# Patient Record
Sex: MALE | Race: WHITE | NOT HISPANIC OR LATINO
[De-identification: names, ages, dates, MRNs, and addresses within clinical notes are randomized per-mention and may not be internally consistent; named-entity substitution may affect disease eponyms.]

---

## 2022-04-27 PROBLEM — Z00.00 ENCOUNTER FOR PREVENTIVE HEALTH EXAMINATION: Status: ACTIVE | Noted: 2022-04-27

## 2022-04-28 ENCOUNTER — APPOINTMENT (OUTPATIENT)
Dept: ORTHOPEDIC SURGERY | Facility: CLINIC | Age: 87
End: 2022-04-28
Payer: MEDICARE

## 2022-04-28 VITALS — WEIGHT: 183 LBS | HEIGHT: 66 IN | BODY MASS INDEX: 29.41 KG/M2

## 2022-04-28 DIAGNOSIS — M75.42 IMPINGEMENT SYNDROME OF LEFT SHOULDER: ICD-10-CM

## 2022-04-28 DIAGNOSIS — M75.52 BURSITIS OF LEFT SHOULDER: ICD-10-CM

## 2022-04-28 DIAGNOSIS — M75.112 INCOMPLETE ROTATOR CUFF TEAR OR RUPTURE OF LEFT SHOULDER, NOT SPECIFIED AS TRAUMATIC: ICD-10-CM

## 2022-04-28 PROCEDURE — 99213 OFFICE O/P EST LOW 20 MIN: CPT

## 2022-04-28 NOTE — ASSESSMENT
[FreeTextEntry1] : CONTINUED PAIN DESPITE CSI AND CONSERVATIVE MGMT X 3 MONTHS\par UNABLE TO GET MRI DUE TO DEFIBRILLATOR\par BEGIN PT\par F/U WITH DR. WOO IF HE REMAINS SYMPTOMATIC, MAY NEED CT ARTHROGRAM FOR SURGICAL PLANNING\par \par The patient was advised of the diagnosis. The natural history of the pathology was explained in full to the patient in layman's terms. All questions were answered. The risks and benefits of surgical and non-surgical treatment alternatives were explained in full to the patient.\par \par I explained to the patient that OTC pain medication, ice, elevation, compression and rest would benefit them. They may return to activity after follow-up or when they no longer have any pain.\par \par The patient will ice, rest, and elevate the area to help with swelling.\par \par Patient is to begin over the counter oral anti-inflammatory medications on an as needed basis, as long as there are no medical contra-indications. Patient is counseled on possible GI and blood pressure side effects.\par \par

## 2022-04-28 NOTE — HISTORY OF PRESENT ILLNESS
[Left Arm] : left arm [9] : 9 [7] : 7 [de-identified] : pt is here for a follow up of the left shoulder. Pain with activity is a 9, with rest it is slightly lower. pt has lost strength in the arm last week. Saw Dr. Arevalo in [facility] 3/5/22 since pain was so bad. Had CSI with only 1 week relief of pain. [de-identified] : none

## 2022-04-28 NOTE — PHYSICAL EXAM
[Left] : left shoulder [Moderate] : moderate [5___] : abduction 5[unfilled]/5 [] : positive impingement testing [TWNoteComboBox4] : passive forward flexion 170 degrees [de-identified] : external rotation at 90 degrees of abduction 70 degrees [TWNoteComboBox5] : internal rotation at 90 degrees of abduction 45 degrees

## 2022-06-02 ENCOUNTER — APPOINTMENT (OUTPATIENT)
Dept: ORTHOPEDIC SURGERY | Facility: CLINIC | Age: 87
End: 2022-06-02

## 2022-08-18 ENCOUNTER — APPOINTMENT (OUTPATIENT)
Dept: ORTHOPEDIC SURGERY | Facility: CLINIC | Age: 87
End: 2022-08-18

## 2022-08-19 ENCOUNTER — APPOINTMENT (OUTPATIENT)
Dept: ORTHOPEDIC SURGERY | Facility: CLINIC | Age: 87
End: 2022-08-19

## 2022-08-19 VITALS — WEIGHT: 183 LBS | BODY MASS INDEX: 29.41 KG/M2 | HEIGHT: 66 IN

## 2022-08-19 DIAGNOSIS — M17.11 UNILATERAL PRIMARY OSTEOARTHRITIS, RIGHT KNEE: ICD-10-CM

## 2022-08-19 PROCEDURE — 99214 OFFICE O/P EST MOD 30 MIN: CPT | Mod: 25

## 2022-08-19 PROCEDURE — 73562 X-RAY EXAM OF KNEE 3: CPT | Mod: RT

## 2022-08-19 PROCEDURE — 20610 DRAIN/INJ JOINT/BURSA W/O US: CPT | Mod: RT

## 2022-08-19 NOTE — IMAGING
[Right] : right knee [de-identified] : Right knee with no skin changes/bony deformity.\par There is no effusion.\par Mild medial jointline TTP . 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

## 2022-08-19 NOTE — HISTORY OF PRESENT ILLNESS
[7] : 7 [Dull/Aching] : dull/aching [Localized] : localized [Retired] : Work status: retired [de-identified] : 8/19/2022: pt here with complaint of right anterior pain x 2 weeks w/o hx of trauma.\par Pt denies instability or associated weakness.\par Pt denies numbness to the RLE. \par \par PMH: CHF.\par Allergies: NKDA.  [] : Post Surgical Visit: no [FreeTextEntry1] : RT knee [FreeTextEntry5] : patient complains of Rt knee pain since 1-2 weeks ago\par no injury

## 2022-08-19 NOTE — PROCEDURE
[Large Joint Injection] : Large joint injection [Right] : of the right [Knee] : knee [Pain] : pain [Inflammation] : inflammation [X-ray evidence of Osteoarthritis on this or prior visit] : x-ray evidence of Osteoarthritis on this or prior visit [Alcohol] : alcohol [Betadine] : betadine [Ethyl Chloride sprayed topically] : ethyl chloride sprayed topically [Sterile technique used] : sterile technique used [___ cc    1%] : Lidocaine ~Vcc of 1%  [___ cc    0.25%] : Bupivacaine (Marcaine) ~Vcc of 0.25%  [___ cc    40mg] : Triamcinolone (Kenalog) ~Vcc of 40 mg  [] : Patient tolerated procedure well [Call if redness, pain or fever occur] : call if redness, pain or fever occur [Apply ice for 15min out of every hour for the next 12-24 hours as tolerated] : apply ice for 15 minutes out of every hour for the next 12-24 hours as tolerated [Previous OTC use and PT nontherapeutic] : patient has tried OTC's including aspirin, Ibuprofen, Aleve, etc or prescription NSAIDS, and/or exercises at home and/or physical therapy without satisfactory response [Patient had decreased mobility in the joint] : patient had decreased mobility in the joint [Risks, benefits, alternatives discussed / Verbal consent obtained] : the risks benefits, and alternatives have been discussed, and verbal consent was obtained [de-identified] : right knee CSI

## 2022-08-19 NOTE — ASSESSMENT
[FreeTextEntry1] : Pt cannot take nsaids due to cardiac issues.\par He has requested a csi today which was provided to the right knee. \par Pt will rto in 1-2 weeks for f/u care with Dr. Hurtado.

## 2023-07-28 ENCOUNTER — APPOINTMENT (OUTPATIENT)
Dept: ORTHOPEDIC SURGERY | Facility: CLINIC | Age: 88
End: 2023-07-28

## 2025-03-19 ENCOUNTER — INPATIENT (INPATIENT)
Facility: HOSPITAL | Age: 89
LOS: 1 days | Discharge: HOME | DRG: 291 | End: 2025-03-21
Attending: HOSPITALIST | Admitting: STUDENT IN AN ORGANIZED HEALTH CARE EDUCATION/TRAINING PROGRAM
Payer: MEDICARE

## 2025-03-19 PROCEDURE — 99285 EMERGENCY DEPT VISIT HI MDM: CPT

## 2025-03-20 ENCOUNTER — RESULT REVIEW (OUTPATIENT)
Age: 89
End: 2025-03-20

## 2025-03-20 ENCOUNTER — TRANSCRIPTION ENCOUNTER (OUTPATIENT)
Age: 89
End: 2025-03-20

## 2025-03-20 VITALS
WEIGHT: 184.97 LBS | RESPIRATION RATE: 18 BRPM | SYSTOLIC BLOOD PRESSURE: 114 MMHG | OXYGEN SATURATION: 100 % | DIASTOLIC BLOOD PRESSURE: 69 MMHG | TEMPERATURE: 99 F | HEIGHT: 68 IN | HEART RATE: 77 BPM

## 2025-03-20 DIAGNOSIS — C61 MALIGNANT NEOPLASM OF PROSTATE: ICD-10-CM

## 2025-03-20 DIAGNOSIS — Z98.890 OTHER SPECIFIED POSTPROCEDURAL STATES: Chronic | ICD-10-CM

## 2025-03-20 DIAGNOSIS — I10 ESSENTIAL (PRIMARY) HYPERTENSION: ICD-10-CM

## 2025-03-20 DIAGNOSIS — I50.9 HEART FAILURE, UNSPECIFIED: ICD-10-CM

## 2025-03-20 DIAGNOSIS — Z95.810 PRESENCE OF AUTOMATIC (IMPLANTABLE) CARDIAC DEFIBRILLATOR: Chronic | ICD-10-CM

## 2025-03-20 DIAGNOSIS — Z98.49 CATARACT EXTRACTION STATUS, UNSPECIFIED EYE: Chronic | ICD-10-CM

## 2025-03-20 DIAGNOSIS — R79.89 OTHER SPECIFIED ABNORMAL FINDINGS OF BLOOD CHEMISTRY: ICD-10-CM

## 2025-03-20 DIAGNOSIS — E78.5 HYPERLIPIDEMIA, UNSPECIFIED: ICD-10-CM

## 2025-03-20 DIAGNOSIS — E11.9 TYPE 2 DIABETES MELLITUS WITHOUT COMPLICATIONS: ICD-10-CM

## 2025-03-20 DIAGNOSIS — I50.23 ACUTE ON CHRONIC SYSTOLIC (CONGESTIVE) HEART FAILURE: ICD-10-CM

## 2025-03-20 DIAGNOSIS — Z29.9 ENCOUNTER FOR PROPHYLACTIC MEASURES, UNSPECIFIED: ICD-10-CM

## 2025-03-20 LAB
A1C WITH ESTIMATED AVERAGE GLUCOSE RESULT: 6.8 % — HIGH (ref 4–5.6)
ALBUMIN SERPL ELPH-MCNC: 3.7 G/DL — SIGNIFICANT CHANGE UP (ref 3.3–5)
ALP SERPL-CCNC: 66 U/L — SIGNIFICANT CHANGE UP (ref 40–120)
ALT FLD-CCNC: 39 U/L — SIGNIFICANT CHANGE UP (ref 12–78)
ANION GAP SERPL CALC-SCNC: 6 MMOL/L — SIGNIFICANT CHANGE UP (ref 5–17)
APTT BLD: 29.4 SEC — SIGNIFICANT CHANGE UP (ref 24.5–35.6)
AST SERPL-CCNC: 25 U/L — SIGNIFICANT CHANGE UP (ref 15–37)
BASOPHILS # BLD AUTO: 0.05 K/UL — SIGNIFICANT CHANGE UP (ref 0–0.2)
BASOPHILS NFR BLD AUTO: 0.6 % — SIGNIFICANT CHANGE UP (ref 0–2)
BILIRUB SERPL-MCNC: 1.2 MG/DL — SIGNIFICANT CHANGE UP (ref 0.2–1.2)
BUN SERPL-MCNC: 35 MG/DL — HIGH (ref 7–23)
CALCIUM SERPL-MCNC: 9.1 MG/DL — SIGNIFICANT CHANGE UP (ref 8.5–10.1)
CHLORIDE SERPL-SCNC: 111 MMOL/L — HIGH (ref 96–108)
CHOLEST SERPL-MCNC: 133 MG/DL — SIGNIFICANT CHANGE UP
CK MB CFR SERPL CALC: 2 NG/ML — SIGNIFICANT CHANGE UP (ref 0–3.6)
CREAT SERPL-MCNC: 1.7 MG/DL — HIGH (ref 0.5–1.3)
EOSINOPHIL # BLD AUTO: 0.26 K/UL — SIGNIFICANT CHANGE UP (ref 0–0.5)
EOSINOPHIL NFR BLD AUTO: 3.3 % — SIGNIFICANT CHANGE UP (ref 0–6)
ESTIMATED AVERAGE GLUCOSE: 148 MG/DL — HIGH (ref 68–114)
GLUCOSE SERPL-MCNC: 114 MG/DL — HIGH (ref 70–99)
HCT VFR BLD CALC: 42.2 % — SIGNIFICANT CHANGE UP (ref 39–50)
HGB BLD-MCNC: 14 G/DL — SIGNIFICANT CHANGE UP (ref 13–17)
IMM GRANULOCYTES NFR BLD AUTO: 0.5 % — SIGNIFICANT CHANGE UP (ref 0–0.9)
INR BLD: 1.4 RATIO — HIGH (ref 0.85–1.16)
LIPID PNL WITH DIRECT LDL SERPL: 81 MG/DL — SIGNIFICANT CHANGE UP
LYMPHOCYTES # BLD AUTO: 2.33 K/UL — SIGNIFICANT CHANGE UP (ref 1–3.3)
LYMPHOCYTES # BLD AUTO: 29.2 % — SIGNIFICANT CHANGE UP (ref 13–44)
MAGNESIUM SERPL-MCNC: 2.4 MG/DL — SIGNIFICANT CHANGE UP (ref 1.6–2.6)
MCHC RBC-ENTMCNC: 31.5 PG — SIGNIFICANT CHANGE UP (ref 27–34)
MCHC RBC-ENTMCNC: 33.2 G/DL — SIGNIFICANT CHANGE UP (ref 32–36)
MCV RBC AUTO: 94.8 FL — SIGNIFICANT CHANGE UP (ref 80–100)
MONOCYTES # BLD AUTO: 0.7 K/UL — SIGNIFICANT CHANGE UP (ref 0–0.9)
MONOCYTES NFR BLD AUTO: 8.8 % — SIGNIFICANT CHANGE UP (ref 2–14)
NEUTROPHILS # BLD AUTO: 4.6 K/UL — SIGNIFICANT CHANGE UP (ref 1.8–7.4)
NON HDL CHOLESTEROL: 94 MG/DL — SIGNIFICANT CHANGE UP
NRBC BLD AUTO-RTO: 0 /100 WBCS — SIGNIFICANT CHANGE UP (ref 0–0)
NT-PROBNP SERPL-SCNC: 9586 PG/ML — HIGH (ref 0–450)
PLATELET # BLD AUTO: 126 K/UL — LOW (ref 150–400)
POTASSIUM SERPL-MCNC: 3.8 MMOL/L — SIGNIFICANT CHANGE UP (ref 3.5–5.3)
POTASSIUM SERPL-SCNC: 3.8 MMOL/L — SIGNIFICANT CHANGE UP (ref 3.5–5.3)
PROTHROM AB SERPL-ACNC: 16.3 SEC — HIGH (ref 9.9–13.4)
RBC # BLD: 4.45 M/UL — SIGNIFICANT CHANGE UP (ref 4.2–5.8)
RBC # FLD: 14.4 % — SIGNIFICANT CHANGE UP (ref 10.3–14.5)
SODIUM SERPL-SCNC: 140 MMOL/L — SIGNIFICANT CHANGE UP (ref 135–145)
TRIGL SERPL-MCNC: 66 MG/DL — SIGNIFICANT CHANGE UP
TROPONIN I, HIGH SENSITIVITY RESULT: 2565.6 NG/L — HIGH
TROPONIN I, HIGH SENSITIVITY RESULT: 2646.5 NG/L — HIGH
WBC # BLD: 7.98 K/UL — SIGNIFICANT CHANGE UP (ref 3.8–10.5)
WBC # FLD AUTO: 7.98 K/UL — SIGNIFICANT CHANGE UP (ref 3.8–10.5)

## 2025-03-20 PROCEDURE — 71045 X-RAY EXAM CHEST 1 VIEW: CPT | Mod: 26

## 2025-03-20 PROCEDURE — 76775 US EXAM ABDO BACK WALL LIM: CPT | Mod: 26

## 2025-03-20 PROCEDURE — 99223 1ST HOSP IP/OBS HIGH 75: CPT | Mod: GC

## 2025-03-20 PROCEDURE — 93010 ELECTROCARDIOGRAM REPORT: CPT

## 2025-03-20 RX ORDER — FINASTERIDE 1 MG/1
5 TABLET, FILM COATED ORAL DAILY
Refills: 0 | Status: DISCONTINUED | OUTPATIENT
Start: 2025-03-20 | End: 2025-03-21

## 2025-03-20 RX ORDER — SACUBITRIL AND VALSARTAN 49; 51 MG/1; MG/1
1 TABLET, FILM COATED ORAL
Refills: 0 | DISCHARGE

## 2025-03-20 RX ORDER — DAPAGLIFLOZIN 5 MG/1
10 TABLET, FILM COATED ORAL DAILY
Refills: 0 | Status: DISCONTINUED | OUTPATIENT
Start: 2025-03-21 | End: 2025-03-21

## 2025-03-20 RX ORDER — INSULIN LISPRO 100 U/ML
INJECTION, SOLUTION INTRAVENOUS; SUBCUTANEOUS
Refills: 0 | Status: DISCONTINUED | OUTPATIENT
Start: 2025-03-20 | End: 2025-03-21

## 2025-03-20 RX ORDER — ASPIRIN 325 MG
81 TABLET ORAL DAILY
Refills: 0 | Status: DISCONTINUED | OUTPATIENT
Start: 2025-03-20 | End: 2025-03-21

## 2025-03-20 RX ORDER — DEXTROSE 50 % IN WATER 50 %
15 SYRINGE (ML) INTRAVENOUS ONCE
Refills: 0 | Status: DISCONTINUED | OUTPATIENT
Start: 2025-03-20 | End: 2025-03-21

## 2025-03-20 RX ORDER — FUROSEMIDE 10 MG/ML
40 INJECTION INTRAMUSCULAR; INTRAVENOUS ONCE
Refills: 0 | Status: COMPLETED | OUTPATIENT
Start: 2025-03-20 | End: 2025-03-20

## 2025-03-20 RX ORDER — FUROSEMIDE 10 MG/ML
40 INJECTION INTRAMUSCULAR; INTRAVENOUS DAILY
Refills: 0 | Status: DISCONTINUED | OUTPATIENT
Start: 2025-03-21 | End: 2025-03-21

## 2025-03-20 RX ORDER — HEPARIN SODIUM 1000 [USP'U]/ML
5000 INJECTION INTRAVENOUS; SUBCUTANEOUS EVERY 8 HOURS
Refills: 0 | Status: DISCONTINUED | OUTPATIENT
Start: 2025-03-20 | End: 2025-03-21

## 2025-03-20 RX ORDER — TAMSULOSIN HYDROCHLORIDE 0.4 MG/1
0.8 CAPSULE ORAL AT BEDTIME
Refills: 0 | Status: DISCONTINUED | OUTPATIENT
Start: 2025-03-20 | End: 2025-03-21

## 2025-03-20 RX ORDER — ASPIRIN 325 MG
324 TABLET ORAL ONCE
Refills: 0 | Status: COMPLETED | OUTPATIENT
Start: 2025-03-20 | End: 2025-03-20

## 2025-03-20 RX ORDER — ATORVASTATIN CALCIUM 80 MG/1
80 TABLET, FILM COATED ORAL AT BEDTIME
Refills: 0 | Status: DISCONTINUED | OUTPATIENT
Start: 2025-03-20 | End: 2025-03-21

## 2025-03-20 RX ORDER — ATORVASTATIN CALCIUM 80 MG/1
1 TABLET, FILM COATED ORAL
Refills: 0 | DISCHARGE

## 2025-03-20 RX ORDER — DEXTROSE 50 % IN WATER 50 %
25 SYRINGE (ML) INTRAVENOUS ONCE
Refills: 0 | Status: DISCONTINUED | OUTPATIENT
Start: 2025-03-20 | End: 2025-03-21

## 2025-03-20 RX ORDER — DAPAGLIFLOZIN 5 MG/1
1 TABLET, FILM COATED ORAL
Refills: 0 | DISCHARGE

## 2025-03-20 RX ORDER — TAMSULOSIN HYDROCHLORIDE 0.4 MG/1
2 CAPSULE ORAL
Refills: 0 | DISCHARGE

## 2025-03-20 RX ORDER — ASPIRIN 325 MG
0 TABLET ORAL
Refills: 0 | DISCHARGE

## 2025-03-20 RX ORDER — FINASTERIDE 1 MG/1
1 TABLET, FILM COATED ORAL
Refills: 0 | DISCHARGE

## 2025-03-20 RX ORDER — CARVEDILOL 3.12 MG/1
6.25 TABLET, FILM COATED ORAL EVERY 12 HOURS
Refills: 0 | Status: DISCONTINUED | OUTPATIENT
Start: 2025-03-20 | End: 2025-03-21

## 2025-03-20 RX ORDER — GLUCAGON 3 MG/1
1 POWDER NASAL ONCE
Refills: 0 | Status: DISCONTINUED | OUTPATIENT
Start: 2025-03-20 | End: 2025-03-21

## 2025-03-20 RX ORDER — SODIUM CHLORIDE 9 G/1000ML
1000 INJECTION, SOLUTION INTRAVENOUS
Refills: 0 | Status: DISCONTINUED | OUTPATIENT
Start: 2025-03-20 | End: 2025-03-21

## 2025-03-20 RX ORDER — DEXTROSE 50 % IN WATER 50 %
12.5 SYRINGE (ML) INTRAVENOUS ONCE
Refills: 0 | Status: DISCONTINUED | OUTPATIENT
Start: 2025-03-20 | End: 2025-03-21

## 2025-03-20 RX ORDER — SACUBITRIL AND VALSARTAN 49; 51 MG/1; MG/1
1 TABLET, FILM COATED ORAL
Refills: 0 | Status: DISCONTINUED | OUTPATIENT
Start: 2025-03-20 | End: 2025-03-20

## 2025-03-20 RX ADMIN — FUROSEMIDE 40 MILLIGRAM(S): 10 INJECTION INTRAMUSCULAR; INTRAVENOUS at 08:51

## 2025-03-20 RX ADMIN — INSULIN LISPRO 1: 100 INJECTION, SOLUTION INTRAVENOUS; SUBCUTANEOUS at 08:52

## 2025-03-20 RX ADMIN — TAMSULOSIN HYDROCHLORIDE 0.8 MILLIGRAM(S): 0.4 CAPSULE ORAL at 22:15

## 2025-03-20 RX ADMIN — Medication 324 MILLIGRAM(S): at 02:23

## 2025-03-20 RX ADMIN — Medication 4 GRAM(S): at 08:51

## 2025-03-20 RX ADMIN — Medication 81 MILLIGRAM(S): at 12:49

## 2025-03-20 RX ADMIN — HEPARIN SODIUM 5000 UNIT(S): 1000 INJECTION INTRAVENOUS; SUBCUTANEOUS at 04:32

## 2025-03-20 RX ADMIN — FUROSEMIDE 40 MILLIGRAM(S): 10 INJECTION INTRAMUSCULAR; INTRAVENOUS at 01:11

## 2025-03-20 RX ADMIN — HEPARIN SODIUM 5000 UNIT(S): 1000 INJECTION INTRAVENOUS; SUBCUTANEOUS at 16:47

## 2025-03-20 RX ADMIN — ATORVASTATIN CALCIUM 80 MILLIGRAM(S): 80 TABLET, FILM COATED ORAL at 22:15

## 2025-03-20 RX ADMIN — CARVEDILOL 6.25 MILLIGRAM(S): 3.12 TABLET, FILM COATED ORAL at 17:15

## 2025-03-20 RX ADMIN — Medication 4 GRAM(S): at 22:15

## 2025-03-20 RX ADMIN — HEPARIN SODIUM 5000 UNIT(S): 1000 INJECTION INTRAVENOUS; SUBCUTANEOUS at 22:15

## 2025-03-20 RX ADMIN — FINASTERIDE 5 MILLIGRAM(S): 1 TABLET, FILM COATED ORAL at 12:49

## 2025-03-20 NOTE — CARE COORDINATION ASSESSMENT. - NSPASTMEDSURGHISTORY_GEN_ALL_CORE_FT
PAST MEDICAL & SURGICAL HISTORY:  CHF (congestive heart failure)      T2DM (type 2 diabetes mellitus)      Prostate CA      HLD (hyperlipidemia)      HTN (hypertension)      NICM (nonischemic cardiomyopathy)      AICD (automatic cardioverter/defibrillator) present      H/O inguinal hernia repair      H/O cataract extraction      H/O prostate biopsy

## 2025-03-20 NOTE — DIETITIAN INITIAL EVALUATION ADULT - PROBLEM/PLAN-8
----- Message from Cony Lombardi MD sent at 4/16/2021  5:42 PM CDT -----  Negative COVID-19 PCR test.  Inform parent.  
Called and spoke with mother, informed her of lab result message below, mother voice understanding, no further questions asked.    
DISPLAY PLAN FREE TEXT

## 2025-03-20 NOTE — DIETITIAN INITIAL EVALUATION ADULT - NS FNS DIET ORDER
Diet, Regular:   DASH/TLC {Sodium & Cholesterol Restricted}  1200mL Fluid Restriction ([id number]) (03-20-25 @ 03:01)

## 2025-03-20 NOTE — CARE COORDINATION ASSESSMENT. - NSCAREPROVIDERS_GEN_ALL_CORE_FT
CARE PROVIDERS:  Accepting Physician: Maco Kruger  Access Services: Brook Perdomo  Administration: Kylee Dumont  Admitting: Maco Kruger  Attending: Maco Kruger  Cardiology Technician: Tamela Callaway  Case Management: Shannan Desouza  Consultant: Cayetano Boothe  Consultant: Ibrahima Ramires  Covering Team: Trent Velasquez  Covering Team: Laurita Cotter  Covering Team: Yordy Rod  ED Attending: Pablito Munoz  ED Nurse: Breana Clark  Nurse: Johanna Miller  Nurse: Nury Sanford  Nurse: Gianna Christianson  Nurse: Rosa Chaudhary  Ordered: ServiceAccount, SCMMLM  Ordered: Doctor, Unknown  Override: Johanna Miller  Physical Therapy: Myla Roberto  Primary Team: Ariel Chilel  Primary Team: Fito Whitley  Primary Team: Maco Kruger  Registered Dietitian: No Kim  Registered Dietitian: Ariadna Li  Respiratory Therapy: Alyson Hagen  : Savannah Marrufo  Team: [facility], Team

## 2025-03-20 NOTE — DISCHARGE NOTE PROVIDER - NSDCCPCAREPLAN_GEN_ALL_CORE_FT
PRINCIPAL DISCHARGE DIAGNOSIS  Diagnosis: Acute CHF  Assessment and Plan of Treatment: You were given IV Lasix with improvement in respiratory status.  Please continue lasix 40mg  oral daily.  Follow up with your PMD and Cardiologist in 1 week.      SECONDARY DISCHARGE DIAGNOSES  Diagnosis: HTN (hypertension)  Assessment and Plan of Treatment: Your blood pressure has been soft during the hospitalization.  Please check your blood pressure prior to taking your antihypertensive medications.  Hold Coreg and Entresto if SBP<110.    Diagnosis: HLD (hyperlipidemia)  Assessment and Plan of Treatment: Continue your statin medication.

## 2025-03-20 NOTE — DISCHARGE NOTE PROVIDER - CARE PROVIDERS DIRECT ADDRESSES
[email or web address]

## 2025-03-20 NOTE — PHYSICAL THERAPY INITIAL EVALUATION ADULT - AMBULATION SKILLS, REHAB EVAL
Patient is c/o nausea/vomiting/diarrhea with abd cramping. Denies blood in the stool, denies recent travel. Denies cp/sob/syncope.   Vitals reviewed.   Patient is awake, alert, answering questions appropriately, appears comfortable and not in any distress.  Lungs: CTA, no wheezing, no crackles.  Abd: +BS, NT, ND, soft, no rebound, no guarding. No CVA tenderness, no rash.  CNS: awake, alert, o x 3, no focal neurologic deficits.  A/P: Acute gastroenteritis,   labs, IVF,   symptomatic treatment,   reevaluation. independent

## 2025-03-20 NOTE — H&P ADULT - NSICDXPASTSURGICALHX_GEN_ALL_CORE_FT
PAST SURGICAL HISTORY:  AICD (automatic cardioverter/defibrillator) present     H/O cataract extraction     H/O inguinal hernia repair     H/O prostate biopsy

## 2025-03-20 NOTE — H&P ADULT - ATTENDING COMMENTS
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 Admitted for acute on chronic CHF exacerbation.     #Acute on chronic CHF exacerbation  Patient presenting with shortness of breath with exertional episode, associated cough, orthopnea   - CXR showing increased pulmonary vasculature, blunted costophrenic angles b/l lobes, on wet read pending official   - proBNP elevated 9586, Troponin 2646   - EKG with LBBB, no prior to compare   - S/P Lasix 40mg IVP x 1,  x 1   - will order one additional IV dose of 40 lasix in AM, and will allow cardiology to place standing regimen  - No old TTE record, however based on chart review PCP note EF 15%   - Obtain TTE   - Strict I&Os, daily weights, fluid restriction   - Remote tele, continuous pulse ox   - Supplemental O2 wean as tolerated   - Cardiology consulted Dr. Boothe, F/U recs.    #Demand Ischemia  Troponin 2646 on presentation  - SOB with exertion; no chest pain  - EKG with LBBB   - Suspect demand ischemia in setting of CHF exacerbation  - Trend troponin to peak    Agree with Resident's Note. Refer to resident's note for chronic comorbidities  76 minutes spent on total encounter excluding teaching and separately reported services. The necessity of the time spent during the encounter on this date of service was due to:   activities including direct patient care, counseling and/or coordinating care, and reviewing notes/lab data/imaging

## 2025-03-20 NOTE — H&P ADULT - PROBLEM SELECTOR PLAN 1
Patient presenting with shortness of breath with exertional episode, associated cough, orthopnea   - CXR showing increased pulmonary vasculature, blunted costophrenic angles b/l lobes, on wet read pending official   - proBNP elevated 9586, Troponin 2646   - EKG with LBBB, no prior to compare   - S/P Lasix 40mg IVP x 1,  x 1   - Start Lasix 40mg IVP BID  - No old TTE record, however based on chart review PCP note EF 15%   - Obtain TTE   - Strict I&Os, daily weights, fluid restriction   - Cardiology consulted Dr. Boothe, F/U recs Patient presenting with shortness of breath with exertional episode, associated cough, orthopnea   - CXR showing increased pulmonary vasculature, blunted costophrenic angles b/l lobes, on wet read pending official   - proBNP elevated 9586, Troponin 2646   - EKG with LBBB, no prior to compare   - S/P Lasix 40mg IVP x 1,  x 1   - Start Lasix 40mg IVP BID  - No old TTE record, however based on chart review PCP note EF 15%   - Obtain TTE   - Strict I&Os, daily weights, fluid restriction   - Remote tele, continuous pulse ox   - Cardiology consulted Dr. Boothe, F/U recs Patient presenting with shortness of breath with exertional episode, associated cough, orthopnea   - CXR showing increased pulmonary vasculature, blunted costophrenic angles b/l lobes, on wet read pending official   - proBNP elevated 9586, Troponin 2646   - EKG with LBBB, no prior to compare   - S/P Lasix 40mg IVP x 1,  x 1   - Start Lasix 40mg IVP BID  - No old TTE record, however based on chart review PCP note EF 15%   - Obtain TTE   - Strict I&Os, daily weights, fluid restriction   - Remote tele, continuous pulse ox   - Supplemental O2 wean as tolerated   - Cardiology consulted Dr. Boothe, F/U recs Patient presenting with shortness of breath with exertional episode, associated cough, orthopnea   - CXR showing increased pulmonary vasculature, blunted costophrenic angles b/l lobes, on wet read pending official   - proBNP elevated 9586, Troponin 2646   - EKG with LBBB, no prior to compare   - S/P Lasix 40mg IVP x 1,  x 1   - one additional IV lasix 40 mg ordered for AM, cardiology to determine standing lasix regimen given renal functioning  - No old TTE record, however based on chart review PCP note EF 15%   - Obtain TTE   - Strict I&Os, daily weights, fluid restriction   - Remote tele, continuous pulse ox   - Supplemental O2 wean as tolerated   - Cardiology consulted Dr. Boothe, F/U recs  - Nephro consulted; Dr Kaur for managing diuresis and preserving renal function

## 2025-03-20 NOTE — ED PROVIDER NOTE - OBJECTIVE STATEMENT
89 male PMH of CHF, EF 17%, on coreg 12.5 mg, lasix 20mg, farxiga 5mg, asa 81mg, entresto 49/51, afluzozin, presents to ER c/o SOB, worse with exertion today, denies chest pain, no fever, no cough. Patient states he has been cutting back on his lasix.  cardio Mezzafonte  PCP Jabario 89 male PMH of CHF, EF 17%, PPM, on coreg 12.5 mg, lasix 20mg, farxiga 5mg, asa 81mg, entresto 49/51, afluzozin, presents to ER c/o SOB, worse with exertion today, denies chest pain, no fever, no cough. Patient states he has been cutting back on his lasix.  cardio Mezzafonte  PCP Estefani

## 2025-03-20 NOTE — H&P ADULT - PROBLEM SELECTOR PLAN 2
Troponin 2646 on presentation  - SOB with exertion; no chest pain  - EKG with LBBB   - Suspect demand ischemia in setting of CHF exacerbation  - Trend troponin to peak

## 2025-03-20 NOTE — DIETITIAN INITIAL EVALUATION ADULT - PERTINENT LABORATORY DATA
03-20    140  |  111[H]  |  35[H]  ----------------------------<  114[H]  3.8   |  23  |  1.70[H]    Ca    9.1      20 Mar 2025 01:15  Mg     2.4     03-20    TPro  6.4  /  Alb  3.7  /  TBili  1.2  /  DBili  x   /  AST  25  /  ALT  39  /  AlkPhos  66  03-20  POCT Blood Glucose.: 160 mg/dL (03-20-25 @ 08:35)  A1C with Estimated Average Glucose Result: 6.8 % (03-20-25 @ 05:52)

## 2025-03-20 NOTE — CONSULT NOTE ADULT - SUBJECTIVE AND OBJECTIVE BOX
History of Present Illness:    Past Medical/Surgical History:    Medications:    Family History: Non-contributory family history of premature cardiovascular atherosclerotic disease    Social History: No tobacco, alcohol or drug use    Review of Systems:  General: No fevers, chills, weight gain  Skin: No rashes, color changes  Cardiovascular: No chest pain, orthopnea  Respiratory: No shortness of breath, cough  Gastrointestinal: No nausea, abdominal pain  Genitourinary: No incontinence, pain with urination  Musculoskeletal: No pain, swelling, decreased range of motion  Neurological: No headache, weakness  Psychiatric: No depression, anxiety  Endocrine: No weight gain, increased thirst  All other systems are comprehensively negative.    Physical Exam:  Vitals:        Vital Signs Last 24 Hrs  T(C): 36.8 (20 Mar 2025 04:52), Max: 37.1 (20 Mar 2025 00:11)  T(F): 98.3 (20 Mar 2025 04:52), Max: 98.7 (20 Mar 2025 00:11)  HR: 75 (20 Mar 2025 04:52) (75 - 77)  BP: 120/65 (20 Mar 2025 04:52) (114/69 - 120/65)  BP(mean): --  RR: 18 (20 Mar 2025 04:52) (18 - 18)  SpO2: 96% (20 Mar 2025 04:52) (96% - 100%)    Parameters below as of 20 Mar 2025 04:52  Patient On (Oxygen Delivery Method): nasal cannula  O2 Flow (L/min): 4    General: NAD  HEENT: MMM  Neck: No JVD, no carotid bruit  Lungs: CTAB  CV: RRR, nl S1/S2, no M/R/G  Abdomen: S/NT/ND, +BS  Extremities: No LE edema, no cyanosis  Neuro: AAOx3, non-focal  Skin: No rash    Labs:                        14.0   7.98  )-----------( 126      ( 20 Mar 2025 01:15 )             42.2     03-20    140  |  111[H]  |  35[H]  ----------------------------<  114[H]  3.8   |  23  |  1.70[H]    Ca    9.1      20 Mar 2025 01:15  Mg     2.4     03-20    TPro  6.4  /  Alb  3.7  /  TBili  1.2  /  DBili  x   /  AST  25  /  ALT  39  /  AlkPhos  66  03-20    CARDIAC MARKERS ( 20 Mar 2025 01:15 )  x     / x     / x     / x     / 2.0 ng/mL      PT/INR - ( 20 Mar 2025 01:15 )   PT: 16.3 sec;   INR: 1.40 ratio         PTT - ( 20 Mar 2025 01:15 )  PTT:29.4 sec    ECG/Telemetry:      History of Present Illness: The patient is an 89 year old male with a history of HTN, HL, DM, CKD, chronic systolic heart failure s/p ICD prostate cancer who presents with shortness of breath. He noted over the last day or so worsening dyspnea on exertion. His legs have been mildly swollen. No chest pain or dizziness. He states he has not been taking care of himself as well due to visiting his wife in the hospital and rehab after she suffered a stroke.    Past Medical/Surgical History:  HTN, HL, DM, CKD, chronic systolic heart failure s/p ICD prostate cancer    Medications:    Family History: Non-contributory family history of premature cardiovascular atherosclerotic disease    Social History: No tobacco, alcohol or drug use    Review of Systems:  General: No fevers, chills, weight gain  Skin: No rashes, color changes  Cardiovascular: No chest pain, orthopnea  Respiratory: No shortness of breath, cough  Gastrointestinal: No nausea, abdominal pain  Genitourinary: No incontinence, pain with urination  Musculoskeletal: No pain, swelling, decreased range of motion  Neurological: No headache, weakness  Psychiatric: No depression, anxiety  Endocrine: No weight gain, increased thirst  All other systems are comprehensively negative.    Physical Exam:  Vitals:        Vital Signs Last 24 Hrs  T(C): 36.8 (20 Mar 2025 04:52), Max: 37.1 (20 Mar 2025 00:11)  T(F): 98.3 (20 Mar 2025 04:52), Max: 98.7 (20 Mar 2025 00:11)  HR: 75 (20 Mar 2025 04:52) (75 - 77)  BP: 120/65 (20 Mar 2025 04:52) (114/69 - 120/65)  BP(mean): --  RR: 18 (20 Mar 2025 04:52) (18 - 18)  SpO2: 96% (20 Mar 2025 04:52) (96% - 100%)    Parameters below as of 20 Mar 2025 04:52  Patient On (Oxygen Delivery Method): nasal cannula  O2 Flow (L/min): 4    General: NAD  HEENT: MMM  Neck: No JVD, no carotid bruit  Lungs: CTAB  CV: RRR, nl S1/S2, no M/R/G  Abdomen: S/NT/ND, +BS  Extremities: 1+ LE edema, no cyanosis  Neuro: AAOx3, non-focal  Skin: No rash    Labs:                        14.0   7.98  )-----------( 126      ( 20 Mar 2025 01:15 )             42.2     03-20    140  |  111[H]  |  35[H]  ----------------------------<  114[H]  3.8   |  23  |  1.70[H]    Ca    9.1      20 Mar 2025 01:15  Mg     2.4     03-20    TPro  6.4  /  Alb  3.7  /  TBili  1.2  /  DBili  x   /  AST  25  /  ALT  39  /  AlkPhos  66  03-20    CARDIAC MARKERS ( 20 Mar 2025 01:15 )  x     / x     / x     / x     / 2.0 ng/mL      PT/INR - ( 20 Mar 2025 01:15 )   PT: 16.3 sec;   INR: 1.40 ratio         PTT - ( 20 Mar 2025 01:15 )  PTT:29.4 sec    ECG/Telemetry: NSR, LBBB

## 2025-03-20 NOTE — H&P ADULT - NSICDXPASTMEDICALHX_GEN_ALL_CORE_FT
PAST MEDICAL HISTORY:  CHF (congestive heart failure)     HLD (hyperlipidemia)     HTN (hypertension)     NICM (nonischemic cardiomyopathy)     Prostate CA     T2DM (type 2 diabetes mellitus)

## 2025-03-20 NOTE — PATIENT PROFILE ADULT - FALL HARM RISK - HARM RISK INTERVENTIONS

## 2025-03-20 NOTE — DISCHARGE NOTE PROVIDER - PROVIDER TOKENS
PROVIDER:[TOKEN:[[id number]],FOLLOWUP:[1 week]],PROVIDER:[TOKEN:[[id number]],FOLLOWUP:[1 week]]

## 2025-03-20 NOTE — CONSULT NOTE ADULT - SUBJECTIVE AND OBJECTIVE BOX
[facility]                             Nephrology and Hypertension                             Schoeneckhair Ramires                                          [phone]     Patient is a 89y old  Male who presents with a chief complaint of Heart failure     (20 Mar 2025 11:28)       HPI:  89 yo M with PMH HTN, HLD, T2DM, CKD, CHF 2/2 nonischemic cardiomyopathy s/p AICD 2018, Prostate CA S/P radioactive seed implantation in 2015, presents to the ED after episodes of sudden onset shortness of breath that occurred today. Pt states that these happened around ~6PM and ~11PM, while patient was exerting himself. Endorsed some associated dry cough, but denies any chest pain, palpitations, nausea, vomiting, or shoulder pain at the time. Pt does have some baseline SOB with exertion but today's episodes were more severe than prior. Does endorse significant orthopnea.   Pt is under significant amounts of stress due to his wife recently having a stroke and was admitted to a [facility] today.   Pt follows with his cardiologist every 3 months, no acute issues at the most recent visit. Pt feels comfortable at rest at this time, on 3.5L NC sating mid 90s. Baseline at home on RA.     ED Course:   Vitals: BP: 114/69, HR: 77, Temp: 98.7F, RR: 18, SpO2: 100% on RA   Labs:  BUN/Cr 35/1.7, Trop 2646, proBNP 9586    Nephrology is consulted for HANNAH on CKD. Patient states he has kidney disease but does not recall what the baseline creatinine is. He states breathing is better, no chest pain, no dizziness, has no chest pain. Has been getting iv lasix       PAST MEDICAL & SURGICAL HISTORY:  NICM (nonischemic cardiomyopathy)      HTN (hypertension)      HLD (hyperlipidemia)      Prostate CA      T2DM (type 2 diabetes mellitus)      CHF (congestive heart failure)      H/O prostate biopsy      H/O cataract extraction      H/O inguinal hernia repair      AICD (automatic cardioverter/defibrillator) present           FAMILY HISTORY:  Family history of cardiomyopathy (Father)    FH: CHF (congestive heart failure) (Sibling)    NC    Social History:Non smoker    MEDICATIONS  (STANDING):  aspirin enteric coated 81 milliGRAM(s) Oral daily  atorvastatin 80 milliGRAM(s) Oral at bedtime  carvedilol 6.25 milliGRAM(s) Oral every 12 hours  cholestyramine Powder (Sugar-Free) 4 Gram(s) Oral two times a day  dextrose 5%. 1000 milliLiter(s) (100 mL/Hr) IV Continuous <Continuous>  dextrose 5%. 1000 milliLiter(s) (50 mL/Hr) IV Continuous <Continuous>  dextrose 50% Injectable 25 Gram(s) IV Push once  dextrose 50% Injectable 12.5 Gram(s) IV Push once  dextrose 50% Injectable 25 Gram(s) IV Push once  finasteride 5 milliGRAM(s) Oral daily  glucagon  Injectable 1 milliGRAM(s) IntraMuscular once  heparin   Injectable 5000 Unit(s) SubCutaneous every 8 hours  insulin lispro (ADMELOG) corrective regimen sliding scale   SubCutaneous three times a day before meals  sacubitril 24 mG/valsartan 26 mG 1 Tablet(s) Oral two times a day  tamsulosin 0.8 milliGRAM(s) Oral at bedtime    MEDICATIONS  (PRN):  dextrose Oral Gel 15 Gram(s) Oral once PRN Blood Glucose LESS THAN 70 milliGRAM(s)/deciliter   Meds reviewed    Allergies    No Known Allergies    Intolerances         REVIEW OF SYSTEMS: neg other than above HPI    Vital Signs Last 24 Hrs  T(C): 37.1 (20 Mar 2025 08:31), Max: 37.1 (20 Mar 2025 00:11)  T(F): 98.7 (20 Mar 2025 08:31), Max: 98.7 (20 Mar 2025 00:11)  HR: 72 (20 Mar 2025 10:59) (55 - 77)  BP: 107/68 (20 Mar 2025 10:59) (102/68 - 120/65)  BP(mean): --  RR: 18 (20 Mar 2025 08:31) (18 - 18)  SpO2: 91% (20 Mar 2025 10:59) (91% - 100%)    Parameters below as of 20 Mar 2025 10:59  Patient On (Oxygen Delivery Method): room air      Daily Height in cm: 172.72 (20 Mar 2025 00:11)    Daily     PHYSICAL EXAM:    GENERAL: NAD  HEAD:  Atraumatic, Normocephalic  EYES: EOMI, conjunctiva and sclera clear  ENMT: No Drainage from nares, No drainage from ears  NERVOUS SYSTEM:  Awake and Alert  CHEST/LUNG: decreased BS  EXTREMITIES:  No Edema  SKIN: No rashes No obvious ecchymosis      LABS:                        14.0   7.98  )-----------( 126      ( 20 Mar 2025 01:15 )             42.2     03-20    140  |  111[H]  |  35[H]  ----------------------------<  114[H]  3.8   |  23  |  1.70[H]    Ca    9.1      20 Mar 2025 01:15  Mg     2.4     03-20    TPro  6.4  /  Alb  3.7  /  TBili  1.2  /  DBili  x   /  AST  25  /  ALT  39  /  AlkPhos  66  03-20    PT/INR - ( 20 Mar 2025 01:15 )   PT: 16.3 sec;   INR: 1.40 ratio         PTT - ( 20 Mar 2025 01:15 )  PTT:29.4 sec  Urinalysis Basic - ( 20 Mar 2025 01:15 )    Color: x / Appearance: x / SG: x / pH: x  Gluc: 114 mg/dL / Ketone: x  / Bili: x / Urobili: x   Blood: x / Protein: x / Nitrite: x   Leuk Esterase: x / RBC: x / WBC x   Sq Epi: x / Non Sq Epi: x / Bacteria: x      Magnesium: 2.4 mg/dL (03-20 @ 01:15)      RADIOLOGY & ADDITIONAL TESTS:  CXR: increased pulmonary vasculature, blunted costophrenic angles b/l lobes, on wet read pending official   EKG: LBBB, no prior EKG   Received in the ED: Lasix 40mg IVP x 1,  x 1   (20 Mar 2025 03:02)

## 2025-03-20 NOTE — DIETITIAN INITIAL EVALUATION ADULT - PERTINENT MEDS FT
MEDICATIONS  (STANDING):  aspirin enteric coated 81 milliGRAM(s) Oral daily  atorvastatin 80 milliGRAM(s) Oral at bedtime  carvedilol 6.25 milliGRAM(s) Oral every 12 hours  cholestyramine Powder (Sugar-Free) 4 Gram(s) Oral two times a day  dextrose 5%. 1000 milliLiter(s) (100 mL/Hr) IV Continuous <Continuous>  dextrose 5%. 1000 milliLiter(s) (50 mL/Hr) IV Continuous <Continuous>  dextrose 50% Injectable 25 Gram(s) IV Push once  dextrose 50% Injectable 12.5 Gram(s) IV Push once  dextrose 50% Injectable 25 Gram(s) IV Push once  finasteride 5 milliGRAM(s) Oral daily  glucagon  Injectable 1 milliGRAM(s) IntraMuscular once  heparin   Injectable 5000 Unit(s) SubCutaneous every 8 hours  insulin lispro (ADMELOG) corrective regimen sliding scale   SubCutaneous three times a day before meals  sacubitril 24 mG/valsartan 26 mG 1 Tablet(s) Oral two times a day  tamsulosin 0.8 milliGRAM(s) Oral at bedtime    MEDICATIONS  (PRN):  dextrose Oral Gel 15 Gram(s) Oral once PRN Blood Glucose LESS THAN 70 milliGRAM(s)/deciliter

## 2025-03-20 NOTE — GOALS OF CARE CONVERSATION - ADVANCED CARE PLANNING - CONVERSATION DETAILS
I discussed with patient in regards to his code status.  Pt. reports in the setting of cardiac arrest or acute respiratory failure he would want full resuscitation efforts.  Pt. remains full code.

## 2025-03-20 NOTE — H&P ADULT - NSICDXFAMILYHX_GEN_ALL_CORE_FT
FAMILY HISTORY:  Father  Still living? Unknown  Family history of cardiomyopathy, Age at diagnosis: Age Unknown    Sibling  Still living? Unknown  FH: CHF (congestive heart failure), Age at diagnosis: Age Unknown

## 2025-03-20 NOTE — DIETITIAN INITIAL EVALUATION ADULT - OTHER INFO
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 Admitted for acute on chronic CHF exacerbation.     Pt A+Ox4 at visit. Dash/TLC diet with 1.2L po FR rx. Pt tolerating meals well with good appetite/po intake. No report N/V. BM 3/19. Tries to watch sugar and salt in diet pta however admits to noncompliance at times. Consumes egg sandwich on bagel 2-3x/week. Small use table salt. Admits to increase fluid consumption recently. UBW 185lbs. Stable per pt, weighs himself regularly due to HF dx.  Recent cut back on lasix to every other day per pt. Hx type II DM, farxiga pta. A1c 6.8%. Tries to watch sugars in diet though does drink OJ for breakfast. Recommend Dash/TLC, Consistent Carbohyedrate diet with 1.2L po FR. Encourage compliance to rx therapeutic diet plus po FR. Written and verbal diet & HF nutrition therapy provided.

## 2025-03-20 NOTE — H&P ADULT - PATIENT'S PREFERRED PRONOUN
Him/He Carac Counseling:  I discussed with the patient the risks of Carac including but not limited to erythema, scaling, itching, weeping, crusting, and pain.

## 2025-03-20 NOTE — CASE MANAGEMENT PROGRESS NOTE - NSCMPROGRESSNOTE_GEN_ALL_CORE
Patient is identified as a CMS STAR patient. Transition care management program explained yellow contact card has been provided. PT eval- no skilled PT needs or DME recommended. Patient is currently on IV Lasix 40 daily, 4LNC and pending an Echo. CM remains available for transition planning when medically cleared.

## 2025-03-20 NOTE — ED ADULT TRIAGE NOTE - CHIEF COMPLAINT QUOTE
89 yr old male arrives to ED c/o sob, Pt notes worsening during activity, no obstruction to airway, Spo2 87% on RA, Pt able to completed full sentences without becoming sob. Pt notes no chest pain, fevers or chills, Eli TEE

## 2025-03-20 NOTE — H&P ADULT - HISTORY OF PRESENT ILLNESS
88 yo M with PMH HTN, HLD, T2DM ,CKD, CAD, NICM s/p AICD 2018, CHF, Prostate CA S/P radioactive seed implantation in 2015, GERD,  presents to the ED with worsening SOB on exertion.     Denies fever, chills, chest pain, palpitations, SOB, cough, abdominal pain, nausea, vomiting, diarrhea, constipation, urinary frequency, urgency, or dysuria, headaches, changes in vision, dizziness, numbness, tingling.  Denies recent travel, recent antibiotic use, or sick contacts.    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 Pt states that these happened around ~6PM and ~11PM, while patient was exerting himself. Endorsed some associated dry cough, but denies any chest pain, palpitations, nausea, vomiting, or shoulder pain at the time. Pt does have some baseline SOB with exertion but today's episodes were more severe than prior. Does endorse significant orthopnea.   Pt is under significant amounts of stress due to his wife recently having a stroke and was admitted to a [facility] today.   Pt follows with his cardiologist every 3 months, no acute issues at the most recent visit. Pt feels comfortable at rest at this time, on 3.5L NC sating mid 90s. Baseline at home on RA.     ED Course:   Vitals: BP: 114/69, HR: 77, Temp: 98.7F, RR: 18, SpO2: 100% on RA   Labs:  BUN/Cr 35/1.7, Trop 2646, proBNP 9586  CXR: increased pulmonary vasculature, blunted costophrenic angles b/l lobes, on wet read pending official   EKG: LBBB, no prior EKG   Received in the ED: Lasix 40mg IVP x 1,  x 1

## 2025-03-20 NOTE — ED ADULT NURSE NOTE - OBJECTIVE STATEMENT
The patient is a 89 years old male, presented to the ED with complaint of SOB. Patient reported that he experienced SOB while walking to the bathroom. Never had this before. 87% RA in triage. Patient denies pain, fever. Patient is alert oriented X3, no acute distress.  Patient has history of Diabetes.

## 2025-03-20 NOTE — CARE COORDINATION ASSESSMENT. - NSADDITIONAL INFORMATION_FT
Pt chart reviewed. Pt discussed during Interdisciplinary rounds. Pt is being managed on TELE for SOB. Pt discharge plan pending hospital course. This CM met with the pt at the bedside. This CM introduced self and CM role in discharge planning, pt verbalized understanding. Pt is aaox4, pt confirmed his demographics, pt declined to identify a caregiver. Pt reports he is independent w/ his ADL's, denies using any DME and denies having any home care. Care management team will remain available to assist w/ discharge planning needs.

## 2025-03-20 NOTE — PHYSICAL THERAPY INITIAL EVALUATION ADULT - ADDITIONAL COMMENTS
Pt lives in a house w/ stairs.  Pt is a community ambulator and is able to drive a car.  Currently pt's wife is at [facility].

## 2025-03-20 NOTE — PROGRESS NOTE ADULT - ASSESSMENT
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 Admitted for acute on chronic CHF exacerbation.        Problem/Plan - 1:  ·  Problem: Acute on chronic systolic congestive heart failure.   ·  Plan: Patient presenting with shortness of breath with exertional episode, associated cough, orthopnea   - CXR:  Small right pleural effusion. Bilateral basilar opacities. No pneumothorax.  - proBNP elevated 9586, Troponin 2646-->2565  - EKG with LBBB, no prior to compare   - S/P Lasix 40mg IVP x 1,  x 1   - s/p IV lasix 40 mg this morning  - No old TTE record, however based on chart review PCP note EF 15%   - Obtain TTE   - Strict I&Os, daily weights, fluid restriction   - Remote tele, continuous pulse ox   - Supplemental O2 wean as tolerated   - Cardiology consulted Dr. Boothe, F/U recs  - Nephro consulted; Dr Kaur for managing diuresis and preserving renal function.     Problem/Plan - 2:  ·  Problem: Elevated troponin.   ·  Plan: Troponin 2646 on presentation  - SOB with exertion; no chest pain  - EKG with LBBB   - Suspect demand ischemia in setting of CHF exacerbation  - Trend troponin  2646-->2565     Problem/Plan - 3:  ·  Problem: HTN (hypertension).   ·  Plan: Restart home Coreg 6.25mg PO Q12h     Problem/Plan - 4:  ·  Problem: HLD (hyperlipidemia).   ·  Plan: Continue Atorvastatin and Cholestyramine  - AM Lipid panel.     Problem/Plan - 5:  ·  Problem: CKD (chronic kidney disease).   ·  Plan: On chart review and PCP note, Cr baseline is 1.8   - Admit Cr 1.7; CKD3b.     Problem/Plan - 6:  ·  Problem: Prostate CA.   ·  Plan: Hx of biopsy, now s/p prostate  radioactive seed implantation in 2015  - Stable PSA, follows yearly   - Continue home Flomax 0.8mg QHS and Finasteride 5mg QD.     Problem/Plan - 7:  ·  Problem: T2DM (type 2 diabetes mellitus).   ·  Plan: Takes Farxiga 5mg QD - likely more for diabetes than CHF based on dosing  - Will hold at this time  - Start LDISS   - FS QAC QHS, hypoglycemia protocol  - Diet DASH CC.     Problem/Plan - 8:  ·  Problem: Need for prophylactic measure.   ·  Plan: DVT PPx: Heparin subq.

## 2025-03-20 NOTE — H&P ADULT - PROBLEM SELECTOR PLAN 7
Takes Farxiga 5mg QD - likely more for diabetes than CHF based on dosing  - Will hold at this time  - Start LDISS   - FS QAC QHS, hypoglycemia protocol  - Diet DASH CC

## 2025-03-20 NOTE — DIETITIAN INITIAL EVALUATION ADULT - PERSON TAUGHT/METHOD
Written and verbal diet and HF nutrition therapy provided. Good understanding of material discussed. RDs name/phone number left with patient if questions/concerns arise.

## 2025-03-20 NOTE — H&P ADULT - NSHPPHYSICALEXAM_GEN_ALL_CORE
T(C): 37.1 (03-20-25 @ 00:11), Max: 37.1 (03-20-25 @ 00:11)  HR: 77 (03-20-25 @ 00:11) (77 - 77)  BP: 114/69 (03-20-25 @ 00:11) (114/69 - 114/69)  RR: 18 (03-20-25 @ 00:11) (18 - 18)  SpO2: 100% (03-20-25 @ 00:11) (100% - 100%)    CONSTITUTIONAL: Well groomed, no apparent distress  EYES: PERRL and symmetric, EOMI,   ENMT: Oral mucosa with moist membranes.  RESP: No respiratory distress, no use of accessory muscles; crackles b/l lobes diffusely   CV: RRR, +S1S2, + systolic murmur; +1 pitting edema b/l LE   GI: Soft, NT, ND, no rebound, no guarding  MSK: No digital clubbing or cyanosis; examination of the (head/neck/spine/ribs/pelvis, RUE, LUE, RLE, LLE) without misalignment, Normal ROM without pain, no spinal tenderness, normal muscle strength/tone  SKIN: No rashes or ulcers noted  NEURO: CN II-XII intact; sensation intact in upper and lower extremities b/l to light touch   PSYCH: Appropriate insight/judgment; A+O x 3 T(C): 37.1 (03-20-25 @ 00:11), Max: 37.1 (03-20-25 @ 00:11)  HR: 77 (03-20-25 @ 00:11) (77 - 77)  BP: 114/69 (03-20-25 @ 00:11) (114/69 - 114/69)  RR: 18 (03-20-25 @ 00:11) (18 - 18)  SpO2: 100% (03-20-25 @ 00:11) (100% - 100%)    CONSTITUTIONAL: Well groomed, no apparent distress  EYES: PERRL and symmetric, EOMI,   ENMT: Oral mucosa with moist membranes.  RESP: No respiratory distress, no use of accessory muscles; crackles b/l lobes at bases  CV: RRR, +S1S2, + systolic murmur; +1 pitting edema b/l LE   GI: Soft, NT, ND, no rebound, no guarding  MSK: No digital clubbing or cyanosis; examination of the (head/neck/spine/ribs/pelvis, RUE, LUE, RLE, LLE) without misalignment, Normal ROM without pain, no spinal tenderness, normal muscle strength/tone  SKIN: No rashes or ulcers noted  NEURO: CN II-XII intact; sensation intact in upper and lower extremities b/l to light touch   PSYCH: Appropriate insight/judgment; A+O x 3

## 2025-03-20 NOTE — H&P ADULT - NSHPREVIEWOFSYSTEMS_GEN_ALL_CORE
CONSTITUTIONAL: denies fever, chills, fatigue, weakness  HEENT: denies blurred vision, sore throat  SKIN: denies new lesions, rash  CARDIOVASCULAR: denies chest pain, chest pressure, palpitations  RESPIRATORY: denies shortness of breath, cough, sputum production  GASTROINTESTINAL: denies nausea, vomiting, diarrhea, abdominal pain, melena or hematochezia  GENITOURINARY: denies dysuria, discharge  NEUROLOGICAL: denies numbness, headache, focal weakness  MUSCULOSKELETAL: denies new joint pain, muscle aches  HEMATOLOGIC: denies gross bleeding, bruising CONSTITUTIONAL: denies fever, chills, fatigue, weakness  HEENT: denies blurred vision, sore throat  SKIN: denies new lesions, rash  CARDIOVASCULAR: denies chest pain, chest pressure, palpitations  RESPIRATORY: + sudden onset shortness of breath with dry cough  GASTROINTESTINAL: denies nausea, vomiting, diarrhea, abdominal pain  GENITOURINARY: denies dysuria, discharge  NEUROLOGICAL: denies numbness, headache, focal weakness  MUSCULOSKELETAL: denies new joint pain, muscle aches  HEMATOLOGIC: denies gross bleeding, bruising

## 2025-03-20 NOTE — PHYSICAL THERAPY INITIAL EVALUATION ADULT - PERTINENT HX OF CURRENT PROBLEM, REHAB EVAL
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 Pt states that these happened around ~6PM and ~11PM, while patient was exerting himself. Endorsed some associated dry cough, but denies any chest pain, palpitations, nausea, vomiting, or shoulder pain at the time. Pt does have some baseline SOB with exertion but today's episodes were more severe than prior. Does endorse significant orthopnea. Pt is under significant amounts of stress due to his wife recently having a stroke and was admitted to a [facility]. CXR: increased pulmonary vasculature, blunted costophrenic angles b/l lobes

## 2025-03-20 NOTE — CONSULT NOTE ADULT - ASSESSMENT
The patient is an 89 year old male with a history of HTN, HL, DM, CKD, chronic systolic heart failure s/p ICD prostate cancer who presents with shortness of breath in the setting of acute on chronic systolic heart failure.    Plan:  - ECG with sinus rhythm and LBBB  - Outpatient echo 2023 with severely reduced LV systolic function (EF 20-25%), no significant valve issues  - BNP 9586  - CXR with small right pleural effusion  - Troponin elevated at 2646 and trended down; CKMB was normal. Suspect troponin is elevated due to acute heart failure.  - He head a cardiac catheterization in the past for work-up of his heart failure which showed mild non-obstructive CAD  - Repeat echo pending  - Continue Entresto 24-26 mg bid  - Continue dapagliflozin 10 mg daily  - Continue carvedilol 6.25 mg bid  - Continue aspirin 81 mg daily  - Continue atorvastatin 80 mg daily  - The patient's blood pressures as outpatient has been on the lower side with lightheadedness at times and his heart failure medications were reduced over the prior year. Will not start spironolactone due to this.  - Ambulate  - Likely will transition to oral diuretics tomorrow if symptoms improved
HANNAH on CKD  Acute on chronic CHF  HTN with CKD   HLD    -HANNAH on CKD, may be related to CRS with acute on chronic CHF  -SCr is 1.6 currently, no baseline to compare in sunrise but he has known CKD  -Will check renal US for completion, check Urine lytes  -Is volume overloaded, getting iv lasix  -Would rec holding entresto with HANNAH and BP borderline low  -Labs in am  -BP is lower end, monitor, recs above  -Remains on farxiga

## 2025-03-20 NOTE — ED PROVIDER NOTE - PROGRESS NOTE DETAILS
Patient signed out to me by Dr. Munoz follow-up labs and admit for acute CHF exacerbation.  Troponins are elevated to 2600.  Patient reevaluated states he feels better on the oxygen but denies any chest pain at this time or earlier.  Aspirin 324 ordered.  Will discuss with hospitalist.  Case d/w Dr. Kruger

## 2025-03-20 NOTE — DISCHARGE NOTE PROVIDER - NSDCMRMEDTOKEN_GEN_ALL_CORE_FT
aspirin 81 mg oral tablet: orally once a day  atorvastatin 80 mg oral tablet: 1 tab(s) orally once a day  carvedilol 12.5 mg oral tablet: 1 tab(s) orally 2 times a day  cholestyramine 4 g/8.3 g oral powder for reconstitution: 4 gram(s) orally 2 times a day  Entresto 24 mg-26 mg oral tablet: 1 tab(s) orally 2 times a day  Farxiga 5 mg oral tablet: 1 tab(s) orally once a day  finasteride 5 mg oral tablet: 1 tab(s) orally once a day  Flomax 0.4 mg oral capsule: 2 cap(s) orally once a day (at bedtime)  furosemide 20 mg oral tablet: 1 tab(s) orally once a day   aspirin 81 mg oral tablet: orally once a day  atorvastatin 80 mg oral tablet: 1 tab(s) orally once a day  carvedilol 6.25 mg oral tablet: 1 tab(s) orally every 12 hours  cholestyramine 4 g/8.3 g oral powder for reconstitution: 4 gram(s) orally 2 times a day  Entresto 24 mg-26 mg oral tablet: 1 tab(s) orally 2 times a day  Farxiga 5 mg oral tablet: 1 tab(s) orally once a day  finasteride 5 mg oral tablet: 1 tab(s) orally once a day  Flomax 0.4 mg oral capsule: 2 cap(s) orally once a day (at bedtime)  furosemide 40 mg oral tablet: 1 tab(s) orally once a day

## 2025-03-20 NOTE — ED ADULT TRIAGE NOTE - RESPIRATORY RATE (BREATHS/MIN)
OUTPATIENT PROGRESS NOTE    CHIEF COMPLAINT:  Chief Complaint   Patient presents with   •  Symptoms     started Saturday 1/25/2020       HPI:  The patient is a 52 year old adult who comes in with burning, urgency, frequency, hematuria, tactile fevers, low back pain, that started 6 days ago. She has increased water intake. No nausea, vomiting.     Due for colon cancer screening, influenza vaccine, breast cancer screening, and depression screening.    MEDICATIONS  Current Outpatient Medications   Medication Sig   • Acetaminophen (TYLENOL ARTHRITIS PAIN PO)    • Omega-3 Fatty Acids (FISH OIL) 1000 MG capsule Take 2 g by mouth 2 times daily.   • gemfibrozil (LOPID) 600 MG tablet Take 1 tablet by mouth 2 times daily.   • Multiple Vitamins-Minerals (MULTIVITAMIN ADULT PO)      No current facility-administered medications for this visit.        ALLERGIES  ALLERGIES:   Allergen Reactions   • Aleve SHORTNESS OF BREATH       HISTORIES  I have reviewed the past medical history, medications and allergies listed in the medical record as obtained by my nursing staff and support staff and agree with their documentation.    REVIEW OF SYSTEMS    Constitutional:  Denies weight loss, generalized fatigue, chills, night sweats, myalgia, dizziness, and weakness.  Head: Denies changes in thought process or difficulty concentrating. No headaches.  Respiratory:  Denies shortness of breath, cough, sputum production, hemoptysis.  Cardiovascular:  Denies chest pain, palpitations, and dyspnea on exertion. Genitourinary:  Denies urinary incontinence, urinary retention, hesitancy, weak stream.  Psychiatric:  Denies changes in mood, depression, anxiety, insomnia, concerns about excess alcohol consumption or illicit drug use.    PHYSICAL EXAM  Vitals:  Blood pressure 138/78, pulse 114, temperature 99 °F (37.2 °C), temperature source Oral, weight 121 kg, last menstrual period 01/01/2019, SpO2 96 %.  General: Pleasant and cooperative. Good eye  contact. Well-developed, well-nourished appearing, in no acute distress.   Respiratory:  Respirations even and unlabored.  Cardiac: No murmurs. S1 and S2 audible. Heart rate and rhythm regular.  No edema in bilateral upper or lower extremities.   GI/: Bowel sounds active in all 4 quadrants. Nontender to light/deep palpation. No hepatosplenomegaly/masses/bruits. Positive CVA tenderness bilaterally.   Psych: Oriented to person, place, time, situation. Speech/thought pattern clear.      ASSESSMENT/PLAN:  1. Acute cystitis with hematuria  2. Dysuria  Bactrim DS 1 tablet 2x daily x 7 days.  Your symptoms should begin to resolve within one day after starting treatment. It is important to take the full course of antibiotics to completely eliminate the infection. If your symptoms persist for more than two or three days after starting treatment, please notify me.     To help prevent/treat UTI’s:  1.) Increase water intake to help flush bacteria from the bladder.  2.) Urinate soon after intercourse to help prevent infection.  - URINALYSIS & REFLEX MICRO WITH CULTURE IF INDICATED; Future    3. Breast cancer screening  - MAMMO SCREENING W RUPA BILATERAL; Future    JANNA Perkins     20

## 2025-03-20 NOTE — PATIENT PROFILE ADULT - HAVE YOU EXPERIENCED VIOLENCE OR A TRAUMATIC EVENT?
[phone]                   no

## 2025-03-20 NOTE — H&P ADULT - ASSESSMENT
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 Admitted for acute on chronic CHF exacerbation. 
no

## 2025-03-20 NOTE — DISCHARGE NOTE PROVIDER - HOSPITAL COURSE
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 Pt states that these happened around ~6PM and ~11PM, while patient was exerting himself. Endorsed some associated dry cough, but denies any chest pain, palpitations, nausea, vomiting, or shoulder pain at the time. Pt does have some baseline SOB with exertion but today's episodes were more severe than prior. Does endorse significant orthopnea.   Pt is under significant amounts of stress due to his wife recently having a stroke and was admitted to a [facility] today.   Pt follows with his cardiologist every 3 months, no acute issues at the most recent visit. Pt feels comfortable at rest at this time, on 3.5L NC sating mid 90s. Baseline at home on RA.     ED Course:   Vitals: BP: 114/69, HR: 77, Temp: 98.7F, RR: 18, SpO2: 100% on RA   Labs:  BUN/Cr 35/1.7, Trop 2646, proBNP 9586  CXR: Small right pleural effusion. Bilateral basilar opacities. No pneumothorax.  EKG: LBBB, no prior EKG   Received in the ED: Lasix 40mg IVP x 1,  x 1    Pt. was admitted with Cardiology consultation.  He was given additional dose of IV Lasix. 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 Pt states that these happened around ~6PM and ~11PM, while patient was exerting himself. Endorsed some associated dry cough, but denies any chest pain, palpitations, nausea, vomiting, or shoulder pain at the time. Pt does have some baseline SOB with exertion but today's episodes were more severe than prior. Does endorse significant orthopnea.   Pt is under significant amounts of stress due to his wife recently having a stroke and was admitted to a [facility] today.   Pt follows with his cardiologist every 3 months, no acute issues at the most recent visit. Pt feels comfortable at rest at this time, on 3.5L NC sating mid 90s. Baseline at home on RA.     ED Course:   Vitals: BP: 114/69, HR: 77, Temp: 98.7F, RR: 18, SpO2: 100% on RA   Labs:  BUN/Cr 35/1.7, Trop 2646, proBNP 9586  CXR: Small right pleural effusion. Bilateral basilar opacities. No pneumothorax.  EKG: LBBB, no prior EKG   Received in the ED: Lasix 40mg IVP x 1,  x 1    Pt. was admitted with Cardiology consultation.  He was continued on IV Lasix.  Pt. had good clinical response.  GRIER resolved.  He was weaned off supplemental O2.    On day of discharge patient is in no distress.  Afebrile and denies having SOB.

## 2025-03-20 NOTE — ED PROVIDER NOTE - CLINICAL SUMMARY MEDICAL DECISION MAKING FREE TEXT BOX
89 male with dyspnea on exertion, hypoxia, place on cardiac monitor, pulse ox, o2 as needed, lasix, cbc, cmp, probnp, cardiac enzymes, chest xray, admit

## 2025-03-20 NOTE — H&P ADULT - PROBLEM SELECTOR PLAN 6
Hx of biopsy, now s/p prostate  radioactive seed implantation in 2015  - Stable PSA, follows yearly   - Continue home Flomax 0.8mg QHS and Finasteride 5mg QD

## 2025-03-20 NOTE — DIETITIAN INITIAL EVALUATION ADULT - PROBLEM SELECTOR PLAN 1
Patient presenting with shortness of breath with exertional episode, associated cough, orthopnea   - CXR showing increased pulmonary vasculature, blunted costophrenic angles b/l lobes, on wet read pending official   - proBNP elevated 9586, Troponin 2646   - EKG with LBBB, no prior to compare   - S/P Lasix 40mg IVP x 1,  x 1   - one additional IV lasix 40 mg ordered for AM, cardiology to determine standing lasix regimen given renal functioning  - No old TTE record, however based on chart review PCP note EF 15%   - Obtain TTE   - Strict I&Os, daily weights, fluid restriction   - Remote tele, continuous pulse ox   - Supplemental O2 wean as tolerated   - Cardiology consulted Dr. Boothe, F/U recs  - Nephro consulted; Dr Kaur for managing diuresis and preserving renal function

## 2025-03-20 NOTE — H&P ADULT - SOCIAL HISTORY: TOBACCO USE
Distant history of tobacco use. Quit smoking in 1985.  Prior to that he smoked a half a pack a day for 10 years.

## 2025-03-20 NOTE — DISCHARGE NOTE PROVIDER - CARE PROVIDER_API CALL
Lex Jara  Internal Medicine  [address] [place] [address]  Phone: [phone]  Fax: [phone]  Follow Up Time: 1 week    CLARKE PRINGLE  [address] [place] [address]  Phone: [phone]  Fax: [phone]  Follow Up Time: 1 week

## 2025-03-21 ENCOUNTER — TRANSCRIPTION ENCOUNTER (OUTPATIENT)
Age: 89
End: 2025-03-21

## 2025-03-21 VITALS
DIASTOLIC BLOOD PRESSURE: 67 MMHG | TEMPERATURE: 98 F | OXYGEN SATURATION: 93 % | SYSTOLIC BLOOD PRESSURE: 102 MMHG | HEART RATE: 72 BPM | RESPIRATION RATE: 18 BRPM

## 2025-03-21 LAB
ALBUMIN SERPL ELPH-MCNC: 3.5 G/DL — SIGNIFICANT CHANGE UP (ref 3.3–5)
ALP SERPL-CCNC: 57 U/L — SIGNIFICANT CHANGE UP (ref 40–120)
ALT FLD-CCNC: 30 U/L — SIGNIFICANT CHANGE UP (ref 12–78)
ANION GAP SERPL CALC-SCNC: 5 MMOL/L — SIGNIFICANT CHANGE UP (ref 5–17)
AST SERPL-CCNC: 19 U/L — SIGNIFICANT CHANGE UP (ref 15–37)
BASOPHILS # BLD AUTO: 0.06 K/UL — SIGNIFICANT CHANGE UP (ref 0–0.2)
BASOPHILS NFR BLD AUTO: 1 % — SIGNIFICANT CHANGE UP (ref 0–2)
BILIRUB SERPL-MCNC: 1.6 MG/DL — HIGH (ref 0.2–1.2)
CHLORIDE SERPL-SCNC: 110 MMOL/L — HIGH (ref 96–108)
CO2 SERPL-SCNC: 28 MMOL/L — SIGNIFICANT CHANGE UP (ref 22–31)
CREAT SERPL-MCNC: 1.7 MG/DL — HIGH (ref 0.5–1.3)
EGFR: 39 ML/MIN/1.73M2 — LOW
EGFR: 39 ML/MIN/1.73M2 — LOW
EOSINOPHIL # BLD AUTO: 0.28 K/UL — SIGNIFICANT CHANGE UP (ref 0–0.5)
EOSINOPHIL NFR BLD AUTO: 4.5 % — SIGNIFICANT CHANGE UP (ref 0–6)
GLUCOSE SERPL-MCNC: 113 MG/DL — HIGH (ref 70–99)
HCT VFR BLD CALC: 41.4 % — SIGNIFICANT CHANGE UP (ref 39–50)
HGB BLD-MCNC: 13.9 G/DL — SIGNIFICANT CHANGE UP (ref 13–17)
LYMPHOCYTES # BLD AUTO: 2.06 K/UL — SIGNIFICANT CHANGE UP (ref 1–3.3)
LYMPHOCYTES # BLD AUTO: 33.3 % — SIGNIFICANT CHANGE UP (ref 13–44)
MAGNESIUM SERPL-MCNC: 2.4 MG/DL — SIGNIFICANT CHANGE UP (ref 1.6–2.6)
MCHC RBC-ENTMCNC: 31.7 PG — SIGNIFICANT CHANGE UP (ref 27–34)
MCHC RBC-ENTMCNC: 33.6 G/DL — SIGNIFICANT CHANGE UP (ref 32–36)
MONOCYTES # BLD AUTO: 0.73 K/UL — SIGNIFICANT CHANGE UP (ref 0–0.9)
MONOCYTES NFR BLD AUTO: 11.8 % — SIGNIFICANT CHANGE UP (ref 2–14)
NEUTROPHILS # BLD AUTO: 3.03 K/UL — SIGNIFICANT CHANGE UP (ref 1.8–7.4)
NEUTROPHILS NFR BLD AUTO: 48.9 % — SIGNIFICANT CHANGE UP (ref 43–77)
NRBC BLD AUTO-RTO: 0 /100 WBCS — SIGNIFICANT CHANGE UP (ref 0–0)
PHOSPHATE SERPL-MCNC: 3 MG/DL — SIGNIFICANT CHANGE UP (ref 2.5–4.5)
PLATELET # BLD AUTO: 112 K/UL — LOW (ref 150–400)
POTASSIUM SERPL-MCNC: 3.9 MMOL/L — SIGNIFICANT CHANGE UP (ref 3.5–5.3)
POTASSIUM SERPL-SCNC: 3.9 MMOL/L — SIGNIFICANT CHANGE UP (ref 3.5–5.3)
PROT SERPL-MCNC: 6 G/DL — SIGNIFICANT CHANGE UP (ref 6–8.3)
RBC # BLD: 4.38 M/UL — SIGNIFICANT CHANGE UP (ref 4.2–5.8)
RBC # FLD: 14.5 % — SIGNIFICANT CHANGE UP (ref 10.3–14.5)
SODIUM SERPL-SCNC: 143 MMOL/L — SIGNIFICANT CHANGE UP (ref 135–145)
WBC # BLD: 6.19 K/UL — SIGNIFICANT CHANGE UP (ref 3.8–10.5)
WBC # FLD AUTO: 6.19 K/UL — SIGNIFICANT CHANGE UP (ref 3.8–10.5)

## 2025-03-21 PROCEDURE — 84100 ASSAY OF PHOSPHORUS: CPT

## 2025-03-21 PROCEDURE — 80053 COMPREHEN METABOLIC PANEL: CPT

## 2025-03-21 PROCEDURE — 80061 LIPID PANEL: CPT

## 2025-03-21 PROCEDURE — 84484 ASSAY OF TROPONIN QUANT: CPT

## 2025-03-21 PROCEDURE — 93005 ELECTROCARDIOGRAM TRACING: CPT

## 2025-03-21 PROCEDURE — 96374 THER/PROPH/DIAG INJ IV PUSH: CPT

## 2025-03-21 PROCEDURE — 85610 PROTHROMBIN TIME: CPT

## 2025-03-21 PROCEDURE — 99239 HOSP IP/OBS DSCHRG MGMT >30: CPT

## 2025-03-21 PROCEDURE — 71045 X-RAY EXAM CHEST 1 VIEW: CPT

## 2025-03-21 PROCEDURE — 82962 GLUCOSE BLOOD TEST: CPT

## 2025-03-21 PROCEDURE — 83735 ASSAY OF MAGNESIUM: CPT

## 2025-03-21 PROCEDURE — 82553 CREATINE MB FRACTION: CPT

## 2025-03-21 PROCEDURE — 85730 THROMBOPLASTIN TIME PARTIAL: CPT

## 2025-03-21 PROCEDURE — 99285 EMERGENCY DEPT VISIT HI MDM: CPT

## 2025-03-21 PROCEDURE — 83036 HEMOGLOBIN GLYCOSYLATED A1C: CPT

## 2025-03-21 PROCEDURE — 97162 PT EVAL MOD COMPLEX 30 MIN: CPT

## 2025-03-21 PROCEDURE — 83880 ASSAY OF NATRIURETIC PEPTIDE: CPT

## 2025-03-21 PROCEDURE — 76775 US EXAM ABDO BACK WALL LIM: CPT

## 2025-03-21 PROCEDURE — 36415 COLL VENOUS BLD VENIPUNCTURE: CPT

## 2025-03-21 PROCEDURE — 93010 ELECTROCARDIOGRAM REPORT: CPT

## 2025-03-21 PROCEDURE — 85025 COMPLETE CBC W/AUTO DIFF WBC: CPT

## 2025-03-21 PROCEDURE — 93306 TTE W/DOPPLER COMPLETE: CPT

## 2025-03-21 RX ORDER — CARVEDILOL 3.12 MG/1
0.5 TABLET, FILM COATED ORAL
Qty: 60 | Refills: 0 | DISCHARGE
Start: 2025-03-21

## 2025-03-21 RX ORDER — CARVEDILOL 3.12 MG/1
1 TABLET, FILM COATED ORAL
Qty: 60 | Refills: 0
Start: 2025-03-21

## 2025-03-21 RX ORDER — CARVEDILOL 3.12 MG/1
1 TABLET, FILM COATED ORAL
Refills: 0 | DISCHARGE

## 2025-03-21 RX ORDER — FUROSEMIDE 10 MG/ML
1 INJECTION INTRAMUSCULAR; INTRAVENOUS
Qty: 30 | Refills: 0
Start: 2025-03-21

## 2025-03-21 RX ORDER — FUROSEMIDE 10 MG/ML
1 INJECTION INTRAMUSCULAR; INTRAVENOUS
Refills: 0 | DISCHARGE

## 2025-03-21 RX ORDER — FUROSEMIDE 10 MG/ML
40 INJECTION INTRAMUSCULAR; INTRAVENOUS DAILY
Refills: 0 | Status: DISCONTINUED | OUTPATIENT
Start: 2025-03-21 | End: 2025-03-21

## 2025-03-21 RX ORDER — FUROSEMIDE 10 MG/ML
40 INJECTION INTRAMUSCULAR; INTRAVENOUS DAILY
Refills: 0 | Status: DISCONTINUED | OUTPATIENT
Start: 2025-03-22 | End: 2025-03-21

## 2025-03-21 RX ADMIN — Medication 4 GRAM(S): at 08:31

## 2025-03-21 RX ADMIN — HEPARIN SODIUM 5000 UNIT(S): 1000 INJECTION INTRAVENOUS; SUBCUTANEOUS at 13:22

## 2025-03-21 RX ADMIN — HEPARIN SODIUM 5000 UNIT(S): 1000 INJECTION INTRAVENOUS; SUBCUTANEOUS at 05:59

## 2025-03-21 RX ADMIN — Medication 81 MILLIGRAM(S): at 11:34

## 2025-03-21 RX ADMIN — DAPAGLIFLOZIN 10 MILLIGRAM(S): 5 TABLET, FILM COATED ORAL at 11:34

## 2025-03-21 RX ADMIN — FINASTERIDE 5 MILLIGRAM(S): 1 TABLET, FILM COATED ORAL at 11:34

## 2025-03-21 NOTE — CASE MANAGEMENT PROGRESS NOTE - NSCMPROGRESSNOTE_GEN_ALL_CORE
Patient is medically cleared for discharge home today. CMS STAR. CM discussed home care services for a visiting nurse. Home care choice list provided. Patient declined and stated he does not want a visiting nurse. Patient stated he will follow up with his Cardiologist from [place] next week. Declined for CM to make follow up appointment. Transition care management program explained and yellow contact card has been provided. Patient is receptive for a call from [facility]. CM left a message for TCM RN alerting of discharge home today and that patient declined home care services. Patient stated he will drive himself home today. IMM reviewed/patient signed/ copy provided to the patient. Patient verbalized understanding of the transition plan and is in agreement. Bedside RN aware of plan. CM remains available.

## 2025-03-21 NOTE — PROGRESS NOTE ADULT - ASSESSMENT
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 Admitted for acute on chronic CHF exacerbation.        Problem/Plan - 1:  ·  Problem: Acute on chronic systolic congestive heart failure.   ·  Plan: Patient presenting with shortness of breath with exertional episode, associated cough, orthopnea   - CXR:  Small right pleural effusion. Bilateral basilar opacities. No pneumothorax.  - proBNP elevated 9586, Troponin 2646-->2565  - EKG with LBBB, no prior to compare   - S/P Lasix 40mg IVP x 1,  x 1   - continue Lasix 40mg IV daily and Coreg 6.25mg PO Q12h with holding parameters  - No old TTE record, however based on chart review PCP note EF 15%   - Check TTE   - Strict I&Os, daily weights, fluid restriction   - Remote tele, continuous pulse ox   - Supplemental O2 wean as tolerated   - Cardiology consulted Dr. Boothe, F/U recs  - Nephro consulted; Dr Kaur for managing diuresis and preserving renal function.     Problem/Plan - 2:  ·  Problem: Elevated troponin.   ·  Plan: Troponin 2646 on presentation  - SOB with exertion; no chest pain  - EKG with LBBB   - Suspect demand ischemia in setting of CHF exacerbation  - Trend troponin  2646-->2565     Problem/Plan - 3:  ·  Problem: HTN (hypertension).   ·  Plan: Continue home Coreg 6.25mg PO Q12h with holding parameters     Problem/Plan - 4:  ·  Problem: HLD (hyperlipidemia).   ·  Plan: Continue Atorvastatin and Cholestyramine  - Lipid panel LDL 81     Problem/Plan - 5:  ·  Problem: CKD (chronic kidney disease).   ·  Plan: On chart review and PCP note, Cr baseline is 1.8   - Admit Cr 1.7; CKD3b.  - Entresto held due to soft BP/hypotension     Problem/Plan - 6:  ·  Problem: Prostate CA.   ·  Plan: Hx of biopsy, now s/p prostate  radioactive seed implantation in 2015  - Stable PSA, follows yearly   - Continue home Flomax 0.8mg QHS and Finasteride 5mg QD.     Problem/Plan - 7:  ·  Problem: T2DM (type 2 diabetes mellitus).   ·  Plan: Takes Farxiga 5mg QD - likely more for diabetes than CHF based on dosing  - Started on LDISS   - FS QAC QHS, hypoglycemia protocol  - Diet DASH CC.     Problem/Plan - 8:  ·  Problem: Need for prophylactic measure.   ·  Plan: DVT PPx: Heparin subq.       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 Admitted for acute on chronic CHF exacerbation.        Problem/Plan - 1:  ·  Problem: Acute on chronic systolic congestive heart failure.   ·  Plan: Patient presenting with shortness of breath with exertional episode, associated cough, orthopnea   - CXR:  Small right pleural effusion. Bilateral basilar opacities. No pneumothorax.  - proBNP elevated 9586, Troponin 2646-->2565  - EKG with LBBB, no prior to compare   - S/P Lasix 40mg IVP x 1,  x 1   - continue Lasix 40mg IV daily and Coreg 6.25mg PO Q12h with holding parameters  - No old TTE record, however based on chart review PCP note EF 15%   - Check TTE   - Strict I&Os, daily weights, fluid restriction   - Remote tele, continuous pulse ox   - Supplemental O2 wean as tolerated   - Reported no GRIER with PT yesterday  - Cardiology consulted Dr. Boothe, F/U recs  - Nephro consulted; Dr Kaur for managing diuresis and preserving renal function.     Problem/Plan - 2:  ·  Problem: Elevated troponin.   ·  Plan: Troponin 2646 on presentation  - SOB with exertion; no chest pain  - EKG with LBBB   - Suspect demand ischemia in setting of CHF exacerbation  - Trend troponin  2646-->2565     Problem/Plan - 3:  ·  Problem: HTN (hypertension).   ·  Plan: Continue home Coreg 6.25mg PO Q12h with holding parameters     Problem/Plan - 4:  ·  Problem: HLD (hyperlipidemia).   ·  Plan: Continue Atorvastatin and Cholestyramine  - Lipid panel LDL 81     Problem/Plan - 5:  ·  Problem: CKD (chronic kidney disease).   ·  Plan: On chart review and PCP note, Cr baseline is 1.8   - Admit Cr 1.7; CKD3b.  - Kidney US: No hydronephrosis or other acute finding.  Multiple simple bilateral renal cysts  - Entresto held due to soft BP/hypotension     Problem/Plan - 6:  ·  Problem: Prostate CA.   ·  Plan: Hx of biopsy, now s/p prostate  radioactive seed implantation in 2015  - Stable PSA, follows yearly   - Continue home Flomax 0.8mg QHS and Finasteride 5mg QD.     Problem/Plan - 7:  ·  Problem: T2DM (type 2 diabetes mellitus).   ·  Plan: Takes Farxiga 5mg QD - likely more for diabetes than CHF based on dosing  - Started on LDISS   - FS QAC QHS, hypoglycemia protocol  - Diet DASH CC.     Problem/Plan - 8:  ·  Problem: Need for prophylactic measure.   ·  Plan: DVT PPx: Heparin subq.

## 2025-03-21 NOTE — PROGRESS NOTE ADULT - ASSESSMENT
The patient is an 89 year old male with a history of HTN, HL, DM, CKD, chronic systolic heart failure s/p ICD prostate cancer who presents with shortness of breath in the setting of acute on chronic systolic heart failure.    Plan:  - ECG with sinus rhythm and LBBB  - Echo  with severely reduced LV systolic function (EF 20-25%), mod MR  - BNP 9586  - CXR with small right pleural effusion  - Troponin elevated at 2646 and trended down; CKMB was normal. Suspect troponin is elevated due to acute heart failure.  - He head a cardiac catheterization in the past for work-up of his heart failure which showed mild non-obstructive CAD  - Continue Entresto 24-26 mg bid  - Continue dapagliflozin 10 mg daily  - Continue carvedilol 6.25 mg bid  - Continue aspirin 81 mg daily  - Continue atorvastatin 80 mg daily  - The patient's blood pressures as outpatient has been on the lower side with lightheadedness at times and his heart failure medications were reduced over the prior year. Will not start spironolactone due to this.  - Lower to furosemide 40 mg PO daily (he was on 20 mg qam and at times 20 mg qpm, would simplify to 40 mg daily)  - Ambulate  - Discharge planning

## 2025-03-21 NOTE — DISCHARGE NOTE NURSING/CASE MANAGEMENT/SOCIAL WORK - PATIENT PORTAL LINK FT
You can access the FollowMyHealth Patient Portal offered by [facility] by registering at the following website: [email or web address]. By joining [facility]’s FollowMyHealth portal, you will also be able to view your health information using other applications (apps) compatible with our system.

## 2025-03-21 NOTE — PROGRESS NOTE ADULT - SUBJECTIVE AND OBJECTIVE BOX
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 Pt states that these happened around ~6PM and ~11PM, while patient was exerting himself. Endorsed some associated dry cough, but denies any chest pain, palpitations, nausea, vomiting, or shoulder pain at the time. Pt does have some baseline SOB with exertion but today's episodes were more severe than prior. Does endorse significant orthopnea.   Pt is under significant amounts of stress due to his wife recently having a stroke and was admitted to a MYRA today.   Pt follows with his cardiologist every 3 months, no acute issues at the most recent visit. Pt feels comfortable at rest at this time, on 3.5L NC sating mid 90s. Baseline at home on RA.     ED Course:   Vitals: BP: 114/69, HR: 77, Temp: 98.7F, RR: 18, SpO2: 100% on RA   Labs:  BUN/Cr 35/1.7, Trop 2646, proBNP 9586  Nephrology is consulted for HANNAH on CKD. Patient states he has kidney disease but does not recall what the baseline creatinine is. He states breathing is better, no chest pain, no dizziness, has no chest pain. Has been getting iv lasix     No N/V/SOB    PAST MEDICAL & SURGICAL HISTORY:  NICM (nonischemic cardiomyopathy)      HTN (hypertension)      HLD (hyperlipidemia)      Prostate CA      T2DM (type 2 diabetes mellitus)      CHF (congestive heart failure)      H/O prostate biopsy      H/O cataract extraction      H/O inguinal hernia repair      AICD (automatic cardioverter/defibrillator) present           FAMILY HISTORY:  Family history of cardiomyopathy (Father)    FH: CHF (congestive heart failure) (Sibling)    NC    Social History:Non smoker    MEDICATIONS  (STANDING):  aspirin enteric coated 81 milliGRAM(s) Oral daily  atorvastatin 80 milliGRAM(s) Oral at bedtime  carvedilol 6.25 milliGRAM(s) Oral every 12 hours  cholestyramine Powder (Sugar-Free) 4 Gram(s) Oral two times a day  dextrose 5%. 1000 milliLiter(s) (100 mL/Hr) IV Continuous <Continuous>  dextrose 5%. 1000 milliLiter(s) (50 mL/Hr) IV Continuous <Continuous>  dextrose 50% Injectable 25 Gram(s) IV Push once  dextrose 50% Injectable 12.5 Gram(s) IV Push once  dextrose 50% Injectable 25 Gram(s) IV Push once  finasteride 5 milliGRAM(s) Oral daily  glucagon  Injectable 1 milliGRAM(s) IntraMuscular once  heparin   Injectable 5000 Unit(s) SubCutaneous every 8 hours  insulin lispro (ADMELOG) corrective regimen sliding scale   SubCutaneous three times a day before meals  sacubitril 24 mG/valsartan 26 mG 1 Tablet(s) Oral two times a day  tamsulosin 0.8 milliGRAM(s) Oral at bedtime    MEDICATIONS  (PRN):  dextrose Oral Gel 15 Gram(s) Oral once PRN Blood Glucose LESS THAN 70 milliGRAM(s)/deciliter   Meds reviewed    Allergies    No Known Allergies    Intolerances         REVIEW OF SYSTEMS: neg other than above HPI    ICU Vital Signs Last 24 Hrs  T(C): 36.6 (21 Mar 2025 11:54), Max: 36.8 (20 Mar 2025 20:47)  T(F): 97.9 (21 Mar 2025 11:54), Max: 98.3 (20 Mar 2025 20:47)  HR: 72 (21 Mar 2025 11:54) (58 - 72)  BP: 102/67 (21 Mar 2025 11:54) (90/48 - 110/64)  BP(mean): --  ABP: --  ABP(mean): --  RR: 18 (21 Mar 2025 11:54) (17 - 18)  SpO2: 93% (21 Mar 2025 11:54) (92% - 100%)    O2 Parameters below as of 21 Mar 2025 11:54  Patient On (Oxygen Delivery Method): room air            PHYSICAL EXAM:    GENERAL: NAD  HEAD:  Atraumatic, Normocephalic  EYES: EOMI, conjunctiva and sclera clear  ENMT: No Drainage from nares, No drainage from ears  NERVOUS SYSTEM:  Awake and Alert  CHEST/LUNG: decreased BS  EXTREMITIES:  No Edema  SKIN: No rashes No obvious ecchymosis      LABS:                                   13.9   6.19  )-----------( 112      ( 21 Mar 2025 08:00 )             41.4     03-21    143  |  110[H]  |  36[H]  ----------------------------<  113[H]  3.9   |  28  |  1.70[H]    Ca    9.0      21 Mar 2025 08:00  Phos  3.0     03-21  Mg     2.4     03-21    TPro  6.0  /  Alb  3.5  /  TBili  1.6[H]  /  DBili  x   /  AST  19  /  ALT  30  /  AlkPhos  57  03-21    PT/INR - ( 20 Mar 2025 01:15 )   PT: 16.3 sec;   INR: 1.40 ratio         PTT - ( 20 Mar 2025 01:15 )  PTT:29.4 sec  Urinalysis Basic - ( 21 Mar 2025 08:00 )    Color: x / Appearance: x / SG: x / pH: x  Gluc: 113 mg/dL / Ketone: x  / Bili: x / Urobili: x   Blood: x / Protein: x / Nitrite: x   Leuk Esterase: x / RBC: x / WBC x   Sq Epi: x / Non Sq Epi: x / Bacteria: x          
CHIEF COMPLAINT/INTERVAL HISTORY:  Pt. seen and evaluated for acute on chronic systolic chf exacerbation.  Pt. is in no distress.  Denies having SOB while at rest.  Tolerated IV lasix.  Denies having any CP.      REVIEW OF SYSTEMS:  No fever, CP, SOB, or abdominal pain    Vital Signs Last 24 Hrs  T(C): 37.1 (20 Mar 2025 08:31), Max: 37.1 (20 Mar 2025 00:11)  T(F): 98.7 (20 Mar 2025 08:31), Max: 98.7 (20 Mar 2025 00:11)  HR: 68 (20 Mar 2025 08:31) (55 - 77)  BP: 106/69 (20 Mar 2025 08:31) (102/68 - 120/65)  BP(mean): --  RR: 18 (20 Mar 2025 08:31) (18 - 18)  SpO2: 94% (20 Mar 2025 08:31) (94% - 100%)    Parameters below as of 20 Mar 2025 08:31  Patient On (Oxygen Delivery Method): nasal cannula  O2 Flow (L/min): 4      PHYSICAL EXAM:  GENERAL: NAD  HEENT: EOMI, hearing normal, conjunctiva and sclera clear  Chest: mild crackles at bases, no wheezing  CV: S1S2, RRR,   GI: soft, +BS, NT/ND  Musculoskeletal: 1+ LE edema  Psychiatric: affect nL, mood nL  Skin: warm and dry    LABS:                        14.0   7.98  )-----------( 126      ( 20 Mar 2025 01:15 )             42.2     03-20    140  |  111[H]  |  35[H]  ----------------------------<  114[H]  3.8   |  23  |  1.70[H]    Ca    9.1      20 Mar 2025 01:15  Mg     2.4     03-20    TPro  6.4  /  Alb  3.7  /  TBili  1.2  /  DBili  x   /  AST  25  /  ALT  39  /  AlkPhos  66  03-20    PT/INR - ( 20 Mar 2025 01:15 )   PT: 16.3 sec;   INR: 1.40 ratio         PTT - ( 20 Mar 2025 01:15 )  PTT:29.4 sec  Urinalysis Basic - ( 20 Mar 2025 01:15 )    Color: x / Appearance: x / SG: x / pH: x  Gluc: 114 mg/dL / Ketone: x  / Bili: x / Urobili: x   Blood: x / Protein: x / Nitrite: x   Leuk Esterase: x / RBC: x / WBC x   Sq Epi: x / Non Sq Epi: x / Bacteria: x        
CHIEF COMPLAINT/INTERVAL HISTORY:  Pt. seen and evaluated for acute on chronic systolic chf exacerbation.  Pt. is in no distress.  Sitting in bed eating breakfast.  Denies having CP or SOB.  Note soft BP with SBP in 90's  this morning.  Did not receive dose of IV lasix as a result of hypotension.      REVIEW OF SYSTEMS:  No fever, CP, SOB, or abdominal pain     Vital Signs Last 24 Hrs  T(C): 36.4 (21 Mar 2025 04:24), Max: 37.1 (20 Mar 2025 08:31)  T(F): 97.6 (21 Mar 2025 04:24), Max: 98.7 (20 Mar 2025 08:31)  HR: 63 (21 Mar 2025 04:24) (58 - 72)  BP: 94/52 (21 Mar 2025 04:43) (90/48 - 110/64)  BP(mean): --  RR: 18 (21 Mar 2025 04:24) (17 - 18)  SpO2: 92% (21 Mar 2025 04:24) (91% - 100%)    Parameters below as of 21 Mar 2025 04:24  Patient On (Oxygen Delivery Method): room air        PHYSICAL EXAM:  GENERAL: NAD  HEENT: EOMI, hearing normal, conjunctiva and sclera clear  Chest: diminished BS at bases, no wheezing  CV: S1S2, RRR,   GI: soft, +BS, NT/ND  Musculoskeletal: improving LE edema  Psychiatric: affect nL, mood nL  Skin: warm and dry    LABS:                        14.0   7.98  )-----------( 126      ( 20 Mar 2025 01:15 )             42.2     03-20    140  |  111[H]  |  35[H]  ----------------------------<  114[H]  3.8   |  23  |  1.70[H]    Ca    9.1      20 Mar 2025 01:15  Mg     2.4     03-20    TPro  6.4  /  Alb  3.7  /  TBili  1.2  /  DBili  x   /  AST  25  /  ALT  39  /  AlkPhos  66  03-20    PT/INR - ( 20 Mar 2025 01:15 )   PT: 16.3 sec;   INR: 1.40 ratio         PTT - ( 20 Mar 2025 01:15 )  PTT:29.4 sec  Urinalysis Basic - ( 20 Mar 2025 01:15 )    Color: x / Appearance: x / SG: x / pH: x  Gluc: 114 mg/dL / Ketone: x  / Bili: x / Urobili: x   Blood: x / Protein: x / Nitrite: x   Leuk Esterase: x / RBC: x / WBC x   Sq Epi: x / Non Sq Epi: x / Bacteria: x        
Chief Complaint: Shortness of breath    Interval Events: No events overnight.    Review of Systems:  General: No fevers, chills, weight gain  Skin: No rashes, color changes  Cardiovascular: No chest pain, orthopnea  Respiratory: No shortness of breath, cough  Gastrointestinal: No nausea, abdominal pain  Genitourinary: No incontinence, pain with urination  Musculoskeletal: No pain, swelling, decreased range of motion  Neurological: No headache, weakness  Psychiatric: No depression, anxiety  Endocrine: No weight gain, increased thirst  All other systems are comprehensively negative.    Physical Exam:  Vitals:        Vital Signs Last 24 Hrs  T(C): 36.4 (21 Mar 2025 04:24), Max: 36.9 (20 Mar 2025 11:58)  T(F): 97.6 (21 Mar 2025 04:24), Max: 98.5 (20 Mar 2025 11:58)  HR: 63 (21 Mar 2025 04:24) (58 - 72)  BP: 94/52 (21 Mar 2025 04:43) (90/48 - 110/64)  BP(mean): --  RR: 18 (21 Mar 2025 04:24) (17 - 18)  SpO2: 92% (21 Mar 2025 04:24) (91% - 100%)  Parameters below as of 21 Mar 2025 04:24  Patient On (Oxygen Delivery Method): room air  General: NAD  HEENT: MMM  Neck: No JVD, no carotid bruit  Lungs: CTAB  CV: RRR, nl S1/S2, no M/R/G  Abdomen: S/NT/ND, +BS  Extremities: No LE edema, no cyanosis  Neuro: AAOx3, non-focal  Skin: No rash    Labs:                        13.9   6.19  )-----------( 112      ( 21 Mar 2025 08:00 )             41.4     03-21    143  |  110[H]  |  36[H]  ----------------------------<  113[H]  3.9   |  28  |  1.70[H]    Ca    9.0      21 Mar 2025 08:00  Phos  3.0     03-21  Mg     2.4     03-21    TPro  6.0  /  Alb  3.5  /  TBili  1.6[H]  /  DBili  x   /  AST  19  /  ALT  30  /  AlkPhos  57  03-21    CARDIAC MARKERS ( 20 Mar 2025 01:15 )  x     / x     / x     / x     / 2.0 ng/mL      PT/INR - ( 20 Mar 2025 01:15 )   PT: 16.3 sec;   INR: 1.40 ratio         PTT - ( 20 Mar 2025 01:15 )  PTT:29.4 sec    ECG/Telemetry: Sinus rhythm

## 2025-03-21 NOTE — DISCHARGE NOTE NURSING/CASE MANAGEMENT/SOCIAL WORK - FINANCIAL ASSISTANCE
[facility] provides services at a reduced cost to those who are determined to be eligible through [facility]’s financial assistance program. Information regarding [facility]’s financial assistance program can be found by going to [email or web address] or by calling [phone].

## 2025-03-21 NOTE — PATIENT CHOICE NOTE. - NSPTCHOICESTATE_GEN_ALL_CORE
I have met with the patient and/or caregiver to discuss discharge goals and treatment plan. Patient and/or caregiver also provided with instructions on accessing the CMS Compare websites for additional information related to Post Acute Provider quality and resource use measures to assist them in evaluation of the providers and in selecting their post-acute provider of choice. Patient and caregiver were informed of the facilities that are owned and/or operated by [facility]. I have discussed with the patient the availability of in-network facilities and providers. Patient and caregiver provided with a list of post-acute providers whose services are appropriate to the discharge plans and patient needs.     For patient requiring durable medical equipment, patient and/or caregiver were informed that they have the right to request who provides the required equipment.
I have met with the patient and/or caregiver to discuss discharge goals and treatment plan. Patient and/or caregiver also provided with instructions on accessing the CMS Compare websites for additional information related to Post Acute Provider quality and resource use measures to assist them in evaluation of the providers and in selecting their post-acute provider of choice. Patient and caregiver were informed of the facilities that are owned and/or operated by [facility]. I have discussed with the patient the availability of in-network facilities and providers. Patient and caregiver provided with a list of post-acute providers whose services are appropriate to the discharge plans and patient needs.     For patient requiring durable medical equipment, patient and/or caregiver were informed that they have the right to request who provides the required equipment.

## 2025-03-21 NOTE — PROVIDER CONTACT NOTE (OTHER) - SITUATION
pt electronic BP 90/48; manual BP 94/52. pt is asymptomatic.
tele tech called to inform RN pt had 10 beats of vtach

## 2025-03-21 NOTE — PROGRESS NOTE ADULT - REASON FOR ADMISSION
acute on chronic CHF exacerbation

## 2025-03-22 ENCOUNTER — TRANSCRIPTION ENCOUNTER (OUTPATIENT)
Age: 89
End: 2025-03-22

## 2025-03-24 ENCOUNTER — TRANSCRIPTION ENCOUNTER (OUTPATIENT)
Age: 89
End: 2025-03-24

## 2025-03-31 ENCOUNTER — TRANSCRIPTION ENCOUNTER (OUTPATIENT)
Age: 89
End: 2025-03-31

## 2025-04-20 ENCOUNTER — INPATIENT (INPATIENT)
Facility: HOSPITAL | Age: 89
LOS: 10 days | Discharge: NURSING, REHAB OR LONG-TERM CARE | DRG: 291 | End: 2025-05-01
Attending: FAMILY MEDICINE | Admitting: STUDENT IN AN ORGANIZED HEALTH CARE EDUCATION/TRAINING PROGRAM
Payer: MEDICARE

## 2025-04-20 VITALS
HEART RATE: 95 BPM | HEIGHT: 68 IN | OXYGEN SATURATION: 94 % | DIASTOLIC BLOOD PRESSURE: 73 MMHG | TEMPERATURE: 98 F | SYSTOLIC BLOOD PRESSURE: 135 MMHG | WEIGHT: 169.98 LBS | RESPIRATION RATE: 18 BRPM

## 2025-04-20 DIAGNOSIS — Z95.810 PRESENCE OF AUTOMATIC (IMPLANTABLE) CARDIAC DEFIBRILLATOR: Chronic | ICD-10-CM

## 2025-04-20 DIAGNOSIS — Z98.890 OTHER SPECIFIED POSTPROCEDURAL STATES: Chronic | ICD-10-CM

## 2025-04-20 DIAGNOSIS — Z98.49 CATARACT EXTRACTION STATUS, UNSPECIFIED EYE: Chronic | ICD-10-CM

## 2025-04-20 PROCEDURE — 93010 ELECTROCARDIOGRAM REPORT: CPT

## 2025-04-20 PROCEDURE — 99285 EMERGENCY DEPT VISIT HI MDM: CPT

## 2025-04-20 NOTE — ED ADULT NURSE NOTE - OBJECTIVE STATEMENT
pt stated he had sob visiting across the street and ems states he has BBB but were not sure if its new so ekg in triage being done and on monitor and o2 2 lnc.

## 2025-04-20 NOTE — ED ADULT NURSE NOTE - CHIEF COMPLAINT QUOTE
Patient brought by ambulance from home as reported was a visitor at [facility] complaining of shortness of breath as per EMS EKG shows new onset left bundle branch blockshor

## 2025-04-20 NOTE — ED ADULT NURSE NOTE - NSFALLUNIVINTERV_ED_ALL_ED
Bed/Stretcher in lowest position, wheels locked, appropriate side rails in place/Call bell, personal items and telephone in reach/Instruct patient to call for assistance before getting out of bed/chair/stretcher/Non-slip footwear applied when patient is off stretcher/[place] to call system/Physically safe environment - no spills, clutter or unnecessary equipment/Purposeful proactive rounding/Room/bathroom lighting operational, light cord in reach

## 2025-04-20 NOTE — ED ADULT TRIAGE NOTE - PAIN: PRESENCE, MLM
denies pain/discomfort (Rating = 0)
Abdomen soft, nontender, nondistended, bowel sounds present in all 4 quadrants.

## 2025-04-20 NOTE — ED ADULT NURSE REASSESSMENT NOTE - NS ED NURSE REASSESS COMMENT FT1
assumed pt care at this time, pt resting in bed with eyes opened, AAOX4, RR even and unlabored, NAD noted, pending MD's eval

## 2025-04-20 NOTE — ED ADULT NURSE NOTE - ISOLATION TYPE:
Subjective


Progress Note Date: 12/26/22





Patient was seen for a follow-up.  Patient initially seen by Dr. Michele Merritt.  

Please refer to his note for details.





Patient is a 56-year-old female with history of seizure disorder.  She has 

clinical status epilepticus that has resolved.  She is known to our neurology 

service for recurrent seizures, and was last time seen by myself on 12/14/2022. 

A 2.5 hour EEG at that time reported generalized spikes seen along with some 

triphasic waves.  At that time it was recommended to wean off Vimpat, continue 

low-dose Depakote 250 mg twice a day, Keppra 500 mg twice a day and extra 500 mg

tablet postdialysis and zonisamide 100 mg daily was started.  





Apparently she was seen by neurologist and patient currently on Depakote 500 mg 

every 8 hours, zonisamide 100 mg daily, Keppra 500 mg twice a day with 500 mg 

postdialysis Monday Wednesday Friday.  Also appears to be on Depakote 250 mg 

twice a day.





Patient has very slow mentation.  Per speech therapist note, patient has been 

made nothing by mouth, patient not able to swallow medications.  She has very 

delayed responses and delayed speech.  Poor oral phase.





Objective





- Vital Signs


Vital signs: 


                                   Vital Signs











Temp  98.5 F   12/26/22 16:00


 


Pulse  85   12/26/22 17:00


 


Resp  9 L  12/26/22 17:00


 


BP  125/70   12/26/22 17:00


 


Pulse Ox  99   12/26/22 17:00


 


FiO2  35   12/25/22 18:05








                                 Intake & Output











 12/25/22 12/26/22 12/26/22





 18:59 06:59 18:59


 


Intake Total 408.912 355.433 268.401


 


Output Total 0 0 0


 


Balance 408.912 355.433 268.401


 


Weight  78.8 kg 78.8 kg


 


Intake:   


 


   280 250


 


    Dextrose 5%-0.9% NaCl 1,   50





    000 ml @ 50 mls/hr IV .   





    Q20H [facility] Rx#:[id number]   


 


    Lacosamide  mg In 50 50 





    Sodium Chloride 0.9% 50   





    ml @ 100 mls/hr IVPB BID   





    [facility] Rx#:[id number]   


 


    NS @  130 100


 


    levETIRAcetam  mg 100 100 100





    In Sodium Chloride 0.9%   





    100 ml @ 400 mls/hr IVPB   





    Q12H [facility] Rx#:[id number]   


 


  Intake, IV Titration 158.912 75.433 18.401





  Amount   


 


    Clevidipine Butyrate 25 131.766 75.433 18.401





    mg In Empty Bag 1 bag @ 1   





    MG/HR 2 mls/hr IV .Q24H   





    [facility] Rx#:[id number]   


 


    propofoL 1,000 mg In 27.146  





    Empty Bag 1 bag @ 15 MCG/   





    KG/MIN 5.715 mls/hr IV .   





    [id number] [facility] Rx#:[id number]   


 


Output:   


 


  Urine 0 0 0








                       ABP, PAP, CO, CI - Last Documented











Arterial Blood Pressure        137/52

















- Exam





Patient is awake, but slow mentation, prolonged latency time to answer 

questions.  Her nursing report, it is much better than how it was in the 

morning.





- Labs


CBC & Chem 7: 


                                 12/27/22 04:00





                                 12/27/22 04:00


Labs: 


                  Abnormal Lab Results - Last 24 Hours (Table)











  12/25/22 12/26/22 12/26/22 Range/Units





  17:56 04:10 04:10 


 


RBC   3.45 L   (3.80-5.40)  m/uL


 


Hgb   9.8 L   (11.4-16.0)  gm/dL


 


Hct   30.2 L   (34.0-46.0)  %


 


BUN    26 H  (7-17)  mg/dL


 


Creatinine    8.95 H*  (0.52-1.04)  mg/dL


 


POC Glucose (mg/dL)  117 H    ()  mg/dL














  12/26/22 12/26/22 Range/Units





  16:06 16:29 


 


RBC    (3.80-5.40)  m/uL


 


Hgb    (11.4-16.0)  gm/dL


 


Hct    (34.0-46.0)  %


 


BUN    (7-17)  mg/dL


 


Creatinine    (0.52-1.04)  mg/dL


 


POC Glucose (mg/dL)  65 L  137 H  ()  mg/dL








                      Microbiology - Last 24 Hours (Table)











 12/23/22 14:15 Blood Culture - Preliminary





 Blood    No Growth after 72 hours


 


 12/23/22 14:00 Blood Culture - Preliminary





 Blood    No Growth after 72 hours


 


 12/24/22 00:17 Gram Stain - Final





 Sputum Sputum Culture - Final














Assessment and Plan


Assessment: 





This is a 56-year-old woman with medical refractory epilepsy, VNS who presents 

to the hospital numerous times for breakthrough seizures.





Clinical status epilepticus---resolved.  Her epilepsy is not controlled.  Is 

compliant with her medications.


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

preliminary 2.5 hour EEG in earlier 12/2022)


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


Hypodensity of left basal ganglia and brainstem reported on CT (but I feel basal

ganglia is seen on prior CT and CT head is not best study for brainstem).


Dysphagia, failed swallow studies, currently on nothing by mouth.  Rule out CVA.


Probable acute UTI


History of stroke with residual left hemiparesis


Diabetes mellitus


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


Hypertension 





Plan: 





* Dr. Merritt has spoken to the patient's  and he stated her Neurologist 

  (Dr. Nelson's team) recommended her stopping Depakote but she continues to be

  taking it.  Also she was recommended to continue Vimpat 150mg bid, Keppra 

  500mg bid.  She is also on Zonegran.


* Dr. Merritt has increased Zonegran from 100mg daily to 100mg bid.  Patient is 

  nothing by mouth at this time because of failed swallow.  Patient cannot take 

  Zonegran.  We will switch from Depakote by mouth to Depacon  mg every 8 

  hours.  (At home patient was taking Depakote 500mg 1 tab tid with additional 

  250mg bid).  Continue Keppra 500mg bid with additional 500mg post dialysis.  

  Dr. Merritt has decreased her Vimpat from 150mg bid to 100mg bid since on last 

  admission it was felt she had primary generalized epilepsy and Vimpat was not 

  great drug for primary generalized epilepsy. 


* I highly recommend patient to follow-up with epileptilologist since has 

  frequent seizures/medical refractory epilepsy.  Consider Epidiolex for control

  of seizures.  


* Patient has 2.5 hour EEG in our facility on 12/14/2022 (prior admission) and 

  preliminary was reported as runs of sharp and slow waves or spike and slow 

  wave at 2-3 Hz lasting 1 -1.5 seconds suggestive of primary generalized epi

  lepsy.  No official report yet.


* Continue neuro checks.


* Left ICA stenosis 75% on CTA but on carotid duplex no significant stenosis 

  seen.  Vascular surgery input appreciated.  No indication for surgery.  


* Hypodensity of left basal ganglia and brainstem reported on CT (but I feel 

  basal ganglia is seen on prior CT and CT head is not best study for 

  brainstem).   Repeat CT head was ordered.  We will consider MRI of the brain 

  if no quadrant medications.


* Patient is on home dose Plavix 75mg daily and Lipitor 40mg qhs which is 

  sufficient for secondary stroke prophylaxis.


* PT and OT are consulted.


* Will defer the rest of medical management to primary team.


* For DVT prophylaxis: on subq heparin. None

## 2025-04-20 NOTE — ED ADULT TRIAGE NOTE - CHIEF COMPLAINT QUOTE
Patient brought by ambulance from home as reported was a visitor at [facility] complaining of shortness of breath as per EMS EKG shows new onset left bundle branch blockshor

## 2025-04-21 DIAGNOSIS — I10 ESSENTIAL (PRIMARY) HYPERTENSION: ICD-10-CM

## 2025-04-21 DIAGNOSIS — E78.5 HYPERLIPIDEMIA, UNSPECIFIED: ICD-10-CM

## 2025-04-21 DIAGNOSIS — R79.89 OTHER SPECIFIED ABNORMAL FINDINGS OF BLOOD CHEMISTRY: ICD-10-CM

## 2025-04-21 DIAGNOSIS — E11.9 TYPE 2 DIABETES MELLITUS WITHOUT COMPLICATIONS: ICD-10-CM

## 2025-04-21 DIAGNOSIS — I50.9 HEART FAILURE, UNSPECIFIED: ICD-10-CM

## 2025-04-21 DIAGNOSIS — Z29.9 ENCOUNTER FOR PROPHYLACTIC MEASURES, UNSPECIFIED: ICD-10-CM

## 2025-04-21 DIAGNOSIS — C61 MALIGNANT NEOPLASM OF PROSTATE: ICD-10-CM

## 2025-04-21 DIAGNOSIS — N17.9 ACUTE KIDNEY FAILURE, UNSPECIFIED: ICD-10-CM

## 2025-04-21 PROBLEM — I42.8 OTHER CARDIOMYOPATHIES: Chronic | Status: ACTIVE | Noted: 2025-03-20

## 2025-04-21 LAB
ALBUMIN SERPL ELPH-MCNC: 3.8 G/DL — SIGNIFICANT CHANGE UP (ref 3.3–5)
ALBUMIN SERPL ELPH-MCNC: 3.9 G/DL — SIGNIFICANT CHANGE UP (ref 3.3–5)
ALP SERPL-CCNC: 71 U/L — SIGNIFICANT CHANGE UP (ref 40–120)
ALP SERPL-CCNC: 83 U/L — SIGNIFICANT CHANGE UP (ref 40–120)
ALT FLD-CCNC: 30 U/L — SIGNIFICANT CHANGE UP (ref 12–78)
ALT FLD-CCNC: 36 U/L — SIGNIFICANT CHANGE UP (ref 12–78)
ANION GAP SERPL CALC-SCNC: 12 MMOL/L — SIGNIFICANT CHANGE UP (ref 5–17)
ANION GAP SERPL CALC-SCNC: 7 MMOL/L — SIGNIFICANT CHANGE UP (ref 5–17)
AST SERPL-CCNC: 23 U/L — SIGNIFICANT CHANGE UP (ref 15–37)
AST SERPL-CCNC: 32 U/L — SIGNIFICANT CHANGE UP (ref 15–37)
BASOPHILS # BLD AUTO: 0.07 K/UL — SIGNIFICANT CHANGE UP (ref 0–0.2)
BASOPHILS NFR BLD AUTO: 0.7 % — SIGNIFICANT CHANGE UP (ref 0–2)
BILIRUB SERPL-MCNC: 1 MG/DL — SIGNIFICANT CHANGE UP (ref 0.2–1.2)
BILIRUB SERPL-MCNC: 1.3 MG/DL — HIGH (ref 0.2–1.2)
BUN SERPL-MCNC: 56 MG/DL — HIGH (ref 7–23)
BUN SERPL-MCNC: 57 MG/DL — HIGH (ref 7–23)
CALCIUM SERPL-MCNC: 9.1 MG/DL — SIGNIFICANT CHANGE UP (ref 8.5–10.1)
CALCIUM SERPL-MCNC: 9.2 MG/DL — SIGNIFICANT CHANGE UP (ref 8.5–10.1)
CHLORIDE SERPL-SCNC: 106 MMOL/L — SIGNIFICANT CHANGE UP (ref 96–108)
CHLORIDE SERPL-SCNC: 106 MMOL/L — SIGNIFICANT CHANGE UP (ref 96–108)
CK MB BLD-MCNC: 2.9 % — SIGNIFICANT CHANGE UP (ref 0–3.5)
CK MB CFR SERPL CALC: 2.3 NG/ML — SIGNIFICANT CHANGE UP (ref 0–3.6)
CK SERPL-CCNC: 80 U/L — SIGNIFICANT CHANGE UP (ref 26–308)
CO2 SERPL-SCNC: 23 MMOL/L — SIGNIFICANT CHANGE UP (ref 22–31)
CO2 SERPL-SCNC: 28 MMOL/L — SIGNIFICANT CHANGE UP (ref 22–31)
CREAT SERPL-MCNC: 2 MG/DL — HIGH (ref 0.5–1.3)
CREAT SERPL-MCNC: 2.1 MG/DL — HIGH (ref 0.5–1.3)
EGFR: 30 ML/MIN/1.73M2 — LOW
EGFR: 30 ML/MIN/1.73M2 — LOW
EGFR: 32 ML/MIN/1.73M2 — LOW
EGFR: 32 ML/MIN/1.73M2 — LOW
EOSINOPHIL # BLD AUTO: 0.33 K/UL — SIGNIFICANT CHANGE UP (ref 0–0.5)
EOSINOPHIL NFR BLD AUTO: 3.3 % — SIGNIFICANT CHANGE UP (ref 0–6)
GLUCOSE SERPL-MCNC: 125 MG/DL — HIGH (ref 70–99)
GLUCOSE SERPL-MCNC: 241 MG/DL — HIGH (ref 70–99)
HCT VFR BLD CALC: 39.8 % — SIGNIFICANT CHANGE UP (ref 39–50)
HCT VFR BLD CALC: 46.4 % — SIGNIFICANT CHANGE UP (ref 39–50)
HGB BLD-MCNC: 14 G/DL — SIGNIFICANT CHANGE UP (ref 13–17)
HGB BLD-MCNC: 15.9 G/DL — SIGNIFICANT CHANGE UP (ref 13–17)
IMM GRANULOCYTES NFR BLD AUTO: 0.6 % — SIGNIFICANT CHANGE UP (ref 0–0.9)
LACTATE SERPL-SCNC: 1.7 MMOL/L — SIGNIFICANT CHANGE UP (ref 0.7–2)
LYMPHOCYTES # BLD AUTO: 2.94 K/UL — SIGNIFICANT CHANGE UP (ref 1–3.3)
LYMPHOCYTES # BLD AUTO: 29.8 % — SIGNIFICANT CHANGE UP (ref 13–44)
MAGNESIUM SERPL-MCNC: 2.4 MG/DL — SIGNIFICANT CHANGE UP (ref 1.6–2.6)
MCHC RBC-ENTMCNC: 31.9 PG — SIGNIFICANT CHANGE UP (ref 27–34)
MCHC RBC-ENTMCNC: 32 PG — SIGNIFICANT CHANGE UP (ref 27–34)
MCHC RBC-ENTMCNC: 34.3 G/DL — SIGNIFICANT CHANGE UP (ref 32–36)
MCHC RBC-ENTMCNC: 35.2 G/DL — SIGNIFICANT CHANGE UP (ref 32–36)
MCV RBC AUTO: 91.1 FL — SIGNIFICANT CHANGE UP (ref 80–100)
MCV RBC AUTO: 93 FL — SIGNIFICANT CHANGE UP (ref 80–100)
MONOCYTES # BLD AUTO: 0.91 K/UL — HIGH (ref 0–0.9)
MONOCYTES NFR BLD AUTO: 9.2 % — SIGNIFICANT CHANGE UP (ref 2–14)
NEUTROPHILS # BLD AUTO: 5.56 K/UL — SIGNIFICANT CHANGE UP (ref 1.8–7.4)
NEUTROPHILS NFR BLD AUTO: 56.4 % — SIGNIFICANT CHANGE UP (ref 43–77)
NRBC BLD AUTO-RTO: 0 /100 WBCS — SIGNIFICANT CHANGE UP (ref 0–0)
NRBC BLD AUTO-RTO: 0 /100 WBCS — SIGNIFICANT CHANGE UP (ref 0–0)
NT-PROBNP SERPL-SCNC: HIGH PG/ML (ref 0–450)
NT-PROBNP SERPL-SCNC: HIGH PG/ML (ref 0–450)
PHOSPHATE SERPL-MCNC: 5.3 MG/DL — HIGH (ref 2.5–4.5)
PLATELET # BLD AUTO: 156 K/UL — SIGNIFICANT CHANGE UP (ref 150–400)
PLATELET # BLD AUTO: 206 K/UL — SIGNIFICANT CHANGE UP (ref 150–400)
POTASSIUM SERPL-MCNC: 3.7 MMOL/L — SIGNIFICANT CHANGE UP (ref 3.5–5.3)
POTASSIUM SERPL-MCNC: 3.8 MMOL/L — SIGNIFICANT CHANGE UP (ref 3.5–5.3)
POTASSIUM SERPL-SCNC: 3.7 MMOL/L — SIGNIFICANT CHANGE UP (ref 3.5–5.3)
POTASSIUM SERPL-SCNC: 3.8 MMOL/L — SIGNIFICANT CHANGE UP (ref 3.5–5.3)
PROT SERPL-MCNC: 6.9 G/DL — SIGNIFICANT CHANGE UP (ref 6–8.3)
PROT SERPL-MCNC: 7.3 G/DL — SIGNIFICANT CHANGE UP (ref 6–8.3)
RBC # BLD: 4.37 M/UL — SIGNIFICANT CHANGE UP (ref 4.2–5.8)
RBC # BLD: 4.99 M/UL — SIGNIFICANT CHANGE UP (ref 4.2–5.8)
RBC # FLD: 14.3 % — SIGNIFICANT CHANGE UP (ref 10.3–14.5)
RBC # FLD: 14.6 % — HIGH (ref 10.3–14.5)
SODIUM SERPL-SCNC: 141 MMOL/L — SIGNIFICANT CHANGE UP (ref 135–145)
SODIUM SERPL-SCNC: 141 MMOL/L — SIGNIFICANT CHANGE UP (ref 135–145)
TROPONIN I, HIGH SENSITIVITY RESULT: 3051.2 NG/L — HIGH
TROPONIN I, HIGH SENSITIVITY RESULT: 3543.3 NG/L — HIGH
TROPONIN I, HIGH SENSITIVITY RESULT: 3569.6 NG/L — HIGH
TROPONIN I, HIGH SENSITIVITY RESULT: 3785.2 NG/L — HIGH
TROPONIN I, HIGH SENSITIVITY RESULT: 3952.2 NG/L — HIGH
WBC # BLD: 12 K/UL — HIGH (ref 3.8–10.5)
WBC # BLD: 9.87 K/UL — SIGNIFICANT CHANGE UP (ref 3.8–10.5)
WBC # FLD AUTO: 12 K/UL — HIGH (ref 3.8–10.5)
WBC # FLD AUTO: 9.87 K/UL — SIGNIFICANT CHANGE UP (ref 3.8–10.5)

## 2025-04-21 PROCEDURE — 93010 ELECTROCARDIOGRAM REPORT: CPT

## 2025-04-21 PROCEDURE — 71045 X-RAY EXAM CHEST 1 VIEW: CPT | Mod: 26,77

## 2025-04-21 PROCEDURE — 99223 1ST HOSP IP/OBS HIGH 75: CPT | Mod: GC

## 2025-04-21 PROCEDURE — 71045 X-RAY EXAM CHEST 1 VIEW: CPT | Mod: 26

## 2025-04-21 PROCEDURE — G0508: CPT | Mod: 2W

## 2025-04-21 PROCEDURE — 99233 SBSQ HOSP IP/OBS HIGH 50: CPT

## 2025-04-21 RX ORDER — SODIUM CHLORIDE 9 G/1000ML
1000 INJECTION, SOLUTION INTRAVENOUS
Refills: 0 | Status: DISCONTINUED | OUTPATIENT
Start: 2025-04-21 | End: 2025-05-01

## 2025-04-21 RX ORDER — INSULIN LISPRO 100 U/ML
INJECTION, SOLUTION INTRAVENOUS; SUBCUTANEOUS
Refills: 0 | Status: DISCONTINUED | OUTPATIENT
Start: 2025-04-21 | End: 2025-04-22

## 2025-04-21 RX ORDER — DEXTROSE 50 % IN WATER 50 %
25 SYRINGE (ML) INTRAVENOUS ONCE
Refills: 0 | Status: DISCONTINUED | OUTPATIENT
Start: 2025-04-21 | End: 2025-04-24

## 2025-04-21 RX ORDER — METOPROLOL SUCCINATE 50 MG/1
5 TABLET, EXTENDED RELEASE ORAL ONCE
Refills: 0 | Status: COMPLETED | OUTPATIENT
Start: 2025-04-21 | End: 2025-04-21

## 2025-04-21 RX ORDER — GLUCAGON 3 MG/1
1 POWDER NASAL ONCE
Refills: 0 | Status: DISCONTINUED | OUTPATIENT
Start: 2025-04-21 | End: 2025-05-01

## 2025-04-21 RX ORDER — DEXTROSE 50 % IN WATER 50 %
12.5 SYRINGE (ML) INTRAVENOUS ONCE
Refills: 0 | Status: DISCONTINUED | OUTPATIENT
Start: 2025-04-21 | End: 2025-04-24

## 2025-04-21 RX ORDER — HEPARIN SODIUM 1000 [USP'U]/ML
5000 INJECTION INTRAVENOUS; SUBCUTANEOUS EVERY 12 HOURS
Refills: 0 | Status: DISCONTINUED | OUTPATIENT
Start: 2025-04-21 | End: 2025-04-22

## 2025-04-21 RX ORDER — ALBUMIN (HUMAN) 12.5 G/50ML
50 INJECTION, SOLUTION INTRAVENOUS ONCE
Refills: 0 | Status: COMPLETED | OUTPATIENT
Start: 2025-04-21 | End: 2025-04-21

## 2025-04-21 RX ORDER — INSULIN LISPRO 100 U/ML
INJECTION, SOLUTION INTRAVENOUS; SUBCUTANEOUS AT BEDTIME
Refills: 0 | Status: DISCONTINUED | OUTPATIENT
Start: 2025-04-21 | End: 2025-04-22

## 2025-04-21 RX ORDER — ATORVASTATIN CALCIUM 80 MG/1
80 TABLET, FILM COATED ORAL AT BEDTIME
Refills: 0 | Status: DISCONTINUED | OUTPATIENT
Start: 2025-04-21 | End: 2025-04-25

## 2025-04-21 RX ORDER — DEXTROSE 50 % IN WATER 50 %
15 SYRINGE (ML) INTRAVENOUS ONCE
Refills: 0 | Status: DISCONTINUED | OUTPATIENT
Start: 2025-04-21 | End: 2025-04-24

## 2025-04-21 RX ORDER — FINASTERIDE 1 MG/1
5 TABLET, FILM COATED ORAL DAILY
Refills: 0 | Status: DISCONTINUED | OUTPATIENT
Start: 2025-04-21 | End: 2025-04-25

## 2025-04-21 RX ORDER — TAMSULOSIN HYDROCHLORIDE 0.4 MG/1
0.8 CAPSULE ORAL AT BEDTIME
Refills: 0 | Status: DISCONTINUED | OUTPATIENT
Start: 2025-04-21 | End: 2025-05-01

## 2025-04-21 RX ORDER — FUROSEMIDE 10 MG/ML
40 INJECTION INTRAMUSCULAR; INTRAVENOUS ONCE
Refills: 0 | Status: COMPLETED | OUTPATIENT
Start: 2025-04-21 | End: 2025-04-21

## 2025-04-21 RX ORDER — CARVEDILOL 3.12 MG/1
3.12 TABLET, FILM COATED ORAL EVERY 12 HOURS
Refills: 0 | Status: DISCONTINUED | OUTPATIENT
Start: 2025-04-21 | End: 2025-04-21

## 2025-04-21 RX ORDER — FUROSEMIDE 10 MG/ML
40 INJECTION INTRAMUSCULAR; INTRAVENOUS DAILY
Refills: 0 | Status: DISCONTINUED | OUTPATIENT
Start: 2025-04-21 | End: 2025-04-22

## 2025-04-21 RX ORDER — CARVEDILOL 3.12 MG/1
3.12 TABLET, FILM COATED ORAL EVERY 12 HOURS
Refills: 0 | Status: DISCONTINUED | OUTPATIENT
Start: 2025-04-21 | End: 2025-04-22

## 2025-04-21 RX ORDER — DAPAGLIFLOZIN 5 MG/1
1 TABLET, FILM COATED ORAL
Refills: 0 | DISCHARGE

## 2025-04-21 RX ORDER — ASPIRIN 325 MG
81 TABLET ORAL DAILY
Refills: 0 | Status: DISCONTINUED | OUTPATIENT
Start: 2025-04-21 | End: 2025-04-25

## 2025-04-21 RX ORDER — ASPIRIN 325 MG
324 TABLET ORAL ONCE
Refills: 0 | Status: COMPLETED | OUTPATIENT
Start: 2025-04-21 | End: 2025-04-21

## 2025-04-21 RX ADMIN — Medication 324 MILLIGRAM(S): at 03:45

## 2025-04-21 RX ADMIN — Medication 81 MILLIGRAM(S): at 12:37

## 2025-04-21 RX ADMIN — INSULIN LISPRO 1: 100 INJECTION, SOLUTION INTRAVENOUS; SUBCUTANEOUS at 12:37

## 2025-04-21 RX ADMIN — HEPARIN SODIUM 5000 UNIT(S): 1000 INJECTION INTRAVENOUS; SUBCUTANEOUS at 06:57

## 2025-04-21 RX ADMIN — HEPARIN SODIUM 5000 UNIT(S): 1000 INJECTION INTRAVENOUS; SUBCUTANEOUS at 18:52

## 2025-04-21 RX ADMIN — FUROSEMIDE 40 MILLIGRAM(S): 10 INJECTION INTRAMUSCULAR; INTRAVENOUS at 03:45

## 2025-04-21 RX ADMIN — CARVEDILOL 3.12 MILLIGRAM(S): 3.12 TABLET, FILM COATED ORAL at 18:52

## 2025-04-21 RX ADMIN — FINASTERIDE 5 MILLIGRAM(S): 1 TABLET, FILM COATED ORAL at 12:38

## 2025-04-21 RX ADMIN — FUROSEMIDE 40 MILLIGRAM(S): 10 INJECTION INTRAMUSCULAR; INTRAVENOUS at 21:28

## 2025-04-21 RX ADMIN — ALBUMIN (HUMAN) 50 MILLILITER(S): 12.5 INJECTION, SOLUTION INTRAVENOUS at 18:52

## 2025-04-21 RX ADMIN — METOPROLOL SUCCINATE 5 MILLIGRAM(S): 50 TABLET, EXTENDED RELEASE ORAL at 21:31

## 2025-04-21 RX ADMIN — CARVEDILOL 3.12 MILLIGRAM(S): 3.12 TABLET, FILM COATED ORAL at 06:57

## 2025-04-21 NOTE — ED PROVIDER NOTE - PROGRESS NOTE DETAILS
Case discussed with hospitalist for admission.  Patient was informed of all results.  States he does not have any chest pain at this time.

## 2025-04-21 NOTE — CHART NOTE - NSCHARTNOTEFT_GEN_A_CORE
Resident Rapid Response Note    Patient is a 89y old  Male who presents with a chief complaint of SOB (21 Apr 2025 11:26)      Rapid response was called at on this 89y Male patient for SOB and vtach. Pt was complaining of anxiety and SOB. Pt was noted to be in vtach on EKG. Afib w/ RVR on monitor.   Patient was seen and examined at the bedside by the rapid response team and primary team. Dr. Brizuela, ICU PA at bedside.    Rapid Response Vital Signs:  BP: 172/146  HR: 135  RR: 40  SpO2: 94% on NRB   Temp:   FS:     Vital Signs Last 24 Hrs  T(C): 36.7 (21 Apr 2025 20:31), Max: 36.8 (20 Apr 2025 22:28)  T(F): 98 (21 Apr 2025 20:31), Max: 98.2 (20 Apr 2025 22:28)  HR: 100 (21 Apr 2025 20:31) (72 - 100)  BP: 116/81 (21 Apr 2025 20:31) (98/66 - 135/73)  BP(mean): --  RR: 18 (21 Apr 2025 20:31) (16 - 20)  SpO2: 91% (21 Apr 2025 20:31) (91% - 98%)    Parameters below as of 21 Apr 2025 20:31  Patient On (Oxygen Delivery Method): nasal cannula  O2 Flow (L/min): 4      Physical Exam:  General: appears in distress, anxious, diaphrotetic, tachypneic using accesory muscles on NRB  Neurology: A&Ox3  Respiratory: tachypneic using accesory muscles on NRB. coarse BS b/l   CV: irregular   Skin: warm, dry, normal color, no obvious rash or abnormal lesions    LABS:                        14.0   9.87  )-----------( 156      ( 21 Apr 2025 01:15 )             39.8     21 Apr 2025 01:15    141    |  106    |  56     ----------------------------<  125    3.7     |  28     |  2.10     Ca    9.2        21 Apr 2025 01:15    TPro  6.9    /  Alb  3.8    /  TBili  1.0    /  DBili  x      /  AST  23     /  ALT  30     /  AlkPhos  71     21 Apr 2025 01:15      Urinalysis Basic - ( 21 Apr 2025 01:15 )    Color: x / Appearance: x / SG: x / pH: x  Gluc: 125 mg/dL / Ketone: x  / Bili: x / Urobili: x   Blood: x / Protein: x / Nitrite: x   Leuk Esterase: x / RBC: x / WBC x   Sq Epi: x / Non Sq Epi: x / Bacteria: x      CAPILLARY BLOOD GLUCOSE      POCT Blood Glucose.: 153 mg/dL (21 Apr 2025 21:14)  POCT Blood Glucose.: 107 mg/dL (21 Apr 2025 16:56)  POCT Blood Glucose.: 159 mg/dL (21 Apr 2025 11:57)  POCT Blood Glucose.: 129 mg/dL (21 Apr 2025 06:55)        RADIOLOGY & ADDITIONAL TESTS:      Assessment/Plan: 89 y/o male with PMHx of CHF, HLD, HTN, prostate ca (s/p radioactive seed implantation in 2015) T2DM, PPM presents to ER c/o SOB admitted for acute on chronic systolic CHF exacerbation. RRT called for SOB and vtach on EKG. Pt now w/ afib RVR and flash pulmonary edema.   -lasix 40 IV x1, lopressor 5mg IV x1   -EKG showed vtach   -CXR wet read showed pulmonary edema, vascular congestion   -pt transferred to ICU         -Will continue to follow, RN to call if any changes. Resident Rapid Response Note    Patient is a 89y old  Male who presents with a chief complaint of SOB (21 Apr 2025 11:26)      Rapid response was called at on this 89y Male patient for SOB and vtach. Pt was complaining of anxiety and SOB. Pt was noted to be in vtach on EKG. Afib w/ RVR on monitor.   Patient was seen and examined at the bedside by the rapid response team. Dr. Brizuela, ICU PA at bedside.    Rapid Response Vital Signs:  BP: 172/146  HR: 135  RR: 40  SpO2: 94% on NRB   Temp:   FS:     Vital Signs Last 24 Hrs  T(C): 36.7 (21 Apr 2025 20:31), Max: 36.8 (20 Apr 2025 22:28)  T(F): 98 (21 Apr 2025 20:31), Max: 98.2 (20 Apr 2025 22:28)  HR: 100 (21 Apr 2025 20:31) (72 - 100)  BP: 116/81 (21 Apr 2025 20:31) (98/66 - 135/73)  BP(mean): --  RR: 18 (21 Apr 2025 20:31) (16 - 20)  SpO2: 91% (21 Apr 2025 20:31) (91% - 98%)    Parameters below as of 21 Apr 2025 20:31  Patient On (Oxygen Delivery Method): nasal cannula  O2 Flow (L/min): 4      Physical Exam:  General: appears in distress, anxious, diaphrotetic, tachypneic using accesory muscles on NRB  Neurology: A&Ox3  Respiratory: tachypneic using accesory muscles on NRB. coarse BS b/l   CV: irregular   Skin: warm, dry, normal color, no obvious rash or abnormal lesions    LABS:                        14.0   9.87  )-----------( 156      ( 21 Apr 2025 01:15 )             39.8     21 Apr 2025 01:15    141    |  106    |  56     ----------------------------<  125    3.7     |  28     |  2.10     Ca    9.2        21 Apr 2025 01:15    TPro  6.9    /  Alb  3.8    /  TBili  1.0    /  DBili  x      /  AST  23     /  ALT  30     /  AlkPhos  71     21 Apr 2025 01:15      Urinalysis Basic - ( 21 Apr 2025 01:15 )    Color: x / Appearance: x / SG: x / pH: x  Gluc: 125 mg/dL / Ketone: x  / Bili: x / Urobili: x   Blood: x / Protein: x / Nitrite: x   Leuk Esterase: x / RBC: x / WBC x   Sq Epi: x / Non Sq Epi: x / Bacteria: x      CAPILLARY BLOOD GLUCOSE      POCT Blood Glucose.: 153 mg/dL (21 Apr 2025 21:14)  POCT Blood Glucose.: 107 mg/dL (21 Apr 2025 16:56)  POCT Blood Glucose.: 159 mg/dL (21 Apr 2025 11:57)  POCT Blood Glucose.: 129 mg/dL (21 Apr 2025 06:55)        RADIOLOGY & ADDITIONAL TESTS:      Assessment/Plan: 88 y/o male with PMHx of CHF, HLD, HTN, prostate ca (s/p radioactive seed implantation in 2015) T2DM, PPM presents to ER c/o SOB admitted for acute on chronic systolic CHF exacerbation. RRT called for SOB and vtach on EKG. Pt now w/ afib RVR and flash pulmonary edema.   -lasix 40 IV x1, lopressor 5mg IV x1   -EKG showed vtach   -stat labs ordered CMP, cardiac enzymes ordered, mag phos   -pt still w/ SOB and placed on bipap   -CXR wet read showed pulmonary edema, vascular congestion   -pt transferred to ICU Resident Rapid Response Note    Patient is a 89y old  Male who presents with a chief complaint of SOB (21 Apr 2025 11:26)      Rapid response was called at on this 89y Male patient for SOB and vtach. Pt was complaining of anxiety and SOB. Pt was noted to be in vtach on EKG. Afib w/ RVR on monitor.  Patient was seen and examined at the bedside by the rapid response team. Dr. Brizuela, ICU PA at bedside.    Rapid Response Vital Signs:  BP: 172/146  HR: 135  RR: 40  SpO2: 94% on NRB   Temp:   FS:     Vital Signs Last 24 Hrs  T(C): 36.7 (21 Apr 2025 20:31), Max: 36.8 (20 Apr 2025 22:28)  T(F): 98 (21 Apr 2025 20:31), Max: 98.2 (20 Apr 2025 22:28)  HR: 100 (21 Apr 2025 20:31) (72 - 100)  BP: 116/81 (21 Apr 2025 20:31) (98/66 - 135/73)  BP(mean): --  RR: 18 (21 Apr 2025 20:31) (16 - 20)  SpO2: 91% (21 Apr 2025 20:31) (91% - 98%)    Parameters below as of 21 Apr 2025 20:31  Patient On (Oxygen Delivery Method): nasal cannula  O2 Flow (L/min): 4      Physical Exam:  General: appears in distress, anxious, diaphrotetic, tachypneic using accesory muscles on NRB  Neurology: A&Ox3  Respiratory: tachypneic using accesory muscles on NRB. coarse BS b/l   CV: irregular   Skin: warm, dry, normal color, no obvious rash or abnormal lesions    LABS:                        14.0   9.87  )-----------( 156      ( 21 Apr 2025 01:15 )             39.8     21 Apr 2025 01:15    141    |  106    |  56     ----------------------------<  125    3.7     |  28     |  2.10     Ca    9.2        21 Apr 2025 01:15    TPro  6.9    /  Alb  3.8    /  TBili  1.0    /  DBili  x      /  AST  23     /  ALT  30     /  AlkPhos  71     21 Apr 2025 01:15      Urinalysis Basic - ( 21 Apr 2025 01:15 )    Color: x / Appearance: x / SG: x / pH: x  Gluc: 125 mg/dL / Ketone: x  / Bili: x / Urobili: x   Blood: x / Protein: x / Nitrite: x   Leuk Esterase: x / RBC: x / WBC x   Sq Epi: x / Non Sq Epi: x / Bacteria: x      CAPILLARY BLOOD GLUCOSE      POCT Blood Glucose.: 153 mg/dL (21 Apr 2025 21:14)  POCT Blood Glucose.: 107 mg/dL (21 Apr 2025 16:56)  POCT Blood Glucose.: 159 mg/dL (21 Apr 2025 11:57)  POCT Blood Glucose.: 129 mg/dL (21 Apr 2025 06:55)        RADIOLOGY & ADDITIONAL TESTS:      Assessment/Plan: 89 y/o male with PMHx of CHF, HLD, HTN, prostate ca (s/p radioactive seed implantation in 2015) T2DM, PPM presents to ER c/o SOB admitted for acute on chronic systolic CHF exacerbation. RRT called for SOB and vtach on EKG. Pt now w/ afib RVR and flash pulmonary edema.   -lasix 40 IV x1, lopressor 5mg IV x1   -EKG showed vtach   -stat labs ordered CMP, cardiac enzymes ordered, mag phos   -pt still w/ SOB and placed on bipap   -CXR wet read showed pulmonary edema, vascular congestion   -pt transferred to ICU Resident Rapid Response Note    Patient is a 89y old  Male who presents with a chief complaint of SOB (21 Apr 2025 11:26)      Rapid response was called at on this 89y Male patient for SOB and vtach. Pt complaining of anxiety and SOB. denies CP, abd pain. Pt was noted to be in vtach on EKG. Afib w/ RVR on monitor.    Patient was seen and examined at the bedside by the rapid response team. Dr. Brizuela, ICU PA at bedside.    Rapid Response Vital Signs:  BP: 172/146  HR: 135  RR: 40  SpO2: 94% on NRB     Vital Signs Last 24 Hrs  T(C): 36.7 (21 Apr 2025 20:31), Max: 36.8 (20 Apr 2025 22:28)  T(F): 98 (21 Apr 2025 20:31), Max: 98.2 (20 Apr 2025 22:28)  HR: 100 (21 Apr 2025 20:31) (72 - 100)  BP: 116/81 (21 Apr 2025 20:31) (98/66 - 135/73)  BP(mean): --  RR: 18 (21 Apr 2025 20:31) (16 - 20)  SpO2: 91% (21 Apr 2025 20:31) (91% - 98%)    Parameters below as of 21 Apr 2025 20:31  Patient On (Oxygen Delivery Method): nasal cannula  O2 Flow (L/min): 4      Physical Exam:  General: appears in distress, anxious, diaphoretic  Neurology: A&Ox3  Respiratory: tachypneic. using accessory muscles on NRB, coarse BS b/l   CV: irregular   Skin: warm, dry, normal color, no obvious rash or abnormal lesions    LABS:                        14.0   9.87  )-----------( 156      ( 21 Apr 2025 01:15 )             39.8     21 Apr 2025 01:15    141    |  106    |  56     ----------------------------<  125    3.7     |  28     |  2.10     Ca    9.2        21 Apr 2025 01:15    TPro  6.9    /  Alb  3.8    /  TBili  1.0    /  DBili  x      /  AST  23     /  ALT  30     /  AlkPhos  71     21 Apr 2025 01:15      Urinalysis Basic - ( 21 Apr 2025 01:15 )    Color: x / Appearance: x / SG: x / pH: x  Gluc: 125 mg/dL / Ketone: x  / Bili: x / Urobili: x   Blood: x / Protein: x / Nitrite: x   Leuk Esterase: x / RBC: x / WBC x   Sq Epi: x / Non Sq Epi: x / Bacteria: x      CAPILLARY BLOOD GLUCOSE      POCT Blood Glucose.: 153 mg/dL (21 Apr 2025 21:14)  POCT Blood Glucose.: 107 mg/dL (21 Apr 2025 16:56)  POCT Blood Glucose.: 159 mg/dL (21 Apr 2025 11:57)  POCT Blood Glucose.: 129 mg/dL (21 Apr 2025 06:55)        RADIOLOGY & ADDITIONAL TESTS:      Assessment/Plan: 88 y/o male with PMHx of CHF, HLD, HTN, prostate ca (s/p radioactive seed implantation in 2015) T2DM, PPM presents to ER c/o SOB admitted for acute on chronic systolic CHF exacerbation. RRT called for SOB and vtach on EKG. Pt now w/ afib RVR and flash pulmonary edema.     -lasix 40 IV x1, lopressor 5mg IV x1   -EKG showed vtach   -CXR wet read showed likely pulmonary edema, vascular congestion   -stat labs ordered including CMP, cardiac enzymes ordered, mg, phos   -pt still w/ SOB and placed on bipap   -pt transferred to ICU Resident Rapid Response Note    Patient is a 89y old  Male who presents with a chief complaint of SOB (21 Apr 2025 11:26)      Rapid response was called at on this 89y Male patient for SOB and vtach. Pt complaining of anxiety and SOB. denies CP, abd pain. Pt was noted to be in vtach on EKG. Afib w/ RVR on monitor.  Prior to rapid, pt had episode of 14 beats of pAF at 15:08 and 33 beats of vtach at 16:45 earlier today.    Patient was seen and examined at the bedside by the rapid response team. Dr. Brizuela, ICU PA at bedside.    Rapid Response Vital Signs:  BP: 172/146  HR: 135  RR: 40  SpO2: 94% on NRB     Vital Signs Last 24 Hrs  T(C): 36.7 (21 Apr 2025 20:31), Max: 36.8 (20 Apr 2025 22:28)  T(F): 98 (21 Apr 2025 20:31), Max: 98.2 (20 Apr 2025 22:28)  HR: 100 (21 Apr 2025 20:31) (72 - 100)  BP: 116/81 (21 Apr 2025 20:31) (98/66 - 135/73)  BP(mean): --  RR: 18 (21 Apr 2025 20:31) (16 - 20)  SpO2: 91% (21 Apr 2025 20:31) (91% - 98%)    Parameters below as of 21 Apr 2025 20:31  Patient On (Oxygen Delivery Method): nasal cannula  O2 Flow (L/min): 4      Physical Exam:  General: appears in distress, anxious, diaphoretic  Neurology: A&Ox3  Respiratory: tachypneic. using accessory muscles on NRB, coarse BS b/l   CV: irregular   Skin: warm, dry, normal color, no obvious rash or abnormal lesions    LABS:                        14.0   9.87  )-----------( 156      ( 21 Apr 2025 01:15 )             39.8     21 Apr 2025 01:15    141    |  106    |  56     ----------------------------<  125    3.7     |  28     |  2.10     Ca    9.2        21 Apr 2025 01:15    TPro  6.9    /  Alb  3.8    /  TBili  1.0    /  DBili  x      /  AST  23     /  ALT  30     /  AlkPhos  71     21 Apr 2025 01:15      Urinalysis Basic - ( 21 Apr 2025 01:15 )    Color: x / Appearance: x / SG: x / pH: x  Gluc: 125 mg/dL / Ketone: x  / Bili: x / Urobili: x   Blood: x / Protein: x / Nitrite: x   Leuk Esterase: x / RBC: x / WBC x   Sq Epi: x / Non Sq Epi: x / Bacteria: x      CAPILLARY BLOOD GLUCOSE      POCT Blood Glucose.: 153 mg/dL (21 Apr 2025 21:14)  POCT Blood Glucose.: 107 mg/dL (21 Apr 2025 16:56)  POCT Blood Glucose.: 159 mg/dL (21 Apr 2025 11:57)  POCT Blood Glucose.: 129 mg/dL (21 Apr 2025 06:55)          Assessment/Plan: 88 y/o male with PMHx of CHF, HLD, HTN, prostate ca (s/p radioactive seed implantation in 2015) T2DM, PPM presents to ER c/o SOB admitted for acute on chronic systolic CHF exacerbation. RRT called for SOB and vtach on EKG. Pt now w/ afib RVR and flash pulmonary edema.     -lasix 40 IV x1, lopressor 5mg IV x1   -EKG showed vtach   -CXR wet read showed likely pulmonary edema, vascular congestion   -stat labs ordered including CMP, cardiac enzymes ordered, mg, phos   -pt still w/ SOB and placed on bipap   -pt transferred to ICU Resident Rapid Response Note    Patient is a 89y old  Male who presents with a chief complaint of SOB (21 Apr 2025 11:26)      Rapid response was called at on this 89y Male patient for SOB and vtach. Pt complaining of anxiety and SOB. denies CP, abd pain. Pt was noted to be in vtach on EKG. Afib w/ RVR on monitor.  Prior to rapid, pt had episode of 14 beats of pAF at 15:08 and 33 beats of vtach at 16:45 earlier today.    Patient was seen and examined at the bedside by the rapid response team. Dr. Brizuela, ICU PA at bedside.    Rapid Response Vital Signs:  BP: 172/146  HR: 135  RR: 40  SpO2: 94% on NRB     Vital Signs Last 24 Hrs  T(C): 36.7 (21 Apr 2025 20:31), Max: 36.8 (20 Apr 2025 22:28)  T(F): 98 (21 Apr 2025 20:31), Max: 98.2 (20 Apr 2025 22:28)  HR: 100 (21 Apr 2025 20:31) (72 - 100)  BP: 116/81 (21 Apr 2025 20:31) (98/66 - 135/73)  BP(mean): --  RR: 18 (21 Apr 2025 20:31) (16 - 20)  SpO2: 91% (21 Apr 2025 20:31) (91% - 98%)    Parameters below as of 21 Apr 2025 20:31  Patient On (Oxygen Delivery Method): nasal cannula  O2 Flow (L/min): 4      Physical Exam:  General: appears in distress, anxious, diaphoretic  Neurology: A&Ox3  Respiratory: tachypneic. using accessory muscles on NRB, coarse BS b/l   CV: irregular   Skin: warm, dry, normal color, no obvious rash or abnormal lesions    LABS:                        14.0   9.87  )-----------( 156      ( 21 Apr 2025 01:15 )             39.8     21 Apr 2025 01:15    141    |  106    |  56     ----------------------------<  125    3.7     |  28     |  2.10     Ca    9.2        21 Apr 2025 01:15    TPro  6.9    /  Alb  3.8    /  TBili  1.0    /  DBili  x      /  AST  23     /  ALT  30     /  AlkPhos  71     21 Apr 2025 01:15      Urinalysis Basic - ( 21 Apr 2025 01:15 )    Color: x / Appearance: x / SG: x / pH: x  Gluc: 125 mg/dL / Ketone: x  / Bili: x / Urobili: x   Blood: x / Protein: x / Nitrite: x   Leuk Esterase: x / RBC: x / WBC x   Sq Epi: x / Non Sq Epi: x / Bacteria: x      CAPILLARY BLOOD GLUCOSE      POCT Blood Glucose.: 153 mg/dL (21 Apr 2025 21:14)  POCT Blood Glucose.: 107 mg/dL (21 Apr 2025 16:56)  POCT Blood Glucose.: 159 mg/dL (21 Apr 2025 11:57)  POCT Blood Glucose.: 129 mg/dL (21 Apr 2025 06:55)          Assessment/Plan: 89 y/o male with PMHx of CHF, HLD, HTN, prostate ca (s/p radioactive seed implantation in 2015) T2DM, PPM presents to ER c/o SOB admitted for acute on chronic systolic CHF exacerbation. RRT called for SOB and vtach on EKG. Pt now w/ afib RVR and flash pulmonary edema.     -lasix 40 IV x1, lopressor 5mg IV x1   -EKG showed vtach  -on monitor pt found to be in afib w/ RVR   -CXR wet read showed likely pulmonary edema, vascular congestion   -stat labs ordered including CMP, cardiac enzymes ordered, mg, phos   -pt still w/ SOB and placed on bipap   -pt transferred to ICU Resident Rapid Response Note    Patient is a 89y old  Male who presents with a chief complaint of SOB (21 Apr 2025 11:26)      Rapid response was called at on this 89y Male patient for SOB and vtach. Pt complaining of anxiety and SOB. denies CP, abd pain. Pt was noted to be in vtach on EKG. Afib w/ RVR on monitor.  Prior to rapid, pt had episode of 14 beats of pAF at 15:08 and 33 beats of vtach at 16:45 earlier today.    Patient was seen and examined at the bedside by the rapid response team. Dr. Brizuela, ICU PA at bedside.    Rapid Response Vital Signs:  BP: 172/146  HR: 135  RR: 40  SpO2: 94% on NRB     Vital Signs Last 24 Hrs  T(C): 36.7 (21 Apr 2025 20:31), Max: 36.8 (20 Apr 2025 22:28)  T(F): 98 (21 Apr 2025 20:31), Max: 98.2 (20 Apr 2025 22:28)  HR: 100 (21 Apr 2025 20:31) (72 - 100)  BP: 116/81 (21 Apr 2025 20:31) (98/66 - 135/73)  BP(mean): --  RR: 18 (21 Apr 2025 20:31) (16 - 20)  SpO2: 91% (21 Apr 2025 20:31) (91% - 98%)    Parameters below as of 21 Apr 2025 20:31  Patient On (Oxygen Delivery Method): nasal cannula  O2 Flow (L/min): 4      Physical Exam:  General: appears in distress, anxious, diaphoretic  Neurology: A&Ox3  Respiratory: tachypneic. using accessory muscles on NRB, coarse BS b/l   CV: irregular   Skin: warm, dry, normal color, no obvious rash or abnormal lesions    LABS:                        14.0   9.87  )-----------( 156      ( 21 Apr 2025 01:15 )             39.8     21 Apr 2025 01:15    141    |  106    |  56     ----------------------------<  125    3.7     |  28     |  2.10     Ca    9.2        21 Apr 2025 01:15    TPro  6.9    /  Alb  3.8    /  TBili  1.0    /  DBili  x      /  AST  23     /  ALT  30     /  AlkPhos  71     21 Apr 2025 01:15      Urinalysis Basic - ( 21 Apr 2025 01:15 )    Color: x / Appearance: x / SG: x / pH: x  Gluc: 125 mg/dL / Ketone: x  / Bili: x / Urobili: x   Blood: x / Protein: x / Nitrite: x   Leuk Esterase: x / RBC: x / WBC x   Sq Epi: x / Non Sq Epi: x / Bacteria: x      CAPILLARY BLOOD GLUCOSE      POCT Blood Glucose.: 153 mg/dL (21 Apr 2025 21:14)  POCT Blood Glucose.: 107 mg/dL (21 Apr 2025 16:56)  POCT Blood Glucose.: 159 mg/dL (21 Apr 2025 11:57)  POCT Blood Glucose.: 129 mg/dL (21 Apr 2025 06:55)          Assessment/Plan: 89 y/o male with PMHx of CHF, HLD, HTN, prostate ca (s/p radioactive seed implantation in 2015) T2DM, PPM presents to ER c/o SOB admitted for acute on chronic systolic CHF exacerbation. RRT called for SOB and vtach on EKG. Pt now w/ afib RVR on monitor and likely flash pulmonary edema.     -lasix 40 IV x1, lopressor 5mg IV x1   -EKG showed vtach  -on monitor pt found to be in afib w/ RVR   -CXR wet read showed likely pulmonary edema, vascular congestion   -stat labs ordered including CMP, cardiac enzymes ordered, mg, phos   -pt still w/ SOB and placed on bipap   -pt transferred to ICU

## 2025-04-21 NOTE — H&P ADULT - PROBLEM SELECTOR PLAN 6
S/p prostate  radioactive seed implantation in 2015  -On home Flomax 0.8mg QHS and Finasteride 5mg QD  -C/w home meds S/p prostate radioactive seed implantation in 2015  -On home Flomax 0.4mg QHS and Finasteride 5mg QD  -C/w home meds

## 2025-04-21 NOTE — CONSULT NOTE ADULT - SUBJECTIVE AND OBJECTIVE BOX
History of Present Illness:    Past Medical/Surgical History:    Medications:    Family History: Non-contributory family history of premature cardiovascular atherosclerotic disease    Social History: No tobacco, alcohol or drug use    Review of Systems:  General: No fevers, chills, weight gain  Skin: No rashes, color changes  Cardiovascular: No chest pain, orthopnea  Respiratory: No shortness of breath, cough  Gastrointestinal: No nausea, abdominal pain  Genitourinary: No incontinence, pain with urination  Musculoskeletal: No pain, swelling, decreased range of motion  Neurological: No headache, weakness  Psychiatric: No depression, anxiety  Endocrine: No weight gain, increased thirst  All other systems are comprehensively negative.    Physical Exam:  Vitals:        Vital Signs Last 24 Hrs  T(C): 36.7 (21 Apr 2025 02:13), Max: 36.8 (20 Apr 2025 22:28)  T(F): 98.1 (21 Apr 2025 02:13), Max: 98.2 (20 Apr 2025 22:28)  HR: 123 (21 Apr 2025 02:13) (90 - 123)  BP: 101/56 (21 Apr 2025 02:13) (101/56 - 135/73)  BP(mean): --  RR: 20 (20 Apr 2025 23:00) (18 - 20)  SpO2: 97% (20 Apr 2025 23:00) (94% - 97%)    Parameters below as of 20 Apr 2025 23:00  Patient On (Oxygen Delivery Method): nasal cannula  O2 Flow (L/min): 4    General: NAD  HEENT: MMM  Neck: No JVD, no carotid bruit  Lungs: CTAB  CV: RRR, nl S1/S2, no M/R/G  Abdomen: S/NT/ND, +BS  Extremities: No LE edema, no cyanosis  Neuro: AAOx3, non-focal  Skin: No rash    Labs:                        14.0   9.87  )-----------( 156      ( 21 Apr 2025 01:15 )             39.8     04-21    141  |  106  |  56[H]  ----------------------------<  125[H]  3.7   |  28  |  2.10[H]    Ca    9.2      21 Apr 2025 01:15    TPro  6.9  /  Alb  3.8  /  TBili  1.0  /  DBili  x   /  AST  23  /  ALT  30  /  AlkPhos  71  04-21            ECG/Telemetry:      History of Present Illness: The patient is an 89 year old male with a history of HTN, HL, DM, CKD, chronic systolic heart failure s/p ICD prostate cancer who presents with shortness of breath. The patient was recently admitted to [facility] and then [facility] for heart failure exacerbations. He states over the past couple of days but worse yesterday evening he had dyspnea on exertion. He notes having salty food during Easter yesterday. No chest pain.    Past Medical/Surgical History:  HTN, HL, DM, CKD, chronic systolic heart failure s/p ICD prostate cancer    Medications:  Home Medications:  aspirin 81 mg oral tablet: orally once a day (21 Apr 2025 05:16)  atorvastatin 80 mg oral tablet: 1 tab(s) orally once a day (21 Apr 2025 05:16)  carvedilol 6.25 mg oral tablet: 0.5 tab(s) orally every 12 hours (21 Apr 2025 05:12)  Farxiga 10 mg oral tablet: 1 tab(s) orally once a day (21 Apr 2025 05:13)  finasteride 5 mg oral tablet: 1 tab(s) orally once a day (21 Apr 2025 05:16)  Flomax 0.4 mg oral capsule: 2 cap(s) orally once a day (at bedtime) (21 Apr 2025 05:16)  Torsemide:  (21 Apr 2025 05:14)      Family History: Non-contributory family history of premature cardiovascular atherosclerotic disease    Social History: No tobacco, alcohol or drug use    Review of Systems:  General: No fevers, chills, weight gain  Skin: No rashes, color changes  Cardiovascular: No chest pain, orthopnea  Respiratory: No shortness of breath, cough  Gastrointestinal: No nausea, abdominal pain  Genitourinary: No incontinence, pain with urination  Musculoskeletal: No pain, swelling, decreased range of motion  Neurological: No headache, weakness  Psychiatric: No depression, anxiety  Endocrine: No weight gain, increased thirst  All other systems are comprehensively negative.    Physical Exam:  Vitals:        Vital Signs Last 24 Hrs  T(C): 36.7 (21 Apr 2025 02:13), Max: 36.8 (20 Apr 2025 22:28)  T(F): 98.1 (21 Apr 2025 02:13), Max: 98.2 (20 Apr 2025 22:28)  HR: 123 (21 Apr 2025 02:13) (90 - 123)  BP: 101/56 (21 Apr 2025 02:13) (101/56 - 135/73)  BP(mean): --  RR: 20 (20 Apr 2025 23:00) (18 - 20)  SpO2: 97% (20 Apr 2025 23:00) (94% - 97%)    Parameters below as of 20 Apr 2025 23:00  Patient On (Oxygen Delivery Method): nasal cannula  O2 Flow (L/min): 4    General: NAD  HEENT: MMM  Neck: No JVD, no carotid bruit  Lungs: CTAB  CV: RRR, nl S1/S2, no M/R/G  Abdomen: S/NT/ND, +BS  Extremities: No LE edema, no cyanosis  Neuro: AAOx3, non-focal  Skin: No rash    Labs:                        14.0   9.87  )-----------( 156      ( 21 Apr 2025 01:15 )             39.8     04-21    141  |  106  |  56[H]  ----------------------------<  125[H]  3.7   |  28  |  2.10[H]    Ca    9.2      21 Apr 2025 01:15    TPro  6.9  /  Alb  3.8  /  TBili  1.0  /  DBili  x   /  AST  23  /  ALT  30  /  AlkPhos  71  04-21            ECG/Telemetry: NSR, LBBB, PVC

## 2025-04-21 NOTE — H&P ADULT - HISTORY OF PRESENT ILLNESS
89 y/o male PMHx of CHF, EF 17%, PPM presents to ER c/o SOB, worse with exertion today, denies chest pain, no fever, no cough. Patient reports he was visiting his wife at the rehab, when he got up to go home he began to feel SOB. He sat down in the lobby to wait for it to pass, but it didn't. Staff at the rehab called EMS. Pt was admitted 1 month ago for CHF exacerbation and also 1 week ago at [facility].      Denies fever, chills, chest pain, palpitations, SOB, cough, abdominal pain, nausea, vomiting, diarrhea, constipation, urinary frequency, urgency, or dysuria, headaches, changes in vision, dizziness, numbness, tingling.  Denies recent travel, recent antibiotic use, or sick contacts.    ED Course:   Vitals: BP: 135/73, HR: 95 , Temp: 98.2F , RR: 18 , SpO2: 94% on 6LNC  Labs: BUN/Cr 56/2.1, Glucose 125, eGFR 30, Troponin 3051, pro-BNP 26211  CXR: official read pending   EKG: NSR with frequent PVC's, 90 BPM, QTc 518    Received in the ED:  Aspirin 324mg PO x1, Lasix 40mg IVP x1 88 y/o male with PMHx of CHF, HLD, HTN, prostate ca (s/p radioactive seed implantation in 2015) T2DM, PPM presents to ER c/o SOB. Pt states that he has had intermittent SOB for the past few days, Pt notes he was visiting his wife today at rehab and felt SOB as he was leaving. He sat down to catch his breath, a staff member noticed he did not look well and they decided to call EMS. Pt notes he has had SOB both at rest and with exertion. Pt denies chest pain, fever, cough. Of note, pt was admitted 1 month ago for CHF exacerbation and also 1 week ago at [facility]. Pt adds a bunch of his medications were changed including his Entresto was dc'ed, Farxiga dosage was doubled and his Lasix was switched to Torsemide. Pt currently on NC and denies any SOB. Pt denies fever, chills, chest pain, palpitations, abdominal pain, nausea, vomiting, diarrhea.    ED Course:   Vitals: BP: 135/73, HR: 95 , Temp: 98.2F , RR: 18 , SpO2: 94% on 6LNC  Labs: BUN/Cr 56/2.1, Glucose 125, eGFR 30, Troponin 3051, pro-BNP 10908  CXR: official read pending   EKG: NSR with frequent PVC's, 90 BPM, QTc 518    Received in the ED:  Aspirin 324mg PO x1, Lasix 40mg IVP x1 89 y/o male with PMHx of CHF, HLD, HTN, prostate ca (s/p radioactive seed implantation in 2015) T2DM, PPM presents to ER c/o SOB. Pt states that he has had intermittent SOB for the past few days, Pt notes he was visiting his wife today at rehab and felt SOB as he was leaving. He sat down to catch his breath, a staff member noticed he did not look well and they decided to call EMS. Pt notes he has had SOB both at rest and with exertion. Pt denies chest pain, fever, cough. Of note, pt was admitted 1 month ago for CHF exacerbation and also 1 week ago at [facility]. Pt adds a bunch of his medications were changed including his Entresto was dc'ed, Farxiga dosage was doubled and his Lasix was switched to Torsemide. Pt currently on NC and denies any SOB. Pt denies fever, chills, chest pain, palpitations, abdominal pain, nausea, vomiting, diarrhea.    ED Course:   Vitals: BP: 135/73, HR: 95 , Temp: 98.2F , RR: 18 , SpO2: 94% on 6LNC  Labs: BUN/Cr 56/2.1, Glucose 125, eGFR 30, Troponin 3051, pro-BNP 38927  CXR: official read pending   EKG: NSR with frequent PVC's, 90 BPM, QTc 518      Received in the ED:  Aspirin 324mg PO x1, Lasix 40mg IVP x1

## 2025-04-21 NOTE — CONSULT NOTE ADULT - SUBJECTIVE AND OBJECTIVE BOX
Pt presents to the ED c/o diffuse abdominal pain with nausea/vomiting and diarrhea. Pt A&O x 4, does not appear in acute distress, RR even and unlabored. Pt sitting on stretcher with eyes open. Vital signs obtained, medical hx and allergies reviewed with pt. Triage completed.        Adán Keene LPN  16/04/60 2761 Patient is a 89y old  Male who presents with a chief complaint of SOB (21 Apr 2025 22:18)      BRIEF HOSPITAL COURSE: 89 y/o male with PMHx of HFrEF (EF20-25%), HLD, HTN, prostate ca (s/p radioactive seed implantation in 2015) DMII, PPM presents to ER c/o SOB. Pt states that he has had intermittent SOB for the past few days, Pt notes he was visiting his wife today at rehab and felt SOB as he was leaving. He sat down to catch his breath, a staff member noticed he did not look well and they decided to call EMS. Pt notes he has had SOB both at rest and with exertion. Pt denies chest pain, fever, cough. Of note, pt was admitted 1 month ago for CHF exacerbation and also 1 week ago at [facility]. Pt adds a bunch of his medications were changed including his Entresto was dc'ed, Farxiga dosage was doubled and his Lasix was switched to Torsemide. Patient admitted for SOB 2/2 acute on chronic systolic heart failure exacerbation. Labs were significant for BNP 12,121 and troponin peaked at 3952, HANNAH on CKD (baseline Cr 1.8).    Events last 24 hours: Patient noted with new onset A fib with arrhythmia complicated this evening with NSVT noted on EKG when patient developed SOB and anxiety prompting a rapid response. ICU consulted where patient CXR revealed vascular congestion concerning for acute pulmonary edema. Patient ordered for NIV, second IVP lasix 40 mg (on lasix 40 mg daily) and started on heparin gtt for full AC and amiodarone gtt for A fib with RVR. Patient upgraded to ICU.    PAST MEDICAL & SURGICAL HISTORY:  NICM (nonischemic cardiomyopathy)      HTN (hypertension)      HLD (hyperlipidemia)      Prostate CA      T2DM (type 2 diabetes mellitus)      CHF (congestive heart failure)      H/O prostate biopsy      H/O cataract extraction      H/O inguinal hernia repair      AICD (automatic cardioverter/defibrillator) present        Allergies    No Known Allergies    Intolerances      FAMILY HISTORY:  Family history of cardiomyopathy (Father)    FH: CHF (congestive heart failure) (Sibling)      SOCIAL HISTORY:     Review of Systems:  CONSTITUTIONAL: No fever, chills, or fatigue.  EYES: No eye pain, visual disturbances, or discharge.  ENMT:  No difficulty hearing, tinnitus, or vertigo. No sinus or throat pain.  NECK: No pain or stiffness.  RESPIRATORY: Difficulty breathing  CARDIOVASCULAR: No chest pain, palpitations, dizziness, or leg swelling.  GASTROINTESTINAL: No abdominal or epigastric pain. No nausea, vomiting,  diarrhea, or constipation. No hematemesis, melena, or hematochezia.  GENITOURINARY: No dysuria, frequency, hematuria, or incontinence.  NEUROLOGICAL: No headaches, memory loss, loss of strength, numbness, or tremors.  SKIN: No itching, burning, rashes, or lesions.  MUSCULOSKELETAL: No joint pain or swelling; No muscle, back, or extremity pain.  PSYCHIATRIC: Anxious    Medications:    aMIOdarone Infusion 1 mG/Min IV Continuous <Continuous>  aMIOdarone Infusion 0.5 mG/Min IV Continuous <Continuous>  carvedilol 3.125 milliGRAM(s) Oral every 12 hours  furosemide   Injectable 40 milliGRAM(s) IV Push daily      dexMEDEtomidine Infusion 0.2 MICROgram(s)/kG/Hr IV Continuous <Continuous>      aspirin  chewable 81 milliGRAM(s) Oral daily  heparin   Injectable 6500 Unit(s) IV Push every 6 hours PRN  heparin   Injectable 3000 Unit(s) IV Push every 6 hours PRN  heparin  Infusion.  Unit(s)/Hr IV Continuous <Continuous>      tamsulosin 0.8 milliGRAM(s) Oral at bedtime    atorvastatin 80 milliGRAM(s) Oral at bedtime  dextrose 50% Injectable 25 Gram(s) IV Push once  dextrose 50% Injectable 12.5 Gram(s) IV Push once  dextrose 50% Injectable 25 Gram(s) IV Push once  dextrose Oral Gel 15 Gram(s) Oral once PRN  finasteride 5 milliGRAM(s) Oral daily  glucagon  Injectable 1 milliGRAM(s) IntraMuscular once  insulin lispro (ADMELOG) corrective regimen sliding scale   SubCutaneous three times a day before meals  insulin lispro (ADMELOG) corrective regimen sliding scale   SubCutaneous at bedtime    dextrose 5%. 1000 milliLiter(s) IV Continuous <Continuous>  dextrose 5%. 1000 milliLiter(s) IV Continuous <Continuous>  potassium chloride  10 mEq/100 mL IVPB 10 milliEquivalent(s) IV Intermittent once      chlorhexidine 2% Cloths 1 Application(s) Topical <User Schedule>            ICU Vital Signs Last 24 Hrs  T(C): 36.3 (21 Apr 2025 23:33), Max: 36.7 (21 Apr 2025 02:13)  T(F): 97.4 (21 Apr 2025 23:33), Max: 98.1 (21 Apr 2025 02:13)  HR: 97 (22 Apr 2025 00:00) (72 - 100)  BP: 135/97 (22 Apr 2025 00:00) (98/66 - 135/97)  BP(mean): 106 (22 Apr 2025 00:00) (69 - 108)  ABP: --  ABP(mean): --  RR: 26 (22 Apr 2025 00:00) (16 - 33)  SpO2: 96% (22 Apr 2025 00:00) (91% - 98%)    O2 Parameters below as of 21 Apr 2025 20:31  Patient On (Oxygen Delivery Method): nasal cannula  O2 Flow (L/min): 4        Vital Signs Last 24 Hrs  T(C): 36.3 (21 Apr 2025 23:33), Max: 36.7 (21 Apr 2025 02:13)  T(F): 97.4 (21 Apr 2025 23:33), Max: 98.1 (21 Apr 2025 02:13)  HR: 97 (22 Apr 2025 00:00) (72 - 100)  BP: 135/97 (22 Apr 2025 00:00) (98/66 - 135/97)  BP(mean): 106 (22 Apr 2025 00:00) (69 - 108)  RR: 26 (22 Apr 2025 00:00) (16 - 33)  SpO2: 96% (22 Apr 2025 00:00) (91% - 98%)    Parameters below as of 21 Apr 2025 20:31  Patient On (Oxygen Delivery Method): nasal cannula  O2 Flow (L/min): 4      ABG - ( 21 Apr 2025 23:42 )  pH, Arterial: 7.50  pH, Blood: x     /  pCO2: 30    /  pO2: 95    / HCO3: 23    / Base Excess: 0.2   /  SaO2: 98.3                I&O's Detail    21 Apr 2025 07:01  -  22 Apr 2025 01:54  --------------------------------------------------------  IN:    Amiodarone: 66.6 mL    Heparin Infusion: 15 mL    Oral Fluid: 150 mL  Total IN: 231.6 mL    OUT:    Voided (mL): 650 mL  Total OUT: 650 mL    Total NET: -418.4 mL          LABS:                        15.9   12.00 )-----------( 206      ( 21 Apr 2025 22:10 )             46.4     04-21    141  |  106  |  57[H]  ----------------------------<  241[H]  3.8   |  23  |  2.00[H]    Ca    9.1      21 Apr 2025 22:10  Phos  5.3     04-21  Mg     2.4     04-21    TPro  7.3  /  Alb  3.9  /  TBili  1.3[H]  /  DBili  x   /  AST  32  /  ALT  36  /  AlkPhos  83  04-21      CARDIAC MARKERS ( 21 Apr 2025 22:10 )  x     / x     / x     / x     / 2.3 ng/mL      CAPILLARY BLOOD GLUCOSE      POCT Blood Glucose.: 208 mg/dL (21 Apr 2025 22:38)    PT/INR - ( 22 Apr 2025 00:12 )   PT: 15.4 sec;   INR: 1.31 ratio         PTT - ( 22 Apr 2025 00:12 )  PTT:26.2 sec  Urinalysis Basic - ( 21 Apr 2025 22:10 )    Color: x / Appearance: x / SG: x / pH: x  Gluc: 241 mg/dL / Ketone: x  / Bili: x / Urobili: x   Blood: x / Protein: x / Nitrite: x   Leuk Esterase: x / RBC: x / WBC x   Sq Epi: x / Non Sq Epi: x / Bacteria: x        CULTURES:      Physical Examination:    General: Ill appearing, afebrile, in acute distress    HEENT: Pupils equal, reactive to light. Symmetric. No scleral icterus or injection. Head NCAT, hearing and vision intact.    PULM: Rhonchi bilaterally, tachypneic with accessory muscle usage on NRB.    NECK: Supple, no lymphadenopathy, trachea midline.    CVS: Run of VT on EKG. A fib with RVR and PVCs.    ABD: Soft, nondistended, nontender, normoactive bowel sounds.    EXT: No edema, nontender.    SKIN: Warm and well perfused, no rashes noted.    NEURO: Alert, oriented, interactive, nonfocal. Anxious

## 2025-04-21 NOTE — ED PROVIDER NOTE - CLINICAL SUMMARY MEDICAL DECISION MAKING FREE TEXT BOX
89-year-old male with history of severe CHF with an ICD is presenting with shortness of breath similar in presentation to the last time patient was admitted in [place].  Patient was visiting his wife got up to go home began to get dyspneic.  No chest pain.  Will evaluate for CHF exacerbation, ACS.  Will get labs, EKG, chest x-ray.  Currently patient requiring O2 support via nasal cannula.  Will reassess.

## 2025-04-21 NOTE — PROGRESS NOTE ADULT - PROBLEM SELECTOR PLAN 7
Chronic  -On home Farxiga 10mg qd   -Hold home oral meds  -Start LDSS with FS QAC, QHS  -Hypoglycemia protocol  -Consistent carb diet

## 2025-04-21 NOTE — CONSULT NOTE ADULT - ASSESSMENT
The patient is an 89 year old male with a history of HTN, HL, DM, CKD, chronic systolic heart failure s/p ICD prostate cancer who presents with shortness of breath in the setting of acute on chronic systolic heart failure.    Plan:  - Decompensated heart failure may in part be due to increased salt intake yesterday  - ECG with sinus rhythm and LBBB  - Echo 3/20/25 with severely reduced LV systolic function (EF 20-25%), mod MR  - BNP 79794  - CXR with pulm congestion and small left pleural effusion  - Troponin elevated at 3543 in the setting of acute heart failure, HANNAH  - He had a cardiac catheterization in the past for work-up of his heart failure which showed mild non-obstructive CAD  - Entresto was discontinued due to hypotension and HANNAH  - Hold dapagliflozin; resume on discharge if renal function stable  - Continue carvedilol 3.125 mg bid  - Continue aspirin 81 mg daily  - Continue atorvastatin 80 mg daily  - The patient has a history of hypotension and his heart failure medications were recently reduced  - Continue furosemide 40 mg IV daily; can increase to 40 mg IV q12h if remains hypoxic or short of breath. Will transition to home torsemide 20 mg PO bid when able.

## 2025-04-21 NOTE — ED PROVIDER NOTE - OBJECTIVE STATEMENT
89 male PMH of CHF, EF 17%, PPM, on coreg 12.5 mg, lasix 20mg, farxiga 5mg, asa 81mg, entresto 49/51, afluzozin, presents to ER c/o SOB, worse with exertion today, denies chest pain, no fever, no cough. Patient reports he was visiting his wife at the rehab, when he got up to go home he began to feel SOB. He sat down in the lobby to wait for it to pass, but it didn't. Staff at the rehab called EMS. Pt was admitted 1 month ago for CHF exacerbation and also 1 week ago at [facility].    cardio Mehariniafonte  PCP Estefani

## 2025-04-21 NOTE — H&P ADULT - ASSESSMENT
Per chart review pt baseline Cr is around 1.8  -Cr 2.1 on this admission  -Avoid  88 y/o male with PMHx of CHF, HLD, HTN, prostate ca (s/p radioactive seed implantation in 2015) T2DM, PPM presents to ER c/o SOB admitted for acute on chronic CHF exacerbation.

## 2025-04-21 NOTE — H&P ADULT - TIME BILLING
2022 18:13 I personally conducted a physical examination of the patient. I personally gathered the patient's history. I edited the above listed findings which were prepared by the listed resident physician. I personally discussed the plan of care with the patient. The questions and concerns were addressed to the best of my ability. The patient is in agreement with the listed treatment plan.    Time spent excludes teaching and seperately reported services

## 2025-04-21 NOTE — H&P ADULT - PROBLEM SELECTOR PLAN 1
Pt with hx of chronic systolic congestive heart failure last admitted for CHF exacerbation in March   -Pt with inc SOB   -F/u CXR official read  -Troponin 3051, pro-BNP 65698  -EKG: NSR with frequent PVC's, 90 BPM, QTc 518  -Vitals: BP: 135/73, HR: 95 , Temp: 98.2F , RR: 18 , SpO2: 94% on 6LNC  -Currently on 6LNC wean as tolerated   -s/p 40mg Lasix IVP in ED  -Previous TTE from March with severely reduced LV systolic function (EF 20-25%), mod MR  - Strict I&Os, daily weights, fluid restriction   - Remote tele, continuous pulse ox   -C/w Lasix 40mg IV daily and continue home meds? Farxiga and Entresto   -Cardio consult? Pt with hx of chronic systolic congestive heart failure last admitted for CHF exacerbation in March to [facility] last week  -Pt with inc SOB at rest and on exertion   -F/u CXR official read  -Troponin 3051, pro-BNP 64913  -EKG: NSR with frequent PVC's, 90 BPM, QTc 518  -Vitals: BP: 135/73, HR: 95 , Temp: 98.2F , RR: 18 , SpO2: 94% on 6LNC  -Currently on 6LNC wean as tolerated   -s/p 40mg Lasix IVP in ED  -Previous TTE from March with severely reduced LV systolic function (EF 20-25%), mod MR  - Strict I&Os, daily weights, fluid restriction   - Remote tele, continuous pulse ox   -Pt recently switched from Lasix to Torsemide per [facility] discharge but is unsure of dosage   -C/w Lasix 40mg daily   -Cardio consulted, Dr. Boothe, f/u recs Pt with hx of chronic systolic congestive heart failure last admitted for CHF exacerbation in March to [facility] last week  -Pt with inc SOB at rest and on exertion   -F/u CXR official read  -Troponin 3051, pro-BNP 35286  -EKG: NSR with frequent PVC's, 90 BPM, QTc 518  -Vitals: BP: 135/73, HR: 95 , Temp: 98.2F , RR: 18 , SpO2: 94% on 6LNC  -Currently on 6LNC wean as tolerated   -s/p 40mg Lasix IVP in ED  -Previous TTE from March with severely reduced LV systolic function (EF 20-25%), mod MR  - Strict I&Os, daily weights, fluid restriction   - Remote tele, continuous pulse ox   -Pt recently switched from Lasix to Torsemide per [facility] discharge but is unsure of dosage   -C/w Lasix 40mg IVP daily   -Cardio consulted, Dr. Boothe, f/u recs acute on chronic HFrEF exacerbation   Pt with hx of chronic systolic congestive heart failure last admitted for CHF exacerbation in March to [facility] and [facility] last week  -Pt with inc SOB at rest and on exertion   -F/u CXR official read  -Troponin 3051, pro-BNP 75954  -EKG: NSR with frequent PVC's, 90 BPM, QTc 518  -Vitals: BP: 135/73, HR: 95 , Temp: 98.2F , RR: 18 , SpO2: 94% on 6LNC  -Currently on 6LNC wean as tolerated   -s/p 40mg Lasix IVP in ED  -Previous TTE from March with severely reduced LV systolic function (EF 20-25%), mod MR  - Strict I&Os, daily weights, fluid restriction   - Remote tele, continuous pulse ox   -Pt recently switched from Lasix to Torsemide per [facility] discharge but is unsure of dosage   -C/w Lasix 40mg IVP daily   -Cardio consulted, Dr. Boothe, f/u recs

## 2025-04-21 NOTE — CONSULT NOTE ADULT - SUBJECTIVE AND OBJECTIVE BOX
[facility]                             Nephrology and Hypertension                             Kris  Jameson Ramires                                          [phone]     Patient is a 89y old  Male who presents with a chief complaint of SOB (21 Apr 2025 11:26)       HPI:  88 y/o male with PMHx of CHF, HLD, HTN, prostate ca (s/p radioactive seed implantation in 2015) T2DM, PPM presents to ER c/o SOB. Pt states that he has had intermittent SOB for the past few days, Pt notes he was visiting his wife today at rehab and felt SOB as he was leaving. He sat down to catch his breath, a staff member noticed he did not look well and they decided to call EMS. Pt notes he has had SOB both at rest and with exertion. Pt denies chest pain, fever, cough. Of note, pt was admitted 1 month ago for CHF exacerbation and also 1 week ago at [facility]. Pt adds a bunch of his medications were changed including his Entresto was dc'ed, Farxiga dosage was doubled and his Lasix was switched to Torsemide. Pt currently on NC and denies any SOB. Pt denies fever, chills, chest pain, palpitations, abdominal pain, nausea, vomiting, diarrhea.  Has not seen nephrologist.  States he is urinating.  Complains of dyspnea and orthopnea.    Received in the ED:  Aspirin 324mg PO x1, Lasix 40mg IVP x1 (21 Apr 2025 03:55)       PAST MEDICAL & SURGICAL HISTORY:  NICM (nonischemic cardiomyopathy)      HTN (hypertension)      HLD (hyperlipidemia)      Prostate CA      T2DM (type 2 diabetes mellitus)      CHF (congestive heart failure)      H/O prostate biopsy      H/O cataract extraction      H/O inguinal hernia repair      AICD (automatic cardioverter/defibrillator) present           FAMILY HISTORY:  Family history of cardiomyopathy (Father)    FH: CHF (congestive heart failure) (Sibling)    NC    Social History:Non smoker    MEDICATIONS  (STANDING):  aspirin  chewable 81 milliGRAM(s) Oral daily  atorvastatin 80 milliGRAM(s) Oral at bedtime  carvedilol 3.125 milliGRAM(s) Oral every 12 hours  dextrose 5%. 1000 milliLiter(s) (100 mL/Hr) IV Continuous <Continuous>  dextrose 5%. 1000 milliLiter(s) (50 mL/Hr) IV Continuous <Continuous>  dextrose 50% Injectable 25 Gram(s) IV Push once  dextrose 50% Injectable 12.5 Gram(s) IV Push once  dextrose 50% Injectable 25 Gram(s) IV Push once  finasteride 5 milliGRAM(s) Oral daily  furosemide   Injectable 40 milliGRAM(s) IV Push daily  glucagon  Injectable 1 milliGRAM(s) IntraMuscular once  heparin   Injectable 5000 Unit(s) SubCutaneous every 12 hours  insulin lispro (ADMELOG) corrective regimen sliding scale   SubCutaneous three times a day before meals  insulin lispro (ADMELOG) corrective regimen sliding scale   SubCutaneous at bedtime  tamsulosin 0.8 milliGRAM(s) Oral at bedtime    MEDICATIONS  (PRN):  dextrose Oral Gel 15 Gram(s) Oral once PRN Blood Glucose LESS THAN 70 milliGRAM(s)/deciliter   Meds reviewed    Allergies    No Known Allergies    Intolerances         REVIEW OF SYSTEMS:    Review of Systems:   Constitutional: Denies fatigue  HEENT: Denies headaches and dizziness  Respiratory: + dyspnea  Cardiovascular: denies CP, palpitations  Gastrointestinal: Denies nausea, denies vomiting, diarrhea, constipation, abdominal pain, or bloody stools  Genitourinary: denies painful urination, increased frequency, urgency, or bloody urine  Skin: denies rashes or itching  Musculoskeletal: denies muscle aches, joint swelling  Neurologic: Denies generalized weakness, denies loss of sensation, numbness, or tingling      Vital Signs Last 24 Hrs  T(C): 36.3 (21 Apr 2025 09:49), Max: 36.8 (20 Apr 2025 22:28)  T(F): 97.4 (21 Apr 2025 09:49), Max: 98.2 (20 Apr 2025 22:28)  HR: 72 (21 Apr 2025 09:49) (72 - 95)  BP: 98/66 (21 Apr 2025 09:49) (98/66 - 135/73)  BP(mean): --  RR: 16 (21 Apr 2025 09:49) (16 - 20)  SpO2: 96% (21 Apr 2025 09:49) (94% - 97%)    Parameters below as of 21 Apr 2025 09:49  Patient On (Oxygen Delivery Method): nasal cannula  O2 Flow (L/min): 3    Daily Height in cm: 172.72 (20 Apr 2025 22:28)    Daily     PHYSICAL EXAM:    GENERAL: NAD  HEAD:  Atraumatic, Normocephalic  EYES: EOMI, conjunctiva and sclera clear  ENMT: No Drainage from nares, No drainage from ears  NERVOUS SYSTEM:  Awake and Alert  CHEST/LUNG: decreased BS  EXTREMITIES:  No Edema  SKIN: No rashes No obvious ecchymosis      LABS:                        14.0   9.87  )-----------( 156      ( 21 Apr 2025 01:15 )             39.8     04-21    141  |  106  |  56[H]  ----------------------------<  125[H]  3.7   |  28  |  2.10[H]    Ca    9.2      21 Apr 2025 01:15    TPro  6.9  /  Alb  3.8  /  TBili  1.0  /  DBili  x   /  AST  23  /  ALT  30  /  AlkPhos  71  04-21      Urinalysis Basic - ( 21 Apr 2025 01:15 )    Color: x / Appearance: x / SG: x / pH: x  Gluc: 125 mg/dL / Ketone: x  / Bili: x / Urobili: x   Blood: x / Protein: x / Nitrite: x   Leuk Esterase: x / RBC: x / WBC x   Sq Epi: x / Non Sq Epi: x / Bacteria: x              RADIOLOGY & ADDITIONAL TESTS:

## 2025-04-21 NOTE — H&P ADULT - NSICDXPASTSURGICALHX_GEN_ALL_CORE_FT
PAST SURGICAL HISTORY:  AICD (automatic cardioverter/defibrillator) present     H/O cataract extraction     H/O inguinal hernia repair     H/O prostate biopsy      Home

## 2025-04-21 NOTE — CASE MANAGEMENT PROGRESS NOTE - NSCMPROGRESSNOTE_GEN_ALL_CORE
Pt admitted from home with CHF, receiving IV Lasix, oxygen 3L. CM met with the patient at the bedside, educated pt on role of CM and transition planning. Patient verbalized understanding. CM provided direct contact/resource folder and remains available. Patient is identified as a CMS STAR patient. Transition care management program explained and pt verbalized understanding. Pt stated that he is not sure if he wants a visiting nurse but he will review the list and notify CM.

## 2025-04-21 NOTE — CARE COORDINATION ASSESSMENT. - CURRENT STRESSORS OF PATIENT
patient's wife recently had a stroke and is in rehab now at [facility]/hospitalization/medical condition/caregiver responsibilities

## 2025-04-21 NOTE — PROGRESS NOTE ADULT - ATTENDING COMMENTS
Patient seen at bedside at bedside on 3LNC   denies any chest pain   dyspnea is a little better    + rales    A/P: Patient seen at bedside at bedside on 3LNC   denies any chest pain   dyspnea is a little better  reports wife had a stroke 3 weeks ago and is currently in [facility]. He has not been compliant with diet because he has been visiting his wife at [facility] and eating what he can.     + rales    A/P:  Acute on chronic systolic CHF exacerbation   AHRF    - wean NC as tolerated   - lasix 40mg IV daily. was on torsemide at home (recently switched at [facility])   - cardio and pulm following   - diet education   - I+Os, daily weight and ambulate as tolerated    - trend trop to peak. likely demand in setting of CHF. will repeat trop in AM per discussion with cardio. does not suspect ACS.    - cont coreg 3.125mg BID    Acute on chronic renal disease stage 3    - nephrology following    - Urine lytes pending    - albumin ordered    prostate cancer     - s/p prostate radioactive seed implantation 2015.     - cont flomax and finasteride     DM2    - cont LDSS      DVT proph: heparin 5000 units TIDAC

## 2025-04-21 NOTE — CONSULT NOTE ADULT - ASSESSMENT
HANNAH HANNAH on CKD 3  CHF  Dyspnea   HTN     -Baseline Creatinine 1.5  -HANNAH Likely 2/2 Decreased EABV  -Check Urine lytes  -Check UA  -Cont with furosemide  -CXR reviewed  -Fluid and sodium restriction  -Will give one dose of albumin

## 2025-04-21 NOTE — PROGRESS NOTE ADULT - PROBLEM SELECTOR PLAN 1
acute on chronic HFrEF exacerbation   Pt with hx of chronic systolic congestive heart failure last admitted for CHF exacerbation in March to [facility] and [facility] last week  -Pt with inc SOB at rest and on exertion   -F/u CXR official read  -Troponin 3051, pro-BNP 91207  -EKG: NSR with frequent PVC's, 90 BPM, QTc 518  -Vitals: BP: 135/73, HR: 95 , Temp: 98.2F , RR: 18 , SpO2: 94% on 6LNC  -Currently on 6LNC wean as tolerated   -s/p 40mg Lasix IVP in ED  -Previous TTE from March with severely reduced LV systolic function (EF 20-25%), mod MR  - Strict I&Os, daily weights, fluid restriction   - Remote tele, continuous pulse ox   -Pt recently switched from Lasix to Torsemide per SF discharge but is unsure of dosage   -C/w Lasix 40mg IVP daily   -Cardio consulted, Dr. Boothe, f/u recs acute on chronic HFrEF exacerbation   Pt with hx of chronic systolic congestive heart failure last admitted for CHF exacerbation in March to [facility] and [facility] last week  -Pt with inc SOB at rest and on exertion   -F/u CXR official read  -Troponin 3051, pro-BNP 92687  -EKG: NSR with frequent PVC's, 90 BPM, QTc 518  -Vitals: BP: 135/73, HR: 95 , Temp: 98.2F , RR: 18 , SpO2: 94% on 6LNC  -Currently on 6LNC wean as tolerated   -s/p 40mg Lasix IVP in ED  -Previous TTE from March with severely reduced LV systolic function (EF 20-25%), mod MR  - Strict I&Os, daily weights, fluid restriction   - Remote tele, continuous pulse ox   -Pt recently switched from Lasix to Torsemide per SF discharge but is unsure of dosage   -C/w Lasix 40mg IVP daily   -Cardio consulted, Dr. Boothe, recs appreciated acute on chronic HFrEF exacerbation   Pt with hx of chronic systolic congestive heart failure last admitted for CHF exacerbation in March to [facility] and [facility] last week  -Pt with inc SOB at rest and on exertion   -F/u CXR official read  -Troponin 3051, 3543, pro-BNP 94719  -EKG: NSR with frequent PVC's, 90 BPM, QTc 518  -Vitals: BP: 135/73, HR: 95 , Temp: 98.2F , RR: 18 , SpO2: 94% on 6LNC  -Currently on 6LNC wean as tolerated   -s/p 40mg Lasix IVP in ED  -Previous TTE from March with severely reduced LV systolic function (EF 20-25%), mod MR  - Strict I&Os, daily weights, fluid restriction   - Remote tele, continuous pulse ox   -Pt recently switched from Lasix to Torsemide per SF discharge but is unsure of dosage   -C/w Lasix 40mg IVP daily   -Cardio consulted, Dr. Boothe, recs appreciated

## 2025-04-21 NOTE — CARE COORDINATION ASSESSMENT. - PATIENT'S UNDERSTANDING OF CONDITION AND TREATMENT
adequate understanding of medical condition/adequate understanding of treatment/needs additional information

## 2025-04-21 NOTE — PROGRESS NOTE ADULT - PROBLEM SELECTOR PLAN 1
acute on chronic HFrEF exacerbation   Pt with hx of chronic systolic congestive heart failure last admitted for CHF exacerbation in March to [facility] and [facility] last week  -Pt with inc SOB at rest and on exertion   -F/u CXR official read  -Troponin 3051, pro-BNP 57708  -EKG: NSR with frequent PVC's, 90 BPM, QTc 518  -Vitals: BP: 135/73, HR: 95 , Temp: 98.2F , RR: 18 , SpO2: 94% on 6LNC  -Currently on 6LNC wean as tolerated   -s/p 40mg Lasix IVP in ED  -Previous TTE from March with severely reduced LV systolic function (EF 20-25%), mod MR  - Strict I&Os, daily weights, fluid restriction   - Remote tele, continuous pulse ox   -Pt recently switched from Lasix to Torsemide per SF discharge but is unsure of dosage   -C/w Lasix 40mg IVP daily   -Cardio consulted, Dr. Boothe, f/u recs

## 2025-04-21 NOTE — H&P ADULT - ATTENDING COMMENTS
88 y/o male with PMHx of CHF, HLD, HTN, prostate ca (s/p radioactive seed implantation in 2015) T2DM, PPM presents to ER c/o SOB admitted for acute on chronic CHF exacerbation.    Agree with above. Edited where appropriate

## 2025-04-21 NOTE — H&P ADULT - PROBLEM SELECTOR PLAN 5
Chronic   -On home Atorvastatin and Cholestyramine  -Continue home meds Chronic   -On home Atorvastatin   -Continue home meds  -F/u AM Lipid panel

## 2025-04-21 NOTE — CHART NOTE - NSCHARTNOTEFT_GEN_A_CORE
called by RN stating pt had episode of 33 beats of vtach. Discussed w/ tele pt had episode of 14 beats of pAF at 15:08 and 33 beats of vtach at 16:45. No additional episodes. pt was seen and examined at bedside. No complaints.   -will get EKG  -cardiac enzymes, CMP, mag phos ordered   -Will continue to monitor   -RN to notify w/ any changes

## 2025-04-21 NOTE — CONSULT NOTE ADULT - ASSESSMENT
Assessment: 88 y/o male with PMHx of HFrEF (EF20-25%), HLD, HTN, prostate ca (s/p radioactive seed implantation in 2015) DMII, PPM presents to ER c/o SOB admitted for acute on chronic systolic heart failure exacerbation with hospital course c/b acute pulmonary edema from continued SOB and new onset A fib RVR.    SOB  AHRF  Acute pulmonary edema  Acute on chronic systolic heart failure  HANNAH on CKD  New onset A fib with RVR  NSVT  Anxiety    Plan:   Neuro: Anxiety persistent with noncompliance with continuous NIV requiring precedex gtt for compliance. Actively titrate sedative to maintain RASS 0 to -1. Tylenol for analgesia or fever PRN.    Pulm: SOB with AHRF 2/2 acute pulmonary edema as CXR noting vascular congestion for CHF exacerbation. On IVP lasix 40 mg daily, given another dose during rapid response. Ordered for continuous bipap 15/8. Follow up ABG stable. Noted acutely hypoxic on NRB and unable to be weaned off bipap s/p lasix administration. Will actively titrate bipap settings to maintain SO2>92.    CV: Acute on chronic systolic HF exacerbation, noted with LVEF 20-25% on prior TTE. Reordered for IVP lasix 40 mg BID.  -Will consider IV nitroglycerin if persistent HTN develops.  -Course c/b new onset A fib with RVR with EKG noting NSVT with 33 beat run this evening  -Troponins peaked, repeat around same level. Possibly demand ischemia from HF exacerbation. Cards following case.  -Can continue GDMT with ASA, Carvedilol, and statin when able to take PO off NIV.    Renal: HANNAH on CKD, trend renal indices daily. Entresto and dapagliflozin discontinued in setting of HANNAH. Assessment: 88 y/o male with PMHx of HFrEF (EF20-25%), HLD, HTN, prostate ca (s/p radioactive seed implantation in 2015) DMII, PPM presents to ER c/o SOB admitted for acute on chronic systolic heart failure exacerbation with hospital course c/b acute pulmonary edema from continued SOB and new onset A fib RVR.    SOB  AHRF  Acute pulmonary edema  Acute on chronic systolic heart failure  HANNAH on CKD  New onset A fib with RVR  NSVT  Anxiety    Plan:   Neuro: Anxiety persistent with noncompliance with continuous NIV requiring precedex gtt for compliance. Actively titrate sedative to maintain RASS 0 to -1. Tylenol for analgesia or fever PRN.    Pulm: SOB with AHRF 2/2 acute pulmonary edema as CXR noting vascular congestion for CHF exacerbation. On IVP lasix 40 mg daily, given another dose during rapid response. Ordered for continuous bipap 15/8. Follow up ABG stable. Noted acutely hypoxic on NRB and unable to be weaned off bipap s/p lasix administration. Will actively titrate bipap settings to maintain SO2>92.    CV: Acute on chronic systolic HF exacerbation, noted with LVEF 20-25% on prior TTE. Reordered for IVP lasix 40 mg BID.  -Will consider IV nitroglycerin if persistent HTN develops.  -Course c/b new onset A fib with RVR with EKG noting NSVT with 33 beat run this evening  -Troponins peaked, repeat around same level. Possibly demand ischemia from HF exacerbation. Cards following case.  -Can continue GDMT with ASA, Carvedilol, and statin when able to take PO off NIV.    Renal: HANNAH on CKD, trend renal indices daily. Entresto and dapagliflozin discontinued in setting of HANNAH. Avoid nephrotoxic meds. Replete lytes to maintain K>4, Mg>2, Phos>3.    GI: NPO    ID: Mild leukocytosis likely stress induced. No fever noted. Trend WBC and fever curves. Monitor off ABX for now. Follow up procal ordered.    Heme: Heparin gtt for full AC 2/2 A fib. Coags stable. H&H stable, trend daily.    Endo: BG goal 110-180 with ISS coverage Q6H.      CRITICAL CARE TIME SPENT: 90 minutes  Time spent evaluating/treating patient with medical issues that pose a high risk for life threatening deterioration, and/or end-organ damage, reviewing data/labs/imaging, discussing case with multidisciplinary team, discussing plan/goals of care with patient/family. Non-inclusive of procedure time. Date of entry of this note is equal to the date of services rendered.     Case Discussed with eICU Dr Arroyo Assessment: 88 y/o male with PMHx of HFrEF (EF20-25%), HLD, HTN, prostate ca (s/p radioactive seed implantation in 2015) DMII, PPM presents to ER c/o SOB admitted for acute on chronic systolic heart failure exacerbation with hospital course c/b acute pulmonary edema from continued SOB and new onset A fib RVR.    SOB  AHRF  Acute pulmonary edema  Acute on chronic systolic heart failure  HANNAH on CKD  New onset A fib with RVR  NSVT  Anxiety    Plan:   Neuro: Anxiety persistent with noncompliance with continuous NIV requiring precedex gtt for compliance. Actively titrate sedative to maintain RASS 0 to -1. Tylenol for analgesia or fever PRN.    Pulm: SOB with AHRF 2/2 acute pulmonary edema as CXR noting vascular congestion for CHF exacerbation. On IVP lasix 40 mg daily, given another dose during rapid response. Ordered for continuous bipap 15/8. Follow up ABG stable. Noted acutely hypoxic on NRB and unable to be weaned off bipap s/p lasix administration. Will actively titrate bipap settings to maintain SO2>92.    CV: Acute on chronic systolic HF exacerbation, noted with LVEF 20-25% on prior TTE. Reordered for IVP lasix 40 mg BID.  -Will consider IV nitroglycerin if persistent HTN develops.  -Course c/b new onset A fib with RVR with EKG noting NSVT with 33 beat run this evening  -Troponins peaked, repeat around same level. Possibly demand ischemia from HF exacerbation. Cards following case.  -Can continue GDMT with ASA, Carvedilol, and statin when able to take PO off NIV.    Renal: HANNAH on CKD possibly ATN, trend renal indices daily. Entresto and dapagliflozin discontinued in setting of HANNAH. Avoid nephrotoxic meds. Replete lytes to maintain K>4, Mg>2, Phos>3.    GI: NPO    ID: Mild leukocytosis likely stress induced. No fever noted. Trend WBC and fever curves. Monitor off ABX for now. Follow up procal ordered.    Heme: Heparin gtt for full AC 2/2 A fib. Coags stable. H&H stable, trend daily.    Endo: BG goal 110-180 with ISS coverage Q6H.      CRITICAL CARE TIME SPENT: 90 minutes  Time spent evaluating/treating patient with medical issues that pose a high risk for life threatening deterioration, and/or end-organ damage, reviewing data/labs/imaging, discussing case with multidisciplinary team, discussing plan/goals of care with patient/family. Non-inclusive of procedure time. Date of entry of this note is equal to the date of services rendered.     Case Discussed with eICU Dr Arroyo

## 2025-04-21 NOTE — H&P ADULT - PROBLEM SELECTOR PLAN 4
Chronic  -On home Coreg  -C/w home meds with hold parameters Chronic  -On home Coreg 3.125 mg BID  -C/w home meds with hold parameters

## 2025-04-21 NOTE — H&P ADULT - PROBLEM SELECTOR PLAN 7
Chronic  -On home Farxiga 5mg qd   -Start LDSS with FS QAC, QHS  -Hypoglycemia protocol  -DASH/TLC diet Chronic  -On home Farxiga 10mg qd   -Start LDSS with FS QAC, QHS  -Hypoglycemia protocol  -Consistent carb diet Chronic  -On home Farxiga 10mg qd   -Hold home oral meds  -Start LDSS with FS QAC, QHS  -Hypoglycemia protocol  -Consistent carb diet

## 2025-04-21 NOTE — H&P ADULT - NSHPREVIEWOFSYSTEMS_GEN_ALL_CORE
84
REVIEW OF SYSTEMS:  CONSTITUTIONAL: No fever, chills or fatigue.  HENMT: No HA, dizziness, rhinorrhea  RESPIRATORY: No cough or shortness of breath.  CARDIOVASCULAR: No chest pain, palpitations.  GASTROINTESTINAL: No abdominal pain. No nausea or vomiting; No diarrhea or constipation. No blood per rectum.  GENITOURINARY: No dysuria, changes in frequency, hematuria, or incontinence  NEUROLOGICAL: Baseline strength. Sensation intact bilaterally.  SKIN: No itching, rashes  MUSCULOSKELETAL: No joint pain or swelling; No muscle, back, or extremity pain

## 2025-04-21 NOTE — CONSULT NOTE ADULT - ASSESSMENT
88 y/o male with PMHx of CHF, HLD, HTN, prostate ca (s/p radioactive seed implantation in 2015) T2DM, PPM presents to ER c/o SOB. Pt states that he has had intermittent SOB    systolic HF  OP  OA  Atelectasis  Pleural Eff  Dyspnea  eval ACS  HTN  HLD  hx of Prostate Ca  DM  PPM    fio2 wean  I apple  goal sat > 88 pct  trend Trops  eval ACS  Cardio eval  tele monitoring  replete lytes  I and O  cvs rx regimen optimization  TTE reviewed from prior visits - EF 20 pct  pleural eff - atelectasis - I apple - no indication for pleurocentesis at present  DM care - serial FS  GOC discussion

## 2025-04-21 NOTE — PROGRESS NOTE ADULT - ASSESSMENT
89 y/o male with PMHx of CHF, HLD, HTN, prostate ca (s/p radioactive seed implantation in 2015) T2DM, PPM presents to ER c/o SOB admitted for acute on chronic CHF exacerbation.  89 y/o male with PMHx of CHF, HLD, HTN, prostate ca (s/p radioactive seed implantation in 2015) T2DM, PPM presents to ER c/o SOB admitted for acute on chronic systolic CHF exacerbation.

## 2025-04-21 NOTE — CONSULT NOTE ADULT - SUBJECTIVE AND OBJECTIVE BOX
Patient is a 89y old  Male who presents with a chief complaint of SOB (21 Apr 2025 11:26)      HPI:  88 y/o male with PMHx of CHF, HLD, HTN, prostate ca (s/p radioactive seed implantation in 2015) T2DM, PPM presents to ER c/o SOB. Pt states that he has had intermittent SOB for the past few days, Pt notes he was visiting his wife today at rehab and felt SOB as he was leaving. He sat down to catch his breath, a staff member noticed he did not look well and they decided to call EMS. Pt notes he has had SOB both at rest and with exertion. Pt denies chest pain, fever, cough. Of note, pt was admitted 1 month ago for CHF exacerbation and also 1 week ago at [facility]. Pt adds a bunch of his medications were changed including his Entresto was dc'ed, Farxiga dosage was doubled and his Lasix was switched to Torsemide. Pt currently on NC and denies any SOB. Pt denies fever, chills, chest pain, palpitations, abdominal pain, nausea, vomiting, diarrhea.    ED Course:   Vitals: BP: 135/73, HR: 95 , Temp: 98.2F , RR: 18 , SpO2: 94% on 6LNC  Labs: BUN/Cr 56/2.1, Glucose 125, eGFR 30, Troponin 3051, pro-BNP 89919  CXR: official read pending   EKG: NSR with frequent PVC's, 90 BPM, QTc 518      Received in the ED:  Aspirin 324mg PO x1, Lasix 40mg IVP x1 (21 Apr 2025 03:55)    ================================================  On 4/21, patient noted to have RRT for increasing shortness of breath and new onset pAF with concern for VTAch on EKG.  Pt was given lasix 40gm and lopriessor 5mg IBP with improvement in HR.  Placed on BIPAP and Transferred to ICU for further care.      On video assessment, patient is now more comfortbable.  Noted to be on tele monitor to be in sinus with frequent PVCs with HR in the 90s to 100s.      Breathing comfortably on Bipap with rate 18 and  to 400, responding well.    Allergies    No Known Allergies    Intolerances        MEDICATIONS  NEURO  Meds:   RESPIRATORY  ABG - ( 21 Apr 2025 01:15 )  pH: x     /  pCO2: x     /  pO2: x     / HCO3: x     / Base Excess: x     /  SaO2: x       Lactate: 1.7              Meds:   CARDIOVASCULAR  Meds: carvedilol 3.125 milliGRAM(s) Oral every 12 hours  furosemide   Injectable 40 milliGRAM(s) IV Push daily    GI/NUTRITION  Meds:   GENITOURINARY  Meds: dextrose 5%. 1000 milliLiter(s) IV Continuous <Continuous>  dextrose 5%. 1000 milliLiter(s) IV Continuous <Continuous>  tamsulosin 0.8 milliGRAM(s) Oral at bedtime    HEMATOLOGIC  Meds: aspirin  chewable 81 milliGRAM(s) Oral daily  heparin   Injectable 5000 Unit(s) SubCutaneous every 12 hours    [x] VTE Prophylaxis  INFECTIOUS DISEASES  Meds:   ENDOCRINE  CAPILLARY BLOOD GLUCOSE      POCT Blood Glucose.: 153 mg/dL (21 Apr 2025 21:14)  POCT Blood Glucose.: 107 mg/dL (21 Apr 2025 16:56)  POCT Blood Glucose.: 159 mg/dL (21 Apr 2025 11:57)  POCT Blood Glucose.: 129 mg/dL (21 Apr 2025 06:55)    Meds: insulin lispro (ADMELOG) corrective regimen sliding scale   SubCutaneous three times a day before meals  insulin lispro (ADMELOG) corrective regimen sliding scale   SubCutaneous at bedtime    OTHER MEDICATIONS:  chlorhexidine 2% Cloths 1 Application(s) Topical <User Schedule>  :    Drug Dosing Weight  Height (cm): 172.7 (20 Apr 2025 22:28)  Weight (kg): 77.1 (20 Apr 2025 22:28)  BMI (kg/m2): 25.9 (20 Apr 2025 22:28)  BSA (m2): 1.91 (20 Apr 2025 22:28)    PAST MEDICAL & SURGICAL HISTORY:  NICM (nonischemic cardiomyopathy)      HTN (hypertension)      HLD (hyperlipidemia)      Prostate CA      T2DM (type 2 diabetes mellitus)      CHF (congestive heart failure)      H/O prostate biopsy      H/O cataract extraction      H/O inguinal hernia repair      AICD (automatic cardioverter/defibrillator) present          FAMILY HISTORY:  Family history of cardiomyopathy (Father)    FH: CHF (congestive heart failure) (Sibling)        SOCIAL HISTORY:    ADVANCE DIRECTIVES:      ALLERGY AND IMMUNOLOGIC: No hives or eczema      ICU Vital Signs Last 24 Hrs  T(C): 36.7 (21 Apr 2025 20:31), Max: 36.8 (20 Apr 2025 22:28)  T(F): 98 (21 Apr 2025 20:31), Max: 98.2 (20 Apr 2025 22:28)  HR: 100 (21 Apr 2025 20:31) (72 - 100)  BP: 116/81 (21 Apr 2025 20:31) (98/66 - 135/73)  BP(mean): --  ABP: --  ABP(mean): --  RR: 18 (21 Apr 2025 20:31) (16 - 20)  SpO2: 91% (21 Apr 2025 20:31) (91% - 98%)    O2 Parameters below as of 21 Apr 2025 20:31  Patient On (Oxygen Delivery Method): nasal cannula  O2 Flow (L/min): 4              I&O's Detail    21 Apr 2025 07:01  -  21 Apr 2025 22:20  --------------------------------------------------------  IN:    Oral Fluid: 150 mL  Total IN: 150 mL    OUT:    Voided (mL): 650 mL  Total OUT: 650 mL    Total NET: -500 mL          PHYSICAL EXAM:    Tele-evaluation precludes physical exam.  Pertinent physical exam findings as per verbal communication by bedside provider are as following.   General: appears in distress, anxious, diaphoretic  HEENT - On BIPAP  Neurology: A&Ox3  Respiratory: tachypneic. using accessory muscles on NRB, coarse BS b/l   CV: irregular   Skin: warm, dry, normal color, no obvious rash or abnormal lesions    On video assessment, patient is now more comfortbable.  Noted to be on tele monitor to be in sinus with frequent PVCs with HR in the 90s to 100s.    Breathing comfortably on Bipap with rate 18 and  to 400, responding well.      LABS:                        14.0   9.87  )-----------( 156      ( 21 Apr 2025 01:15 )             39.8       04-21    141  |  106  |  56[H]  ----------------------------<  125[H]  3.7   |  28  |  2.10[H]    Ca    9.2      21 Apr 2025 01:15    TPro  6.9  /  Alb  3.8  /  TBili  1.0  /  DBili  x   /  AST  23  /  ALT  30  /  AlkPhos  71  04-21    CAPILLARY BLOOD GLUCOSE      POCT Blood Glucose.: 153 mg/dL (21 Apr 2025 21:14)      Urinalysis Basic - ( 21 Apr 2025 01:15 )    Color: x / Appearance: x / SG: x / pH: x  Gluc: 125 mg/dL / Ketone: x  / Bili: x / Urobili: x   Blood: x / Protein: x / Nitrite: x   Leuk Esterase: x / RBC: x / WBC x   Sq Epi: x / Non Sq Epi: x / Bacteria: x        EKG:    ECHO, US:    RADIOLOGY:    CRITICAL CARE TIME SPENT:

## 2025-04-21 NOTE — CONSULT NOTE ADULT - ASSESSMENT
88 y/o male with PMHx of CHF, HLD, HTN, prostate ca (s/p radioactive seed implantation in 2015) T2DM, PPM presents to ER c/o SOB admitted for acute on chronic systolic CHF exacerbation. RRT called for SOB and vtach on EKG. Pt now w/ afib RVR and flash pulmonary edema.     Neuro: Awake and alert responding well.  Avoid sedating medications.    CV: HF with flash pulm edema, with pulm vascular congestion on chest xray, requiring BIPAP.  Now s/p lasix and breathing more comfortably. Troponin peaked  - Afib with RVR, possibly vtach vs afib with aberancy - now in sinus with trigeminy ? on tele monitor.  Will continue with beta blocker for rate control.  Consider amiodarone if patient goes in the VTach again.   Pulm: Acute pulmm edema, now on BIPAP, appears comfortable. pulm vascular congestion noted on chest xray  GI: NPO   Renal/Metabolic:   ID:  Heme:  Endo:  Skin:  Dispo:    Time Spent 60 Minutes 89 y/o male with PMHx of CHF, HLD, HTN, prostate ca (s/p radioactive seed implantation in 2015) T2DM, PPM presents to ER c/o SOB admitted for acute on chronic systolic CHF exacerbation. RRT called for SOB and vtach on EKG. Pt now w/ afib RVR and flash pulmonary edema.     Neuro: Awake and alert responding well.  Avoid sedating medications.    CV: HF with flash pulm edema, with pulm vascular congestion on chest xray, requiring BIPAP.  Now s/p lasix and breathing more comfortably. Troponin peaked, monitor on tele, TTE in AM.  - Afib with RVR, possibly vtach vs afib with aberancy - now in sinus with trigeminy ? on tele monitor.  Will continue with beta blocker for rate control.  Consider amiodarone if patient goes in the VTach again.   Pulm: Acute pulmm edema, now on BIPAP, appears comfortable. pulm vascular congestion noted on chest xray  GI: NPO   Renal/Metabolic: Cr 2.1, strict I and Os, avoid nephrotoxi agents  - continue aggressive diuresis.   ID: ALAINA  Heme: On Hep SQ; given possible afib, consider full dose AC with heparin drip  Endo: ISS   Dispo: Admit to ICU for acute pulm edema    Time Spent 60 Minutes 88 y/o male with PMHx of CHF, HLD, HTN, prostate ca (s/p radioactive seed implantation in 2015) T2DM, PPM presents to ER c/o SOB admitted for acute on chronic systolic CHF exacerbation. RRT called for SOB and vtach on EKG. Pt now w/ afib RVR and flash pulmonary edema.     Neuro: Awake and alert responding well.  Avoid sedating medications.    CV: HF with flash pulm edema, with pulm vascular congestion on chest xray, requiring BIPAP.  Now s/p lasix and breathing more comfortably. Troponin peaked, monitor on tele, TTE in AM.  - Afib with RVR, possibly vtach vs afib with aberancy - now in sinus with trigeminy ? on tele monitor.  Will continue with beta blocker for rate control.  Consider amiodarone if patient goes in the VTach again.   Pulm: Acute pulmm edema, now on BIPAP, appears comfortable. pulm vascular congestion noted on chest xray  GI: NPO   Renal/Metabolic: Cr 2.1, strict I and Os, avoid nephrotoxi agents  - continue aggressive diuresis.   ID: ALAINA  Heme: On Hep SQ; given possible afib, consider full dose AC with heparin drip  Endo: ISS   Dispo: Admit to ICU for acute pulm edema    Care discussed with bedside provider MELI Dumont and eICU attending. If any additional assistance in care/management of patient required by bedside team, provider/RN/family member advised to push "e-alert" button in patient's room, and details will be discussed at that time.     Time Spent 60 Minutes 88 yo M HFrEF (20-25%), HLD, HTN, prostate ca (s/p radioactive seed implantation in 2015) T2DM, PPM admitted for acute on chronic systolic CHF exacerbation. RRT called for SOB and vtach on EKG. Pt now w/ afib RVR and flash pulmonary edema.     Neuro: Awake and alert responding well.  Avoid sedating medications.    CV: HF with flash pulm edema, with pulm vascular congestion on chest xray, requiring BIPAP.  Now s/p lasix and breathing more comfortably. Troponin peaked, monitor on tele, TTE in AM.  - Afib with RVR, possibly vtach vs afib with aberancy - now in sinus with trigeminy ? on tele monitor.  Will continue with beta blocker for rate control.  Start amiodarone drip given frequency of PVCs and Vtach on tele/ekg  Pulm: Acute pulm edema, now on BIPAP, appears comfortable. pulm vascular congestion noted on chest xray.  Continue with lasix for aggressive diuresis.  GI: NPO   Renal/Metabolic: HANNAH on CKD with Cr 2.1, strict I and Os, avoid nephrotoxic agents; consider ramos placement.  - continue aggressive diuresis.   ID: ALAINA  Heme: On Hep SQ; given possible afib, consider full dose AC with heparin drip  Endo: ISS   Dispo: Admit to ICU for acute pulm edema; patient is critically ill with acute pulm edema, elevated troponins, in the setting of likely decompensated heart failure; continue with aggressive diuresis and bipap as tolerated.      Care discussed with bedside provider Julia, MELI and eICU attending. If any additional assistance in care/management of patient required by bedside team, provider/RN/family member advised to push "e-alert" button in patient's room, and details will be discussed at that time.     Time Spent 60 Minutes

## 2025-04-21 NOTE — H&P ADULT - NSHPLABSRESULTS_GEN_ALL_CORE
LABS:  cret                        14.0   9.87  )-----------( 156      ( 21 Apr 2025 01:15 )             39.8     04-21    141  |  106  |  56[H]  ----------------------------<  125[H]  3.7   |  28  |  2.10[H]    Ca    9.2      21 Apr 2025 01:15    TPro  6.9  /  Alb  3.8  /  TBili  1.0  /  DBili  x   /  AST  23  /  ALT  30  /  AlkPhos  71  04-21

## 2025-04-21 NOTE — CONSULT NOTE ADULT - SUBJECTIVE AND OBJECTIVE BOX
Date/Time Patient Seen:  		  Referring MD:   Data Reviewed	       Patient is a 89y old  Male who presents with a chief complaint of SOB (21 Apr 2025 03:55)      Subjective/HPI   Reason for Admission: SOB  History of Present Illness:   89 y/o male with PMHx of CHF, HLD, HTN, prostate ca (s/p radioactive seed implantation in 2015) T2DM, PPM presents to ER c/o SOB. Pt states that he has had intermittent SOB for the past few days, Pt notes he was visiting his wife today at rehab and felt SOB as he was leaving. He sat down to catch his breath, a staff member noticed he did not look well and they decided to call EMS. Pt notes he has had SOB both at rest and with exertion. Pt denies chest pain, fever, cough. Of note, pt was admitted 1 month ago for CHF exacerbation and also 1 week ago at [facility]. Pt adds a bunch of his medications were changed including his Entresto was dc'ed, Farxiga dosage was doubled and his Lasix was switched to Torsemide. Pt currently on NC and denies any SOB. Pt denies fever, chills, chest pain, palpitations, abdominal pain, nausea, vomiting, diarrhea.    ED Course:   Vitals: BP: 135/73, HR: 95 , Temp: 98.2F , RR: 18 , SpO2: 94% on 6LNC  Labs: BUN/Cr 56/2.1, Glucose 125, eGFR 30, Troponin 3051, pro-BNP 92218  CXR: official read pending   EKG: NSR with frequent PVC's, 90 BPM, QTc 518      Received in the ED:  Aspirin 324mg PO x1, Lasix 40mg IVP x1       Review of Systems:  Review of Systems: REVIEW OF SYSTEMS:  CONSTITUTIONAL: No fever, chills or fatigue.  HENMT: No HA, dizziness, rhinorrhea  RESPIRATORY: No cough or shortness of breath.  CARDIOVASCULAR: No chest pain, palpitations.  GASTROINTESTINAL: No abdominal pain. No nausea or vomiting; No diarrhea or constipation. No blood per rectum.  GENITOURINARY: No dysuria, changes in frequency, hematuria, or incontinence  NEUROLOGICAL: Baseline strength. Sensation intact bilaterally.  SKIN: No itching, rashes  MUSCULOSKELETAL: No joint pain or swelling; No muscle, back, or extremity pain      Allergies and Intolerances:        Allergies:  	No Known Allergies:     Home Medications:   * Patient Currently Takes Medications as of 21-Apr-2025 05:16 documented in Structured Notes  · 	carvedilol 6.25 mg oral tablet: Last Dose Taken:  , 0.5 tab(s) orally every 12 hours  · 	aspirin 81 mg oral tablet: Last Dose Taken:  , orally once a day  · 	finasteride 5 mg oral tablet: Last Dose Taken:  , 1 tab(s) orally once a day  · 	Torsemide: Last Dose Taken:    · 	atorvastatin 80 mg oral tablet: Last Dose Taken:  , 1 tab(s) orally once a day  · 	Flomax 0.4 mg oral capsule: Last Dose Taken:  , 2 cap(s) orally once a day (at bedtime)  · 	Farxiga 10 mg oral tablet: Last Dose Taken:  , 1 tab(s) orally once a day    Patient History:    Past Medical, Past Surgical, and Family History:  PAST MEDICAL HISTORY:  CHF (congestive heart failure)     HLD (hyperlipidemia)     HTN (hypertension)     NICM (nonischemic cardiomyopathy)     Prostate CA     T2DM (type 2 diabetes mellitus).     PAST SURGICAL HISTORY:  AICD (automatic cardioverter/defibrillator) present     H/O cataract extraction     H/O inguinal hernia repair     H/O prostate biopsy.     FAMILY HISTORY:  Father  Still living? Unknown  Family history of cardiomyopathy, Age at diagnosis: Age Unknown    Sibling  Still living? Unknown  FH: CHF (congestive heart failure), Age at diagnosis: Age Unknown.     Social History:  · Substance use	No  · Social History (marital status, living situation, occupation, and sexual history)	Tobacco: Denies  EtOH: Denies  Recreational drug use: Denies  Lives with: Wife at home   Ambulates: Independently   ADLs: Independent     Tobacco Screening:  · Core Measure Site	Yes  · Has the patient used tobacco in the past 30 days?	No    Risk Assessment:    Present on Admission:  Deep Venous Thrombosis	no  Pulmonary Embolus	no     HIV Screening:  · In accordance with [place] State law, we offer every patient who comes to our ED an HIV test. Would you like to be tested today?	Opt out     Hepatitis C Test Questions:  · In accordance with [place] State Law, we offer every patient a Hepatitis C test. Would you like to be tested today?	Opt out    PAST MEDICAL & SURGICAL HISTORY:  NICM (nonischemic cardiomyopathy)    HTN (hypertension)    HLD (hyperlipidemia)    Prostate CA    T2DM (type 2 diabetes mellitus)    CHF (congestive heart failure)    H/O prostate biopsy    H/O cataract extraction    H/O inguinal hernia repair    AICD (automatic cardioverter/defibrillator) present          Medication list         MEDICATIONS  (STANDING):  aspirin  chewable 81 milliGRAM(s) Oral daily  atorvastatin 80 milliGRAM(s) Oral at bedtime  carvedilol 3.125 milliGRAM(s) Oral every 12 hours  dextrose 5%. 1000 milliLiter(s) (100 mL/Hr) IV Continuous <Continuous>  dextrose 5%. 1000 milliLiter(s) (50 mL/Hr) IV Continuous <Continuous>  dextrose 50% Injectable 25 Gram(s) IV Push once  dextrose 50% Injectable 12.5 Gram(s) IV Push once  dextrose 50% Injectable 25 Gram(s) IV Push once  finasteride 5 milliGRAM(s) Oral daily  furosemide   Injectable 40 milliGRAM(s) IV Push daily  glucagon  Injectable 1 milliGRAM(s) IntraMuscular once  heparin   Injectable 5000 Unit(s) SubCutaneous every 12 hours  insulin lispro (ADMELOG) corrective regimen sliding scale   SubCutaneous three times a day before meals  insulin lispro (ADMELOG) corrective regimen sliding scale   SubCutaneous at bedtime  tamsulosin 0.8 milliGRAM(s) Oral at bedtime    MEDICATIONS  (PRN):  dextrose Oral Gel 15 Gram(s) Oral once PRN Blood Glucose LESS THAN 70 milliGRAM(s)/deciliter         Vitals log        ICU Vital Signs Last 24 Hrs  T(C): 36.7 (21 Apr 2025 02:13), Max: 36.8 (20 Apr 2025 22:28)  T(F): 98.1 (21 Apr 2025 02:13), Max: 98.2 (20 Apr 2025 22:28)  HR: 123 (21 Apr 2025 02:13) (90 - 123)  BP: 101/56 (21 Apr 2025 02:13) (101/56 - 135/73)  BP(mean): --  ABP: --  ABP(mean): --  RR: 20 (20 Apr 2025 23:00) (18 - 20)  SpO2: 97% (20 Apr 2025 23:00) (94% - 97%)    O2 Parameters below as of 20 Apr 2025 23:00  Patient On (Oxygen Delivery Method): nasal cannula  O2 Flow (L/min): 4               Input and Output:  I&O's Detail      Lab Data                        14.0   9.87  )-----------( 156      ( 21 Apr 2025 01:15 )             39.8     04-21    141  |  106  |  56[H]  ----------------------------<  125[H]  3.7   |  28  |  2.10[H]    Ca    9.2      21 Apr 2025 01:15    TPro  6.9  /  Alb  3.8  /  TBili  1.0  /  DBili  x   /  AST  23  /  ALT  30  /  AlkPhos  71  04-21            Review of Systems	  sob  osorio  weakness  o2 support  alert  verbal      Objective     Physical Examination    heart s1s2  lung dec bs  head nc  head at  abd soft  cn grossly int  o2 support      Pertinent Lab findings & Imaging      Gaming:  NO   Adequate UO     I&O's Detail           Discussed with:     Cultures:	        Radiology    cxr reviewed  atelectasis  eff  report pending

## 2025-04-21 NOTE — H&P ADULT - NSHPPHYSICALEXAM_GEN_ALL_CORE
T(C): 36.7 (04-21-25 @ 02:13), Max: 36.8 (04-20-25 @ 22:28)  HR: 123 (04-21-25 @ 02:13) (90 - 123)  BP: 101/56 (04-21-25 @ 02:13) (101/56 - 135/73)  RR: 20 (04-20-25 @ 23:00) (18 - 20)  SpO2: 97% (04-20-25 @ 23:00) (94% - 97%)    GENERAL: patient appears well, no acute distress, appropriately interactive  EYES: sclera clear, no exudates  ENMT: oropharynx clear without erythema, no exudates, moist mucous membranes  NECK: supple, soft  LUNGS: good air entry bilaterally, clear to auscultation, symmetric breath sounds, no wheezing or rhonchi appreciated  HEART: soft S1/S2, regular rate and rhythm, no murmurs noted, no lower extremity edema  GASTROINTESTINAL: abdomen is soft, nontender, nondistended, normoactive bowel sounds  INTEGUMENT: good skin turgor, warm skin, appears well perfused  MUSCULOSKELETAL: no clubbing or cyanosis, no obvious deformity  NEUROLOGIC: awake, alert, oriented x3, good muscle tone in all 4 extremities  HEME/LYMPH: no obvious ecchymosis or petechiae T(C): 36.7 (04-21-25 @ 02:13), Max: 36.8 (04-20-25 @ 22:28)  HR: 123 (04-21-25 @ 02:13) (90 - 123)  BP: 101/56 (04-21-25 @ 02:13) (101/56 - 135/73)  RR: 20 (04-20-25 @ 23:00) (18 - 20)  SpO2: 97% (04-20-25 @ 23:00) (94% - 97%)    GENERAL: patient appears well, resting in bed, awake and alert   EYES: sclera clear, no exudates  ENMT: no exudates, moist mucous membranes  NECK: supple, soft  LUNGS: good air entry bilaterally, dec bs b/l   HEART: soft S1/S2, RRR, no lower extremity edema  GASTROINTESTINAL: abdomen is soft, NT/ND  INTEGUMENT: good skin turgor, warm skin, appears well perfused  MUSCULOSKELETAL: no clubbing or cyanosis, no obvious deformity  NEUROLOGIC: AxOx4, answering all questions appropriately   HEME/LYMPH: no obvious ecchymosis or petechiae T(C): 36.7 (04-21-25 @ 02:13), Max: 36.8 (04-20-25 @ 22:28)  HR: 123 (04-21-25 @ 02:13) (90 - 123)  BP: 101/56 (04-21-25 @ 02:13) (101/56 - 135/73)  RR: 20 (04-20-25 @ 23:00) (18 - 20)  SpO2: 97% (04-20-25 @ 23:00) (94% - 97%)      GENERAL: patient appears well, resting in bed, awake and alert   EYES: sclera clear, no exudates  ENMT: no exudates, moist mucous membranes  NECK: supple, soft  LUNGS: good air entry bilaterally, dec bs b/l   HEART: soft S1/S2, RRR, no lower extremity edema  GASTROINTESTINAL: abdomen is soft, NT/ND  INTEGUMENT: good skin turgor, warm skin, appears well perfused  MUSCULOSKELETAL: no clubbing or cyanosis, no obvious deformity  NEUROLOGIC: AxOx4, answering all questions appropriately   HEME/LYMPH: no obvious ecchymosis or petechiae

## 2025-04-21 NOTE — H&P ADULT - PROBLEM SELECTOR PLAN 3
Pt with elevated troponin on admission to 3051  -Per chart review pt had elevated troponin on last admission most likely 2/2 demand ischemia in setting of severe CHF exacerbation   -Will trend trops to peak  -Currently no active CP, will monitor for anginal sx  -EKG with NSR with frequent PVC's, 90 BPM, QTc 518

## 2025-04-21 NOTE — PROGRESS NOTE ADULT - PROBLEM SELECTOR PLAN 7
Detail Level: Simple Detail Level: Detailed Chronic  -On home Farxiga 10mg qd   -Hold home oral meds  -Start LDSS with FS QAC, QHS  -Hypoglycemia protocol  -Consistent carb diet

## 2025-04-21 NOTE — PROGRESS NOTE ADULT - PROBLEM SELECTOR PLAN 6
S/p prostate radioactive seed implantation in 2015  -On home Flomax 0.4mg QHS and Finasteride 5mg QD  -C/w home meds

## 2025-04-21 NOTE — PROGRESS NOTE ADULT - PROBLEM SELECTOR PLAN 2
Per chart review pt baseline Cr is around 1.8  -Cr 2.1 on this admission  -Avoid nephrotoxic agents   -s/p 40mg IV Lasix in ED  -Nephro consulted, f/u recs

## 2025-04-21 NOTE — CARE COORDINATION ASSESSMENT. - OTHER PERTINENT DISCHARGE PLANNING INFORMATION:
SW met with this a&ox 4 male admitted from home for +sbirt which I completed. Patient resides with wife in a private rudy 3 steps and one to get inside. She had a stroke a few weeks ago and is currently at [facility]. Patient was visiting her when he became short of breath reports feeling this way since before today and also reported new medication changes. Patient has 2 adult children daughter and a son Amado whom of offered to call he declined. Patient admits to sleeping well taking medications but not eating well since his wife's stroke. He reported that his children are working to secure an aid at home for them. SW explained name role availability and dc planning. SW provided suggestions to ensure better food and meals, patient seems receptive says his family can assist. SW to follow for needs and hospital course. Support provided

## 2025-04-21 NOTE — PROGRESS NOTE ADULT - ASSESSMENT
88 y/o male with PMHx of CHF, HLD, HTN, prostate ca (s/p radioactive seed implantation in 2015) T2DM, PPM presents to ER c/o SOB admitted for acute on chronic CHF exacerbation.

## 2025-04-21 NOTE — PATIENT PROFILE ADULT - FALL HARM RISK - HARM RISK INTERVENTIONS

## 2025-04-21 NOTE — PROGRESS NOTE ADULT - SUBJECTIVE AND OBJECTIVE BOX
Patient is a 89y old  Male who presents with a chief complaint of SOB (21 Apr 2025 07:33)      INTERVAL HPI/OVERNIGHT EVENTS: Patient seen and examined at bedside. No overnight events occurred. Patient has no complaints at this time. Denies fevers, chills, headache, lightheadedness, chest pain, dyspnea, abdominal pain, n/v/d/c.    MEDICATIONS  (STANDING):  aspirin  chewable 81 milliGRAM(s) Oral daily  atorvastatin 80 milliGRAM(s) Oral at bedtime  carvedilol 3.125 milliGRAM(s) Oral every 12 hours  dextrose 5%. 1000 milliLiter(s) (100 mL/Hr) IV Continuous <Continuous>  dextrose 5%. 1000 milliLiter(s) (50 mL/Hr) IV Continuous <Continuous>  dextrose 50% Injectable 25 Gram(s) IV Push once  dextrose 50% Injectable 12.5 Gram(s) IV Push once  dextrose 50% Injectable 25 Gram(s) IV Push once  finasteride 5 milliGRAM(s) Oral daily  furosemide   Injectable 40 milliGRAM(s) IV Push daily  glucagon  Injectable 1 milliGRAM(s) IntraMuscular once  heparin   Injectable 5000 Unit(s) SubCutaneous every 12 hours  insulin lispro (ADMELOG) corrective regimen sliding scale   SubCutaneous three times a day before meals  insulin lispro (ADMELOG) corrective regimen sliding scale   SubCutaneous at bedtime  tamsulosin 0.8 milliGRAM(s) Oral at bedtime    MEDICATIONS  (PRN):  dextrose Oral Gel 15 Gram(s) Oral once PRN Blood Glucose LESS THAN 70 milliGRAM(s)/deciliter      Allergies    No Known Allergies    Intolerances        REVIEW OF SYSTEMS:  CONSTITUTIONAL: No fever or chills  HEENT:  No headache, no sore throat  RESPIRATORY: No cough, wheezing, or shortness of breath  CARDIOVASCULAR: No chest pain, palpitations  GASTROINTESTINAL: No abd pain, nausea, vomiting, or diarrhea  GENITOURINARY: No dysuria, frequency, or hematuria  NEUROLOGICAL: no focal weakness or dizziness  MUSCULOSKELETAL: no myalgias     Vital Signs Last 24 Hrs  T(C): 36.3 (21 Apr 2025 09:49), Max: 36.8 (20 Apr 2025 22:28)  T(F): 97.4 (21 Apr 2025 09:49), Max: 98.2 (20 Apr 2025 22:28)  HR: 72 (21 Apr 2025 09:49) (72 - 95)  BP: 98/66 (21 Apr 2025 09:49) (98/66 - 135/73)  BP(mean): --  RR: 16 (21 Apr 2025 09:49) (16 - 20)  SpO2: 96% (21 Apr 2025 09:49) (94% - 97%)    Parameters below as of 21 Apr 2025 09:49  Patient On (Oxygen Delivery Method): nasal cannula  O2 Flow (L/min): 3      PHYSICAL EXAM:  GENERAL: NAD  HEENT:  anicteric, moist mucous membranes  CHEST/LUNG:  CTA b/l, no rales, wheezes, or rhonchi  HEART:  RRR, S1, S2  ABDOMEN:  BS+, soft, nontender, nondistended  EXTREMITIES: no edema, cyanosis, or calf tenderness  NERVOUS SYSTEM: answers questions and follows commands appropriately    LABS:                        14.0   9.87  )-----------( 156      ( 21 Apr 2025 01:15 )             39.8     CBC Full  -  ( 21 Apr 2025 01:15 )  WBC Count : 9.87 K/uL  Hemoglobin : 14.0 g/dL  Hematocrit : 39.8 %  Platelet Count - Automated : 156 K/uL  Mean Cell Volume : 91.1 fl  Mean Cell Hemoglobin : 32.0 pg  Mean Cell Hemoglobin Concentration : 35.2 g/dL  Auto Neutrophil # : x  Auto Lymphocyte # : x  Auto Monocyte # : x  Auto Eosinophil # : x  Auto Basophil # : x  Auto Neutrophil % : x  Auto Lymphocyte % : x  Auto Monocyte % : x  Auto Eosinophil % : x  Auto Basophil % : x    21 Apr 2025 01:15    141    |  106    |  56     ----------------------------<  125    3.7     |  28     |  2.10     Ca    9.2        21 Apr 2025 01:15    TPro  6.9    /  Alb  3.8    /  TBili  1.0    /  DBili  x      /  AST  23     /  ALT  30     /  AlkPhos  71     21 Apr 2025 01:15      Urinalysis Basic - ( 21 Apr 2025 01:15 )    Color: x / Appearance: x / SG: x / pH: x  Gluc: 125 mg/dL / Ketone: x  / Bili: x / Urobili: x   Blood: x / Protein: x / Nitrite: x   Leuk Esterase: x / RBC: x / WBC x   Sq Epi: x / Non Sq Epi: x / Bacteria: x      CAPILLARY BLOOD GLUCOSE      POCT Blood Glucose.: 129 mg/dL (21 Apr 2025 06:55)          RADIOLOGY & ADDITIONAL TESTS:    Personally reviewed.     Consultant(s) Notes Reviewed:  [x] YES  [ ] NO     Patient is a 89y old  Male who presents with a chief complaint of SOB (21 Apr 2025 07:33)      INTERVAL HPI/OVERNIGHT EVENTS: No acute overnight events. Pt seen and examined at the bedside in the ED this AM. The patient reported dyspnea at rest and upon exertion, but denied experiencing orthopnea or PND. He also stated that his last BM was a week ago, for which he took Miralax. He stated that he is compliant with the medication prescribed upon discharge from the hospital for CHF.  Denies fevers, chills, headache, lightheadedness, chest pain, abdominal pain, n/v/d.      MEDICATIONS  (STANDING):  aspirin  chewable 81 milliGRAM(s) Oral daily  atorvastatin 80 milliGRAM(s) Oral at bedtime  carvedilol 3.125 milliGRAM(s) Oral every 12 hours  dextrose 5%. 1000 milliLiter(s) (100 mL/Hr) IV Continuous <Continuous>  dextrose 5%. 1000 milliLiter(s) (50 mL/Hr) IV Continuous <Continuous>  dextrose 50% Injectable 25 Gram(s) IV Push once  dextrose 50% Injectable 12.5 Gram(s) IV Push once  dextrose 50% Injectable 25 Gram(s) IV Push once  finasteride 5 milliGRAM(s) Oral daily  furosemide   Injectable 40 milliGRAM(s) IV Push daily  glucagon  Injectable 1 milliGRAM(s) IntraMuscular once  heparin   Injectable 5000 Unit(s) SubCutaneous every 12 hours  insulin lispro (ADMELOG) corrective regimen sliding scale   SubCutaneous three times a day before meals  insulin lispro (ADMELOG) corrective regimen sliding scale   SubCutaneous at bedtime  tamsulosin 0.8 milliGRAM(s) Oral at bedtime    MEDICATIONS  (PRN):  dextrose Oral Gel 15 Gram(s) Oral once PRN Blood Glucose LESS THAN 70 milliGRAM(s)/deciliter      Allergies    No Known Allergies    Intolerances        REVIEW OF SYSTEMS:  CONSTITUTIONAL: No fever or chills  HEENT:  No headache, no sore throat  RESPIRATORY: +shortness of breath. No cough, wheezing.  CARDIOVASCULAR: No chest pain, palpitations  GASTROINTESTINAL: + constipation. No abd pain, nausea, vomiting, or diarrhea  GENITOURINARY: No dysuria, frequency, or hematuria  NEUROLOGICAL: no focal weakness or dizziness  MUSCULOSKELETAL: no myalgias     Vital Signs Last 24 Hrs  T(C): 36.3 (21 Apr 2025 09:49), Max: 36.8 (20 Apr 2025 22:28)  T(F): 97.4 (21 Apr 2025 09:49), Max: 98.2 (20 Apr 2025 22:28)  HR: 72 (21 Apr 2025 09:49) (72 - 95)  BP: 98/66 (21 Apr 2025 09:49) (98/66 - 135/73)  BP(mean): --  RR: 16 (21 Apr 2025 09:49) (16 - 20)  SpO2: 96% (21 Apr 2025 09:49) (94% - 97%)    Parameters below as of 21 Apr 2025 09:49  Patient On (Oxygen Delivery Method): nasal cannula  O2 Flow (L/min): 3      PHYSICAL EXAM:  GENERAL: NAD  HEENT:  anicteric, moist mucous membranes  CHEST/LUNG:  CTA b/l, no rales, wheezes, or rhonchi  HEART:  RRR, S1, S2  ABDOMEN:  BS+, soft, nontender, nondistended  EXTREMITIES: no edema, cyanosis, or calf tenderness  NERVOUS SYSTEM: answers questions and follows commands appropriately    LABS:                        14.0   9.87  )-----------( 156      ( 21 Apr 2025 01:15 )             39.8     CBC Full  -  ( 21 Apr 2025 01:15 )  WBC Count : 9.87 K/uL  Hemoglobin : 14.0 g/dL  Hematocrit : 39.8 %  Platelet Count - Automated : 156 K/uL  Mean Cell Volume : 91.1 fl  Mean Cell Hemoglobin : 32.0 pg  Mean Cell Hemoglobin Concentration : 35.2 g/dL  Auto Neutrophil # : x  Auto Lymphocyte # : x  Auto Monocyte # : x  Auto Eosinophil # : x  Auto Basophil # : x  Auto Neutrophil % : x  Auto Lymphocyte % : x  Auto Monocyte % : x  Auto Eosinophil % : x  Auto Basophil % : x    21 Apr 2025 01:15    141    |  106    |  56     ----------------------------<  125    3.7     |  28     |  2.10     Ca    9.2        21 Apr 2025 01:15    TPro  6.9    /  Alb  3.8    /  TBili  1.0    /  DBili  x      /  AST  23     /  ALT  30     /  AlkPhos  71     21 Apr 2025 01:15      Urinalysis Basic - ( 21 Apr 2025 01:15 )    Color: x / Appearance: x / SG: x / pH: x  Gluc: 125 mg/dL / Ketone: x  / Bili: x / Urobili: x   Blood: x / Protein: x / Nitrite: x   Leuk Esterase: x / RBC: x / WBC x   Sq Epi: x / Non Sq Epi: x / Bacteria: x      CAPILLARY BLOOD GLUCOSE      POCT Blood Glucose.: 129 mg/dL (21 Apr 2025 06:55)          RADIOLOGY & ADDITIONAL TESTS:    Personally reviewed.     Consultant(s) Notes Reviewed:  [x] YES  [ ] NO

## 2025-04-21 NOTE — H&P ADULT - PROBLEM SELECTOR PLAN 2
Per chart review pt baseline Cr is around 1.8  -Cr 2.1 on this admission  -Avoid nephrotoxic agents   -s/p 40mg IV Lasix in ED Per chart review pt baseline Cr is around 1.8  -Cr 2.1 on this admission  -Avoid nephrotoxic agents   -s/p 40mg IV Lasix in ED  -Nephro consulted, f/u recs

## 2025-04-21 NOTE — H&P ADULT - NSHPSOCIALHISTORY_GEN_ALL_CORE
Tobacco:   EtOH:   Recreational drug use:  Lives with:  Ambulates:  ADLs: Tobacco: Denies  EtOH: Denies  Recreational drug use: Denies  Lives with: Wife at home   Ambulates: Independently   ADLs: Independent

## 2025-04-22 DIAGNOSIS — I48.91 UNSPECIFIED ATRIAL FIBRILLATION: ICD-10-CM

## 2025-04-22 LAB
A1C WITH ESTIMATED AVERAGE GLUCOSE RESULT: 6.7 % — HIGH (ref 4–5.6)
ALBUMIN SERPL ELPH-MCNC: 3.9 G/DL — SIGNIFICANT CHANGE UP (ref 3.3–5)
ALP SERPL-CCNC: 66 U/L — SIGNIFICANT CHANGE UP (ref 40–120)
ALT FLD-CCNC: 33 U/L — SIGNIFICANT CHANGE UP (ref 12–78)
ANION GAP SERPL CALC-SCNC: 11 MMOL/L — SIGNIFICANT CHANGE UP (ref 5–17)
ANION GAP SERPL CALC-SCNC: 12 MMOL/L — SIGNIFICANT CHANGE UP (ref 5–17)
APPEARANCE UR: CLEAR — SIGNIFICANT CHANGE UP
APTT BLD: 143 SEC — CRITICAL HIGH (ref 26.1–36.8)
APTT BLD: 151.9 SEC — CRITICAL HIGH (ref 24.5–35.6)
APTT BLD: 26.2 SEC — SIGNIFICANT CHANGE UP (ref 24.5–35.6)
AST SERPL-CCNC: 25 U/L — SIGNIFICANT CHANGE UP (ref 15–37)
BASE EXCESS BLDA CALC-SCNC: 0.2 MMOL/L — SIGNIFICANT CHANGE UP (ref -2–3)
BASOPHILS # BLD AUTO: 0.06 K/UL — SIGNIFICANT CHANGE UP (ref 0–0.2)
BASOPHILS NFR BLD AUTO: 0.5 % — SIGNIFICANT CHANGE UP (ref 0–2)
BILIRUB SERPL-MCNC: 2.3 MG/DL — HIGH (ref 0.2–1.2)
BILIRUB UR-MCNC: NEGATIVE — SIGNIFICANT CHANGE UP
BLOOD GAS COMMENTS ARTERIAL: SIGNIFICANT CHANGE UP
BUN SERPL-MCNC: 55 MG/DL — HIGH (ref 7–23)
BUN SERPL-MCNC: 57 MG/DL — HIGH (ref 7–23)
CALCIUM SERPL-MCNC: 9.4 MG/DL — SIGNIFICANT CHANGE UP (ref 8.5–10.1)
CALCIUM SERPL-MCNC: 9.4 MG/DL — SIGNIFICANT CHANGE UP (ref 8.5–10.1)
CHLORIDE SERPL-SCNC: 105 MMOL/L — SIGNIFICANT CHANGE UP (ref 96–108)
CHLORIDE SERPL-SCNC: 108 MMOL/L — SIGNIFICANT CHANGE UP (ref 96–108)
CHOLEST SERPL-MCNC: 144 MG/DL — SIGNIFICANT CHANGE UP
CK MB BLD-MCNC: 2.7 % — SIGNIFICANT CHANGE UP (ref 0–3.5)
CK MB CFR SERPL CALC: 1.9 NG/ML — SIGNIFICANT CHANGE UP (ref 0–3.6)
CK SERPL-CCNC: 70 U/L — SIGNIFICANT CHANGE UP (ref 26–308)
CK SERPL-CCNC: 71 U/L — SIGNIFICANT CHANGE UP (ref 26–308)
CO2 SERPL-SCNC: 23 MMOL/L — SIGNIFICANT CHANGE UP (ref 22–31)
CO2 SERPL-SCNC: 24 MMOL/L — SIGNIFICANT CHANGE UP (ref 22–31)
COLOR SPEC: YELLOW — SIGNIFICANT CHANGE UP
CREAT ?TM UR-MCNC: 39 MG/DL — SIGNIFICANT CHANGE UP
CREAT SERPL-MCNC: 2 MG/DL — HIGH (ref 0.5–1.3)
CREAT SERPL-MCNC: 2.1 MG/DL — HIGH (ref 0.5–1.3)
DIFF PNL FLD: ABNORMAL
EGFR: 30 ML/MIN/1.73M2 — LOW
EGFR: 30 ML/MIN/1.73M2 — LOW
EGFR: 32 ML/MIN/1.73M2 — LOW
EGFR: 32 ML/MIN/1.73M2 — LOW
EOSINOPHIL # BLD AUTO: 0.06 K/UL — SIGNIFICANT CHANGE UP (ref 0–0.5)
EOSINOPHIL NFR BLD AUTO: 0.5 % — SIGNIFICANT CHANGE UP (ref 0–6)
ESTIMATED AVERAGE GLUCOSE: 146 MG/DL — HIGH (ref 68–114)
GAS PNL BLDA: SIGNIFICANT CHANGE UP
GLUCOSE SERPL-MCNC: 210 MG/DL — HIGH (ref 70–99)
GLUCOSE SERPL-MCNC: 244 MG/DL — HIGH (ref 70–99)
GLUCOSE UR QL: >=1000 MG/DL
HCO3 BLDA-SCNC: 23 MMOL/L — SIGNIFICANT CHANGE UP (ref 21–28)
HCT VFR BLD CALC: 45.6 % — SIGNIFICANT CHANGE UP (ref 39–50)
HCT VFR BLD CALC: 46.6 % — SIGNIFICANT CHANGE UP (ref 39–50)
HDLC SERPL-MCNC: 42 MG/DL — SIGNIFICANT CHANGE UP
HGB BLD-MCNC: 15.2 G/DL — SIGNIFICANT CHANGE UP (ref 13–17)
HGB BLD-MCNC: 15.8 G/DL — SIGNIFICANT CHANGE UP (ref 13–17)
HOROWITZ INDEX BLDA+IHG-RTO: 60 — SIGNIFICANT CHANGE UP
IMM GRANULOCYTES NFR BLD AUTO: 0.8 % — SIGNIFICANT CHANGE UP (ref 0–0.9)
INR BLD: 1.31 RATIO — HIGH (ref 0.85–1.16)
KETONES UR-MCNC: NEGATIVE MG/DL — SIGNIFICANT CHANGE UP
LDLC SERPL-MCNC: 88 MG/DL — SIGNIFICANT CHANGE UP
LEUKOCYTE ESTERASE UR-ACNC: NEGATIVE — SIGNIFICANT CHANGE UP
LIPID PNL WITH DIRECT LDL SERPL: 88 MG/DL — SIGNIFICANT CHANGE UP
LYMPHOCYTES # BLD AUTO: 1.96 K/UL — SIGNIFICANT CHANGE UP (ref 1–3.3)
LYMPHOCYTES # BLD AUTO: 15.4 % — SIGNIFICANT CHANGE UP (ref 13–44)
MAGNESIUM SERPL-MCNC: 2.4 MG/DL — SIGNIFICANT CHANGE UP (ref 1.6–2.6)
MCHC RBC-ENTMCNC: 31.4 PG — SIGNIFICANT CHANGE UP (ref 27–34)
MCHC RBC-ENTMCNC: 31.7 PG — SIGNIFICANT CHANGE UP (ref 27–34)
MCHC RBC-ENTMCNC: 33.3 G/DL — SIGNIFICANT CHANGE UP (ref 32–36)
MCHC RBC-ENTMCNC: 33.9 G/DL — SIGNIFICANT CHANGE UP (ref 32–36)
MCV RBC AUTO: 93.4 FL — SIGNIFICANT CHANGE UP (ref 80–100)
MCV RBC AUTO: 94.2 FL — SIGNIFICANT CHANGE UP (ref 80–100)
MONOCYTES # BLD AUTO: 1.02 K/UL — HIGH (ref 0–0.9)
MONOCYTES NFR BLD AUTO: 8 % — SIGNIFICANT CHANGE UP (ref 2–14)
MRSA PCR RESULT.: SIGNIFICANT CHANGE UP
NEUTROPHILS # BLD AUTO: 9.52 K/UL — HIGH (ref 1.8–7.4)
NEUTROPHILS NFR BLD AUTO: 74.8 % — SIGNIFICANT CHANGE UP (ref 43–77)
NITRITE UR-MCNC: NEGATIVE — SIGNIFICANT CHANGE UP
NONHDLC SERPL-MCNC: 103 MG/DL — SIGNIFICANT CHANGE UP
NRBC BLD AUTO-RTO: 0 /100 WBCS — SIGNIFICANT CHANGE UP (ref 0–0)
NRBC BLD AUTO-RTO: 0 /100 WBCS — SIGNIFICANT CHANGE UP (ref 0–0)
NT-PROBNP SERPL-SCNC: HIGH PG/ML (ref 0–450)
OSMOLALITY UR: 382 MOSM/KG — SIGNIFICANT CHANGE UP (ref 50–1200)
PCO2 BLDA: 30 MMHG — LOW (ref 35–48)
PH BLDA: 7.5 — HIGH (ref 7.35–7.45)
PH UR: 5.5 — SIGNIFICANT CHANGE UP (ref 5–8)
PHOSPHATE SERPL-MCNC: 4.9 MG/DL — HIGH (ref 2.5–4.5)
PLATELET # BLD AUTO: 221 K/UL — SIGNIFICANT CHANGE UP (ref 150–400)
PLATELET # BLD AUTO: 221 K/UL — SIGNIFICANT CHANGE UP (ref 150–400)
PO2 BLDA: 95 MMHG — SIGNIFICANT CHANGE UP (ref 83–108)
POTASSIUM SERPL-MCNC: 3.8 MMOL/L — SIGNIFICANT CHANGE UP (ref 3.5–5.3)
POTASSIUM SERPL-MCNC: 3.9 MMOL/L — SIGNIFICANT CHANGE UP (ref 3.5–5.3)
POTASSIUM SERPL-SCNC: 3.8 MMOL/L — SIGNIFICANT CHANGE UP (ref 3.5–5.3)
POTASSIUM SERPL-SCNC: 3.9 MMOL/L — SIGNIFICANT CHANGE UP (ref 3.5–5.3)
POTASSIUM UR-SCNC: 33.6 MMOL/L — SIGNIFICANT CHANGE UP
PROCALCITONIN SERPL-MCNC: 0.08 NG/ML — SIGNIFICANT CHANGE UP
PROT ?TM UR-MCNC: 38 MG/DL — HIGH (ref 0–12)
PROT SERPL-MCNC: 6.9 G/DL — SIGNIFICANT CHANGE UP (ref 6–8.3)
PROT UR-MCNC: 30 MG/DL
PROT/CREAT UR-RTO: 1 RATIO — HIGH (ref 0–0.2)
PROTHROM AB SERPL-ACNC: 15.4 SEC — HIGH (ref 9.9–13.4)
RBC # BLD: 4.84 M/UL — SIGNIFICANT CHANGE UP (ref 4.2–5.8)
RBC # BLD: 4.99 M/UL — SIGNIFICANT CHANGE UP (ref 4.2–5.8)
RBC # FLD: 14.6 % — HIGH (ref 10.3–14.5)
RBC # FLD: 14.6 % — HIGH (ref 10.3–14.5)
S AUREUS DNA NOSE QL NAA+PROBE: DETECTED
SAO2 % BLDA: 98.3 % — HIGH (ref 94–98)
SODIUM SERPL-SCNC: 140 MMOL/L — SIGNIFICANT CHANGE UP (ref 135–145)
SODIUM SERPL-SCNC: 143 MMOL/L — SIGNIFICANT CHANGE UP (ref 135–145)
SODIUM UR-SCNC: 70 MMOL/L — SIGNIFICANT CHANGE UP
SP GR SPEC: 1.01 — SIGNIFICANT CHANGE UP (ref 1–1.03)
TRIGL SERPL-MCNC: 75 MG/DL — SIGNIFICANT CHANGE UP
TROPONIN I, HIGH SENSITIVITY RESULT: 3602.3 NG/L — HIGH
TROPONIN I, HIGH SENSITIVITY RESULT: 3967.5 NG/L — HIGH
UROBILINOGEN FLD QL: 0.2 MG/DL — SIGNIFICANT CHANGE UP (ref 0.2–1)
UUN UR-MCNC: 382 MG/DL — SIGNIFICANT CHANGE UP
WBC # BLD: 12.72 K/UL — HIGH (ref 3.8–10.5)
WBC # BLD: 13.97 K/UL — HIGH (ref 3.8–10.5)
WBC # FLD AUTO: 12.72 K/UL — HIGH (ref 3.8–10.5)
WBC # FLD AUTO: 13.97 K/UL — HIGH (ref 3.8–10.5)

## 2025-04-22 PROCEDURE — 71045 X-RAY EXAM CHEST 1 VIEW: CPT | Mod: 26

## 2025-04-22 PROCEDURE — 99292 CRITICAL CARE ADDL 30 MIN: CPT

## 2025-04-22 PROCEDURE — 76604 US EXAM CHEST: CPT | Mod: 26

## 2025-04-22 PROCEDURE — 93308 TTE F-UP OR LMTD: CPT | Mod: 26

## 2025-04-22 PROCEDURE — 99232 SBSQ HOSP IP/OBS MODERATE 35: CPT

## 2025-04-22 PROCEDURE — 93010 ELECTROCARDIOGRAM REPORT: CPT

## 2025-04-22 PROCEDURE — 99291 CRITICAL CARE FIRST HOUR: CPT | Mod: 25

## 2025-04-22 RX ORDER — FUROSEMIDE 10 MG/ML
40 INJECTION INTRAMUSCULAR; INTRAVENOUS
Refills: 0 | Status: DISCONTINUED | OUTPATIENT
Start: 2025-04-22 | End: 2025-04-24

## 2025-04-22 RX ORDER — AMIODARONE HYDROCHLORIDE 50 MG/ML
INJECTION, SOLUTION INTRAVENOUS
Refills: 0 | Status: DISCONTINUED | OUTPATIENT
Start: 2025-04-22 | End: 2025-04-22

## 2025-04-22 RX ORDER — AMIODARONE HYDROCHLORIDE 50 MG/ML
0.5 INJECTION, SOLUTION INTRAVENOUS
Qty: 450 | Refills: 0 | Status: DISCONTINUED | OUTPATIENT
Start: 2025-04-22 | End: 2025-04-25

## 2025-04-22 RX ORDER — HEPARIN SODIUM 1000 [USP'U]/ML
3000 INJECTION INTRAVENOUS; SUBCUTANEOUS EVERY 6 HOURS
Refills: 0 | Status: DISCONTINUED | OUTPATIENT
Start: 2025-04-22 | End: 2025-04-28

## 2025-04-22 RX ORDER — HEPARIN SODIUM 1000 [USP'U]/ML
6500 INJECTION INTRAVENOUS; SUBCUTANEOUS EVERY 6 HOURS
Refills: 0 | Status: DISCONTINUED | OUTPATIENT
Start: 2025-04-22 | End: 2025-04-22

## 2025-04-22 RX ORDER — AMIODARONE HYDROCHLORIDE 50 MG/ML
400 INJECTION, SOLUTION INTRAVENOUS EVERY 8 HOURS
Refills: 0 | Status: DISCONTINUED | OUTPATIENT
Start: 2025-04-22 | End: 2025-04-22

## 2025-04-22 RX ORDER — DEXMEDETOMIDINE HYDROCHLORIDE IN SODIUM CHLORIDE 4 UG/ML
0.2 INJECTION INTRAVENOUS
Qty: 400 | Refills: 0 | Status: DISCONTINUED | OUTPATIENT
Start: 2025-04-22 | End: 2025-04-26

## 2025-04-22 RX ORDER — DEXMEDETOMIDINE HYDROCHLORIDE IN SODIUM CHLORIDE 4 UG/ML
1.5 INJECTION INTRAVENOUS
Qty: 200 | Refills: 0 | Status: DISCONTINUED | OUTPATIENT
Start: 2025-04-22 | End: 2025-04-22

## 2025-04-22 RX ORDER — AMIODARONE HYDROCHLORIDE 50 MG/ML
0.5 INJECTION, SOLUTION INTRAVENOUS
Qty: 450 | Refills: 0 | Status: DISCONTINUED | OUTPATIENT
Start: 2025-04-22 | End: 2025-04-22

## 2025-04-22 RX ORDER — LORAZEPAM 4 MG/ML
1 VIAL (ML) INJECTION ONCE
Refills: 0 | Status: DISCONTINUED | OUTPATIENT
Start: 2025-04-22 | End: 2025-04-22

## 2025-04-22 RX ORDER — HEPARIN SODIUM 1000 [USP'U]/ML
3000 INJECTION INTRAVENOUS; SUBCUTANEOUS EVERY 6 HOURS
Refills: 0 | Status: DISCONTINUED | OUTPATIENT
Start: 2025-04-21 | End: 2025-04-22

## 2025-04-22 RX ORDER — AMIODARONE HYDROCHLORIDE 50 MG/ML
1 INJECTION, SOLUTION INTRAVENOUS
Qty: 450 | Refills: 0 | Status: DISCONTINUED | OUTPATIENT
Start: 2025-04-21 | End: 2025-04-22

## 2025-04-22 RX ORDER — HEPARIN SODIUM 1000 [USP'U]/ML
6500 INJECTION INTRAVENOUS; SUBCUTANEOUS EVERY 6 HOURS
Refills: 0 | Status: DISCONTINUED | OUTPATIENT
Start: 2025-04-22 | End: 2025-04-28

## 2025-04-22 RX ORDER — HEPARIN SODIUM 1000 [USP'U]/ML
INJECTION INTRAVENOUS; SUBCUTANEOUS
Qty: 25000 | Refills: 0 | Status: DISCONTINUED | OUTPATIENT
Start: 2025-04-22 | End: 2025-04-28

## 2025-04-22 RX ORDER — DEXMEDETOMIDINE HYDROCHLORIDE IN SODIUM CHLORIDE 4 UG/ML
0.2 INJECTION INTRAVENOUS
Qty: 200 | Refills: 0 | Status: DISCONTINUED | OUTPATIENT
Start: 2025-04-22 | End: 2025-04-22

## 2025-04-22 RX ORDER — HEPARIN SODIUM 1000 [USP'U]/ML
INJECTION INTRAVENOUS; SUBCUTANEOUS
Qty: 25000 | Refills: 0 | Status: DISCONTINUED | OUTPATIENT
Start: 2025-04-22 | End: 2025-04-22

## 2025-04-22 RX ORDER — NOREPINEPHRINE BITARTRATE 8 MG
0.05 SOLUTION INTRAVENOUS
Qty: 8 | Refills: 0 | Status: DISCONTINUED | OUTPATIENT
Start: 2025-04-22 | End: 2025-04-25

## 2025-04-22 RX ORDER — INSULIN LISPRO 100 U/ML
INJECTION, SOLUTION INTRAVENOUS; SUBCUTANEOUS EVERY 6 HOURS
Refills: 0 | Status: DISCONTINUED | OUTPATIENT
Start: 2025-04-22 | End: 2025-04-23

## 2025-04-22 RX ADMIN — HEPARIN SODIUM 1200 UNIT(S)/HR: 1000 INJECTION INTRAVENOUS; SUBCUTANEOUS at 09:30

## 2025-04-22 RX ADMIN — DEXMEDETOMIDINE HYDROCHLORIDE IN SODIUM CHLORIDE 4.04 MICROGRAM(S)/KG/HR: 4 INJECTION INTRAVENOUS at 21:13

## 2025-04-22 RX ADMIN — Medication 100 MILLIEQUIVALENT(S): at 08:33

## 2025-04-22 RX ADMIN — Medication 100 MILLIEQUIVALENT(S): at 22:11

## 2025-04-22 RX ADMIN — FUROSEMIDE 40 MILLIGRAM(S): 10 INJECTION INTRAMUSCULAR; INTRAVENOUS at 05:07

## 2025-04-22 RX ADMIN — Medication 40 MILLIGRAM(S): at 11:35

## 2025-04-22 RX ADMIN — Medication 100 MILLIEQUIVALENT(S): at 19:59

## 2025-04-22 RX ADMIN — Medication 100 MILLIEQUIVALENT(S): at 21:11

## 2025-04-22 RX ADMIN — INSULIN LISPRO 1: 100 INJECTION, SOLUTION INTRAVENOUS; SUBCUTANEOUS at 16:16

## 2025-04-22 RX ADMIN — HEPARIN SODIUM 0 UNIT(S)/HR: 1000 INJECTION INTRAVENOUS; SUBCUTANEOUS at 18:51

## 2025-04-22 RX ADMIN — HEPARIN SODIUM 900 UNIT(S)/HR: 1000 INJECTION INTRAVENOUS; SUBCUTANEOUS at 19:56

## 2025-04-22 RX ADMIN — DEXMEDETOMIDINE HYDROCHLORIDE IN SODIUM CHLORIDE 4.04 MICROGRAM(S)/KG/HR: 4 INJECTION INTRAVENOUS at 17:10

## 2025-04-22 RX ADMIN — Medication 1 APPLICATION(S): at 05:08

## 2025-04-22 RX ADMIN — HEPARIN SODIUM 0 UNIT(S)/HR: 1000 INJECTION INTRAVENOUS; SUBCUTANEOUS at 19:16

## 2025-04-22 RX ADMIN — Medication 100 MILLIEQUIVALENT(S): at 11:36

## 2025-04-22 RX ADMIN — DEXMEDETOMIDINE HYDROCHLORIDE IN SODIUM CHLORIDE 30.3 MICROGRAM(S)/KG/HR: 4 INJECTION INTRAVENOUS at 02:50

## 2025-04-22 RX ADMIN — DEXMEDETOMIDINE HYDROCHLORIDE IN SODIUM CHLORIDE 30.3 MICROGRAM(S)/KG/HR: 4 INJECTION INTRAVENOUS at 03:16

## 2025-04-22 RX ADMIN — DEXMEDETOMIDINE HYDROCHLORIDE IN SODIUM CHLORIDE 4.04 MICROGRAM(S)/KG/HR: 4 INJECTION INTRAVENOUS at 01:58

## 2025-04-22 RX ADMIN — NOREPINEPHRINE BITARTRATE 7.57 MICROGRAM(S)/KG/MIN: 8 SOLUTION at 05:03

## 2025-04-22 RX ADMIN — Medication 100 MILLIEQUIVALENT(S): at 02:03

## 2025-04-22 RX ADMIN — HEPARIN SODIUM 0 UNIT(S)/HR: 1000 INJECTION INTRAVENOUS; SUBCUTANEOUS at 08:29

## 2025-04-22 RX ADMIN — HEPARIN SODIUM 1500 UNIT(S)/HR: 1000 INJECTION INTRAVENOUS; SUBCUTANEOUS at 07:09

## 2025-04-22 RX ADMIN — INSULIN LISPRO 2: 100 INJECTION, SOLUTION INTRAVENOUS; SUBCUTANEOUS at 06:18

## 2025-04-22 RX ADMIN — INSULIN LISPRO 2: 100 INJECTION, SOLUTION INTRAVENOUS; SUBCUTANEOUS at 11:36

## 2025-04-22 RX ADMIN — Medication 1 MILLIGRAM(S): at 02:40

## 2025-04-22 RX ADMIN — Medication 100 MILLIEQUIVALENT(S): at 10:01

## 2025-04-22 RX ADMIN — AMIODARONE HYDROCHLORIDE 16.7 MG/MIN: 50 INJECTION, SOLUTION INTRAVENOUS at 07:01

## 2025-04-22 RX ADMIN — AMIODARONE HYDROCHLORIDE 16.7 MG/MIN: 50 INJECTION, SOLUTION INTRAVENOUS at 20:02

## 2025-04-22 RX ADMIN — HEPARIN SODIUM 1500 UNIT(S)/HR: 1000 INJECTION INTRAVENOUS; SUBCUTANEOUS at 01:06

## 2025-04-22 RX ADMIN — FUROSEMIDE 40 MILLIGRAM(S): 10 INJECTION INTRAMUSCULAR; INTRAVENOUS at 13:02

## 2025-04-22 RX ADMIN — AMIODARONE HYDROCHLORIDE 33.3 MG/MIN: 50 INJECTION, SOLUTION INTRAVENOUS at 00:37

## 2025-04-22 NOTE — PROGRESS NOTE ADULT - SUBJECTIVE AND OBJECTIVE BOX
Patient is a 89y old  Male who presents with a chief complaint of SOB (22 Apr 2025 09:12)      INTERVAL HPI/OVERNIGHT EVENTS: Patient seen and examined at bedside. No overnight events occurred. Patient has no complaints at this time. Denies fevers, chills, headache, lightheadedness, chest pain, dyspnea, abdominal pain, n/v/d/c.    MEDICATIONS  (STANDING):  aMIOdarone Infusion 0.5 mG/Min (16.7 mL/Hr) IV Continuous <Continuous>  aspirin  chewable 81 milliGRAM(s) Oral daily  atorvastatin 80 milliGRAM(s) Oral at bedtime  chlorhexidine 2% Cloths 1 Application(s) Topical <User Schedule>  dexMEDEtomidine Infusion 0.2 MICROgram(s)/kG/Hr (4.04 mL/Hr) IV Continuous <Continuous>  dextrose 5%. 1000 milliLiter(s) (100 mL/Hr) IV Continuous <Continuous>  dextrose 5%. 1000 milliLiter(s) (50 mL/Hr) IV Continuous <Continuous>  dextrose 50% Injectable 25 Gram(s) IV Push once  dextrose 50% Injectable 12.5 Gram(s) IV Push once  dextrose 50% Injectable 25 Gram(s) IV Push once  finasteride 5 milliGRAM(s) Oral daily  furosemide   Injectable 40 milliGRAM(s) IV Push two times a day  glucagon  Injectable 1 milliGRAM(s) IntraMuscular once  heparin  Infusion.  Unit(s)/Hr (15 mL/Hr) IV Continuous <Continuous>  insulin lispro (ADMELOG) corrective regimen sliding scale   SubCutaneous every 6 hours  norepinephrine Infusion 0.05 MICROgram(s)/kG/Min (7.57 mL/Hr) IV Continuous <Continuous>  pantoprazole   Suspension 40 milliGRAM(s) Oral daily  potassium chloride  10 mEq/100 mL IVPB 10 milliEquivalent(s) IV Intermittent every 1 hour  tamsulosin 0.8 milliGRAM(s) Oral at bedtime    MEDICATIONS  (PRN):  dextrose Oral Gel 15 Gram(s) Oral once PRN Blood Glucose LESS THAN 70 milliGRAM(s)/deciliter  heparin   Injectable 6500 Unit(s) IV Push every 6 hours PRN For aPTT less than 40  heparin   Injectable 3000 Unit(s) IV Push every 6 hours PRN For aPTT between 40 - 57      Allergies    No Known Allergies    Intolerances        REVIEW OF SYSTEMS:  CONSTITUTIONAL: No fever or chills  HEENT:  No headache, no sore throat  RESPIRATORY: No cough, wheezing, or shortness of breath  CARDIOVASCULAR: No chest pain, palpitations  GASTROINTESTINAL: No abd pain, nausea, vomiting, or diarrhea  GENITOURINARY: No dysuria, frequency, or hematuria  NEUROLOGICAL: no focal weakness or dizziness  MUSCULOSKELETAL: no myalgias     Vital Signs Last 24 Hrs  T(C): 36.2 (22 Apr 2025 05:08), Max: 36.7 (21 Apr 2025 20:31)  T(F): 97.2 (22 Apr 2025 05:08), Max: 98 (21 Apr 2025 20:31)  HR: 87 (22 Apr 2025 10:00) (50 - 109)  BP: 123/89 (22 Apr 2025 10:00) (64/48 - 135/97)  BP(mean): 101 (22 Apr 2025 10:00) (55 - 108)  RR: 29 (22 Apr 2025 10:00) (10 - 34)  SpO2: 93% (22 Apr 2025 10:00) (89% - 99%)    Parameters below as of 22 Apr 2025 08:00  Patient On (Oxygen Delivery Method): nasal cannula  O2 Flow (L/min): 6      PHYSICAL EXAM:  GENERAL: NAD  HEENT:  anicteric, moist mucous membranes  CHEST/LUNG:  CTA b/l, no rales, wheezes, or rhonchi  HEART:  RRR, S1, S2  ABDOMEN:  BS+, soft, nontender, nondistended  EXTREMITIES: no edema, cyanosis, or calf tenderness  NERVOUS SYSTEM: answers questions and follows commands appropriately    LABS:                        15.8   13.97 )-----------( 221      ( 22 Apr 2025 07:25 )             46.6     CBC Full  -  ( 22 Apr 2025 07:25 )  WBC Count : 13.97 K/uL  Hemoglobin : 15.8 g/dL  Hematocrit : 46.6 %  Platelet Count - Automated : 221 K/uL  Mean Cell Volume : 93.4 fl  Mean Cell Hemoglobin : 31.7 pg  Mean Cell Hemoglobin Concentration : 33.9 g/dL  Auto Neutrophil # : x  Auto Lymphocyte # : x  Auto Monocyte # : x  Auto Eosinophil # : x  Auto Basophil # : x  Auto Neutrophil % : x  Auto Lymphocyte % : x  Auto Monocyte % : x  Auto Eosinophil % : x  Auto Basophil % : x    22 Apr 2025 05:50    140    |  105    |  57     ----------------------------<  244    3.8     |  24     |  2.10     Ca    9.4        22 Apr 2025 05:50  Phos  4.9       22 Apr 2025 05:50  Mg     2.4       22 Apr 2025 05:50    TPro  6.9    /  Alb  3.9    /  TBili  2.3    /  DBili  x      /  AST  25     /  ALT  33     /  AlkPhos  66     22 Apr 2025 05:50    PT/INR - ( 22 Apr 2025 00:12 )   PT: 15.4 sec;   INR: 1.31 ratio         PTT - ( 22 Apr 2025 07:25 )  PTT:151.9 sec  Urinalysis Basic - ( 22 Apr 2025 05:50 )    Color: x / Appearance: x / SG: x / pH: x  Gluc: 244 mg/dL / Ketone: x  / Bili: x / Urobili: x   Blood: x / Protein: x / Nitrite: x   Leuk Esterase: x / RBC: x / WBC x   Sq Epi: x / Non Sq Epi: x / Bacteria: x      CAPILLARY BLOOD GLUCOSE      POCT Blood Glucose.: 221 mg/dL (22 Apr 2025 05:31)  POCT Blood Glucose.: 208 mg/dL (21 Apr 2025 22:38)  POCT Blood Glucose.: 153 mg/dL (21 Apr 2025 21:14)  POCT Blood Glucose.: 107 mg/dL (21 Apr 2025 16:56)  POCT Blood Glucose.: 159 mg/dL (21 Apr 2025 11:57)          RADIOLOGY & ADDITIONAL TESTS:    Personally reviewed.     Consultant(s) Notes Reviewed:  [x] YES  [ ] NO     Patient is a 89y old  Male who presents with a chief complaint of SOB (22 Apr 2025 09:12)      INTERVAL HPI/OVERNIGHT EVENTS: Patient seen and examined at bedside. Overnight patient had 33 beats of vtach, new onset afib with RVR, was SOB, and found to have flash pulmonary edema. Patient was transferred to ICU for pressor support and amiodarone drip for vtach. Patient sleeping in bed, restless overnight as per RN.    MEDICATIONS  (STANDING):  aMIOdarone Infusion 0.5 mG/Min (16.7 mL/Hr) IV Continuous <Continuous>  aspirin  chewable 81 milliGRAM(s) Oral daily  atorvastatin 80 milliGRAM(s) Oral at bedtime  chlorhexidine 2% Cloths 1 Application(s) Topical <User Schedule>  dexMEDEtomidine Infusion 0.2 MICROgram(s)/kG/Hr (4.04 mL/Hr) IV Continuous <Continuous>  dextrose 5%. 1000 milliLiter(s) (100 mL/Hr) IV Continuous <Continuous>  dextrose 5%. 1000 milliLiter(s) (50 mL/Hr) IV Continuous <Continuous>  dextrose 50% Injectable 25 Gram(s) IV Push once  dextrose 50% Injectable 12.5 Gram(s) IV Push once  dextrose 50% Injectable 25 Gram(s) IV Push once  finasteride 5 milliGRAM(s) Oral daily  furosemide   Injectable 40 milliGRAM(s) IV Push two times a day  glucagon  Injectable 1 milliGRAM(s) IntraMuscular once  heparin  Infusion.  Unit(s)/Hr (15 mL/Hr) IV Continuous <Continuous>  insulin lispro (ADMELOG) corrective regimen sliding scale   SubCutaneous every 6 hours  norepinephrine Infusion 0.05 MICROgram(s)/kG/Min (7.57 mL/Hr) IV Continuous <Continuous>  pantoprazole   Suspension 40 milliGRAM(s) Oral daily  potassium chloride  10 mEq/100 mL IVPB 10 milliEquivalent(s) IV Intermittent every 1 hour  tamsulosin 0.8 milliGRAM(s) Oral at bedtime    MEDICATIONS  (PRN):  dextrose Oral Gel 15 Gram(s) Oral once PRN Blood Glucose LESS THAN 70 milliGRAM(s)/deciliter  heparin   Injectable 6500 Unit(s) IV Push every 6 hours PRN For aPTT less than 40  heparin   Injectable 3000 Unit(s) IV Push every 6 hours PRN For aPTT between 40 - 57      Allergies    No Known Allergies    Intolerances        REVIEW OF SYSTEMS:  unable to elicit due to patient's mental status      Vital Signs Last 24 Hrs  T(C): 36.2 (22 Apr 2025 05:08), Max: 36.7 (21 Apr 2025 20:31)  T(F): 97.2 (22 Apr 2025 05:08), Max: 98 (21 Apr 2025 20:31)  HR: 87 (22 Apr 2025 10:00) (50 - 109)  BP: 123/89 (22 Apr 2025 10:00) (64/48 - 135/97)  BP(mean): 101 (22 Apr 2025 10:00) (55 - 108)  RR: 29 (22 Apr 2025 10:00) (10 - 34)  SpO2: 93% (22 Apr 2025 10:00) (89% - 99%)    Parameters below as of 22 Apr 2025 08:00  Patient On (Oxygen Delivery Method): nasal cannula  O2 Flow (L/min): 6      PHYSICAL EXAM:  GENERAL: ill-appearing, sleeping in bed with intermittent spontaneous movement  EYES: sclera clear, no exudates  ENMT: oropharynx clear without erythema, no exudates, moist mucous membranes  LUNGS: diffusely decreased breath sounds, no wheezing or rhonchi appreciated  HEART: soft S1/S2, regular rate and rhythm, no murmurs noted, no lower extremity edema  GASTROINTESTINAL: abdomen is soft, nontender, nondistended, normoactive bowel sounds  INTEGUMENT: good skin turgor, warm skin, appears well perfused  MUSCULOSKELETAL: no clubbing or cyanosis, no obvious deformity  NEUROLOGIC: AOx0      LABS:                        15.8   13.97 )-----------( 221      ( 22 Apr 2025 07:25 )             46.6     CBC Full  -  ( 22 Apr 2025 07:25 )  WBC Count : 13.97 K/uL  Hemoglobin : 15.8 g/dL  Hematocrit : 46.6 %  Platelet Count - Automated : 221 K/uL  Mean Cell Volume : 93.4 fl  Mean Cell Hemoglobin : 31.7 pg  Mean Cell Hemoglobin Concentration : 33.9 g/dL  Auto Neutrophil # : x  Auto Lymphocyte # : x  Auto Monocyte # : x  Auto Eosinophil # : x  Auto Basophil # : x  Auto Neutrophil % : x  Auto Lymphocyte % : x  Auto Monocyte % : x  Auto Eosinophil % : x  Auto Basophil % : x    22 Apr 2025 05:50    140    |  105    |  57     ----------------------------<  244    3.8     |  24     |  2.10     Ca    9.4        22 Apr 2025 05:50  Phos  4.9       22 Apr 2025 05:50  Mg     2.4       22 Apr 2025 05:50    TPro  6.9    /  Alb  3.9    /  TBili  2.3    /  DBili  x      /  AST  25     /  ALT  33     /  AlkPhos  66     22 Apr 2025 05:50    PT/INR - ( 22 Apr 2025 00:12 )   PT: 15.4 sec;   INR: 1.31 ratio         PTT - ( 22 Apr 2025 07:25 )  PTT:151.9 sec  Urinalysis Basic - ( 22 Apr 2025 05:50 )    Color: x / Appearance: x / SG: x / pH: x  Gluc: 244 mg/dL / Ketone: x  / Bili: x / Urobili: x   Blood: x / Protein: x / Nitrite: x   Leuk Esterase: x / RBC: x / WBC x   Sq Epi: x / Non Sq Epi: x / Bacteria: x      CAPILLARY BLOOD GLUCOSE      POCT Blood Glucose.: 221 mg/dL (22 Apr 2025 05:31)  POCT Blood Glucose.: 208 mg/dL (21 Apr 2025 22:38)  POCT Blood Glucose.: 153 mg/dL (21 Apr 2025 21:14)  POCT Blood Glucose.: 107 mg/dL (21 Apr 2025 16:56)  POCT Blood Glucose.: 159 mg/dL (21 Apr 2025 11:57)          RADIOLOGY & ADDITIONAL TESTS:    Personally reviewed.     Consultant(s) Notes Reviewed:  [x] YES  [ ] NO

## 2025-04-22 NOTE — PROGRESS NOTE ADULT - SUBJECTIVE AND OBJECTIVE BOX
Chief Complaint: Shortness of breath    Interval Events: Worsening respiratory status requiring BIPAP and episode of VT overnight.    Review of Systems:  Unable to obtain.    Physical Exam:  Vitals:        Vital Signs Last 24 Hrs  T(C): 36.2 (22 Apr 2025 05:08), Max: 36.7 (21 Apr 2025 20:31)  T(F): 97.2 (22 Apr 2025 05:08), Max: 98 (21 Apr 2025 20:31)  HR: 78 (22 Apr 2025 09:00) (50 - 109)  BP: 120/68 (22 Apr 2025 09:00) (64/48 - 135/97)  BP(mean): 84 (22 Apr 2025 09:00) (55 - 108)  RR: 24 (22 Apr 2025 09:00) (10 - 34)  SpO2: 91% (22 Apr 2025 09:00) (89% - 99%)  Parameters below as of 22 Apr 2025 08:00  Patient On (Oxygen Delivery Method): nasal cannula  O2 Flow (L/min): 6  General: NAD  HEENT: MMM  Neck: No JVD, no carotid bruit  Lungs: CTAB  CV: RRR, nl S1/S2, no M/R/G  Abdomen: S/NT/ND, +BS  Extremities: No LE edema, no cyanosis  Neuro: AAOx0  Skin: No rash    Labs:                        15.8   13.97 )-----------( 221      ( 22 Apr 2025 07:25 )             46.6     04-22    140  |  105  |  57[H]  ----------------------------<  244[H]  3.8   |  24  |  2.10[H]    Ca    9.4      22 Apr 2025 05:50  Phos  4.9     04-22  Mg     2.4     04-22    TPro  6.9  /  Alb  3.9  /  TBili  2.3[H]  /  DBili  x   /  AST  25  /  ALT  33  /  AlkPhos  66  04-22    CARDIAC MARKERS ( 21 Apr 2025 22:10 )  x     / x     / x     / x     / 2.3 ng/mL      PT/INR - ( 22 Apr 2025 00:12 )   PT: 15.4 sec;   INR: 1.31 ratio         PTT - ( 22 Apr 2025 07:25 )  PTT:151.9 sec    ECG/Telemetry: Sinus rhythm, PVCs, NSVT, VT

## 2025-04-22 NOTE — PROGRESS NOTE ADULT - PROBLEM SELECTOR PLAN 5
Chronic   -On home Atorvastatin   -Continue home meds  -F/u AM Lipid panel Chronic  -HOLD home Coreg 3.125 mg BID in setting of hypotension  -Plan per ICU

## 2025-04-22 NOTE — PROGRESS NOTE ADULT - PROBLEM SELECTOR PLAN 3
Pt with elevated troponin on admission to 3051  -Per chart review pt had elevated troponin on last admission most likely 2/2 demand ischemia in setting of severe CHF exacerbation   -Will trend trops to peak  -Currently no active CP, will monitor for anginal sx  -EKG with NSR with frequent PVC's, 90 BPM, QTc 518 Per chart review pt baseline Cr is around 1.8  -Cr 2.1 on this admission  -Avoid nephrotoxic agents   -s/p 40mg IV Lasix in ED  -Nephro consulted, f/u recs  -Plan per ICU

## 2025-04-22 NOTE — PROGRESS NOTE ADULT - ASSESSMENT
HANNAH on CKD 3  CHF EF 20-25%  Dyspnea   HTN     -Baseline Creatinine 1.5  -HANNAH Likely 2/2 Decreased EABV, now plateau   -Check Urine lytes - pending   -Check UA - reviewed. Lg blood. >50 RBC  -Cont with furosemide  -CXR reviewed  -Fluid and sodium restriction  -S/p albumin  -No indication for RRT. Will assist with dialysis for UF if not responding to diuretics     D/w ICU    Renal critical care time spent > 30 min

## 2025-04-22 NOTE — PROGRESS NOTE ADULT - ASSESSMENT
89M with PMHx HFrEF (25%), ICD, prostate cancer, DM2, CKD who presents with SOB.    Admitted with Acute decompensated HF  Episode of likely VT last night   Acute pulmonary edema resulting in   acute hypoxemic respiratory failure  HANNAH on CKD  New Afib  Cardiogenic shock    - Agitated delirium overnight despite precedex infusion, pt refusing/non compliant with BIPAP.              89M with PMHx HFrEF (25%), ICD, prostate cancer, DM2, CKD who presents with SOB.    Admitted with Acute decompensated HF  Episode of likely VT last night   Acute pulmonary edema resulting in   acute hypoxemic respiratory failure  HANNAH on CKD  New Afib  Cardiogenic shock    - Agitated delirium overnight despite precedex infusion, pt refusing/non compliant with BIPAP. Trial on x30 minutes though patient severely agitated and concern for respiratory depression with any further sedation will continue on NC. Assess ABG. At high risk for intubation.   - Episode of VT yesterday night, continue on amiodarone gtt with HR controlled overnight. Electrolytes repleted to maintain K>4, Mg>2.  - On full AC with heparin gtt, having some hematuria. Will monitor.   - Pulmonary edema resulting in acute hypoxemic resp failure, requiring aggressive diuresis yesterday. On lasix 40mg IV BID, though urine output is not great. Resp status tenuous with tachypnea to high 20s.   - NPO for now d/t mental status, bedside dysphagia in the AM  - GOC ongoing with patients family. DNR but intubate.       CRITICAL CARE TIME SPENT: 35 minutes of critical care time spent providing medical care for patient's acute illness/conditions that impairs at least one vital organ system and/or poses a high risk of imminent or life threatening deterioration in the patient's condition. It includes time spent evaluating and treating the patient's acute illness as well as time spent reviewing labs, radiology, discussing goals of care with patient and/or patient's family, and discussing the case with a multidisciplinary team, in an effort to prevent further life threatening deterioration or end organ damage. This time is independent of any procedures performed.

## 2025-04-22 NOTE — PROGRESS NOTE ADULT - PROBLEM SELECTOR PLAN 8
DVT PPx: Heparin subq Chronic  -On home Farxiga 10mg qd   -Hold home oral meds  -Continue LDSS with FS QAC, QHS  -Hypoglycemia protocol  -Consistent carb diet  -Plan per ICU

## 2025-04-22 NOTE — PROGRESS NOTE ADULT - ASSESSMENT
88 y/o male with PMHx of CHF, HLD, HTN, prostate ca (s/p radioactive seed implantation in 2015) T2DM, PPM presents to ER c/o SOB. Pt states that he has had intermittent SOB    systolic HF  OP  OA  Atelectasis  Pleural Eff  Dyspnea  eval ACS  HTN  HLD  hx of Prostate Ca  DM  PPM    AFIB management  HD support and monitoring - ICU care  on AC  cardio follow up  NIV Use as needed    fio2 wean  I apple  goal sat > 88 pct  trend Trops  eval ACS  Cardio eval  tele monitoring  replete lytes  I and O  cvs rx regimen optimization  TTE reviewed from prior visits - EF 20 pct  pleural eff - atelectasis - I apple - no indication for pleurocentesis at present  DM care - serial FS  GOC discussion

## 2025-04-22 NOTE — PROGRESS NOTE ADULT - SUBJECTIVE AND OBJECTIVE BOX
[facility]                             Nephrology and Hypertension                             Kris  Jameson Ramires                                          [phone]     Patient is a 89y old  Male who presents with a chief complaint of SOB (21 Apr 2025 11:26)       HPI:  88 y/o male with PMHx of CHF, HLD, HTN, prostate ca (s/p radioactive seed implantation in 2015) T2DM, PPM presents to ER c/o SOB. Pt states that he has had intermittent SOB for the past few days, Pt notes he was visiting his wife today at rehab and felt SOB as he was leaving. He sat down to catch his breath, a staff member noticed he did not look well and they decided to call EMS. Pt notes he has had SOB both at rest and with exertion. Pt denies chest pain, fever, cough. Of note, pt was admitted 1 month ago for CHF exacerbation and also 1 week ago at [facility]. Pt adds a bunch of his medications were changed including his Entresto was dc'ed, Farxiga dosage was doubled and his Lasix was switched to Torsemide. Pt currently on NC and denies any SOB. Pt denies fever, chills, chest pain, palpitations, abdominal pain, nausea, vomiting, diarrhea.  Has not seen nephrologist.  States he is urinating.  Complains of dyspnea and orthopnea.    Worsening respiratory status requiring BIPAP and episode of VT overnight.    PAST MEDICAL & SURGICAL HISTORY:  NICM (nonischemic cardiomyopathy)      HTN (hypertension)      HLD (hyperlipidemia)      Prostate CA      T2DM (type 2 diabetes mellitus)      CHF (congestive heart failure)      H/O prostate biopsy      H/O cataract extraction      H/O inguinal hernia repair      AICD (automatic cardioverter/defibrillator) present           FAMILY HISTORY:  Family history of cardiomyopathy (Father)    FH: CHF (congestive heart failure) (Sibling)    NC    Social History:Non smoker    MEDICATIONS  (STANDING):  aspirin  chewable 81 milliGRAM(s) Oral daily  atorvastatin 80 milliGRAM(s) Oral at bedtime  carvedilol 3.125 milliGRAM(s) Oral every 12 hours  dextrose 5%. 1000 milliLiter(s) (100 mL/Hr) IV Continuous <Continuous>  dextrose 5%. 1000 milliLiter(s) (50 mL/Hr) IV Continuous <Continuous>  dextrose 50% Injectable 25 Gram(s) IV Push once  dextrose 50% Injectable 12.5 Gram(s) IV Push once  dextrose 50% Injectable 25 Gram(s) IV Push once  finasteride 5 milliGRAM(s) Oral daily  furosemide   Injectable 40 milliGRAM(s) IV Push daily  glucagon  Injectable 1 milliGRAM(s) IntraMuscular once  heparin   Injectable 5000 Unit(s) SubCutaneous every 12 hours  insulin lispro (ADMELOG) corrective regimen sliding scale   SubCutaneous three times a day before meals  insulin lispro (ADMELOG) corrective regimen sliding scale   SubCutaneous at bedtime  tamsulosin 0.8 milliGRAM(s) Oral at bedtime    MEDICATIONS  (PRN):  dextrose Oral Gel 15 Gram(s) Oral once PRN Blood Glucose LESS THAN 70 milliGRAM(s)/deciliter   Meds reviewed    Allergies    No Known Allergies    Intolerances         REVIEW OF SYSTEMS:    Review of Systems:   Constitutional: Denies fatigue  HEENT: Denies headaches and dizziness  Respiratory: + dyspnea  Cardiovascular: denies CP, palpitations  Gastrointestinal: Denies nausea, denies vomiting, diarrhea, constipation, abdominal pain, or bloody stools  Genitourinary: denies painful urination, increased frequency, urgency, or bloody urine  Skin: denies rashes or itching  Musculoskeletal: denies muscle aches, joint swelling  Neurologic: Denies generalized weakness, denies loss of sensation, numbness, or tingling    ICU Vital Signs Last 24 Hrs  T(C): 36.2 (22 Apr 2025 05:08), Max: 36.7 (21 Apr 2025 20:31)  T(F): 97.2 (22 Apr 2025 05:08), Max: 98 (21 Apr 2025 20:31)  HR: 78 (22 Apr 2025 09:00) (50 - 109)  BP: 120/68 (22 Apr 2025 09:00) (64/48 - 135/97)  BP(mean): 84 (22 Apr 2025 09:00) (55 - 108)  ABP: --  ABP(mean): --  RR: 24 (22 Apr 2025 09:00) (10 - 34)  SpO2: 91% (22 Apr 2025 09:00) (89% - 99%)    O2 Parameters below as of 22 Apr 2025 08:00  Patient On (Oxygen Delivery Method): nasal cannula  O2 Flow (L/min): 6        PHYSICAL EXAM:    GENERAL: NAD  HEAD:  Atraumatic, Normocephalic  EYES: EOMI, conjunctiva and sclera clear  ENMT: No Drainage from nares, No drainage from ears  NERVOUS SYSTEM:  Awake and Alert  CHEST/LUNG: decreased BS  EXTREMITIES:  No Edema  SKIN: No rashes No obvious ecchymosis      LABS:                        15.8   13.97 )-----------( 221      ( 22 Apr 2025 07:25 )             46.6     04-22    140  |  105  |  57[H]  ----------------------------<  244[H]  3.8   |  24  |  2.10[H]    Ca    9.4      22 Apr 2025 05:50  Phos  4.9     04-22  Mg     2.4     04-22    TPro  6.9  /  Alb  3.9  /  TBili  2.3[H]  /  DBili  x   /  AST  25  /  ALT  33  /  AlkPhos  66  04-22    PT/INR - ( 22 Apr 2025 00:12 )   PT: 15.4 sec;   INR: 1.31 ratio         PTT - ( 22 Apr 2025 07:25 )  PTT:151.9 sec  Urinalysis Basic - ( 22 Apr 2025 05:50 )    Color: x / Appearance: x / SG: x / pH: x  Gluc: 244 mg/dL / Ketone: x  / Bili: x / Urobili: x   Blood: x / Protein: x / Nitrite: x   Leuk Esterase: x / RBC: x / WBC x   Sq Epi: x / Non Sq Epi: x / Bacteria: x        ABG - ( 21 Apr 2025 23:42 )  pH, Arterial: 7.50  pH, Blood: x     /  pCO2: 30    /  pO2: 95    / HCO3: 23    / Base Excess: 0.2   /  SaO2: 98.3

## 2025-04-22 NOTE — PROGRESS NOTE ADULT - ASSESSMENT
88 y/o male with PMHx of CHF, HLD, HTN, prostate ca (s/p radioactive seed implantation in 2015) T2DM, PPM admitted for acute on chronic HFrEF.Upgraded to ICU after episode of NSVT and episode of afib with RVR complicated by flash pulmonary ededma, requiring heparin and amio gtt.    Neuro:  - continue to wean precedex drip  - Tylenol PRN for pain    CV:  - RRT for new Afib with RVR and NSVT (33 beats)  - PPM interrogation inconclusive  - c/w heparin gtt with eventual transition to DOAC  - c/w amio gtt to complete 8g load, then transition to PO  - Acute on chronic HFrEF (LVEF 20-25%)  - c/w IV lasix 40mg BID  - c/w ASA and statin   - Hold home coreg   - c/w levophed, titrate PRN to mantain MAP >65  -Troponins peaked and downtrended, can be demand 2/2 acute no chronic HFrEF  - f/u repeat cardiac enzymes @ 14:00    Pulm:  - RRT called SOB, found to have AHRF likely 2/2 flash pulmonary edema in setting of Afib with RVR  - CXR: pulmonary vascular congestion  - s/p lasix 40mg x1 during RRT  - c/w lasix 40 mg BID  - Initially on continuous BiPAP, now saturating well on 6L NC  - Maintain SO2>92    GI:  - NPO pending bedside dysphagia  - PPI    Renal:  - HANNAH on CKD, likely pre-renal due to decreased EABV in setting of HFrEF  - Hold home Entresto and dapagliflozin  - Monitor daily Cr  - Strict Is&Os  - Replete lytes PRN  - Hyperphosphetemic, continue to monitor  - f/u repeat BMP @ 14:00    ID:  - Mild leukocytosis, likely reactive  - Remains afebrile, procal WNL  - Monitor off antibiotics  - Trend WBC and fever curve    Heme:  - Full AC with heparin gtt    Endo:  - LDISS Q6H while NPO  - Goal -180    Dispo:  - DNR/Intubate; discussion had with son at bedside. Advance directives reviewed and DNR/Intubate order placed per pt wishes. Will continue GOC discussion with son and daughter.

## 2025-04-22 NOTE — PROGRESS NOTE ADULT - SUBJECTIVE AND OBJECTIVE BOX
Date/Time Patient Seen:  		  Referring MD:   Data Reviewed	       Patient is a 89y old  Male who presents with a chief complaint of SOB (21 Apr 2025 22:30)      Subjective/HPI     PAST MEDICAL & SURGICAL HISTORY:  NICM (nonischemic cardiomyopathy)    HTN (hypertension)    HLD (hyperlipidemia)    Prostate CA    T2DM (type 2 diabetes mellitus)    CHF (congestive heart failure)    H/O prostate biopsy    H/O cataract extraction    H/O inguinal hernia repair    AICD (automatic cardioverter/defibrillator) present          Medication list         MEDICATIONS  (STANDING):  aMIOdarone Infusion 1 mG/Min (33.3 mL/Hr) IV Continuous <Continuous>  aMIOdarone Infusion 0.5 mG/Min (16.7 mL/Hr) IV Continuous <Continuous>  aspirin  chewable 81 milliGRAM(s) Oral daily  atorvastatin 80 milliGRAM(s) Oral at bedtime  carvedilol 3.125 milliGRAM(s) Oral every 12 hours  chlorhexidine 2% Cloths 1 Application(s) Topical <User Schedule>  dexMEDEtomidine Infusion 1.5 MICROgram(s)/kG/Hr (30.3 mL/Hr) IV Continuous <Continuous>  dextrose 5%. 1000 milliLiter(s) (100 mL/Hr) IV Continuous <Continuous>  dextrose 5%. 1000 milliLiter(s) (50 mL/Hr) IV Continuous <Continuous>  dextrose 50% Injectable 25 Gram(s) IV Push once  dextrose 50% Injectable 12.5 Gram(s) IV Push once  dextrose 50% Injectable 25 Gram(s) IV Push once  finasteride 5 milliGRAM(s) Oral daily  furosemide   Injectable 40 milliGRAM(s) IV Push two times a day  glucagon  Injectable 1 milliGRAM(s) IntraMuscular once  heparin  Infusion.  Unit(s)/Hr (15 mL/Hr) IV Continuous <Continuous>  insulin lispro (ADMELOG) corrective regimen sliding scale   SubCutaneous every 6 hours  norepinephrine Infusion 0.05 MICROgram(s)/kG/Min (7.57 mL/Hr) IV Continuous <Continuous>  tamsulosin 0.8 milliGRAM(s) Oral at bedtime    MEDICATIONS  (PRN):  dextrose Oral Gel 15 Gram(s) Oral once PRN Blood Glucose LESS THAN 70 milliGRAM(s)/deciliter  heparin   Injectable 6500 Unit(s) IV Push every 6 hours PRN For aPTT less than 40  heparin   Injectable 3000 Unit(s) IV Push every 6 hours PRN For aPTT between 40 - 57         Vitals log        ICU Vital Signs Last 24 Hrs  T(C): 36.2 (22 Apr 2025 05:08), Max: 36.7 (21 Apr 2025 20:31)  T(F): 97.2 (22 Apr 2025 05:08), Max: 98 (21 Apr 2025 20:31)  HR: 61 (22 Apr 2025 04:10) (61 - 100)  BP: 90/52 (22 Apr 2025 04:00) (81/47 - 135/97)  BP(mean): 64 (22 Apr 2025 04:00) (55 - 108)  ABP: --  ABP(mean): --  RR: 29 (22 Apr 2025 04:00) (10 - 33)  SpO2: 98% (22 Apr 2025 04:10) (91% - 99%)    O2 Parameters below as of 21 Apr 2025 20:31  Patient On (Oxygen Delivery Method): nasal cannula  O2 Flow (L/min): 4               Input and Output:  I&O's Detail    21 Apr 2025 07:01  -  22 Apr 2025 05:51  --------------------------------------------------------  IN:    Amiodarone: 69.9 mL    Heparin Infusion: 30 mL    Oral Fluid: 150 mL  Total IN: 249.9 mL    OUT:    Voided (mL): 650 mL  Total OUT: 650 mL    Total NET: -400.1 mL          Lab Data                        15.9   12.00 )-----------( 206      ( 21 Apr 2025 22:10 )             46.4     04-21    141  |  106  |  57[H]  ----------------------------<  241[H]  3.8   |  23  |  2.00[H]    Ca    9.1      21 Apr 2025 22:10  Phos  5.3     04-21  Mg     2.4     04-21    TPro  7.3  /  Alb  3.9  /  TBili  1.3[H]  /  DBili  x   /  AST  32  /  ALT  36  /  AlkPhos  83  04-21    ABG - ( 21 Apr 2025 23:42 )  pH, Arterial: 7.50  pH, Blood: x     /  pCO2: 30    /  pO2: 95    / HCO3: 23    / Base Excess: 0.2   /  SaO2: 98.3              CARDIAC MARKERS ( 21 Apr 2025 22:10 )  x     / x     / x     / x     / 2.3 ng/mL        Review of Systems	      Objective     Physical Examination    heart s1s2  lung dc bs  head nc  head at  abd soft      Pertinent Lab findings & Imaging      Amberly:  NO   Adequate UO     I&O's Detail    21 Apr 2025 07:01  -  22 Apr 2025 05:51  --------------------------------------------------------  IN:    Amiodarone: 69.9 mL    Heparin Infusion: 30 mL    Oral Fluid: 150 mL  Total IN: 249.9 mL    OUT:    Voided (mL): 650 mL  Total OUT: 650 mL    Total NET: -400.1 mL               Discussed with:     Cultures:	        Radiology

## 2025-04-22 NOTE — PROGRESS NOTE ADULT - PROBLEM SELECTOR PLAN 6
S/p prostate radioactive seed implantation in 2015  -On home Flomax 0.4mg QHS and Finasteride 5mg QD  -C/w home meds Chronic   -On home Atorvastatin   -Continue home meds  -Lipid panel wnl  -Plan per ICU

## 2025-04-22 NOTE — GOALS OF CARE CONVERSATION - ADVANCED CARE PLANNING - CONVERSATION DETAILS
Discussion regarding current diagnosis of acute on chronic decompensated heart failure discussed with pt's son Amado Dumas was had and pt's advance directive was present which stated pt wishes to not be resuscitated (DNR). Per son his sister is on her way to the hospital and he wishes to discuss intubation with her before making any further decision. At this time the patient will remain DNR/Intubate pending further conversation with family regarding his medical status. MOLST to be completed and placed in chart when discussion is had with both son and daughter of pt.

## 2025-04-22 NOTE — CHART NOTE - NSCHARTNOTEFT_GEN_A_CORE
Case discussed with ICU team- they state they will handle GOC independently.  Our team remains available if needed for support and consultation.  Please reach out to our team if service is required.    Lizzie Allen MD, [facility]; Palliative Care Attending, [phone]

## 2025-04-22 NOTE — PROGRESS NOTE ADULT - PROBLEM SELECTOR PLAN 4
Chronic  -On home Coreg 3.125 mg BID  -C/w home meds with hold parameters Pt with elevated troponin on admission to 3051  -Per chart review pt had elevated troponin on last admission most likely 2/2 demand ischemia in setting of severe CHF exacerbation   -Will trend trops to peak  -Currently no active CP, will monitor for anginal sx  -EKG with NSR with frequent PVC's, 90 BPM, QTc 518  -Plan per ICU

## 2025-04-22 NOTE — PROGRESS NOTE ADULT - ASSESSMENT
The patient is an 89 year old male with a history of HTN, HL, DM, CKD, chronic systolic heart failure s/p ICD prostate cancer who presents with shortness of breath in the setting of acute on chronic systolic heart failure.    Plan:  - Decompensated heart failure may in part be due to increased salt intake yesterday  - ECG with sinus rhythm and LBBB  - Echo 3/20/25 with severely reduced LV systolic function (EF 20-25%), mod MR  - BNP 01471  - CXR with pulm congestion and small left pleural effusion  - Troponin elevated at 3785 in the setting of acute heart failure, HANNAH  - He had a cardiac catheterization in the past for work-up of his heart failure which showed mild non-obstructive CAD  - Entresto was discontinued due to hypotension and HANNAH  - Hold dapagliflozin; resume on discharge if renal function stable  - Continue carvedilol 3.125 mg bid  - Continue aspirin 81 mg daily  - Continue atorvastatin 80 mg daily  - The patient has a history of hypotension and his heart failure medications were recently reduced  - Continue furosemide 40 mg IV q12h  - Wean off norepinephrine  - Ventricular tachycardia - EP eval. Continue amiodarone drip. ICD is in place. May need upgrade in device.  - Reportedly had an episode of atrial fibrillation (strips not available). Continue amiodarone. Continue heparin drip with probable transition to DOAC at a later date.

## 2025-04-22 NOTE — PROGRESS NOTE ADULT - PROBLEM SELECTOR PLAN 2
Per chart review pt baseline Cr is around 1.8  -Cr 2.1 on this admission  -Avoid nephrotoxic agents   -s/p 40mg IV Lasix in ED  -Nephro consulted, f/u recs New onset afib with RVR  -RTT called on 4/21 due to vtach and afib with RVR, patient found to be SOB with flash pulmonary edema and hypotensive; transferred to ICU and started on amio and levophed drip  -Continue amio gtt  -Resume coreg once BP tolerates  -Plan per ICU

## 2025-04-22 NOTE — CHART NOTE - NSCHARTNOTEFT_GEN_A_CORE
: Leila      INDICATION: shock, acute respiratory failure      PROCEDURE:    [x ] LIMITED ECHO    [x ] LIMITED CHEST      FINDINGS:  B lines diffusely   R small-mod pleural effusion though no window for drainage  L small pleural effusion    reduced LV function  no significant pericardial effusion  IVC 2.1cm without variation    INTERPRETATION:  cardiogenic shock, pulmonary edema, volume overload      Images stored on [employer]

## 2025-04-22 NOTE — PROGRESS NOTE ADULT - SUBJECTIVE AND OBJECTIVE BOX
Date of entry of this note is equal to date of services rendered  Pt seen and examined at bedside. 24 hours events noted, recent imaging, labs and medication ordered reviewed.       REVIEW OF SYSTEMS:     [ ] A ten-point review of systems was otherwise negative except as noted.  [X] Due to altered mental status/intubation, subjective information were not able to be obtained from the patient. History was obtained, to the extent possible, from review of the chart and collateral sources of information.    PAST MEDICAL & SURGICAL HISTORY:  NICM (nonischemic cardiomyopathy)      HTN (hypertension)      HLD (hyperlipidemia)      Prostate CA      T2DM (type 2 diabetes mellitus)      CHF (congestive heart failure)      H/O prostate biopsy      H/O cataract extraction      H/O inguinal hernia repair      AICD (automatic cardioverter/defibrillator) present        FAMILY HISTORY:  Family history of cardiomyopathy (Father)    FH: CHF (congestive heart failure) (Sibling)        Vitals   ICU Vital Signs Last 24 Hrs  T(C): 37.6 (22 Apr 2025 23:38), Max: 37.7 (22 Apr 2025 20:23)  T(F): 99.7 (22 Apr 2025 23:38), Max: 99.8 (22 Apr 2025 20:23)  HR: 84 (23 Apr 2025 04:01) (50 - 109)  BP: 94/69 (23 Apr 2025 02:30) (65/49 - 130/116)  BP(mean): 78 (23 Apr 2025 02:30) (56 - 123)  ABP: --  ABP(mean): --  RR: 29 (23 Apr 2025 02:30) (16 - 40)  SpO2: 100% (23 Apr 2025 04:01) (88% - 100%)    O2 Parameters below as of 23 Apr 2025 03:30  Patient On (Oxygen Delivery Method): nasal cannula  O2 Flow (L/min): 3      PHYSICAL EXAM:  General: ill appearing, frail, agitated  HEENT: Pupils equal, reactive to light.  Symmetric.  PULM: Clear to auscultation bilaterally  CVS: Regular rate and rhythm  ABD: Soft, nondistended, nontender  EXT: No edema, nontender, warm  NEURO: agitated delirium, intermittently will follow commands    VENT SETTINGS     ABG - ( 21 Apr 2025 23:42 )  pH, Arterial: 7.50  pH, Blood: x     /  pCO2: 30    /  pO2: 95    / HCO3: 23    / Base Excess: 0.2   /  SaO2: 98.3        I&O's Detail    21 Apr 2025 07:01  -  22 Apr 2025 07:00  --------------------------------------------------------  IN:    Amiodarone: 233.1 mL    Amiodarone: 16.7 mL    Dexmedetomidine: 16 mL    Heparin Infusion: 105 mL    Norepinephrine: 105.6 mL    Oral Fluid: 150 mL  Total IN: 626.4 mL    OUT:    Indwelling Catheter - Urethral (mL): 800 mL    Voided (mL): 650 mL  Total OUT: 1450 mL    Total NET: -823.6 mL      22 Apr 2025 07:01  -  23 Apr 2025 04:32  --------------------------------------------------------  IN:    Amiodarone: 83.5 mL    Amiodarone: 233.8 mL    Dexmedetomidine: 16.1 mL    Dexmedetomidine: 443.1 mL    Heparin Infusion: 189 mL    IV PiggyBack: 600 mL    IV PiggyBack: 100 mL    Norepinephrine: 230.5 mL  Total IN: 1896 mL    OUT:    Indwelling Catheter - Urethral (mL): 1275 mL  Total OUT: 1275 mL    Total NET: 621 mL          LABS                        15.8   13.97 )-----------( 221      ( 22 Apr 2025 07:25 )             46.6     04-22    143  |  108  |  55[H]  ----------------------------<  210[H]  3.9   |  23  |  2.00[H]    Ca    9.4      22 Apr 2025 16:10  Phos  4.9     04-22  Mg     2.4     04-22    TPro  6.9  /  Alb  3.9  /  TBili  2.3[H]  /  DBili  x   /  AST  25  /  ALT  33  /  AlkPhos  66  04-22   LIVER FUNCTIONS - ( 22 Apr 2025 05:50 )  Alb: 3.9 g/dL / Pro: 6.9 g/dL / ALK PHOS: 66 U/L / ALT: 33 U/L / AST: 25 U/L / GGT: x          PT/INR - ( 22 Apr 2025 00:12 )   PT: 15.4 sec;   INR: 1.31 ratio         PTT - ( 23 Apr 2025 02:20 )  PTT:104.3 sec CARDIAC MARKERS ( 22 Apr 2025 16:10 )  CKx     / CKMB1.9 ng/mL / Troponin Tx     /        Urinalysis Basic - ( 22 Apr 2025 16:10 )    Color: x / Appearance: x / SG: x / pH: x  Gluc: 210 mg/dL / Ketone: x  / Bili: x / Urobili: x   Blood: x / Protein: x / Nitrite: x   Leuk Esterase: x / RBC: x / WBC x   Sq Epi: x / Non Sq Epi: x / Bacteria: x      POCT Blood Glucose.: 218 mg/dL *H* (04-23-25 @ 00:29)  POCT Blood Glucose.: 197 mg/dL *H* (04-22-25 @ 16:09)  POCT Blood Glucose.: 216 mg/dL *H* (04-22-25 @ 11:22)  POCT Blood Glucose.: 221 mg/dL *H* (04-22-25 @ 05:31)        RADIOLOGY:   < from: Xray Chest 1 View-PORTABLE IMMEDIATE (Xray Chest 1 View-PORTABLE IMMEDIATE .) (04.21.25 @ 21:37) >  FINDINGS:    Single frontal view of the chest demonstrates moderate CHF. Small   bilateral pleural effusions. Left-sided AICD. The cardiomediastinal   silhouette is enlarged. No acute osseous abnormalities. Overlying EKG   leads and wires are noted    IMPRESSION:Moderate CHF. Small bilateral pleural effusions.    --- End of Report ---    < end of copied text >

## 2025-04-22 NOTE — PROGRESS NOTE ADULT - PROBLEM SELECTOR PLAN 1
acute on chronic HFrEF exacerbation   Pt with hx of chronic systolic congestive heart failure last admitted for CHF exacerbation in March to [facility] and [facility] last week  -Pt with inc SOB at rest and on exertion   -F/u CXR official read  -Troponin 3051, 3543, pro-BNP 64537  -EKG: NSR with frequent PVC's, 90 BPM, QTc 518  -Vitals: BP: 135/73, HR: 95 , Temp: 98.2F , RR: 18 , SpO2: 94% on 6LNC  -Currently on 6LNC wean as tolerated   -s/p 40mg Lasix IVP in ED  -Previous TTE from March with severely reduced LV systolic function (EF 20-25%), mod MR  - Strict I&Os, daily weights, fluid restriction   - Remote tele, continuous pulse ox   -Pt recently switched from Lasix to Torsemide per SF discharge but is unsure of dosage   -C/w Lasix 40mg IVP daily   -Cardio consulted, Dr. Boothe, recs appreciated acute on chronic HFrEF exacerbation   Pt with hx of chronic systolic congestive heart failure last admitted for CHF exacerbation in March to [facility] and [facility] last week  -Pt with inc SOB at rest and on exertion   -CXR with pulmonary congestion and b/l pleural effusions  -Troponin 3051, 3543, pro-BNP 47339  -EKG: NSR with frequent PVC's, 90 BPM, QTc 518  -Vitals: BP: 135/73, HR: 95 , Temp: 98.2F , RR: 18 , SpO2: 94% on 6LNC  -Currently on 6LNC wean as tolerated   -s/p 40mg Lasix IVP in ED  -Previous TTE from March with severely reduced LV systolic function (EF 20-25%), mod MR  - Strict I&Os, daily weights, fluid restriction   - Remote tele, continuous pulse ox   -Pt recently switched from Lasix to Torsemide per SF discharge but is unsure of dosage   -C/w Lasix 40mg IVP daily   -Cardio consulted, Dr. Boothe, recs appreciated  -Plan per ICU

## 2025-04-22 NOTE — PROGRESS NOTE ADULT - ASSESSMENT
89 y/o male with PMHx of CHF, HLD, HTN, prostate ca (s/p radioactive seed implantation in 2015) T2DM, PPM presents to ER c/o SOB admitted for acute on chronic systolic CHF exacerbation.

## 2025-04-22 NOTE — PROGRESS NOTE ADULT - SUBJECTIVE AND OBJECTIVE BOX
INTERVAL HPI/OVERNIGHT EVENTS: RRT called for SOB and VT. On monitor Afib rvr, however, EKG with VT. CXR showing pulmonary edema. Pt given lasix, lopressor, and placed on BIPAP. Transferred to ICU for pressor support and precedex. Started on amio gtt.    SUBJECTIVE: Seen and examined pt at bedside. Pt on nasal cannula satting well. Mildly agitated during examination. Unable to obtain reliable ROS 2/2 AMS.       ICU Vital Signs Last 24 Hrs  T(C): 36.2 (22 Apr 2025 05:08), Max: 36.7 (21 Apr 2025 20:31)  T(F): 97.2 (22 Apr 2025 05:08), Max: 98 (21 Apr 2025 20:31)  HR: 80 (22 Apr 2025 11:00) (50 - 109)  BP: 99/74 (22 Apr 2025 11:00) (64/48 - 135/97)  BP(mean): 79 (22 Apr 2025 11:00) (55 - 108)  ABP: --  ABP(mean): --  RR: 27 (22 Apr 2025 11:00) (10 - 34)  SpO2: 92% (22 Apr 2025 11:00) (88% - 99%)    O2 Parameters below as of 22 Apr 2025 08:00  Patient On (Oxygen Delivery Method): nasal cannula  O2 Flow (L/min): 6            04-21-25 @ 07:01  -  04-22-25 @ 07:00  --------------------------------------------------------  IN: 626.4 mL / OUT: 1450 mL / NET: -823.6 mL    04-22-25 @ 07:01  -  04-22-25 @ 11:37  --------------------------------------------------------  IN: 214.2 mL / OUT: 400 mL / NET: -185.8 mL        CAPILLARY BLOOD GLUCOSE      POCT Blood Glucose.: 216 mg/dL (22 Apr 2025 11:22)      I&O's Summary    21 Apr 2025 07:01  -  22 Apr 2025 07:00  --------------------------------------------------------  IN: 626.4 mL / OUT: 1450 mL / NET: -823.6 mL    22 Apr 2025 07:01  -  22 Apr 2025 11:37  --------------------------------------------------------  IN: 214.2 mL / OUT: 400 mL / NET: -185.8 mL        PHYSICAL EXAM:  General: ill-appearing, responding to physical stimuli  Neurology: AxO 0   HEENT: NC/AT  Respiratory: diminished course breath sounds b/l, no rhonchi noted  Cardiovascular: RRR, normal S1S2, no M/R/G  Abdomen: soft, NT/ND, +BS, no palpable masses  : indwelling in place, bag with hematuria  Extremities: cool extremities; no clubbing, cyanosis, or edema  Skin: cool/dry      Meds:    aMIOdarone Infusion IV Continuous  furosemide   Injectable IV Push  norepinephrine Infusion IV Continuous    atorvastatin Oral  dextrose 50% Injectable IV Push  dextrose 50% Injectable IV Push  dextrose 50% Injectable IV Push  dextrose Oral Gel Oral PRN  finasteride Oral  glucagon  Injectable IntraMuscular  insulin lispro (ADMELOG) corrective regimen sliding scale SubCutaneous      dexMEDEtomidine Infusion IV Continuous      aspirin  chewable Oral  heparin   Injectable IV Push PRN  heparin   Injectable IV Push PRN  heparin  Infusion. IV Continuous    pantoprazole  Injectable IV Push    tamsulosin Oral    dextrose 5%. IV Continuous  dextrose 5%. IV Continuous  potassium chloride  10 mEq/100 mL IVPB IV Intermittent      chlorhexidine 2% Cloths Topical                              15.8   13.97 )-----------( 221      ( 22 Apr 2025 07:25 )             46.6       04-22    140  |  105  |  57[H]  ----------------------------<  244[H]  3.8   |  24  |  2.10[H]    Ca    9.4      22 Apr 2025 05:50  Phos  4.9     04-22  Mg     2.4     04-22    TPro  6.9  /  Alb  3.9  /  TBili  2.3[H]  /  DBili  x   /  AST  25  /  ALT  33  /  AlkPhos  66  04-22      CARDIAC MARKERS ( 21 Apr 2025 22:10 )  x     / x     / x     / x     / 2.3 ng/mL      PT/INR - ( 22 Apr 2025 00:12 )   PT: 15.4 sec;   INR: 1.31 ratio         PTT - ( 22 Apr 2025 07:25 )  PTT:151.9 sec  Urinalysis Basic - ( 22 Apr 2025 05:50 )    Color: x / Appearance: x / SG: x / pH: x  Gluc: 244 mg/dL / Ketone: x  / Bili: x / Urobili: x   Blood: x / Protein: x / Nitrite: x   Leuk Esterase: x / RBC: x / WBC x   Sq Epi: x / Non Sq Epi: x / Bacteria: x                  Radiology: REVIEWED  Xray Chest 1 View-PORTABLE IMMEDIATE (04.21.25 @ 21:37)  IMPRESSION:Moderate CHF. Small bilateral pleural effusions.      Bedside Ultrasound: B-lines present b/l lung fields. Small-mod R and small L pleural effusion. LV dilated with asymmetric squeeze and severely reduced EF    Tubes/Lines: Indwelling      GLOBAL ISSUE/BEST PRACTICE:  Analgesia: Y  Sedation: Y  HOB elevation: Y  Stress ulcer prophylaxis: Y  VTE prophylaxis: full AC  Glycemic control: Y  Nutrition: NPO     CODE STATUS: DNR/Intubate       INTERVAL HPI/OVERNIGHT EVENTS: RRT called for SOB and VT. On monitor ? Afib rvr, however, EKG consistent with VT. CXR showing pulmonary edema. Pt given lasix, lopressor, and placed on BIPAP. Transferred to ICU for pressor support and precedex. Started on amio gtt.  Requiring precedex for compliance with BiPAP though still remains agitated this am    SUBJECTIVE: Seen and examined pt at bedside. Pt on nasal cannula satting well. Mildly agitated during examination. Unable to obtain reliable ROS 2/2 AMS.       ICU Vital Signs Last 24 Hrs  T(C): 36.2 (22 Apr 2025 05:08), Max: 36.7 (21 Apr 2025 20:31)  T(F): 97.2 (22 Apr 2025 05:08), Max: 98 (21 Apr 2025 20:31)  HR: 80 (22 Apr 2025 11:00) (50 - 109)  BP: 99/74 (22 Apr 2025 11:00) (64/48 - 135/97)  BP(mean): 79 (22 Apr 2025 11:00) (55 - 108)  ABP: --  ABP(mean): --  RR: 27 (22 Apr 2025 11:00) (10 - 34)  SpO2: 92% (22 Apr 2025 11:00) (88% - 99%)    O2 Parameters below as of 22 Apr 2025 08:00  Patient On (Oxygen Delivery Method): nasal cannula  O2 Flow (L/min): 6            04-21-25 @ 07:01  -  04-22-25 @ 07:00  --------------------------------------------------------  IN: 626.4 mL / OUT: 1450 mL / NET: -823.6 mL    04-22-25 @ 07:01  -  04-22-25 @ 11:37  --------------------------------------------------------  IN: 214.2 mL / OUT: 400 mL / NET: -185.8 mL        CAPILLARY BLOOD GLUCOSE      POCT Blood Glucose.: 216 mg/dL (22 Apr 2025 11:22)      I&O's Summary    21 Apr 2025 07:01  -  22 Apr 2025 07:00  --------------------------------------------------------  IN: 626.4 mL / OUT: 1450 mL / NET: -823.6 mL    22 Apr 2025 07:01  -  22 Apr 2025 11:37  --------------------------------------------------------  IN: 214.2 mL / OUT: 400 mL / NET: -185.8 mL        PHYSICAL EXAM:  General: ill-appearing, responding to physical stimuli  Neurology: responds to voice, intermittent confused speech   HEENT: NC/AT  Respiratory: diminished course breath sounds b/l, no rhonchi noted  Cardiovascular: RRR, normal S1S2, no M/R/G  Abdomen: soft, NT/ND, +BS, no palpable masses  : indwelling in place, + wine colored hematuria  Extremities: cool extremities; no clubbing, cyanosis, or edema  Skin: cool/dry      Meds:    aMIOdarone Infusion IV Continuous  furosemide   Injectable IV Push  norepinephrine Infusion IV Continuous    atorvastatin Oral  dextrose 50% Injectable IV Push  dextrose 50% Injectable IV Push  dextrose 50% Injectable IV Push  dextrose Oral Gel Oral PRN  finasteride Oral  glucagon  Injectable IntraMuscular  insulin lispro (ADMELOG) corrective regimen sliding scale SubCutaneous      dexMEDEtomidine Infusion IV Continuous      aspirin  chewable Oral  heparin   Injectable IV Push PRN  heparin   Injectable IV Push PRN  heparin  Infusion. IV Continuous    pantoprazole  Injectable IV Push    tamsulosin Oral    dextrose 5%. IV Continuous  dextrose 5%. IV Continuous  potassium chloride  10 mEq/100 mL IVPB IV Intermittent      chlorhexidine 2% Cloths Topical                              15.8   13.97 )-----------( 221      ( 22 Apr 2025 07:25 )             46.6       04-22    140  |  105  |  57[H]  ----------------------------<  244[H]  3.8   |  24  |  2.10[H]    Ca    9.4      22 Apr 2025 05:50  Phos  4.9     04-22  Mg     2.4     04-22    TPro  6.9  /  Alb  3.9  /  TBili  2.3[H]  /  DBili  x   /  AST  25  /  ALT  33  /  AlkPhos  66  04-22      CARDIAC MARKERS ( 21 Apr 2025 22:10 )  x     / x     / x     / x     / 2.3 ng/mL      PT/INR - ( 22 Apr 2025 00:12 )   PT: 15.4 sec;   INR: 1.31 ratio         PTT - ( 22 Apr 2025 07:25 )  PTT:151.9 sec  Urinalysis Basic - ( 22 Apr 2025 05:50 )    Color: x / Appearance: x / SG: x / pH: x  Gluc: 244 mg/dL / Ketone: x  / Bili: x / Urobili: x   Blood: x / Protein: x / Nitrite: x   Leuk Esterase: x / RBC: x / WBC x   Sq Epi: x / Non Sq Epi: x / Bacteria: x                  Radiology: REVIEWED  Xray Chest 1 View-PORTABLE IMMEDIATE (04.21.25 @ 21:37)  IMPRESSION:Moderate CHF. Small bilateral pleural effusions.      Bedside Ultrasound: B-lines present b/l lung fields. Small-mod R and small L pleural effusion. LV dilated with asymmetric squeeze and severely reduced EF    Tubes/Lines: Indwelling      GLOBAL ISSUE/BEST PRACTICE:  Analgesia: Y  Sedation: Y  HOB elevation: Y  Stress ulcer prophylaxis: Y  VTE prophylaxis: full AC  Glycemic control: Y  Nutrition: NPO     CODE STATUS: DNR/Intubate

## 2025-04-22 NOTE — PROGRESS NOTE ADULT - ATTENDING COMMENTS
89 yo man with Hx HFrEF, ICD, prostate cancer, CKD admitted with ADHF, overnight with episode of likely VT and acute pulmonary edema resulting in acute hypoxemic respiratory failure.    --agitated delirium, precedex for compliance  --cardiogenic shock requiring low dose levophed  continue current lasix  new afib, and suspected VT overnight, continue amio gtt, AC with heparin gtt  CAD, continue ASA and statin  --acute hypoxemic respiratory failure from pulmonary edema  diurese as above  BiPAP able to be weaned to 5L NC  would benefit from nocturnal NIV while acutely ill, if he's able to comply  --HANNAH on CKD 3, prerenal v ATN  diurese as above  --NPO until mental status impoves  --monitor off Abx, doubt infection  --DM2, continue ISS  --plan discussed with wife and children, GOC discussed at length, they have decided on DNR but currently undecided on intubation  DNR/Intubate  --plan discussed with Dr. King

## 2025-04-22 NOTE — CONSULT NOTE ADULT - SUBJECTIVE AND OBJECTIVE BOX
EP Attending  HISTORY OF PRESENT ILLNESS: HPI:  89 y/o male with PMHx of CHF, HLD, HTN, prostate ca (s/p radioactive seed implantation in 2015) T2DM, PPM presents to ER c/o SOB. Pt states that he has had intermittent SOB for the past few days, Pt notes he was visiting his wife today at rehab and felt SOB as he was leaving. He sat down to catch his breath, a staff member noticed he did not look well and they decided to call EMS. Pt notes he has had SOB both at rest and with exertion. Pt denies chest pain, fever, cough. Of note, pt was admitted 1 month ago for CHF exacerbation and also 1 week ago at [facility]. Pt adds a bunch of his medications were changed including his Entresto was dc'ed, Farxiga dosage was doubled and his Lasix was switched to Torsemide. Pt currently on NC and denies any SOB. Pt denies fever, chills, chest pain, palpitations, abdominal pain, nausea, vomiting, diarrhea.    ED Course:   Vitals: BP: 135/73, HR: 95 , Temp: 98.2F , RR: 18 , SpO2: 94% on 6LNC  Labs: BUN/Cr 56/2.1, Glucose 125, eGFR 30, Troponin 3051, pro-BNP 26062  CXR: official read pending   EKG: NSR with frequent PVC's, 90 BPM, QTc 518  Received in the ED:  Aspirin 324mg PO x1, Lasix 40mg IVP x1 (2025 03:55)    While hospitalized re: acute on chronic systolic heart failure, he developed rapid VT, and has since been transferred to the ICU.  VT has quelled down with IV amiodarone, but he's been agitated in bed. Required precedex to sedate, then NorEpinephrine infusion to maintain blood pressure after sedation made him hypotensive.  Too sedated this morning to participate in any history/ROS, only opens his eyes for a brief moment.  He's reportedly been hospitalized at [facility] and at [facility] in the last few weeks/months with CHF exacerbations.  He has a dual-coil, VVI (single chamber) Abbott ICD.  This will be interrogated by the EP lab.  He has history of a dilated nonischemic cardiomyopathy, and baseline LBBB ~145-150ms.  There is no known prior history of AFib.    PAST MEDICAL & SURGICAL HISTORY:  NICM (nonischemic cardiomyopathy)  HTN (hypertension)  HLD (hyperlipidemia)  Prostate CA  T2DM (type 2 diabetes mellitus)  CHF (congestive heart failure)  H/O prostate biopsy  H/O cataract extraction  H/O inguinal hernia repair  AICD (automatic cardioverter/defibrillator) present    MEDICATIONS  (STANDING):  aMIOdarone Infusion 0.5 mG/Min (16.7 mL/Hr) IV Continuous <Continuous>  aspirin  chewable 81 milliGRAM(s) Oral daily  atorvastatin 80 milliGRAM(s) Oral at bedtime  chlorhexidine 2% Cloths 1 Application(s) Topical <User Schedule>  dexMEDEtomidine Infusion 0.2 MICROgram(s)/kG/Hr (4.04 mL/Hr) IV Continuous <Continuous>  dextrose 5%. 1000 milliLiter(s) (100 mL/Hr) IV Continuous <Continuous>  dextrose 5%. 1000 milliLiter(s) (50 mL/Hr) IV Continuous <Continuous>  dextrose 50% Injectable 25 Gram(s) IV Push once  dextrose 50% Injectable 12.5 Gram(s) IV Push once  dextrose 50% Injectable 25 Gram(s) IV Push once  finasteride 5 milliGRAM(s) Oral daily  furosemide   Injectable 40 milliGRAM(s) IV Push two times a day  glucagon  Injectable 1 milliGRAM(s) IntraMuscular once  heparin  Infusion.  Unit(s)/Hr (15 mL/Hr) IV Continuous <Continuous>  insulin lispro (ADMELOG) corrective regimen sliding scale   SubCutaneous every 6 hours  norepinephrine Infusion 0.05 MICROgram(s)/kG/Min (7.57 mL/Hr) IV Continuous <Continuous>  pantoprazole   Suspension 40 milliGRAM(s) Oral daily  potassium chloride  10 mEq/100 mL IVPB 10 milliEquivalent(s) IV Intermittent every 1 hour  tamsulosin 0.8 milliGRAM(s) Oral at bedtime    Allergies    No Known Allergies    Intolerances      FAMILY HISTORY:  Family history of cardiomyopathy (Father)    FH: CHF (congestive heart failure) (Sibling)    Non-contributary for premature coronary disease or sudden cardiac death    SOCIAL HISTORY:    [x ] Non-smoker  [ ] Smoker  [ ] Alcohol    PHYSICAL EXAM:  T(C): 36.2 (25 @ 05:08), Max: 36.7 (25 @ 20:31)  HR: 85 (25 @ 10:30) (50 - 109)  BP: 117/50 (25 @ 10:30) (64/48 - 135/97)  RR: 28 (25 @ 10:30) (10 - 34)  SpO2: 88% (25 @ 10:30) (88% - 99%)  Wt(kg): --    Appearance: elderly man, sedated, resting comfortably in bed, nondiaphoretic.	  HEENT:   Normal oral mucosa, PERRL, EOMI	  Lymphatic: No lymphadenopathy , no edema  Cardiovascular: Normal S1 S2, elevated jugular venous pressure, No murmurs , Peripheral pulses palpable 2+ bilaterally, ICD left upper chest.  Respiratory: Lungs clear to auscultation, normal effort 	  Gastrointestinal:  Soft, Non-tender, + BS	  Skin: No rashes, No ecchymoses, No cyanosis, warm to touch  Musculoskeletal: Normal range of motion, normal strength  Psychiatry:  Mood & affect appropriate    TELEMETRY: NSR with APCs, LBBB native QRS, some ventricular premature beats.  Telemetry strips of sustained ventricular tachycardia, two morphologies, RBBB in V1.	    ECG:  Sustained VT, 187bpm.	  device check per EP lab:  episodes of sustained VT, some in 'monitor zone', one in VF zone that slowed after anti-tachycardia pacing but did not terminate.  Echo:  < from: TTE W or WO Ultrasound Enhancing Agent (25 @ 19:47) >  CONCLUSIONS:      1. Left ventricular cavity is severely dilated. Left ventricular wall thickness is mildly increased. Left ventricular systolic function is severely decreased with an ejection fraction visually estimated at 20 to 25 %.   2. Left atrium is moderately dilated.   3. Moderate mitral regurgitation.   4. There is no evidence of a left ventricular thrombus.    < end of copied text >  ** on apical echo views, the RV lead appears reach at least the distal RV cavity)    CXR:  Left upper chest ICD generator, dual coil / single lead system terminating in inferior RV.  Difficult to gauge if it is reaching the apex due to dilated cardiac contours.  	  LABS:	 	                          15.8   13.97 )-----------( 221      ( 2025 07:25 )             46.6         140  |  105  |  57[H]  ----------------------------<  244[H]  3.8   |  24  |  2.10[H]    Ca    9.4      2025 05:50  Phos  4.9     -  Mg     2.4     -    TPro  6.9  /  Alb  3.9  /  TBili  2.3[H]  /  DBili  x   /  AST  25  /  ALT  33  /  AlkPhos  66        ASSESSMENT/PLAN: Mr Dumas is an	89y Male brought in with sudden onset weakness, fatigue and shortness of breath while visiting his spouse who is at rehab.  He's been demonstrating nonsustained VT on telemetry, until ultimately having a sustained VT episode which resulted in transfer to ICU and amiodarone infusion.  He has a chronic dilated nonischemic cardiomyopathy... hard to justify if he is really in exacerbation right now, as there is no edema or hepatomegaly. His limbs are relatively warm.  He is on vasopressor support to balance the vasodilatory effect of sedatives, given for sundowning/agitation last night.  Awaiting improvement in mental status to reattempt an HPI with him.    For now:  1) reduce sedation --> wean NorEpi.  2) replace K to >4, maintain Mg >2.  3) can continue 24hrs duration of IV amiodarone.  4) I will order a VT-dose for amiodarone loadinmg TID x 20 doses (8 grams) then 400 mg daily.  5) assess code status / goals of care.    For later:  1) will reprogram his ICD for more anti-tachycardia pacing starting at lower heartrates (amiodarone may slow any remaining VT into a non-treatment zone).  2) if full-code and amenable to more procedures, would recommend ICD upgrade w/ balloon-venoplasty to add an atrial and CRT-pacing lead, to treat his wide LBBB.  3) consider right heart catheterization (bedside or formally in the cath lab) to help optimize cardiac meds.    as he is not a good candidate for VT ablation due to advanced age + CKD, nor is he requiring inotropic support, I would not urgently transfer him to [facility] at this time.  will follow with you, and will update plan of care as we go.        Maico King M.D.  Cardiac Electrophysiology    office [phone]  pager [phone]

## 2025-04-22 NOTE — CASE MANAGEMENT PROGRESS NOTE - NSCMPROGRESSNOTE_GEN_ALL_CORE
Patient discussed during rounds and remains acute in ICU on 6L of O2 via NC. Dx: CHF exacerbation. RRT called for SOB and Vtach. Patient receiving IV Heparin, Levophed, Amiodarone, and Precedex. CM will continue to collaborate with interdisciplinary team and remain available to assist.

## 2025-04-22 NOTE — PROGRESS NOTE ADULT - ATTENDING COMMENTS
Patient seen at bedside in ICU  overnight events reviewed.   s/p VT/afib and now on amiodarone gtt/precedex gtt/levophed gtt/heparin gtt.   + rales    A/P:  Acute on chronic systolic CHF exacerbation   AHRF   New Afib RVR/NSVT/pulm edema   s/p ICD   - on NC    - lasix 40mg IV BID, on levophed gtt. recently medications were reduced at [facility] given hypotension/CHF   - cardio and pulm following   - I+Os, daily weight    - on amiodarone gtt. coreg discontinued.    - heparin gtt.    - monitor K/Mg    - EP eval noted. Pending GOC     Acute on chronic renal disease stage 3    - nephrology following    prostate cancer     - s/p prostate radioactive seed implantation 2015.     - cont flomax and finasteride     DM2    - cont LDSS      DVT proph: heparin gtt  GOC: DNR/DNI  management per ICU

## 2025-04-22 NOTE — PROGRESS NOTE ADULT - PROBLEM SELECTOR PLAN 7
Chronic  -On home Farxiga 10mg qd   -Hold home oral meds  -Start LDSS with FS QAC, QHS  -Hypoglycemia protocol  -Consistent carb diet S/p prostate radioactive seed implantation in 2015  -On home Flomax 0.4mg QHS and Finasteride 5mg QD  -C/w home meds  -Plan per ICU

## 2025-04-23 LAB
ALBUMIN SERPL ELPH-MCNC: 3.2 G/DL — LOW (ref 3.3–5)
ALP SERPL-CCNC: 52 U/L — SIGNIFICANT CHANGE UP (ref 40–120)
ALT FLD-CCNC: 107 U/L — HIGH (ref 12–78)
ANION GAP SERPL CALC-SCNC: 11 MMOL/L — SIGNIFICANT CHANGE UP (ref 5–17)
ANION GAP SERPL CALC-SCNC: 13 MMOL/L — SIGNIFICANT CHANGE UP (ref 5–17)
APPEARANCE UR: ABNORMAL
APTT BLD: 104.3 SEC — HIGH (ref 26.1–36.8)
APTT BLD: 66.3 SEC — HIGH (ref 26.1–36.8)
APTT BLD: 75.2 SEC — HIGH (ref 26.1–36.8)
AST SERPL-CCNC: 77 U/L — HIGH (ref 15–37)
BASE EXCESS BLDA CALC-SCNC: -5.8 MMOL/L — LOW (ref -2–3)
BASOPHILS # BLD AUTO: 0 K/UL — SIGNIFICANT CHANGE UP (ref 0–0.2)
BASOPHILS # BLD AUTO: 0.03 K/UL — SIGNIFICANT CHANGE UP (ref 0–0.2)
BASOPHILS NFR BLD AUTO: 0 % — SIGNIFICANT CHANGE UP (ref 0–2)
BASOPHILS NFR BLD AUTO: 0.3 % — SIGNIFICANT CHANGE UP (ref 0–2)
BILIRUB SERPL-MCNC: 2.2 MG/DL — HIGH (ref 0.2–1.2)
BILIRUB UR-MCNC: ABNORMAL
BLOOD GAS COMMENTS ARTERIAL: SIGNIFICANT CHANGE UP
BUN SERPL-MCNC: 54 MG/DL — HIGH (ref 7–23)
BUN SERPL-MCNC: 65 MG/DL — HIGH (ref 7–23)
CALCIUM SERPL-MCNC: 7.8 MG/DL — LOW (ref 8.5–10.1)
CALCIUM SERPL-MCNC: 9.4 MG/DL — SIGNIFICANT CHANGE UP (ref 8.5–10.1)
CHLORIDE SERPL-SCNC: 102 MMOL/L — SIGNIFICANT CHANGE UP (ref 96–108)
CHLORIDE SERPL-SCNC: 106 MMOL/L — SIGNIFICANT CHANGE UP (ref 96–108)
CK MB BLD-MCNC: <1.4 % — SIGNIFICANT CHANGE UP (ref 0–3.5)
CK MB CFR SERPL CALC: <1 NG/ML — SIGNIFICANT CHANGE UP (ref 0–3.6)
CK SERPL-CCNC: 71 U/L — SIGNIFICANT CHANGE UP (ref 26–308)
CO2 SERPL-SCNC: 20 MMOL/L — LOW (ref 22–31)
CO2 SERPL-SCNC: 23 MMOL/L — SIGNIFICANT CHANGE UP (ref 22–31)
COLOR SPEC: ABNORMAL
CREAT SERPL-MCNC: 2.4 MG/DL — HIGH (ref 0.5–1.3)
CREAT SERPL-MCNC: 2.7 MG/DL — HIGH (ref 0.5–1.3)
DIFF PNL FLD: ABNORMAL
EGFR: 22 ML/MIN/1.73M2 — LOW
EGFR: 22 ML/MIN/1.73M2 — LOW
EGFR: 25 ML/MIN/1.73M2 — LOW
EGFR: 25 ML/MIN/1.73M2 — LOW
EOSINOPHIL # BLD AUTO: 0 K/UL — SIGNIFICANT CHANGE UP (ref 0–0.5)
EOSINOPHIL # BLD AUTO: 0 K/UL — SIGNIFICANT CHANGE UP (ref 0–0.5)
EOSINOPHIL NFR BLD AUTO: 0 % — SIGNIFICANT CHANGE UP (ref 0–6)
EOSINOPHIL NFR BLD AUTO: 0 % — SIGNIFICANT CHANGE UP (ref 0–6)
GAS PNL BLDA: SIGNIFICANT CHANGE UP
GLUCOSE SERPL-MCNC: 261 MG/DL — HIGH (ref 70–99)
GLUCOSE SERPL-MCNC: 591 MG/DL — CRITICAL HIGH (ref 70–99)
GLUCOSE UR QL: 250 MG/DL
HCO3 BLDA-SCNC: 17 MMOL/L — LOW (ref 21–28)
HCT VFR BLD CALC: 39.9 % — SIGNIFICANT CHANGE UP (ref 39–50)
HCT VFR BLD CALC: 45.5 % — SIGNIFICANT CHANGE UP (ref 39–50)
HGB BLD-MCNC: 13.3 G/DL — SIGNIFICANT CHANGE UP (ref 13–17)
HGB BLD-MCNC: 15.2 G/DL — SIGNIFICANT CHANGE UP (ref 13–17)
IMM GRANULOCYTES NFR BLD AUTO: 0.7 % — SIGNIFICANT CHANGE UP (ref 0–0.9)
KETONES UR-MCNC: NEGATIVE MG/DL — SIGNIFICANT CHANGE UP
LACTATE SERPL-SCNC: 5 MMOL/L — CRITICAL HIGH (ref 0.7–2)
LACTATE SERPL-SCNC: 5.4 MMOL/L — CRITICAL HIGH (ref 0.7–2)
LEUKOCYTE ESTERASE UR-ACNC: ABNORMAL
LYMPHOCYTES # BLD AUTO: 1.65 K/UL — SIGNIFICANT CHANGE UP (ref 1–3.3)
LYMPHOCYTES # BLD AUTO: 14.3 % — SIGNIFICANT CHANGE UP (ref 13–44)
LYMPHOCYTES # BLD AUTO: 2.81 K/UL — SIGNIFICANT CHANGE UP (ref 1–3.3)
LYMPHOCYTES # BLD AUTO: 21 % — SIGNIFICANT CHANGE UP (ref 13–44)
MAGNESIUM SERPL-MCNC: 2.2 MG/DL — SIGNIFICANT CHANGE UP (ref 1.6–2.6)
MAGNESIUM SERPL-MCNC: 2.6 MG/DL — SIGNIFICANT CHANGE UP (ref 1.6–2.6)
MCHC RBC-ENTMCNC: 31.7 PG — SIGNIFICANT CHANGE UP (ref 27–34)
MCHC RBC-ENTMCNC: 31.9 PG — SIGNIFICANT CHANGE UP (ref 27–34)
MCHC RBC-ENTMCNC: 33.3 G/DL — SIGNIFICANT CHANGE UP (ref 32–36)
MCHC RBC-ENTMCNC: 33.4 G/DL — SIGNIFICANT CHANGE UP (ref 32–36)
MCV RBC AUTO: 94.8 FL — SIGNIFICANT CHANGE UP (ref 80–100)
MCV RBC AUTO: 95.7 FL — SIGNIFICANT CHANGE UP (ref 80–100)
MONOCYTES # BLD AUTO: 1.09 K/UL — HIGH (ref 0–0.9)
MONOCYTES # BLD AUTO: 1.74 K/UL — HIGH (ref 0–0.9)
MONOCYTES NFR BLD AUTO: 13 % — SIGNIFICANT CHANGE UP (ref 2–14)
MONOCYTES NFR BLD AUTO: 9.5 % — SIGNIFICANT CHANGE UP (ref 2–14)
NEUTROPHILS # BLD AUTO: 8.65 K/UL — HIGH (ref 1.8–7.4)
NEUTROPHILS # BLD AUTO: 8.82 K/UL — HIGH (ref 1.8–7.4)
NEUTROPHILS NFR BLD AUTO: 66 % — SIGNIFICANT CHANGE UP (ref 43–77)
NEUTROPHILS NFR BLD AUTO: 75.2 % — SIGNIFICANT CHANGE UP (ref 43–77)
NITRITE UR-MCNC: NEGATIVE — SIGNIFICANT CHANGE UP
NRBC BLD AUTO-RTO: 0 /100 WBCS — SIGNIFICANT CHANGE UP (ref 0–0)
NRBC BLD AUTO-RTO: SIGNIFICANT CHANGE UP /100 WBCS (ref 0–0)
NT-PROBNP SERPL-SCNC: HIGH PG/ML (ref 0–450)
PCO2 BLDA: 19 MMHG — LOW (ref 35–48)
PH BLDA: 7.55 — HIGH (ref 7.35–7.45)
PH UR: 5 — SIGNIFICANT CHANGE UP (ref 5–8)
PHOSPHATE SERPL-MCNC: 3.4 MG/DL — SIGNIFICANT CHANGE UP (ref 2.5–4.5)
PHOSPHATE SERPL-MCNC: 3.7 MG/DL — SIGNIFICANT CHANGE UP (ref 2.5–4.5)
PLATELET # BLD AUTO: 134 K/UL — LOW (ref 150–400)
PLATELET # BLD AUTO: 180 K/UL — SIGNIFICANT CHANGE UP (ref 150–400)
PO2 BLDA: 105 MMHG — SIGNIFICANT CHANGE UP (ref 83–108)
POTASSIUM SERPL-MCNC: 3.7 MMOL/L — SIGNIFICANT CHANGE UP (ref 3.5–5.3)
POTASSIUM SERPL-MCNC: 4.3 MMOL/L — SIGNIFICANT CHANGE UP (ref 3.5–5.3)
POTASSIUM SERPL-SCNC: 3.7 MMOL/L — SIGNIFICANT CHANGE UP (ref 3.5–5.3)
POTASSIUM SERPL-SCNC: 4.3 MMOL/L — SIGNIFICANT CHANGE UP (ref 3.5–5.3)
PROT SERPL-MCNC: 5.8 G/DL — LOW (ref 6–8.3)
PROT UR-MCNC: 100 MG/DL
RAPID RVP RESULT: SIGNIFICANT CHANGE UP
RBC # BLD: 4.17 M/UL — LOW (ref 4.2–5.8)
RBC # BLD: 4.8 M/UL — SIGNIFICANT CHANGE UP (ref 4.2–5.8)
RBC # FLD: 14.6 % — HIGH (ref 10.3–14.5)
RBC # FLD: 14.6 % — HIGH (ref 10.3–14.5)
SAO2 % BLDA: 99.3 % — HIGH (ref 94–98)
SARS-COV-2 RNA SPEC QL NAA+PROBE: SIGNIFICANT CHANGE UP
SODIUM SERPL-SCNC: 133 MMOL/L — LOW (ref 135–145)
SODIUM SERPL-SCNC: 142 MMOL/L — SIGNIFICANT CHANGE UP (ref 135–145)
SP GR SPEC: 1.01 — SIGNIFICANT CHANGE UP (ref 1–1.03)
TROPONIN I, HIGH SENSITIVITY RESULT: 1565.5 NG/L — HIGH
UROBILINOGEN FLD QL: 0.2 MG/DL — SIGNIFICANT CHANGE UP (ref 0.2–1)
WBC # BLD: 11.5 K/UL — HIGH (ref 3.8–10.5)
WBC # BLD: 13.37 K/UL — HIGH (ref 3.8–10.5)
WBC # FLD AUTO: 11.5 K/UL — HIGH (ref 3.8–10.5)
WBC # FLD AUTO: 13.37 K/UL — HIGH (ref 3.8–10.5)

## 2025-04-23 PROCEDURE — 99291 CRITICAL CARE FIRST HOUR: CPT | Mod: 25

## 2025-04-23 PROCEDURE — 99233 SBSQ HOSP IP/OBS HIGH 50: CPT | Mod: GC

## 2025-04-23 PROCEDURE — 76604 US EXAM CHEST: CPT | Mod: 26

## 2025-04-23 PROCEDURE — 99292 CRITICAL CARE ADDL 30 MIN: CPT | Mod: 25

## 2025-04-23 PROCEDURE — 93308 TTE F-UP OR LMTD: CPT | Mod: 26

## 2025-04-23 PROCEDURE — 71045 X-RAY EXAM CHEST 1 VIEW: CPT | Mod: 26

## 2025-04-23 RX ORDER — INSULIN LISPRO 100 U/ML
INJECTION, SOLUTION INTRAVENOUS; SUBCUTANEOUS
Refills: 0 | Status: DISCONTINUED | OUTPATIENT
Start: 2025-04-23 | End: 2025-04-24

## 2025-04-23 RX ORDER — ACETAMINOPHEN 500 MG/5ML
1000 LIQUID (ML) ORAL ONCE
Refills: 0 | Status: COMPLETED | OUTPATIENT
Start: 2025-04-23 | End: 2025-04-24

## 2025-04-23 RX ORDER — CEFTRIAXONE 500 MG/1
1000 INJECTION, POWDER, FOR SOLUTION INTRAMUSCULAR; INTRAVENOUS ONCE
Refills: 0 | Status: COMPLETED | OUTPATIENT
Start: 2025-04-23 | End: 2025-04-23

## 2025-04-23 RX ORDER — MUPIROCIN CALCIUM 20 MG/G
1 CREAM TOPICAL
Refills: 0 | Status: COMPLETED | OUTPATIENT
Start: 2025-04-23 | End: 2025-04-28

## 2025-04-23 RX ORDER — CEFTRIAXONE 500 MG/1
INJECTION, POWDER, FOR SOLUTION INTRAMUSCULAR; INTRAVENOUS
Refills: 0 | Status: DISCONTINUED | OUTPATIENT
Start: 2025-04-23 | End: 2025-04-24

## 2025-04-23 RX ORDER — ACETAMINOPHEN 500 MG/5ML
1000 LIQUID (ML) ORAL ONCE
Refills: 0 | Status: COMPLETED | OUTPATIENT
Start: 2025-04-23 | End: 2025-04-23

## 2025-04-23 RX ORDER — CEFTRIAXONE 500 MG/1
1000 INJECTION, POWDER, FOR SOLUTION INTRAMUSCULAR; INTRAVENOUS EVERY 24 HOURS
Refills: 0 | Status: DISCONTINUED | OUTPATIENT
Start: 2025-04-24 | End: 2025-04-24

## 2025-04-23 RX ADMIN — INSULIN LISPRO 4: 100 INJECTION, SOLUTION INTRAVENOUS; SUBCUTANEOUS at 12:29

## 2025-04-23 RX ADMIN — Medication 1000 MILLIGRAM(S): at 11:08

## 2025-04-23 RX ADMIN — DEXMEDETOMIDINE HYDROCHLORIDE IN SODIUM CHLORIDE 4.04 MICROGRAM(S)/KG/HR: 4 INJECTION INTRAVENOUS at 22:06

## 2025-04-23 RX ADMIN — HEPARIN SODIUM 700 UNIT(S)/HR: 1000 INJECTION INTRAVENOUS; SUBCUTANEOUS at 19:35

## 2025-04-23 RX ADMIN — INSULIN LISPRO 2: 100 INJECTION, SOLUTION INTRAVENOUS; SUBCUTANEOUS at 18:05

## 2025-04-23 RX ADMIN — INSULIN LISPRO 2: 100 INJECTION, SOLUTION INTRAVENOUS; SUBCUTANEOUS at 00:34

## 2025-04-23 RX ADMIN — DEXMEDETOMIDINE HYDROCHLORIDE IN SODIUM CHLORIDE 4.04 MICROGRAM(S)/KG/HR: 4 INJECTION INTRAVENOUS at 04:36

## 2025-04-23 RX ADMIN — HEPARIN SODIUM 700 UNIT(S)/HR: 1000 INJECTION INTRAVENOUS; SUBCUTANEOUS at 23:07

## 2025-04-23 RX ADMIN — DEXMEDETOMIDINE HYDROCHLORIDE IN SODIUM CHLORIDE 4.04 MICROGRAM(S)/KG/HR: 4 INJECTION INTRAVENOUS at 18:04

## 2025-04-23 RX ADMIN — INSULIN LISPRO 3: 100 INJECTION, SOLUTION INTRAVENOUS; SUBCUTANEOUS at 05:17

## 2025-04-23 RX ADMIN — Medication 1 APPLICATION(S): at 05:11

## 2025-04-23 RX ADMIN — HEPARIN SODIUM 700 UNIT(S)/HR: 1000 INJECTION INTRAVENOUS; SUBCUTANEOUS at 15:55

## 2025-04-23 RX ADMIN — MUPIROCIN CALCIUM 1 APPLICATION(S): 20 CREAM TOPICAL at 09:45

## 2025-04-23 RX ADMIN — DEXMEDETOMIDINE HYDROCHLORIDE IN SODIUM CHLORIDE 4.04 MICROGRAM(S)/KG/HR: 4 INJECTION INTRAVENOUS at 01:11

## 2025-04-23 RX ADMIN — CEFTRIAXONE 100 MILLIGRAM(S): 500 INJECTION, POWDER, FOR SOLUTION INTRAMUSCULAR; INTRAVENOUS at 09:45

## 2025-04-23 RX ADMIN — Medication 400 MILLIGRAM(S): at 01:11

## 2025-04-23 RX ADMIN — FUROSEMIDE 40 MILLIGRAM(S): 10 INJECTION INTRAMUSCULAR; INTRAVENOUS at 05:11

## 2025-04-23 RX ADMIN — MUPIROCIN CALCIUM 1 APPLICATION(S): 20 CREAM TOPICAL at 18:05

## 2025-04-23 RX ADMIN — HEPARIN SODIUM 700 UNIT(S)/HR: 1000 INJECTION INTRAVENOUS; SUBCUTANEOUS at 12:27

## 2025-04-23 RX ADMIN — FUROSEMIDE 40 MILLIGRAM(S): 10 INJECTION INTRAMUSCULAR; INTRAVENOUS at 14:59

## 2025-04-23 RX ADMIN — Medication 40 MILLIGRAM(S): at 12:42

## 2025-04-23 RX ADMIN — Medication 400 MILLIGRAM(S): at 10:34

## 2025-04-23 RX ADMIN — HEPARIN SODIUM 700 UNIT(S)/HR: 1000 INJECTION INTRAVENOUS; SUBCUTANEOUS at 04:27

## 2025-04-23 NOTE — PROGRESS NOTE ADULT - PROBLEM SELECTOR PLAN 7
S/p prostate radioactive seed implantation in 2015  -On home Flomax 0.4mg QHS and Finasteride 5mg QD  -C/w home meds  -Plan per ICU

## 2025-04-23 NOTE — GOALS OF CARE CONVERSATION - ADVANCED CARE PLANNING - CONVERSATION DETAILS
Yesterday pt's wife and children (wife with recent CVA and aphasia so children are primary decision makers) decided on DNR.  They wanted to further discuss DNI, and today and all in agreement with DNI as well.  We also discussed turning off defibrillator function of ICD (with maintenance of PPM function) which they are agreeable with.  Discussed with Dr. Boothe who is in agreement, and had left message with outpt cardiologist Dr. Yee at [facility] to update him.  DNR/DNI order placed, JL in chart   Will contact EP team to assist with turning off defib function.

## 2025-04-23 NOTE — PROGRESS NOTE ADULT - SUBJECTIVE AND OBJECTIVE BOX
Date/Time Patient Seen:  		  Referring MD:   Data Reviewed	       Patient is a 89y old  Male who presents with a chief complaint of SOB (22 Apr 2025 23:30)      Subjective/HPI     PAST MEDICAL & SURGICAL HISTORY:  NICM (nonischemic cardiomyopathy)    HTN (hypertension)    HLD (hyperlipidemia)    Prostate CA    T2DM (type 2 diabetes mellitus)    CHF (congestive heart failure)    H/O prostate biopsy    H/O cataract extraction    H/O inguinal hernia repair    AICD (automatic cardioverter/defibrillator) present          Medication list         MEDICATIONS  (STANDING):  aMIOdarone Infusion 0.501 mG/Min (16.7 mL/Hr) IV Continuous <Continuous>  aspirin  chewable 81 milliGRAM(s) Oral daily  atorvastatin 80 milliGRAM(s) Oral at bedtime  chlorhexidine 2% Cloths 1 Application(s) Topical <User Schedule>  dexMEDEtomidine Infusion 0.2 MICROgram(s)/kG/Hr (4.04 mL/Hr) IV Continuous <Continuous>  dextrose 5%. 1000 milliLiter(s) (100 mL/Hr) IV Continuous <Continuous>  dextrose 5%. 1000 milliLiter(s) (50 mL/Hr) IV Continuous <Continuous>  dextrose 50% Injectable 25 Gram(s) IV Push once  dextrose 50% Injectable 12.5 Gram(s) IV Push once  dextrose 50% Injectable 25 Gram(s) IV Push once  finasteride 5 milliGRAM(s) Oral daily  furosemide   Injectable 40 milliGRAM(s) IV Push two times a day  glucagon  Injectable 1 milliGRAM(s) IntraMuscular once  heparin  Infusion.  Unit(s)/Hr (15 mL/Hr) IV Continuous <Continuous>  insulin lispro (ADMELOG) corrective regimen sliding scale   SubCutaneous every 6 hours  norepinephrine Infusion 0.05 MICROgram(s)/kG/Min (7.57 mL/Hr) IV Continuous <Continuous>  pantoprazole  Injectable 40 milliGRAM(s) IV Push daily  tamsulosin 0.8 milliGRAM(s) Oral at bedtime    MEDICATIONS  (PRN):  dextrose Oral Gel 15 Gram(s) Oral once PRN Blood Glucose LESS THAN 70 milliGRAM(s)/deciliter  heparin   Injectable 6500 Unit(s) IV Push every 6 hours PRN For aPTT less than 40  heparin   Injectable 3000 Unit(s) IV Push every 6 hours PRN For aPTT between 40 - 57         Vitals log        ICU Vital Signs Last 24 Hrs  T(C): 37.2 (23 Apr 2025 04:55), Max: 37.7 (22 Apr 2025 20:23)  T(F): 98.9 (23 Apr 2025 04:55), Max: 99.8 (22 Apr 2025 20:23)  HR: 82 (23 Apr 2025 04:45) (74 - 109)  BP: 104/70 (23 Apr 2025 04:45) (84/59 - 130/116)  BP(mean): 82 (23 Apr 2025 04:45) (61 - 123)  ABP: --  ABP(mean): --  RR: 30 (23 Apr 2025 04:45) (16 - 40)  SpO2: 100% (23 Apr 2025 04:45) (88% - 100%)    O2 Parameters below as of 23 Apr 2025 05:45  Patient On (Oxygen Delivery Method): nasal cannula  O2 Flow (L/min): 3               Input and Output:  I&O's Detail    21 Apr 2025 07:01  -  22 Apr 2025 07:00  --------------------------------------------------------  IN:    Amiodarone: 233.1 mL    Amiodarone: 16.7 mL    Dexmedetomidine: 16 mL    Heparin Infusion: 105 mL    Norepinephrine: 105.6 mL    Oral Fluid: 150 mL  Total IN: 626.4 mL    OUT:    Indwelling Catheter - Urethral (mL): 800 mL    Voided (mL): 650 mL  Total OUT: 1450 mL    Total NET: -823.6 mL      22 Apr 2025 07:01  -  23 Apr 2025 05:18  --------------------------------------------------------  IN:    Amiodarone: 83.5 mL    Amiodarone: 283.9 mL    Dexmedetomidine: 16.1 mL    Dexmedetomidine: 527.7 mL    Heparin Infusion: 205 mL    IV PiggyBack: 600 mL    IV PiggyBack: 100 mL    Norepinephrine: 244.2 mL  Total IN: 2060.4 mL    OUT:    Indwelling Catheter - Urethral (mL): 1350 mL  Total OUT: 1350 mL    Total NET: 710.4 mL          Lab Data                        15.8   13.97 )-----------( 221      ( 22 Apr 2025 07:25 )             46.6     04-22    143  |  108  |  55[H]  ----------------------------<  210[H]  3.9   |  23  |  2.00[H]    Ca    9.4      22 Apr 2025 16:10  Phos  4.9     04-22  Mg     2.4     04-22    TPro  6.9  /  Alb  3.9  /  TBili  2.3[H]  /  DBili  x   /  AST  25  /  ALT  33  /  AlkPhos  66  04-22    ABG - ( 21 Apr 2025 23:42 )  pH, Arterial: 7.50  pH, Blood: x     /  pCO2: 30    /  pO2: 95    / HCO3: 23    / Base Excess: 0.2   /  SaO2: 98.3              CARDIAC MARKERS ( 22 Apr 2025 16:10 )  x     / x     / x     / x     / 1.9 ng/mL  CARDIAC MARKERS ( 21 Apr 2025 22:10 )  x     / x     / x     / x     / 2.3 ng/mL        Review of Systems	      Objective     Physical Examination    heart s1s2  lung dc bs  head nc  head at      Pertinent Lab findings & Imaging      Amberly:  NO   Adequate UO     I&O's Detail    21 Apr 2025 07:01  -  22 Apr 2025 07:00  --------------------------------------------------------  IN:    Amiodarone: 233.1 mL    Amiodarone: 16.7 mL    Dexmedetomidine: 16 mL    Heparin Infusion: 105 mL    Norepinephrine: 105.6 mL    Oral Fluid: 150 mL  Total IN: 626.4 mL    OUT:    Indwelling Catheter - Urethral (mL): 800 mL    Voided (mL): 650 mL  Total OUT: 1450 mL    Total NET: -823.6 mL      22 Apr 2025 07:01  -  23 Apr 2025 05:18  --------------------------------------------------------  IN:    Amiodarone: 83.5 mL    Amiodarone: 283.9 mL    Dexmedetomidine: 16.1 mL    Dexmedetomidine: 527.7 mL    Heparin Infusion: 205 mL    IV PiggyBack: 600 mL    IV PiggyBack: 100 mL    Norepinephrine: 244.2 mL  Total IN: 2060.4 mL    OUT:    Indwelling Catheter - Urethral (mL): 1350 mL  Total OUT: 1350 mL    Total NET: 710.4 mL               Discussed with:     Cultures:	        Radiology

## 2025-04-23 NOTE — DIETITIAN INITIAL EVALUATION ADULT - OTHER INFO
Pt is a "88 y/o male with PMHx of CHF, HLD, HTN, prostate ca (s/p radioactive seed implantation in 2015) T2DM, PPM admitted for acute on chronic HFrEF. Upgraded to ICU after episode of NSVT and episode of afib with RVR complicated by flash pulmonary edema, requiring heparin and amio gtt."    Visited pt at bedside this am. Pt asleep, currently on bipap during visit this am. Unable to interview at this time. Pt currently NPO while on bipap. Previously on consistent carbohydrate, DASH/TLC diet with 1.2L FR. Good po intake documented at that time. NKFA. No chewing/swallowing difficulties noted. No report N/V. +BM 4/21. CBW on admission 177#. No edema noted. Skin: intact. Pt seen by RD during recent hospitalization last month (3/2025). At that time; pt reported he tries to watch sugar and salt in diet pta however admits to noncompliance at times. Hx type II DM, farxiga pta. A1c 6.8%. Recommend initiating po diet when medically feasible. Will follow for interview/education when medically appropriate. RD remains available and will continue to follow-up.

## 2025-04-23 NOTE — PROGRESS NOTE ADULT - PROBLEM SELECTOR PLAN 3
arnulfo on ckd3  Per chart review pt baseline Cr is around 1.8  -Cr 2.1 on this admission  -Avoid nephrotoxic agents   -s/p 40mg IV Lasix in ED  -Nephro consulted dr solorio   -Plan per ICU

## 2025-04-23 NOTE — PROGRESS NOTE ADULT - SUBJECTIVE AND OBJECTIVE BOX
EP Attending  HISTORY OF PRESENT ILLNESS: HPI:  88 y/o male with PMHx of CHF, HLD, HTN, prostate ca (s/p radioactive seed implantation in 2015) T2DM, PPM presents to ER c/o SOB. Pt states that he has had intermittent SOB for the past few days, Pt notes he was visiting his wife today at rehab and felt SOB as he was leaving. He sat down to catch his breath, a staff member noticed he did not look well and they decided to call EMS. Pt notes he has had SOB both at rest and with exertion. Pt denies chest pain, fever, cough. Of note, pt was admitted 1 month ago for CHF exacerbation and also 1 week ago at [facility]. Pt adds a bunch of his medications were changed including his Entresto was dc'ed, Farxiga dosage was doubled and his Lasix was switched to Torsemide. Pt currently on NC and denies any SOB. Pt denies fever, chills, chest pain, palpitations, abdominal pain, nausea, vomiting, diarrhea.    ED Course:   Vitals: BP: 135/73, HR: 95 , Temp: 98.2F , RR: 18 , SpO2: 94% on 6LNC  Labs: BUN/Cr 56/2.1, Glucose 125, eGFR 30, Troponin 3051, pro-BNP 52977  CXR: official read pending   EKG: NSR with frequent PVC's, 90 BPM, QTc 518  Received in the ED:  Aspirin 324mg PO x1, Lasix 40mg IVP x1 (2025 03:55)    While hospitalized re: acute on chronic systolic heart failure, he developed rapid VT, and has since been transferred to the ICU.  VT has quelled down with IV amiodarone, but he's been agitated in bed. Required precedex to sedate, then NorEpinephrine infusion to maintain blood pressure after sedation made him hypotensive.  Too sedated this morning to participate in any history/ROS, only opens his eyes for a brief moment.  He's reportedly been hospitalized at [facility] and at [facility] in the last few weeks/months with CHF exacerbations.  He has a dual-coil, VVI (single chamber) Abbott ICD.  This will be interrogated by the EP lab.  He has history of a dilated nonischemic cardiomyopathy, and baseline LBBB ~145-150ms.  There is no known prior history of AFib.  Date of service - remains somnolent, on CPAP mask during daylight hours.  no overnight issues. short bursts of NSVT on telemetry. wide QRS rhythm at baseline.  made DNR by family.    PAST MEDICAL & SURGICAL HISTORY:  NICM (nonischemic cardiomyopathy)  HTN (hypertension)  HLD (hyperlipidemia)  Prostate CA  T2DM (type 2 diabetes mellitus)  CHF (congestive heart failure)  H/O prostate biopsy  H/O cataract extraction  H/O inguinal hernia repair  AICD (automatic cardioverter/defibrillator) present    aMIOdarone Infusion 0.501 mG/Min IV Continuous <Continuous>  aspirin  chewable 81 milliGRAM(s) Oral daily  atorvastatin 80 milliGRAM(s) Oral at bedtime  cefTRIAXone   IVPB      chlorhexidine 2% Cloths 1 Application(s) Topical <User Schedule>  dexMEDEtomidine Infusion 0.2 MICROgram(s)/kG/Hr IV Continuous <Continuous>  dextrose 5%. 1000 milliLiter(s) IV Continuous <Continuous>  dextrose 5%. 1000 milliLiter(s) IV Continuous <Continuous>  dextrose 50% Injectable 25 Gram(s) IV Push once  dextrose 50% Injectable 12.5 Gram(s) IV Push once  dextrose 50% Injectable 25 Gram(s) IV Push once  dextrose Oral Gel 15 Gram(s) Oral once PRN  finasteride 5 milliGRAM(s) Oral daily  furosemide   Injectable 40 milliGRAM(s) IV Push two times a day  glucagon  Injectable 1 milliGRAM(s) IntraMuscular once  heparin   Injectable 6500 Unit(s) IV Push every 6 hours PRN  heparin   Injectable 3000 Unit(s) IV Push every 6 hours PRN  heparin  Infusion.  Unit(s)/Hr IV Continuous <Continuous>  insulin lispro (ADMELOG) corrective regimen sliding scale   SubCutaneous three times a day before meals  mupirocin 2% Nasal 1 Application(s) Both Nostrils two times a day  norepinephrine Infusion 0.05 MICROgram(s)/kG/Min IV Continuous <Continuous>  pantoprazole  Injectable 40 milliGRAM(s) IV Push daily  tamsulosin 0.8 milliGRAM(s) Oral at bedtime                            15.2   13.37 )-----------( 180      ( 2025 10:00 )             45.5           142  |  106  |  65[H]  ----------------------------<  261[H]  4.3   |  23  |  2.70[H]    Ca    9.4      2025 10:00  Phos  3.7       Mg     2.6         TPro  5.8[L]  /  Alb  3.2[L]  /  TBili  2.2[H]  /  DBili  x   /  AST  77[H]  /  ALT  107[H]  /  AlkPhos  52        CARDIAC MARKERS ( 2025 05:30 )  x     / x     / x     / x     / <1.0 ng/mL  CARDIAC MARKERS ( 2025 16:10 )  x     / x     / x     / x     / 1.9 ng/mL  CARDIAC MARKERS ( 2025 22:10 )  x     / x     / x     / x     / 2.3 ng/mL      T(C): 38.2 (25 @ 11:49), Max: 38.3 (25 @ 10:00)  HR: 73 (25 @ 13:00) (67 - 100)  BP: 95/44 (25 @ 13:00) (69/38 - 130/116)  RR: 15 (25 @ 13:00) (10 - 46)  SpO2: 100% (25 @ 13:00) (92% - 100%)  Wt(kg): --    I&O's Summary    2025 07:  -  2025 07:00  --------------------------------------------------------  IN: 2133 mL / OUT: 1470 mL / NET: 663 mL    2025 07:  -  2025 13:34  --------------------------------------------------------  IN: 119.2 mL / OUT: 160 mL / NET: -40.8 mL        Appearance: elderly man, somnolent, on BIPAP.  HEENT:   Normal oral mucosa, PERRL, EOMI	  Lymphatic: No lymphadenopathy , no edema  Cardiovascular: Normal S1 S2, elevated jugular venous pressure, No murmurs , Peripheral pulses palpable 2+ bilaterally, ICD left upper chest.  Respiratory: Lungs clear to auscultation, normal effort 	  Gastrointestinal:  Soft, Non-tender, + BS	  Skin: No rashes, No ecchymoses, No cyanosis, warm to touch  Musculoskeletal: Normal range of motion, normal strength  Psychiatry:  Mood & affect appropriate    TELEMETRY: NSR with APCs, LBBB native QRS, some ventricular premature beats.  Telemetry strips of sustained ventricular tachycardia, two morphologies, RBBB in V1.	    ECG:  Sustained VT, 187bpm.	  device check per EP lab:  episodes of sustained VT, some in 'monitor zone', one in VF zone that slowed after anti-tachycardia pacing but did not terminate.  Echo:  < from: TTE W or WO Ultrasound Enhancing Agent (25 @ 19:47) >  CONCLUSIONS:      1. Left ventricular cavity is severely dilated. Left ventricular wall thickness is mildly increased. Left ventricular systolic function is severely decreased with an ejection fraction visually estimated at 20 to 25 %.   2. Left atrium is moderately dilated.   3. Moderate mitral regurgitation.   4. There is no evidence of a left ventricular thrombus.    < end of copied text >  ** on apical echo views, the RV lead appears reach at least the distal RV cavity)    CXR:  Left upper chest ICD generator, dual coil / single lead system terminating in inferior RV.  Difficult to gauge if it is reaching the apex due to dilated cardiac contours.      ASSESSMENT/PLAN: Mr Dumas is an	89y Male brought in with sudden onset weakness, fatigue and shortness of breath while visiting his spouse who is at rehab.  He's been demonstrating nonsustained VT on telemetry, until ultimately having a sustained VT episode which resulted in transfer to ICU and amiodarone infusion.  He has a chronic dilated nonischemic cardiomyopathy... hard to justify if he is really in exacerbation right now, as there is no edema or hepatomegaly. His limbs are relatively warm.  He is on vasopressor support to balance the vasodilatory effect of sedatives, given for sundowning/agitation last night.  Awaiting improvement in mental status to reattempt an HPI with him.    1) replace K to >4, maintain Mg >2.  2) continue VT-dose for amiodarone loadinmg TID x 20 doses (8 grams) then 400 mg daily.  3) will reprogram his ICD before the weekend to allow for earlier ATP / shocks if necessary.  will confirm that goals of care are to allow further ICD shocks.  4) stabilization, and will consult with family to see if he would revoke DNR for device upgrade.  if not, he should be offered a palliative evaluation.        Maico King M.D.  Cardiac Electrophysiology    office [phone]  pager [phone]

## 2025-04-23 NOTE — PROGRESS NOTE ADULT - ASSESSMENT
The patient is an 89 year old male with a history of HTN, HL, DM, CKD, chronic systolic heart failure s/p ICD prostate cancer who presents with shortness of breath in the setting of acute on chronic systolic heart failure.    Plan:  - ECG with sinus rhythm and LBBB  - Echo 3/20/25 with severely reduced LV systolic function (EF 20-25%), mod MR  - BNP 04247  - CXR with pulm congestion and small left pleural effusion  - Troponin elevated at 3785 in the setting of acute heart failure, HANNAH  - He had a cardiac catheterization in the past for work-up of his heart failure which showed mild non-obstructive CAD  - Entresto was discontinued due to hypotension and HANNAH  - Hold dapagliflozin; resume on discharge if renal function stable  - Continue carvedilol 3.125 mg bid  - Continue aspirin 81 mg daily  - Continue atorvastatin 80 mg daily  - With worsening HANNAH, can consider lowering furosemide to oral now that hypoxia has improved  - Wean off norepinephrine  - Ventricular tachycardia - EP follow-up. Continue amiodarone drip. ICD is in place. May need upgrade in device.  - Reportedly had an episode of atrial fibrillation (strips not available). Continue amiodarone. Continue heparin drip with probable transition to DOAC at a later date.  - Febrile - infectious work-up. On IV antibiotics.

## 2025-04-23 NOTE — PROGRESS NOTE ADULT - PROBLEM SELECTOR PLAN 4
Pt with elevated troponin on admission to 3051  -Per chart review pt had elevated troponin on last admission most likely 2/2 demand ischemia in setting of severe CHF exacerbation   - trend trops to trending down   -Currently no active CP, will monitor for anginal sx  -EKG with NSR with frequent PVC's, 90 BPM, QTc 518  -Plan per ICU

## 2025-04-23 NOTE — PROGRESS NOTE ADULT - SUBJECTIVE AND OBJECTIVE BOX
INTERVAL HPI/OVERNIGHT EVENTS: No acute overnight events occurred.    SUBJECTIVE: Seen and examined pt at bedside. Endorsing breathlessness. Pt initially satting well on nasal cannula but with increased WOB and pt requesting bipap. Blood gas with pCO2 19. Pt endorsing fear of dying. Son and daughter at bedside updating regarding current clinical status and questions answered.     Review of Systems: *limited by mentation*  Constitutional: No fever, chills, fatigue  Neuro: No headache, numbness, weakness  Resp: +SOB; No cough, wheezing  CVS: No chest pain, palpitations, leg swelling  GI: No abdominal pain, nausea, vomiting, diarrhea   : No dysuria, frequency, incontinence  Skin: No itching, burning, rashes, or lesions   Psych: +anxiety; No depression, mood swings    ICU Vital Signs Last 24 Hrs  T(C): 38.1 (23 Apr 2025 14:00), Max: 38.3 (23 Apr 2025 10:00)  T(F): 100.6 (23 Apr 2025 14:00), Max: 100.9 (23 Apr 2025 10:00)  HR: 79 (23 Apr 2025 14:45) (67 - 100)  BP: 103/71 (23 Apr 2025 14:45) (69/38 - 129/64)  BP(mean): 82 (23 Apr 2025 14:45) (49 - 114)  ABP: --  ABP(mean): --  RR: 15 (23 Apr 2025 14:45) (10 - 46)  SpO2: 100% (23 Apr 2025 14:45) (92% - 100%)    O2 Parameters below as of 23 Apr 2025 07:00  Patient On (Oxygen Delivery Method): nasal cannula  O2 Flow (L/min): 3            04-22-25 @ 07:01  -  04-23-25 @ 07:00  --------------------------------------------------------  IN: 2133 mL / OUT: 1470 mL / NET: 663 mL    04-23-25 @ 07:01  -  04-23-25 @ 14:55  --------------------------------------------------------  IN: 119.2 mL / OUT: 160 mL / NET: -40.8 mL        CAPILLARY BLOOD GLUCOSE      POCT Blood Glucose.: 201 mg/dL (23 Apr 2025 12:09)      I&O's Summary    22 Apr 2025 07:01  -  23 Apr 2025 07:00  --------------------------------------------------------  IN: 2133 mL / OUT: 1470 mL / NET: 663 mL    23 Apr 2025 07:01  -  23 Apr 2025 14:55  --------------------------------------------------------  IN: 119.2 mL / OUT: 160 mL / NET: -40.8 mL        PHYSICAL EXAM:  General: anxious, uncomfortable appearing with increased WOB  Neurology: responds to voice  HEENT: NC/AT  Respiratory: +tachypneic; CTA b/l, no rales or rhonchi noted  Cardiovascular: RRR, normal S1S2, no M/R/G  Abdomen: soft, NT/ND, +BS, no palpable masses  Extremities: cool, no clubbing, cyanosis, or edema  Skin: cool/dry      Meds:  cefTRIAXone   IVPB     aMIOdarone Infusion IV Continuous  furosemide   Injectable IV Push  norepinephrine Infusion IV Continuous    atorvastatin Oral  dextrose 50% Injectable IV Push  dextrose 50% Injectable IV Push  dextrose 50% Injectable IV Push  dextrose Oral Gel Oral PRN  finasteride Oral  glucagon  Injectable IntraMuscular  insulin lispro (ADMELOG) corrective regimen sliding scale SubCutaneous      dexMEDEtomidine Infusion IV Continuous      aspirin  chewable Oral  heparin   Injectable IV Push PRN  heparin   Injectable IV Push PRN  heparin  Infusion. IV Continuous    pantoprazole  Injectable IV Push    tamsulosin Oral    dextrose 5%. IV Continuous  dextrose 5%. IV Continuous      chlorhexidine 2% Cloths Topical  mupirocin 2% Nasal Both Nostrils                              15.2   13.37 )-----------( 180      ( 23 Apr 2025 10:00 )             45.5       04-23    142  |  106  |  65[H]  ----------------------------<  261[H]  4.3   |  23  |  2.70[H]    Ca    9.4      23 Apr 2025 10:00  Phos  3.7     04-23  Mg     2.6     04-23    TPro  5.8[L]  /  Alb  3.2[L]  /  TBili  2.2[H]  /  DBili  x   /  AST  77[H]  /  ALT  107[H]  /  AlkPhos  52  04-23    Lactate 5.4           04-23 @ 10:00      CARDIAC MARKERS ( 23 Apr 2025 05:30 )  x     / x     / x     / x     / <1.0 ng/mL  CARDIAC MARKERS ( 22 Apr 2025 16:10 )  x     / x     / x     / x     / 1.9 ng/mL  CARDIAC MARKERS ( 21 Apr 2025 22:10 )  x     / x     / x     / x     / 2.3 ng/mL      PT/INR - ( 22 Apr 2025 00:12 )   PT: 15.4 sec;   INR: 1.31 ratio         PTT - ( 23 Apr 2025 10:00 )  PTT:75.2 sec  Urinalysis Basic - ( 23 Apr 2025 10:00 )    Color: x / Appearance: x / SG: x / pH: x  Gluc: 261 mg/dL / Ketone: x  / Bili: x / Urobili: x   Blood: x / Protein: x / Nitrite: x   Leuk Esterase: x / RBC: x / WBC x   Sq Epi: x / Non Sq Epi: x / Bacteria: x          Rapid RVP Result: NotDetec (04-23 @ 10:20)          Radiology: REVIEWED  < from: Xray Chest 1 View- PORTABLE-Routine (Xray Chest 1 View- PORTABLE-Routine in AM.) (04.22.25 @ 08:19) >  IMPRESSION: Persistent CHF.    < end of copied text >  < from: Xray Chest 1 View- PORTABLE-Urgent (Xray Chest 1 View- PORTABLE-Urgent .) (04.23.25 @ 08:53) >  IMPRESSION: Persistent CHF.    < end of copied text >        Bedside Ultrasound: B-lines present b/l lung fields. Small R pleural effusion. LV dilated with asymmetric squeeze and severely reduced EF    Tubes/Lines: Indwelling      GLOBAL ISSUE/BEST PRACTICE:  Analgesia: Y  Sedation: Y  HOB elevation: Y  Stress ulcer prophylaxis: Y  VTE prophylaxis: full AC  Glycemic control: Y  Nutrition: NPO     CODE STATUS: DNR/DNI Trial NIV       INTERVAL HPI/OVERNIGHT EVENTS: No acute overnight events occurred.    SUBJECTIVE: Seen and examined pt at bedside. Endorsing breathlessness. Pt initially satting well on nasal cannula but with increased WOB and pt requesting bipap. Blood gas with pCO2 19. Pt endorsing fear of dying. Son and daughter at bedside updating regarding current clinical status and questions answered.   +anxiety      ICU Vital Signs Last 24 Hrs  T(C): 38.1 (23 Apr 2025 14:00), Max: 38.3 (23 Apr 2025 10:00)  T(F): 100.6 (23 Apr 2025 14:00), Max: 100.9 (23 Apr 2025 10:00)  HR: 79 (23 Apr 2025 14:45) (67 - 100)  BP: 103/71 (23 Apr 2025 14:45) (69/38 - 129/64)  BP(mean): 82 (23 Apr 2025 14:45) (49 - 114)  ABP: --  ABP(mean): --  RR: 15 (23 Apr 2025 14:45) (10 - 46)  SpO2: 100% (23 Apr 2025 14:45) (92% - 100%)    O2 Parameters below as of 23 Apr 2025 07:00  Patient On (Oxygen Delivery Method): nasal cannula  O2 Flow (L/min): 3            04-22-25 @ 07:01  -  04-23-25 @ 07:00  --------------------------------------------------------  IN: 2133 mL / OUT: 1470 mL / NET: 663 mL    04-23-25 @ 07:01 - 04-23-25 @ 14:55  --------------------------------------------------------  IN: 119.2 mL / OUT: 160 mL / NET: -40.8 mL        CAPILLARY BLOOD GLUCOSE      POCT Blood Glucose.: 201 mg/dL (23 Apr 2025 12:09)      I&O's Summary    22 Apr 2025 07:01  -  23 Apr 2025 07:00  --------------------------------------------------------  IN: 2133 mL / OUT: 1470 mL / NET: 663 mL    23 Apr 2025 07:01  -  23 Apr 2025 14:55  --------------------------------------------------------  IN: 119.2 mL / OUT: 160 mL / NET: -40.8 mL        PHYSICAL EXAM:  General: anxious, uncomfortable appearing with increased WOB  Neurology: responds to voice  HEENT: NC/AT  Respiratory: +tachypneic; CTA b/l, no rales or rhonchi noted  Cardiovascular: RRR, normal S1S2, no M/R/G  Abdomen: soft, NT/ND, +BS, no palpable masses  Extremities: cool, no clubbing, cyanosis, or edema  Skin: cool/dry      Meds:  cefTRIAXone   IVPB     aMIOdarone Infusion IV Continuous  furosemide   Injectable IV Push  norepinephrine Infusion IV Continuous    atorvastatin Oral  dextrose 50% Injectable IV Push  dextrose 50% Injectable IV Push  dextrose 50% Injectable IV Push  dextrose Oral Gel Oral PRN  finasteride Oral  glucagon  Injectable IntraMuscular  insulin lispro (ADMELOG) corrective regimen sliding scale SubCutaneous      dexMEDEtomidine Infusion IV Continuous      aspirin  chewable Oral  heparin   Injectable IV Push PRN  heparin   Injectable IV Push PRN  heparin  Infusion. IV Continuous    pantoprazole  Injectable IV Push    tamsulosin Oral    dextrose 5%. IV Continuous  dextrose 5%. IV Continuous      chlorhexidine 2% Cloths Topical  mupirocin 2% Nasal Both Nostrils                              15.2   13.37 )-----------( 180      ( 23 Apr 2025 10:00 )             45.5       04-23    142  |  106  |  65[H]  ----------------------------<  261[H]  4.3   |  23  |  2.70[H]    Ca    9.4      23 Apr 2025 10:00  Phos  3.7     04-23  Mg     2.6     04-23    TPro  5.8[L]  /  Alb  3.2[L]  /  TBili  2.2[H]  /  DBili  x   /  AST  77[H]  /  ALT  107[H]  /  AlkPhos  52  04-23    Lactate 5.4           04-23 @ 10:00      CARDIAC MARKERS ( 23 Apr 2025 05:30 )  x     / x     / x     / x     / <1.0 ng/mL  CARDIAC MARKERS ( 22 Apr 2025 16:10 )  x     / x     / x     / x     / 1.9 ng/mL  CARDIAC MARKERS ( 21 Apr 2025 22:10 )  x     / x     / x     / x     / 2.3 ng/mL      PT/INR - ( 22 Apr 2025 00:12 )   PT: 15.4 sec;   INR: 1.31 ratio         PTT - ( 23 Apr 2025 10:00 )  PTT:75.2 sec  Urinalysis Basic - ( 23 Apr 2025 10:00 )    Color: x / Appearance: x / SG: x / pH: x  Gluc: 261 mg/dL / Ketone: x  / Bili: x / Urobili: x   Blood: x / Protein: x / Nitrite: x   Leuk Esterase: x / RBC: x / WBC x   Sq Epi: x / Non Sq Epi: x / Bacteria: x          Rapid RVP Result: NotDetec (04-23 @ 10:20)          Radiology: REVIEWED  < from: Xray Chest 1 View- PORTABLE-Routine (Xray Chest 1 View- PORTABLE-Routine in AM.) (04.22.25 @ 08:19) >  IMPRESSION: Persistent CHF.    < end of copied text >  < from: Xray Chest 1 View- PORTABLE-Urgent (Xray Chest 1 View- PORTABLE-Urgent .) (04.23.25 @ 08:53) >  IMPRESSION: Persistent CHF.    < end of copied text >      Tubes/Lines: Indwelling ramos      GLOBAL ISSUE/BEST PRACTICE:  Analgesia: Y  Sedation: Y  HOB elevation: Y  Stress ulcer prophylaxis: Y  VTE prophylaxis: full AC  Glycemic control: Y  Nutrition: NPO     CODE STATUS: DNR/DNI Trial NIV

## 2025-04-23 NOTE — PROGRESS NOTE ADULT - ASSESSMENT
88 y/o male with PMHx of CHF, HLD, HTN, prostate ca (s/p radioactive seed implantation in 2015) T2DM, PPM admitted for acute on chronic HFrEF.Upgraded to ICU after episode of NSVT and episode of afib with RVR complicated by flash pulmonary ededma, requiring heparin and amio gtt.    Neuro:  - Monitor off precedex  - Tylenol PRN for pain    CV:  - Pt with afib vs VT, now in NSR  - PPM interrogation inconclusive  - c/w heparin gtt with eventual transition to DOAC  - c/w amio gtt, transition to PO once able to tolerate  - Acute on chronic HFrEF (LVEF 20-25%)  - Decrease lasix 40mg from BID to qd  - Hold home coreg   - c/w levophed, titrate PRN to mantain MAP >65  - Hx CAD, c/w ASA and statin   - Troponins peaked and downtrending, can be demand 2/2 acute on chronic HFrEF    Pulm:  - STAT CXR this AM with improving pulmonary vascular congestion  - Decrease lasix to 40mg qd  - f/u STAT ABG  - Maintain SO2>92    GI:  - NPO pending bedside dysphagia  - PPI    Renal:  - HANNAH on CKD, likely pre-renal due to decreased EABV in setting of HFrEF  - Hold home Entresto and dapagliflozin  - Monitor daily Cr  - Strict Is&Os  - Replete lytes PRN    ID:  - Pt febrile this AM, possible source PNA?  - Lactate 5.4-->5.0  - Start rocephin  - Fever workup including BCx, UA, CXR, RVP  - Tylenol PRN for fever  - Trend WBC and fever curve    Heme:  - Full AC with heparin gtt    Endo:  - Increase to MDISS q6h while NPO  - Goal -180    Dispo:  - DNR/DNI Trial NIV

## 2025-04-23 NOTE — SOCIAL WORK PROGRESS NOTE - NSSWPROGRESSNOTE_GEN_ALL_CORE
Pt currently on Bipap, acute per ICU team. SW met with pt's daughter/Muriel and pt's son Amado at bedside in order to provide psychosocial support. Will remain available and continue to follow up. Daughter/Muriel can be reached at ([phone]).

## 2025-04-23 NOTE — PROGRESS NOTE ADULT - ASSESSMENT
88 y/o male with PMHx of CHF, HLD, HTN, prostate ca (s/p radioactive seed implantation in 2015) T2DM, PPM presents to ER c/o SOB admitted for acute on chronic systolic CHF exacerbation.

## 2025-04-23 NOTE — PROGRESS NOTE ADULT - PROBLEM SELECTOR PLAN 2
New onset afib with RVR  -RTT called on 4/21 due to vtach and afib with RVR, patient found to be SOB with flash pulmonary edema and hypotensive; transferred to ICU and started on amiodarone  and levophed drip  -Continue amio gtt , heparin drip   -Resume coreg once BP tolerates - now levophed   -Plan per ICU

## 2025-04-23 NOTE — PROGRESS NOTE ADULT - ASSESSMENT
HANNAH on CKD 3  CHF EF 20-25%  Dyspnea   HTN     -Baseline Creatinine 1.5  -HANNAH Likely 2/2 Decreased EABV  -Urine indices reviewed  -Cont with furosemide IV  -Recommend to Add Albumin to intermittent hypotension and need for active diuresis  -Urinating better; rising creatinine from diuresis; monitor for now  -No indication for RRT. Will assist with dialysis for UF if not responding to diuretics; can hold off for now    D/w ICU    Renal critical care time spent > 32 min

## 2025-04-23 NOTE — DIETITIAN INITIAL EVALUATION ADULT - PERTINENT LABORATORY DATA
04-23    133[L]  |  102  |  54[H]  ----------------------------<  591[HH]  3.7   |  20[L]  |  2.40[H]    Ca    7.8[L]      23 Apr 2025 05:30  Phos  3.4     04-23  Mg     2.2     04-23    TPro  5.8[L]  /  Alb  3.2[L]  /  TBili  2.2[H]  /  DBili  x   /  AST  77[H]  /  ALT  107[H]  /  AlkPhos  52  04-23  POCT Blood Glucose.: 267 mg/dL (04-23-25 @ 06:58)  A1C with Estimated Average Glucose Result: 6.7 % (04-22-25 @ 05:50)  A1C with Estimated Average Glucose Result: 6.8 % (03-20-25 @ 05:52)

## 2025-04-23 NOTE — PROGRESS NOTE ADULT - SUBJECTIVE AND OBJECTIVE BOX
Fluid/Electrolyte/Metabolic Patient is a 89y old  Male who presents with a chief complaint of SOB (23 Apr 2025 11:06)    pt seen and examine today  on bipap / resp distress ,  tmax 100.9  , npo .  INTERVAL HPI/OVERNIGHT EVENTS:     T(C): 38.2 (04-23-25 @ 11:49), Max: 38.3 (04-23-25 @ 10:00)  HR: 67 (04-23-25 @ 11:20) (67 - 100)  BP: 87/68 (04-23-25 @ 11:20) (69/38 - 130/116)  RR: 24 (04-23-25 @ 11:20) (18 - 42)  SpO2: 100% (04-23-25 @ 11:20) (92% - 100%)  Wt(kg): --  I&O's Summary    22 Apr 2025 07:01  -  23 Apr 2025 07:00  --------------------------------------------------------  IN: 2133 mL / OUT: 1470 mL / NET: 663 mL    23 Apr 2025 07:01  -  23 Apr 2025 12:28  --------------------------------------------------------  IN: 119.2 mL / OUT: 100 mL / NET: 19.2 mL        REVIEW OF SYSTEMS:  CONSTITUTIONAL: No fever, weight loss, + fatigue  EYES: No eye pain, visual disturbances, or discharge  ENMT:   ; No sinus or throat pain  NECK: No pain or stiffness  RESPIRATORY: No cough, wheezing, chills   + shortness of breath  CARDIOVASCULAR: No chest pain, palpitations, dizziness, or leg swelling  GASTROINTESTINAL: No abdominal or epigastric pain. No nausea, vomiting, or hematemesis; No diarrhea or constipation.   GENITOURINARY: No dysuria, frequency, hematuria, or incontinence  NEUROLOGICAL: No headaches, memory loss, loss of strength, numbness, or tremors  SKIN: No itching, burning, rashes, or lesions   MUSCULOSKELETAL: No joint pain or swelling; No muscle, back, or extremity pain    PHYSICAL EXAM:  GENERAL: NAD,   HEAD:  Atraumatic, Normocephalic  EYES: EOMI, PERRLA, conjunctiva and sclera clear  ENMT:   Moist mucous membranes  NECK: Supple, No JVD  NERVOUS SYSTEM:  Alert & Oriented X3; Motor Strength 5/5 B/L upper and lower extremities; DTRs 2+ intact and symmetric  CHEST/LUNG:   percussion bilaterally+ rales, rhonchi, wheezing,    HEART: Regular rate and rhythm; No murmurs, no tachy   ABDOMEN: Soft, Nontender, Nondistended; Bowel sounds present  EXTREMITIES:  2+ Peripheral Pulses, No clubbing, cyanosis, or edema  SKIN: No rashes or lesions    MEDICATIONS  (STANDING):  aMIOdarone Infusion 0.501 mG/Min (16.7 mL/Hr) IV Continuous <Continuous>  aspirin  chewable 81 milliGRAM(s) Oral daily  atorvastatin 80 milliGRAM(s) Oral at bedtime  cefTRIAXone   IVPB      chlorhexidine 2% Cloths 1 Application(s) Topical <User Schedule>  dexMEDEtomidine Infusion 0.2 MICROgram(s)/kG/Hr (4.04 mL/Hr) IV Continuous <Continuous>  dextrose 5%. 1000 milliLiter(s) (100 mL/Hr) IV Continuous <Continuous>  dextrose 5%. 1000 milliLiter(s) (50 mL/Hr) IV Continuous <Continuous>  dextrose 50% Injectable 25 Gram(s) IV Push once  dextrose 50% Injectable 12.5 Gram(s) IV Push once  dextrose 50% Injectable 25 Gram(s) IV Push once  finasteride 5 milliGRAM(s) Oral daily  furosemide   Injectable 40 milliGRAM(s) IV Push two times a day  glucagon  Injectable 1 milliGRAM(s) IntraMuscular once  heparin  Infusion.  Unit(s)/Hr (15 mL/Hr) IV Continuous <Continuous>  insulin lispro (ADMELOG) corrective regimen sliding scale   SubCutaneous three times a day before meals  mupirocin 2% Nasal 1 Application(s) Both Nostrils two times a day  norepinephrine Infusion 0.05 MICROgram(s)/kG/Min (7.57 mL/Hr) IV Continuous <Continuous>  pantoprazole  Injectable 40 milliGRAM(s) IV Push daily  tamsulosin 0.8 milliGRAM(s) Oral at bedtime    MEDICATIONS  (PRN):  dextrose Oral Gel 15 Gram(s) Oral once PRN Blood Glucose LESS THAN 70 milliGRAM(s)/deciliter  heparin   Injectable 6500 Unit(s) IV Push every 6 hours PRN For aPTT less than 40  heparin   Injectable 3000 Unit(s) IV Push every 6 hours PRN For aPTT between 40 - 57      LABS:                        15.2   13.37 )-----------( 180      ( 23 Apr 2025 10:00 )             45.5     04-23    142  |  106  |  65[H]  ----------------------------<  261[H]  4.3   |  23  |  2.70[H]    Ca    9.4      23 Apr 2025 10:00  Phos  3.7     04-23  Mg     2.6     04-23    TPro  5.8[L]  /  Alb  3.2[L]  /  TBili  2.2[H]  /  DBili  x   /  AST  77[H]  /  ALT  107[H]  /  AlkPhos  52  04-23    PT/INR - ( 22 Apr 2025 00:12 )   PT: 15.4 sec;   INR: 1.31 ratio         PTT - ( 23 Apr 2025 10:00 )  PTT:75.2 sec  Urinalysis Basic - ( 23 Apr 2025 10:00 )    Color: x / Appearance: x / SG: x / pH: x  Gluc: 261 mg/dL / Ketone: x  / Bili: x / Urobili: x   Blood: x / Protein: x / Nitrite: x   Leuk Esterase: x / RBC: x / WBC x   Sq Epi: x / Non Sq Epi: x / Bacteria: x      CAPILLARY BLOOD GLUCOSE      POCT Blood Glucose.: 201 mg/dL (23 Apr 2025 12:09)  POCT Blood Glucose.: 267 mg/dL (23 Apr 2025 06:58)  POCT Blood Glucose.: 253 mg/dL (23 Apr 2025 05:16)  POCT Blood Glucose.: 218 mg/dL (23 Apr 2025 00:29)  POCT Blood Glucose.: 197 mg/dL (22 Apr 2025 16:09)    ABG - ( 23 Apr 2025 12:03 )  pH, Arterial: 7.55  pH, Blood: x     /  pCO2: 19    /  pO2: 105   / HCO3: 17    / Base Excess: -5.8  /  SaO2: 99.3                      RADIOLOGY & ADDITIONAL TESTS:    Imaging Personally Reviewed:   no new test     Advance Directives:  dnr/intubate    Palliative Care:  Appropriate

## 2025-04-23 NOTE — CASE MANAGEMENT PROGRESS NOTE - NSCMPROGRESSNOTE_GEN_ALL_CORE
Patient discussed during rounds and remains acute in ICU on BiPAP. Dx: Acute decompensated HF, AHRF. Patient receiving IV Heparin, IVPB Rocephin, IV Amiodarone, and IV Levophed. CM will continue to collaborate with interdisciplinary team and remain available to assist.

## 2025-04-23 NOTE — PROGRESS NOTE ADULT - SUBJECTIVE AND OBJECTIVE BOX
Chief Complaint: Shortness of breath    Interval Events: No events overnight.    Review of Systems:  Unable to obtain.    Physical Exam:  Vital Signs Last 24 Hrs  T(C): 38.3 (23 Apr 2025 10:00), Max: 38.3 (23 Apr 2025 10:00)  T(F): 100.9 (23 Apr 2025 10:00), Max: 100.9 (23 Apr 2025 10:00)  HR: 76 (23 Apr 2025 10:45) (72 - 100)  BP: 101/75 (23 Apr 2025 10:45) (69/38 - 130/116)  BP(mean): 85 (23 Apr 2025 10:45) (49 - 123)  RR: 31 (23 Apr 2025 10:45) (18 - 42)  SpO2: 100% (23 Apr 2025 10:45) (92% - 100%)  Parameters below as of 23 Apr 2025 07:00  Patient On (Oxygen Delivery Method): nasal cannula  O2 Flow (L/min): 3  General: NAD  HEENT: MMM  Neck: No JVD, no carotid bruit  Lungs: CTAB  CV: RRR, nl S1/S2, no M/R/G  Abdomen: S/NT/ND, +BS  Extremities: No LE edema, no cyanosis  Neuro: AAOx0  Skin: No rash    Labs:  04-23    133[L]  |  102  |  54[H]  ----------------------------<  591[HH]  3.7   |  20[L]  |  2.40[H]    Ca    7.8[L]      23 Apr 2025 05:30  Phos  3.4     04-23  Mg     2.2     04-23    TPro  5.8[L]  /  Alb  3.2[L]  /  TBili  2.2[H]  /  DBili  x   /  AST  77[H]  /  ALT  107[H]  /  AlkPhos  52  04-23                        15.2   13.37 )-----------( 180      ( 23 Apr 2025 10:00 )             45.5       ECG/Telemetry: Sinus rhythm, PVCs, NSVT

## 2025-04-23 NOTE — DIETITIAN INITIAL EVALUATION ADULT - ADD RECOMMEND
1) Pt to remain NPO at this time while on bipap; recommend initiating consistent carbohydrate, DASH/TLC diet with 1.2L FR when medically feasible  2) Monitor po intake, diet tolerance, weight trends, labs, GI function, skin integrity

## 2025-04-23 NOTE — PROGRESS NOTE ADULT - ATTENDING COMMENTS
88 yo man with Hx HFrEF, ICD, prostate cancer, CKD admitted with ADHF, overnight with episode of likely VT and acute pulmonary edema resulting in acute hypoxemic respiratory failure. Today with improved hemodynamics , but also febrile with lactic acidosis.    --agitated delirium improved, but likely also with component of anxiety  continue precedex  --cardiogenic shock requiring low dose levophed, weaning down  pulmonary edema improving, continue lasix  new afib, and likely VT, continue amio gtt, AC with heparin gtt  CAD, continue ASA and statin  --acute hypoxemic respiratory failure from pulmonary edema  seems to be improving, most of day on NC  likely multifactorial from pulmonary edema, lactic acidosis, anxiety  respiratory alkalosis so use CPAP if increased work of breathing  --HANNAH on CKD 3, prerenal v ATN, worse today  --NPO until mental status improves  --concern for sepsis, new fever and lactate, most likely source pneumonia, start CTX  f/u panCx  --DM2, continue ISS  --plan discussed with Dr. Boothe  --children updated, see separate GOC note

## 2025-04-23 NOTE — DIETITIAN INITIAL EVALUATION ADULT - PROBLEM SELECTOR PLAN 1
acute on chronic HFrEF exacerbation   Pt with hx of chronic systolic congestive heart failure last admitted for CHF exacerbation in March to [facility] and SF last week  -Pt with inc SOB at rest and on exertion   -F/u CXR official read  -Troponin 3051, pro-BNP 26368  -EKG: NSR with frequent PVC's, 90 BPM, QTc 518  -Vitals: BP: 135/73, HR: 95 , Temp: 98.2F , RR: 18 , SpO2: 94% on 6LNC  -Currently on 6LNC wean as tolerated   -s/p 40mg Lasix IVP in ED  -Previous TTE from March with severely reduced LV systolic function (EF 20-25%), mod MR  - Strict I&Os, daily weights, fluid restriction   - Remote tele, continuous pulse ox   -Pt recently switched from Lasix to Torsemide per SF discharge but is unsure of dosage   -C/w Lasix 40mg IVP daily   -Cardio consulted, Dr. Boothe, f/u recs

## 2025-04-23 NOTE — PROGRESS NOTE ADULT - ASSESSMENT
89 y/o male with PMHx of CHF, HLD, HTN, prostate ca (s/p radioactive seed implantation in 2015) T2DM, PPM presents to ER c/o SOB. Pt states that he has had intermittent SOB    systolic HF  OP  OA  Atelectasis  Pleural Eff  Dyspnea  eval ACS  HTN  HLD  hx of Prostate Ca  DM  PPM    AFIB management  HD support and monitoring - ICU care  on AC  on diuretic  monitor for delirium  vs noted    fio2 wean  I apple  goal sat > 88 pct  trend Trops  eval ACS  Cardio eval  tele monitoring  replete lytes  I and O  cvs rx regimen optimization  TTE reviewed from prior visits - EF 20 pct  pleural eff - atelectasis - I apple - no indication for pleurocentesis at present  DM care - serial FS  GOC discussion

## 2025-04-23 NOTE — CHART NOTE - NSCHARTNOTEFT_GEN_A_CORE
: Leila      INDICATION: shock, respiratory failure       PROCEDURE:    [ x] LIMITED ECHO    [x ] LIMITED CHEST      FINDINGS:  markedly reduced LV function  no significant pericardial effusion  IVC 2.4cm without variation    B line predominant but decreased from yesterday  decrease in b/l effusions, now small-trace    INTERPRETATION:  improving pulmonary edema, but still intrasvascularly volume overloaded    Images stored on [employer]

## 2025-04-23 NOTE — PROGRESS NOTE ADULT - PROBLEM SELECTOR PLAN 1
acute on chronic HFrEF exacerbation   cause of acute hypoxic resp failure    Pt with hx of chronic systolic congestive heart failure last admitted for CHF exacerbation in March to [facility] and [facility] last week-Pt with inc SOB at rest and on exertion   -CXR with pulmonary congestion and b/l pleural effusions  -Troponin 3051, 3543, pro-BNP 87570  -EKG: NSR with frequent PVC's, 90 BPM, QTc 518  -Vitals: BP: 135/73, HR: 95 , Temp: 98.2F , RR: 18 , SpO2: 94% on 6LNC  -on bipap   -s/p 40mg Lasix IVP in ED  -Previous TTE from March with severely reduced LV systolic function (EF 20-25%), mod MR  - Strict I&Os, daily weights, fluid restriction   - Remote tele, continuous pulse ox   -Pt recently switched from Lasix to Torsemide per SF discharge but is unsure of dosage   -C/w Lasix 40mg IVP bid   -Cardio consulted, Dr. Boothe,   -Plan per ICU

## 2025-04-23 NOTE — PROGRESS NOTE ADULT - PROBLEM SELECTOR PLAN 8
Chronic  -On home Farxiga 10mg qd   -Hold home oral meds  -Continue LDSS with FS QAC, QHS  -Hypoglycemia protocol  -Consistent carb diet  -Plan per ICU

## 2025-04-23 NOTE — DIETITIAN INITIAL EVALUATION ADULT - PERTINENT MEDS FT
MEDICATIONS  (STANDING):  aMIOdarone Infusion 0.501 mG/Min (16.7 mL/Hr) IV Continuous <Continuous>  aspirin  chewable 81 milliGRAM(s) Oral daily  atorvastatin 80 milliGRAM(s) Oral at bedtime  cefTRIAXone   IVPB      chlorhexidine 2% Cloths 1 Application(s) Topical <User Schedule>  dexMEDEtomidine Infusion 0.2 MICROgram(s)/kG/Hr (4.04 mL/Hr) IV Continuous <Continuous>  dextrose 5%. 1000 milliLiter(s) (100 mL/Hr) IV Continuous <Continuous>  dextrose 5%. 1000 milliLiter(s) (50 mL/Hr) IV Continuous <Continuous>  dextrose 50% Injectable 25 Gram(s) IV Push once  dextrose 50% Injectable 12.5 Gram(s) IV Push once  dextrose 50% Injectable 25 Gram(s) IV Push once  finasteride 5 milliGRAM(s) Oral daily  furosemide   Injectable 40 milliGRAM(s) IV Push two times a day  glucagon  Injectable 1 milliGRAM(s) IntraMuscular once  heparin  Infusion.  Unit(s)/Hr (15 mL/Hr) IV Continuous <Continuous>  insulin lispro (ADMELOG) corrective regimen sliding scale   SubCutaneous three times a day before meals  mupirocin 2% Nasal 1 Application(s) Both Nostrils two times a day  norepinephrine Infusion 0.05 MICROgram(s)/kG/Min (7.57 mL/Hr) IV Continuous <Continuous>  pantoprazole  Injectable 40 milliGRAM(s) IV Push daily  tamsulosin 0.8 milliGRAM(s) Oral at bedtime    MEDICATIONS  (PRN):  dextrose Oral Gel 15 Gram(s) Oral once PRN Blood Glucose LESS THAN 70 milliGRAM(s)/deciliter  heparin   Injectable 6500 Unit(s) IV Push every 6 hours PRN For aPTT less than 40  heparin   Injectable 3000 Unit(s) IV Push every 6 hours PRN For aPTT between 40 - 57

## 2025-04-23 NOTE — PROGRESS NOTE ADULT - SUBJECTIVE AND OBJECTIVE BOX
[facility]                             Nephrology and Hypertension                             Kris  Jameson Ramires                                          [phone]     Patient is a 89y old  Male who presents with a chief complaint of SOB (21 Apr 2025 11:26)       HPI:  88 y/o male with PMHx of CHF, HLD, HTN, prostate ca (s/p radioactive seed implantation in 2015) T2DM, PPM presents to ER c/o SOB. Pt states that he has had intermittent SOB for the past few days, Pt notes he was visiting his wife today at rehab and felt SOB as he was leaving. He sat down to catch his breath, a staff member noticed he did not look well and they decided to call EMS. Pt notes he has had SOB both at rest and with exertion. Pt denies chest pain, fever, cough. Of note, pt was admitted 1 month ago for CHF exacerbation and also 1 week ago at [facility]. Pt adds a bunch of his medications were changed including his Entresto was dc'ed, Farxiga dosage was doubled and his Lasix was switched to Torsemide. Pt currently on NC and denies any SOB. Pt denies fever, chills, chest pain, palpitations, abdominal pain, nausea, vomiting, diarrhea.  Has not seen nephrologist.  States he is urinating.  Complains of dyspnea and orthopnea.      Off of BiPAP  Seen in ICU  Still feels SOB    PAST MEDICAL & SURGICAL HISTORY:  NICM (nonischemic cardiomyopathy)      HTN (hypertension)      HLD (hyperlipidemia)      Prostate CA      T2DM (type 2 diabetes mellitus)      CHF (congestive heart failure)      H/O prostate biopsy      H/O cataract extraction      H/O inguinal hernia repair      AICD (automatic cardioverter/defibrillator) present           FAMILY HISTORY:  Family history of cardiomyopathy (Father)    FH: CHF (congestive heart failure) (Sibling)    NC    Social History:Non smoker    MEDICATIONS  (STANDING):  aspirin  chewable 81 milliGRAM(s) Oral daily  atorvastatin 80 milliGRAM(s) Oral at bedtime  carvedilol 3.125 milliGRAM(s) Oral every 12 hours  dextrose 5%. 1000 milliLiter(s) (100 mL/Hr) IV Continuous <Continuous>  dextrose 5%. 1000 milliLiter(s) (50 mL/Hr) IV Continuous <Continuous>  dextrose 50% Injectable 25 Gram(s) IV Push once  dextrose 50% Injectable 12.5 Gram(s) IV Push once  dextrose 50% Injectable 25 Gram(s) IV Push once  finasteride 5 milliGRAM(s) Oral daily  furosemide   Injectable 40 milliGRAM(s) IV Push daily  glucagon  Injectable 1 milliGRAM(s) IntraMuscular once  heparin   Injectable 5000 Unit(s) SubCutaneous every 12 hours  insulin lispro (ADMELOG) corrective regimen sliding scale   SubCutaneous three times a day before meals  insulin lispro (ADMELOG) corrective regimen sliding scale   SubCutaneous at bedtime  tamsulosin 0.8 milliGRAM(s) Oral at bedtime    MEDICATIONS  (PRN):  dextrose Oral Gel 15 Gram(s) Oral once PRN Blood Glucose LESS THAN 70 milliGRAM(s)/deciliter   Meds reviewed    Allergies    No Known Allergies    Intolerances         REVIEW OF SYSTEMS:    Review of Systems:   Constitutional: Denies fatigue  HEENT: Denies headaches and dizziness  Respiratory: + dyspnea  Cardiovascular: denies CP, palpitations  Gastrointestinal: Denies nausea, denies vomiting, diarrhea, constipation, abdominal pain, or bloody stools  Genitourinary: denies painful urination, increased frequency, urgency, or bloody urine  Skin: denies rashes or itching  Musculoskeletal: denies muscle aches, joint swelling  Neurologic: Denies generalized weakness, denies loss of sensation, numbness, or tingling    ICU Vital Signs Last 24 Hrs  T(C): 36.2 (22 Apr 2025 05:08), Max: 36.7 (21 Apr 2025 20:31)  T(F): 97.2 (22 Apr 2025 05:08), Max: 98 (21 Apr 2025 20:31)  HR: 78 (22 Apr 2025 09:00) (50 - 109)  BP: 120/68 (22 Apr 2025 09:00) (64/48 - 135/97)  BP(mean): 84 (22 Apr 2025 09:00) (55 - 108)  ABP: --  ABP(mean): --  RR: 24 (22 Apr 2025 09:00) (10 - 34)  SpO2: 91% (22 Apr 2025 09:00) (89% - 99%)    O2 Parameters below as of 22 Apr 2025 08:00  Patient On (Oxygen Delivery Method): nasal cannula  O2 Flow (L/min): 6        PHYSICAL EXAM:    GENERAL: NAD  HEAD:  Atraumatic, Normocephalic  EYES: EOMI, conjunctiva and sclera clear  ENMT: No Drainage from nares, No drainage from ears  NERVOUS SYSTEM:  Awake and Alert  CHEST/LUNG: decreased BS  EXTREMITIES:  No Edema  SKIN: No rashes No obvious ecchymosis      LABS:                                   13.3   11.50 )-----------( 134      ( 23 Apr 2025 05:30 )             39.9     04-23    133[L]  |  102  |  54[H]  ----------------------------<  591[HH]  3.7   |  20[L]  |  2.40[H]    Ca    7.8[L]      23 Apr 2025 05:30  Phos  3.4     04-23  Mg     2.2     04-23    TPro  5.8[L]  /  Alb  3.2[L]  /  TBili  2.2[H]  /  DBili  x   /  AST  77[H]  /  ALT  107[H]  /  AlkPhos  52  04-23    PT/INR - ( 22 Apr 2025 00:12 )   PT: 15.4 sec;   INR: 1.31 ratio         PTT - ( 23 Apr 2025 02:20 )  PTT:104.3 sec  Urinalysis Basic - ( 23 Apr 2025 05:30 )    Color: x / Appearance: x / SG: x / pH: x  Gluc: 591 mg/dL / Ketone: x  / Bili: x / Urobili: x   Blood: x / Protein: x / Nitrite: x   Leuk Esterase: x / RBC: x / WBC x   Sq Epi: x / Non Sq Epi: x / Bacteria: x        ABG - ( 21 Apr 2025 23:42 )  pH, Arterial: 7.50  pH, Blood: x     /  pCO2: 30    /  pO2: 95    / HCO3: 23    / Base Excess: 0.2   /  SaO2: 98.3             26-Aug-2021 16:15

## 2025-04-24 LAB
ANION GAP SERPL CALC-SCNC: 14 MMOL/L — SIGNIFICANT CHANGE UP (ref 5–17)
APTT BLD: 59.5 SEC — HIGH (ref 26.1–36.8)
BASOPHILS # BLD AUTO: 0.04 K/UL — SIGNIFICANT CHANGE UP (ref 0–0.2)
BASOPHILS NFR BLD AUTO: 0.2 % — SIGNIFICANT CHANGE UP (ref 0–2)
BUN SERPL-MCNC: 87 MG/DL — HIGH (ref 7–23)
CALCIUM SERPL-MCNC: 9.1 MG/DL — SIGNIFICANT CHANGE UP (ref 8.5–10.1)
CHLORIDE SERPL-SCNC: 106 MMOL/L — SIGNIFICANT CHANGE UP (ref 96–108)
CO2 SERPL-SCNC: 22 MMOL/L — SIGNIFICANT CHANGE UP (ref 22–31)
CREAT SERPL-MCNC: 3.2 MG/DL — HIGH (ref 0.5–1.3)
EGFR: 18 ML/MIN/1.73M2 — LOW
EGFR: 18 ML/MIN/1.73M2 — LOW
EOSINOPHIL # BLD AUTO: 0.01 K/UL — SIGNIFICANT CHANGE UP (ref 0–0.5)
EOSINOPHIL NFR BLD AUTO: 0.1 % — SIGNIFICANT CHANGE UP (ref 0–6)
GLUCOSE SERPL-MCNC: 201 MG/DL — HIGH (ref 70–99)
HCT VFR BLD CALC: 43.3 % — SIGNIFICANT CHANGE UP (ref 39–50)
HGB BLD-MCNC: 14.3 G/DL — SIGNIFICANT CHANGE UP (ref 13–17)
IMM GRANULOCYTES NFR BLD AUTO: 1 % — HIGH (ref 0–0.9)
LACTATE SERPL-SCNC: 4.6 MMOL/L — CRITICAL HIGH (ref 0.7–2)
LYMPHOCYTES # BLD AUTO: 19.6 % — SIGNIFICANT CHANGE UP (ref 13–44)
LYMPHOCYTES # BLD AUTO: 3.45 K/UL — HIGH (ref 1–3.3)
MAGNESIUM SERPL-MCNC: 2.5 MG/DL — SIGNIFICANT CHANGE UP (ref 1.6–2.6)
MCHC RBC-ENTMCNC: 31.6 PG — SIGNIFICANT CHANGE UP (ref 27–34)
MCHC RBC-ENTMCNC: 33 G/DL — SIGNIFICANT CHANGE UP (ref 32–36)
MCV RBC AUTO: 95.8 FL — SIGNIFICANT CHANGE UP (ref 80–100)
MONOCYTES # BLD AUTO: 1.83 K/UL — HIGH (ref 0–0.9)
MONOCYTES NFR BLD AUTO: 10.4 % — SIGNIFICANT CHANGE UP (ref 2–14)
NEUTROPHILS # BLD AUTO: 12.05 K/UL — HIGH (ref 1.8–7.4)
NEUTROPHILS NFR BLD AUTO: 68.7 % — SIGNIFICANT CHANGE UP (ref 43–77)
NRBC BLD AUTO-RTO: 0 /100 WBCS — SIGNIFICANT CHANGE UP (ref 0–0)
PHOSPHATE SERPL-MCNC: 5.4 MG/DL — HIGH (ref 2.5–4.5)
PLATELET # BLD AUTO: 173 K/UL — SIGNIFICANT CHANGE UP (ref 150–400)
POTASSIUM SERPL-MCNC: 4.1 MMOL/L — SIGNIFICANT CHANGE UP (ref 3.5–5.3)
POTASSIUM SERPL-SCNC: 4.1 MMOL/L — SIGNIFICANT CHANGE UP (ref 3.5–5.3)
RBC # BLD: 4.52 M/UL — SIGNIFICANT CHANGE UP (ref 4.2–5.8)
RBC # FLD: 14.9 % — HIGH (ref 10.3–14.5)
SODIUM SERPL-SCNC: 142 MMOL/L — SIGNIFICANT CHANGE UP (ref 135–145)
WBC # BLD: 17.56 K/UL — HIGH (ref 3.8–10.5)
WBC # FLD AUTO: 17.56 K/UL — HIGH (ref 3.8–10.5)

## 2025-04-24 PROCEDURE — 76705 ECHO EXAM OF ABDOMEN: CPT | Mod: 26

## 2025-04-24 PROCEDURE — 99222 1ST HOSP IP/OBS MODERATE 55: CPT

## 2025-04-24 PROCEDURE — 99233 SBSQ HOSP IP/OBS HIGH 50: CPT | Mod: GC

## 2025-04-24 PROCEDURE — 99291 CRITICAL CARE FIRST HOUR: CPT

## 2025-04-24 RX ORDER — SODIUM CHLORIDE 9 G/1000ML
1000 INJECTION, SOLUTION INTRAVENOUS
Refills: 0 | Status: DISCONTINUED | OUTPATIENT
Start: 2025-04-24 | End: 2025-05-01

## 2025-04-24 RX ORDER — CEFTRIAXONE 500 MG/1
2000 INJECTION, POWDER, FOR SOLUTION INTRAMUSCULAR; INTRAVENOUS EVERY 24 HOURS
Refills: 0 | Status: DISCONTINUED | OUTPATIENT
Start: 2025-04-24 | End: 2025-04-24

## 2025-04-24 RX ORDER — DEXTROSE 50 % IN WATER 50 %
25 SYRINGE (ML) INTRAVENOUS ONCE
Refills: 0 | Status: DISCONTINUED | OUTPATIENT
Start: 2025-04-24 | End: 2025-05-01

## 2025-04-24 RX ORDER — DEXTROSE 50 % IN WATER 50 %
15 SYRINGE (ML) INTRAVENOUS ONCE
Refills: 0 | Status: DISCONTINUED | OUTPATIENT
Start: 2025-04-24 | End: 2025-05-01

## 2025-04-24 RX ORDER — CEFTRIAXONE 500 MG/1
1000 INJECTION, POWDER, FOR SOLUTION INTRAMUSCULAR; INTRAVENOUS ONCE
Refills: 0 | Status: DISCONTINUED | OUTPATIENT
Start: 2025-04-24 | End: 2025-04-24

## 2025-04-24 RX ORDER — PIPERACILLIN-TAZO-DEXTROSE,ISO 2.25G/50ML
3.38 IV SOLUTION, PIGGYBACK PREMIX FROZEN(ML) INTRAVENOUS EVERY 12 HOURS
Refills: 0 | Status: DISCONTINUED | OUTPATIENT
Start: 2025-04-25 | End: 2025-04-30

## 2025-04-24 RX ORDER — PIPERACILLIN-TAZO-DEXTROSE,ISO 2.25G/50ML
3.38 IV SOLUTION, PIGGYBACK PREMIX FROZEN(ML) INTRAVENOUS ONCE
Refills: 0 | Status: COMPLETED | OUTPATIENT
Start: 2025-04-24 | End: 2025-04-24

## 2025-04-24 RX ORDER — OLANZAPINE 10 MG/1
10 TABLET ORAL ONCE
Refills: 0 | Status: COMPLETED | OUTPATIENT
Start: 2025-04-24 | End: 2025-04-25

## 2025-04-24 RX ORDER — INSULIN LISPRO 100 U/ML
INJECTION, SOLUTION INTRAVENOUS; SUBCUTANEOUS EVERY 6 HOURS
Refills: 0 | Status: DISCONTINUED | OUTPATIENT
Start: 2025-04-24 | End: 2025-04-27

## 2025-04-24 RX ORDER — CEFTRIAXONE 500 MG/1
1000 INJECTION, POWDER, FOR SOLUTION INTRAMUSCULAR; INTRAVENOUS ONCE
Refills: 0 | Status: COMPLETED | OUTPATIENT
Start: 2025-04-24 | End: 2025-04-24

## 2025-04-24 RX ORDER — DEXTROSE 50 % IN WATER 50 %
12.5 SYRINGE (ML) INTRAVENOUS ONCE
Refills: 0 | Status: DISCONTINUED | OUTPATIENT
Start: 2025-04-24 | End: 2025-05-01

## 2025-04-24 RX ADMIN — CEFTRIAXONE 100 MILLIGRAM(S): 500 INJECTION, POWDER, FOR SOLUTION INTRAMUSCULAR; INTRAVENOUS at 13:59

## 2025-04-24 RX ADMIN — HEPARIN SODIUM 700 UNIT(S)/HR: 1000 INJECTION INTRAVENOUS; SUBCUTANEOUS at 07:42

## 2025-04-24 RX ADMIN — Medication 1 APPLICATION(S): at 05:09

## 2025-04-24 RX ADMIN — DEXMEDETOMIDINE HYDROCHLORIDE IN SODIUM CHLORIDE 4.04 MICROGRAM(S)/KG/HR: 4 INJECTION INTRAVENOUS at 11:38

## 2025-04-24 RX ADMIN — MUPIROCIN CALCIUM 1 APPLICATION(S): 20 CREAM TOPICAL at 05:08

## 2025-04-24 RX ADMIN — DEXMEDETOMIDINE HYDROCHLORIDE IN SODIUM CHLORIDE 4.04 MICROGRAM(S)/KG/HR: 4 INJECTION INTRAVENOUS at 04:50

## 2025-04-24 RX ADMIN — Medication 200 GRAM(S): at 18:07

## 2025-04-24 RX ADMIN — HEPARIN SODIUM 700 UNIT(S)/HR: 1000 INJECTION INTRAVENOUS; SUBCUTANEOUS at 05:02

## 2025-04-24 RX ADMIN — NOREPINEPHRINE BITARTRATE 7.57 MICROGRAM(S)/KG/MIN: 8 SOLUTION at 11:47

## 2025-04-24 RX ADMIN — Medication 400 MILLIGRAM(S): at 00:08

## 2025-04-24 RX ADMIN — DEXMEDETOMIDINE HYDROCHLORIDE IN SODIUM CHLORIDE 4.04 MICROGRAM(S)/KG/HR: 4 INJECTION INTRAVENOUS at 01:34

## 2025-04-24 RX ADMIN — MUPIROCIN CALCIUM 1 APPLICATION(S): 20 CREAM TOPICAL at 18:08

## 2025-04-24 RX ADMIN — DEXMEDETOMIDINE HYDROCHLORIDE IN SODIUM CHLORIDE 4.04 MICROGRAM(S)/KG/HR: 4 INJECTION INTRAVENOUS at 19:18

## 2025-04-24 RX ADMIN — HEPARIN SODIUM 700 UNIT(S)/HR: 1000 INJECTION INTRAVENOUS; SUBCUTANEOUS at 07:40

## 2025-04-24 RX ADMIN — AMIODARONE HYDROCHLORIDE 16.7 MG/MIN: 50 INJECTION, SOLUTION INTRAVENOUS at 05:02

## 2025-04-24 RX ADMIN — Medication 40 MILLIGRAM(S): at 11:38

## 2025-04-24 RX ADMIN — CEFTRIAXONE 100 MILLIGRAM(S): 500 INJECTION, POWDER, FOR SOLUTION INTRAMUSCULAR; INTRAVENOUS at 11:39

## 2025-04-24 NOTE — PROGRESS NOTE ADULT - SUBJECTIVE AND OBJECTIVE BOX
INTERVAL HPI/OVERNIGHT EVENTS: No acute overnight events occurred.    SUBJECTIVE: Seen and examined pt at bedside. Has no acute complaints at this time.    Review of Systems:  Constitutional: No fever, chills, fatigue  Neuro: No headache, numbness, weakness  Resp: No cough, wheezing, shortness of breath  CVS: No chest pain, palpitations, leg swelling  GI: No abdominal pain, nausea, vomiting, diarrhea   : No dysuria, frequency, incontinence  Skin: No itching, burning, rashes, or lesions   Msk: No joint pain or swelling  Psych: No depression, anxiety, mood swings    ICU Vital Signs Last 24 Hrs  T(C): 36.7 (24 Apr 2025 12:09), Max: 38.1 (23 Apr 2025 14:00)  T(F): 98 (24 Apr 2025 12:09), Max: 100.6 (23 Apr 2025 14:00)  HR: 72 (24 Apr 2025 09:00) (63 - 96)  BP: 88/70 (24 Apr 2025 08:45) (59/18 - 172/103)  BP(mean): 78 (24 Apr 2025 08:45) (29 - 166)  ABP: --  ABP(mean): --  RR: 31 (24 Apr 2025 09:00) (13 - 48)  SpO2: 97% (24 Apr 2025 09:00) (80% - 100%)    O2 Parameters below as of 24 Apr 2025 07:00  Patient On (Oxygen Delivery Method): nasal cannula  O2 Flow (L/min): 3            04-23-25 @ 07:01  -  04-24-25 @ 07:00  --------------------------------------------------------  IN: 1176.4 mL / OUT: 315 mL / NET: 861.4 mL        CAPILLARY BLOOD GLUCOSE      POCT Blood Glucose.: 134 mg/dL (24 Apr 2025 12:11)      I&O's Summary    23 Apr 2025 07:01  -  24 Apr 2025 07:00  --------------------------------------------------------  IN: 1176.4 mL / OUT: 315 mL / NET: 861.4 mL        PHYSICAL EXAM:  General: NAD  Neurology: awake and alert  HEENT: NC/AT  Respiratory: CTA b/l, no rales or rhonchi noted  Cardiovascular: RRR, normal S1S2, no M/R/G  Abdomen: soft, NT/ND, +BS, no palpable masses  Extremities: WWP, no clubbing, cyanosis, or edema  Skin: warm/dry      Meds:  cefTRIAXone   IVPB IV Intermittent    aMIOdarone Infusion IV Continuous  norepinephrine Infusion IV Continuous    atorvastatin Oral  dextrose 50% Injectable IV Push  dextrose 50% Injectable IV Push  dextrose 50% Injectable IV Push  dextrose Oral Gel Oral PRN  finasteride Oral  glucagon  Injectable IntraMuscular  insulin lispro (ADMELOG) corrective regimen sliding scale SubCutaneous      dexMEDEtomidine Infusion IV Continuous      aspirin  chewable Oral  heparin   Injectable IV Push PRN  heparin   Injectable IV Push PRN  heparin  Infusion. IV Continuous    pantoprazole  Injectable IV Push    tamsulosin Oral    dextrose 5%. IV Continuous  dextrose 5%. IV Continuous  dextrose 5%. IV Continuous      chlorhexidine 2% Cloths Topical  mupirocin 2% Nasal Both Nostrils                              14.3   17.56 )-----------( 173      ( 24 Apr 2025 05:52 )             43.3       04-24    142  |  106  |  87[H]  ----------------------------<  201[H]  4.1   |  22  |  3.20[H]    Ca    9.1      24 Apr 2025 05:52  Phos  5.4     04-24  Mg     2.5     04-24    TPro  5.8[L]  /  Alb  3.2[L]  /  TBili  2.2[H]  /  DBili  x   /  AST  77[H]  /  ALT  107[H]  /  AlkPhos  52  04-23    Lactate 4.6           04-24 @ 09:30    Lactate 5.0           04-23 @ 14:20      CARDIAC MARKERS ( 23 Apr 2025 05:30 )  x     / x     / x     / x     / <1.0 ng/mL  CARDIAC MARKERS ( 22 Apr 2025 16:10 )  x     / x     / x     / x     / 1.9 ng/mL      PTT - ( 24 Apr 2025 05:52 )  PTT:59.5 sec  Urinalysis Basic - ( 24 Apr 2025 05:52 )    Color: x / Appearance: x / SG: x / pH: x  Gluc: 201 mg/dL / Ketone: x  / Bili: x / Urobili: x   Blood: x / Protein: x / Nitrite: x   Leuk Esterase: x / RBC: x / WBC x   Sq Epi: x / Non Sq Epi: x / Bacteria: x          Rapid RVP Result: NotDetec (04-23 @ 10:20)          Radiology:    Bedside Ultrasound:    Tubes/Lines:      GLOBAL ISSUE/BEST PRACTICE:  Analgesia:  Sedation:  HOB elevation: Y  Stress ulcer prophylaxis:  VTE prophylaxis:  Glycemic control:  Nutrition:    CODE STATUS:        INTERVAL HPI/OVERNIGHT EVENTS: No acute overnight events occurred.    SUBJECTIVE: Seen and examined pt at bedside. Has no acute complaints at this time.    Review of Systems: limited ability to perform ROS due to patient's lack of orientation.  Constitutional: No pain  HEENT: +thirsty, dry mouth  Resp: No cough  CVS: No chest pain  GI: No abdominal pain    ICU Vital Signs Last 24 Hrs  T(C): 36.7 (24 Apr 2025 12:09), Max: 38.1 (23 Apr 2025 14:00)  T(F): 98 (24 Apr 2025 12:09), Max: 100.6 (23 Apr 2025 14:00)  HR: 72 (24 Apr 2025 09:00) (63 - 96)  BP: 88/70 (24 Apr 2025 08:45) (59/18 - 172/103)  BP(mean): 78 (24 Apr 2025 08:45) (29 - 166)  ABP: --  ABP(mean): --  RR: 31 (24 Apr 2025 09:00) (13 - 48)  SpO2: 97% (24 Apr 2025 09:00) (80% - 100%)    O2 Parameters below as of 24 Apr 2025 07:00  Patient On (Oxygen Delivery Method): nasal cannula  O2 Flow (L/min): 3      04-23-25 @ 07:01  -  04-24-25 @ 07:00  --------------------------------------------------------  IN: 1176.4 mL / OUT: 315 mL / NET: 861.4 mL        CAPILLARY BLOOD GLUCOSE  POCT Blood Glucose.: 134 mg/dL (24 Apr 2025 12:11)      I&O's Summary    23 Apr 2025 07:01  -  24 Apr 2025 07:00  --------------------------------------------------------  IN: 1176.4 mL / OUT: 315 mL / NET: 861.4 mL        PHYSICAL EXAM:  General: resting comfortably  Neurology: awake and alert, oriented x1  HEENT: NC/AT  Respiratory: CTA b/l, no rales or rhonchi noted  Cardiovascular: RRR, normal S1S2, no m/r/g appreciated  Abdomen: + tenderness to deep palpation of RUQ/epigastrium, otherwise soft, NT/ND, +BS, no palpable masses  Extremities: WWP, no clubbing, cyanosis, or edema  Skin: warm/dry      MEDICATIONS  (STANDING):  aMIOdarone Infusion 0.501 mG/Min (16.7 mL/Hr) IV Continuous <Continuous>  aspirin  chewable 81 milliGRAM(s) Oral daily  atorvastatin 80 milliGRAM(s) Oral at bedtime  cefTRIAXone   IVPB 1000 milliGRAM(s) IV Intermittent once  chlorhexidine 2% Cloths 1 Application(s) Topical <User Schedule>  dexMEDEtomidine Infusion 0.2 MICROgram(s)/kG/Hr (4.04 mL/Hr) IV Continuous <Continuous>  dextrose 5%. 1000 milliLiter(s) (50 mL/Hr) IV Continuous <Continuous>  dextrose 5%. 1000 milliLiter(s) (100 mL/Hr) IV Continuous <Continuous>  dextrose 5%. 1000 milliLiter(s) (50 mL/Hr) IV Continuous <Continuous>  dextrose 50% Injectable 25 Gram(s) IV Push once  dextrose 50% Injectable 12.5 Gram(s) IV Push once  dextrose 50% Injectable 25 Gram(s) IV Push once  finasteride 5 milliGRAM(s) Oral daily  glucagon  Injectable 1 milliGRAM(s) IntraMuscular once  heparin  Infusion.  Unit(s)/Hr (15 mL/Hr) IV Continuous <Continuous>  insulin lispro (ADMELOG) corrective regimen sliding scale   SubCutaneous every 6 hours  mupirocin 2% Nasal 1 Application(s) Both Nostrils two times a day  norepinephrine Infusion 0.05 MICROgram(s)/kG/Min (7.57 mL/Hr) IV Continuous <Continuous>  pantoprazole  Injectable 40 milliGRAM(s) IV Push daily  tamsulosin 0.8 milliGRAM(s) Oral at bedtime                          14.3   17.56 )-----------( 173      ( 24 Apr 2025 05:52 )             43.3       04-24    142  |  106  |  87[H]  ----------------------------<  201[H]  4.1   |  22  |  3.20[H]    Ca    9.1      24 Apr 2025 05:52  Phos  5.4     04-24  Mg     2.5     04-24    TPro  5.8[L]  /  Alb  3.2[L]  /  TBili  2.2[H]  /  DBili  x   /  AST  77[H]  /  ALT  107[H]  /  AlkPhos  52  04-23    Lactate 4.6           04-24 @ 09:30    Lactate 5.0           04-23 @ 14:20      CARDIAC MARKERS ( 23 Apr 2025 05:30 )  x     / x     / x     / x     / <1.0 ng/mL  CARDIAC MARKERS ( 22 Apr 2025 16:10 )  x     / x     / x     / x     / 1.9 ng/mL      PTT - ( 24 Apr 2025 05:52 )  PTT:59.5 sec  Urinalysis Basic - ( 24 Apr 2025 05:52 )    Color: x / Appearance: x / SG: x / pH: x  Gluc: 201 mg/dL / Ketone: x  / Bili: x / Urobili: x   Blood: x / Protein: x / Nitrite: x   Leuk Esterase: x / RBC: x / WBC x   Sq Epi: x / Non Sq Epi: x / Bacteria: x        Rapid RVP Result: NotDetec (04-23 @ 10:20)        Radiology:    < from: Xray Chest 1 View- PORTABLE-Urgent (Xray Chest 1 View- PORTABLE-Urgent .) (04.23.25 @ 08:53) >    ACC: [id number] EXAM:  XR CHEST PORTABLE ROUTINE 1V   ORDERED BY:  ELISABETH NAIDU     ACC: [id number] EXAM:  XR CHEST PORTABLE URGENT 1V   ORDERED BY: GABO FUNES     PROCEDURE DATE:  04/23/2025          INTERPRETATION:  AP chest on April 22, 2025at 8:07 AM. 2 images. Patient   is short of breath.    Heart enlargement and left-sided defibrillator again noted. Diffuse mild   to moderate CHF again noted.    Findings are similar to April 21.    Follow-up AP chest on April 23, 2025 at 8:27 AM. Nochange.    IMPRESSION: Persistent CHF.    --- End of Report ---    < end of copied text >      Tubes/Lines:  -ramos catheter    GLOBAL ISSUE/BEST PRACTICE:  Analgesia: PRN  Sedation: N  HOB elevation: Y  Stress ulcer prophylaxis: Y  VTE prophylaxis: Y  Glycemic control: Y  Nutrition: NPO    CODE STATUS: DNR/DNI       INTERVAL HPI/OVERNIGHT EVENTS: No acute overnight events occurred.    SUBJECTIVE: Seen and examined pt at bedside. Intermittently agitated. Daughter updated at bedside.    Review of Systems: limited ability to perform ROS due to patient's lack of orientation.  HEENT: +thirsty, dry mouth  Resp: +cough      ICU Vital Signs Last 24 Hrs  T(C): 36.7 (24 Apr 2025 12:09), Max: 38.1 (23 Apr 2025 14:00)  T(F): 98 (24 Apr 2025 12:09), Max: 100.6 (23 Apr 2025 14:00)  HR: 72 (24 Apr 2025 09:00) (63 - 96)  BP: 88/70 (24 Apr 2025 08:45) (59/18 - 172/103)  BP(mean): 78 (24 Apr 2025 08:45) (29 - 166)  ABP: --  ABP(mean): --  RR: 31 (24 Apr 2025 09:00) (13 - 48)  SpO2: 97% (24 Apr 2025 09:00) (80% - 100%)    O2 Parameters below as of 24 Apr 2025 07:00  Patient On (Oxygen Delivery Method): nasal cannula  O2 Flow (L/min): 3      04-23-25 @ 07:01  -  04-24-25 @ 07:00  --------------------------------------------------------  IN: 1176.4 mL / OUT: 315 mL / NET: 861.4 mL        CAPILLARY BLOOD GLUCOSE  POCT Blood Glucose.: 134 mg/dL (24 Apr 2025 12:11)      I&O's Summary    23 Apr 2025 07:01  -  24 Apr 2025 07:00  --------------------------------------------------------  IN: 1176.4 mL / OUT: 315 mL / NET: 861.4 mL        PHYSICAL EXAM:  General: anxious appearing  Neurology: awake, intermittently agitated, oriented x1  HEENT: NC/AT  Respiratory: course breath sounds b/l, no rales or rhonchi noted  Cardiovascular: RRR, normal S1S2, no m/r/g appreciated  Abdomen: + tenderness to deep palpation of epigastrium, otherwise soft, ND, +BS, no palpable masses  Extremities: WWP, no clubbing, cyanosis, or edema  Skin: warm/dry      MEDICATIONS  (STANDING):  aMIOdarone Infusion 0.501 mG/Min (16.7 mL/Hr) IV Continuous <Continuous>  aspirin  chewable 81 milliGRAM(s) Oral daily  atorvastatin 80 milliGRAM(s) Oral at bedtime  cefTRIAXone   IVPB 1000 milliGRAM(s) IV Intermittent once  chlorhexidine 2% Cloths 1 Application(s) Topical <User Schedule>  dexMEDEtomidine Infusion 0.2 MICROgram(s)/kG/Hr (4.04 mL/Hr) IV Continuous <Continuous>  dextrose 5%. 1000 milliLiter(s) (50 mL/Hr) IV Continuous <Continuous>  dextrose 5%. 1000 milliLiter(s) (100 mL/Hr) IV Continuous <Continuous>  dextrose 5%. 1000 milliLiter(s) (50 mL/Hr) IV Continuous <Continuous>  dextrose 50% Injectable 25 Gram(s) IV Push once  dextrose 50% Injectable 12.5 Gram(s) IV Push once  dextrose 50% Injectable 25 Gram(s) IV Push once  finasteride 5 milliGRAM(s) Oral daily  glucagon  Injectable 1 milliGRAM(s) IntraMuscular once  heparin  Infusion.  Unit(s)/Hr (15 mL/Hr) IV Continuous <Continuous>  insulin lispro (ADMELOG) corrective regimen sliding scale   SubCutaneous every 6 hours  mupirocin 2% Nasal 1 Application(s) Both Nostrils two times a day  norepinephrine Infusion 0.05 MICROgram(s)/kG/Min (7.57 mL/Hr) IV Continuous <Continuous>  pantoprazole  Injectable 40 milliGRAM(s) IV Push daily  tamsulosin 0.8 milliGRAM(s) Oral at bedtime                          14.3   17.56 )-----------( 173      ( 24 Apr 2025 05:52 )             43.3       04-24    142  |  106  |  87[H]  ----------------------------<  201[H]  4.1   |  22  |  3.20[H]    Ca    9.1      24 Apr 2025 05:52  Phos  5.4     04-24  Mg     2.5     04-24    TPro  5.8[L]  /  Alb  3.2[L]  /  TBili  2.2[H]  /  DBili  x   /  AST  77[H]  /  ALT  107[H]  /  AlkPhos  52  04-23    Lactate 4.6           04-24 @ 09:30    Lactate 5.0           04-23 @ 14:20      CARDIAC MARKERS ( 23 Apr 2025 05:30 )  x     / x     / x     / x     / <1.0 ng/mL  CARDIAC MARKERS ( 22 Apr 2025 16:10 )  x     / x     / x     / x     / 1.9 ng/mL      PTT - ( 24 Apr 2025 05:52 )  PTT:59.5 sec  Urinalysis Basic - ( 24 Apr 2025 05:52 )    Color: x / Appearance: x / SG: x / pH: x  Gluc: 201 mg/dL / Ketone: x  / Bili: x / Urobili: x   Blood: x / Protein: x / Nitrite: x   Leuk Esterase: x / RBC: x / WBC x   Sq Epi: x / Non Sq Epi: x / Bacteria: x        Rapid RVP Result: NotDetec (04-23 @ 10:20)        Radiology:    < from: Xray Chest 1 View- PORTABLE-Urgent (Xray Chest 1 View- PORTABLE-Urgent .) (04.23.25 @ 08:53) >    ACC: [id number] EXAM:  XR CHEST PORTABLE ROUTINE 1V   ORDERED BY:  ELISABETH NAIDU     ACC: [id number] EXAM:  XR CHEST PORTABLE URGENT 1V   ORDERED BY: GABO FUNES     PROCEDURE DATE:  04/23/2025          INTERPRETATION:  AP chest on April 22, 2025at 8:07 AM. 2 images. Patient   is short of breath.    Heart enlargement and left-sided defibrillator again noted. Diffuse mild   to moderate CHF again noted.    Findings are similar to April 21.    Follow-up AP chest on April 23, 2025 at 8:27 AM. Nochange.    IMPRESSION: Persistent CHF.    --- End of Report ---    < end of copied text >      Tubes/Lines:  -ramos catheter    GLOBAL ISSUE/BEST PRACTICE:  Analgesia: PRN  Sedation: N  HOB elevation: Y  Stress ulcer prophylaxis: Y  VTE prophylaxis: Y  Glycemic control: Y  Nutrition: NPO    CODE STATUS: DNR/DNI       INTERVAL HPI/OVERNIGHT EVENTS: Tm 100.9 at 10am yesterday, No acute overnight events occurred.    SUBJECTIVE: Seen and examined pt at bedside. Intermittently agitated. Daughter updated at bedside.    Review of Systems: limited ability to perform ROS due to patient's lack of orientation.  HEENT: +thirsty, dry mouth  Resp: +cough      ICU Vital Signs Last 24 Hrs  T(C): 36.7 (24 Apr 2025 12:09), Max: 38.1 (23 Apr 2025 14:00)  T(F): 98 (24 Apr 2025 12:09), Max: 100.6 (23 Apr 2025 14:00)  HR: 72 (24 Apr 2025 09:00) (63 - 96)  BP: 88/70 (24 Apr 2025 08:45) (59/18 - 172/103)  BP(mean): 78 (24 Apr 2025 08:45) (29 - 166)  ABP: --  ABP(mean): --  RR: 31 (24 Apr 2025 09:00) (13 - 48)  SpO2: 97% (24 Apr 2025 09:00) (80% - 100%)    O2 Parameters below as of 24 Apr 2025 07:00  Patient On (Oxygen Delivery Method): nasal cannula  O2 Flow (L/min): 3      04-23-25 @ 07:01  -  04-24-25 @ 07:00  --------------------------------------------------------  IN: 1176.4 mL / OUT: 315 mL / NET: 861.4 mL        CAPILLARY BLOOD GLUCOSE  POCT Blood Glucose.: 134 mg/dL (24 Apr 2025 12:11)      I&O's Summary    23 Apr 2025 07:01  -  24 Apr 2025 07:00  --------------------------------------------------------  IN: 1176.4 mL / OUT: 315 mL / NET: 861.4 mL        PHYSICAL EXAM:  General: anxious appearing  Neurology: awake, intermittently agitated, oriented x1  HEENT: NC/AT  Respiratory: course breath sounds b/l, no rales or rhonchi noted  Cardiovascular: RRR, normal S1S2, no m/r/g appreciated  Abdomen: + tenderness to deep palpation of epigastrium, otherwise soft, ND, +BS, no palpable masses  Extremities: WWP, no clubbing, cyanosis, or edema  Skin: warm/dry      MEDICATIONS  (STANDING):  aMIOdarone Infusion 0.501 mG/Min (16.7 mL/Hr) IV Continuous <Continuous>  aspirin  chewable 81 milliGRAM(s) Oral daily  atorvastatin 80 milliGRAM(s) Oral at bedtime  cefTRIAXone   IVPB 1000 milliGRAM(s) IV Intermittent once  chlorhexidine 2% Cloths 1 Application(s) Topical <User Schedule>  dexMEDEtomidine Infusion 0.2 MICROgram(s)/kG/Hr (4.04 mL/Hr) IV Continuous <Continuous>  dextrose 5%. 1000 milliLiter(s) (50 mL/Hr) IV Continuous <Continuous>  dextrose 5%. 1000 milliLiter(s) (100 mL/Hr) IV Continuous <Continuous>  dextrose 5%. 1000 milliLiter(s) (50 mL/Hr) IV Continuous <Continuous>  dextrose 50% Injectable 25 Gram(s) IV Push once  dextrose 50% Injectable 12.5 Gram(s) IV Push once  dextrose 50% Injectable 25 Gram(s) IV Push once  finasteride 5 milliGRAM(s) Oral daily  glucagon  Injectable 1 milliGRAM(s) IntraMuscular once  heparin  Infusion.  Unit(s)/Hr (15 mL/Hr) IV Continuous <Continuous>  insulin lispro (ADMELOG) corrective regimen sliding scale   SubCutaneous every 6 hours  mupirocin 2% Nasal 1 Application(s) Both Nostrils two times a day  norepinephrine Infusion 0.05 MICROgram(s)/kG/Min (7.57 mL/Hr) IV Continuous <Continuous>  pantoprazole  Injectable 40 milliGRAM(s) IV Push daily  tamsulosin 0.8 milliGRAM(s) Oral at bedtime                          14.3   17.56 )-----------( 173      ( 24 Apr 2025 05:52 )             43.3       04-24    142  |  106  |  87[H]  ----------------------------<  201[H]  4.1   |  22  |  3.20[H]    Ca    9.1      24 Apr 2025 05:52  Phos  5.4     04-24  Mg     2.5     04-24    TPro  5.8[L]  /  Alb  3.2[L]  /  TBili  2.2[H]  /  DBili  x   /  AST  77[H]  /  ALT  107[H]  /  AlkPhos  52  04-23    Lactate 4.6           04-24 @ 09:30    Lactate 5.0           04-23 @ 14:20      CARDIAC MARKERS ( 23 Apr 2025 05:30 )  x     / x     / x     / x     / <1.0 ng/mL  CARDIAC MARKERS ( 22 Apr 2025 16:10 )  x     / x     / x     / x     / 1.9 ng/mL      PTT - ( 24 Apr 2025 05:52 )  PTT:59.5 sec  Urinalysis Basic - ( 24 Apr 2025 05:52 )    Color: x / Appearance: x / SG: x / pH: x  Gluc: 201 mg/dL / Ketone: x  / Bili: x / Urobili: x   Blood: x / Protein: x / Nitrite: x   Leuk Esterase: x / RBC: x / WBC x   Sq Epi: x / Non Sq Epi: x / Bacteria: x        Rapid RVP Result: NotDetec (04-23 @ 10:20)        Radiology:    < from: Xray Chest 1 View- PORTABLE-Urgent (Xray Chest 1 View- PORTABLE-Urgent .) (04.23.25 @ 08:53) >    ACC: [id number] EXAM:  XR CHEST PORTABLE ROUTINE 1V   ORDERED BY:  ELISABETH NAIDU     ACC: [id number] EXAM:  XR CHEST PORTABLE URGENT 1V   ORDERED BY: GABO FUNES     PROCEDURE DATE:  04/23/2025          INTERPRETATION:  AP chest on April 22, 2025at 8:07 AM. 2 images. Patient   is short of breath.    Heart enlargement and left-sided defibrillator again noted. Diffuse mild   to moderate CHF again noted.    Findings are similar to April 21.    Follow-up AP chest on April 23, 2025 at 8:27 AM. Nochange.    IMPRESSION: Persistent CHF.    --- End of Report ---    < end of copied text >    < from: US Abdomen Upper Quadrant Right (04.24.25 @ 14:47) >    FINDINGS:  Liver: Within normal limits. There is hepatopedal flow in the main portal   vein  Bile ducts: Normal caliber. Common bile duct measures 5 mm.  Gallbladder: Distendedwith edematous wall thickening to 9 mm.  Pancreas: Obscured by gas.  Right kidney: 10.7 cm. No hydronephrosis. Cysts are again appreciated  Ascites: None.  IVC and aorta: Visualized portions are within normal limits.    Right pleural effusion seen.    IMPRESSION:  Distended gallbladder with wall thickening and edema without evidence of   cholelithiasis.  Right pleural effusion appreciated  findings discussed with CASSANDRA Garrett at 3:25 PM on 4/24/2025    --- End of Report ---    < end of copied text >  < from: Xray Chest 1 View- PORTABLE-Urgent (Xray Chest 1 View- PORTABLE-Urgent .) (04.23.25 @ 08:53) >  INTERPRETATION:  AP chest on April 22, 2025at 8:07 AM. 2 images. Patient   is short of breath.    Heart enlargement and left-sided defibrillator again noted. Diffuse mild   to moderate CHF again noted.    Findings are similar to April 21.    Follow-up AP chest on April 23, 2025 at 8:27 AM. Nochange.    IMPRESSION: Persistent CHF.    --- End of Report ---    < end of copied text >      Tubes/Lines:  -ramos catheter    GLOBAL ISSUE/BEST PRACTICE:  Analgesia: PRN  Sedation: N  HOB elevation: Y  Stress ulcer prophylaxis: Y  VTE prophylaxis: Y  Glycemic control: Y  Nutrition: NPO    CODE STATUS: DNR/DNI

## 2025-04-24 NOTE — PROGRESS NOTE ADULT - ASSESSMENT
89 y/o male with PMHx of CHF, HLD, HTN, prostate ca (s/p radioactive seed implantation in 2015) T2DM, PPM admitted for acute on chronic HFrEF.Upgraded to ICU after episode of NSVT and episode of afib with RVR complicated by flash pulmonary ededma, requiring heparin and amio gtt.    Neuro:  - Continue to wean off precedex  - Pt with anxiety, can consider starting buspar once able to tolerate PO    CV:  - Pt with afib vs VT, now in NSR  - PPM interrogation inconclusive, defibrillation function turned off  - c/w heparin gtt with eventual transition to DOAC  - c/w amio gtt, transition to PO once able to tolerate  - Acute on chronic HFrEF (LVEF 20-25%)  - Monitor off lasix, volume status improving  - Hold home coreg   - c/w levophed, titrate PRN to mantain MAP >65  - Hx CAD, c/w ASA and statin   -Troponins peaked and downtrending, can be demand 2/2 acute on chronic HFrEF    Pulm:  - Pt intially with acute hypoxemic respiratory failure 2/2 flash pulmonary edema  - Now saturating well on RA  - CXR show improving pulmonary vascular congestion  - Monitor off lasix, volume status improving  - Maintain SO2>92    GI:  - Pt with abdominal pain of unclear etiology  - f/u abdominal US  - f/u AM CMP  - NPO pending bedside dysphagia  - c/w PPI    Renal:  - HANNAH on CKD, likely pre-renal due to decreased EABV in setting of HFrEF  - Cr worsening this AM  - Hold home Entresto and dapagliflozin  - Monitor daily Cr  - Strict Is&Os  - Replete lytes PRN    ID:  - Sepsis of unclear etiology, possible PNA vs intra-abdominal source  - Intermittently febrile  - c/w rocephin, increase dose to 2g  - f/u BCx  - f/u abdominal US  - Tylenol PRN for fever  - Trend WBC and fever curve    Heme:  - Full AC with heparin gtt    Endo:  - Increase to MDISS q6h while NPO  - Goal -180    Dispo:  - DNR/DNI   89 y/o male with PMHx of CHF, HLD, HTN, prostate ca (s/p radioactive seed implantation in 2015) T2DM, PPM admitted for acute on chronic HFrEF. Upgraded to ICU after episode of NSVT and episode of afib with RVR complicated by flash pulmonary ededma, requiring heparin and amio gtt.    Neuro:  - Continue to wean off precedex  - Pt with anxiety, can consider starting buspar once able to tolerate PO    CV:  - Pt with afib vs VT, now in NSR  - PPM interrogation inconclusive, defibrillation function turned off  - c/w heparin gtt with eventual transition to DOAC  - c/w amio gtt, transition to PO once able to tolerate  - Acute on chronic HFrEF (LVEF 20-25%)  - Monitor off lasix, volume status improving  - Hold home coreg  - c/w levophed, titrate PRN to maintain MAP >65, requirements decreasing  - Hx CAD, c/w ASA and statin   - Troponins peaked and downtrending, can be demand 2/2 acute on chronic HFrEF  - Defibrillator function of pacemaker discontinued yesterday in line with DNR order, pacemaker function permitted to continue    Pulm:  - Pt initially with acute hypoxemic respiratory failure 2/2 flash pulmonary edema  - Now saturating well on 3LNC  - CXR show improving pulmonary vascular congestion  - Monitor off lasix, volume status improving  - Maintain SO2>92  - Patient demonstrating Cheyne-Dominguez respirations with periods of difficulty breathing and low O2 sat, but improved comfort on 3LNC    GI:  - Pt with abdominal tenderness on palpation of unclear etiology  - f/u abdominal US  - f/u AM CMP for improvement in LFTs  - NPO pending bedside dysphagia  - c/w PPI    Renal:  - HANNAH on CKD, likely pre-renal due to decreased EABV in setting of HFrEF  - Cr worsening this AM to 2.70, continue supportive care to optimize  - Hold home Entresto and dapagliflozin  - Monitor daily Cr  - Strict Is&Os  - Replete lytes PRN    ID:  - Sepsis of unclear etiology, possible PNA vs intra-abdominal source, follow abdominal u/s results  - Intermittently febrile  - c/w rocephin, increase dose to 2g  - f/u BCx  - f/u abdominal US  - Tylenol PRN for fever  - Trend WBC and fever curve, WBC 17.56 today and uptrending  - Repeat lactate slightly down from yesterday  - Pressor requirements decreasing  - Considered need to escalate antibiotics, will await ultrasound results and monitor for clinical worsening, patient seems clinically stable    Heme:  - Full AC with heparin gtt    Endo:  - Increase to MDISS q6h while NPO  - Goal -180    Dispo:  - DNR/DNI   89 y/o male with PMHx of CHF, HLD, HTN, prostate ca (s/p radioactive seed implantation in 2015) T2DM, PPM admitted for acute on chronic HFrEF. Upgraded to ICU after episode of NSVT and episode of afib with RVR complicated by flash pulmonary ededma, requiring heparin and amio gtt.    Neuro:  - Continue to wean off precedex  - Pt with anxiety, can consider starting buspar once able to tolerate PO    CV:  - Pt with afib vs VT, now in NSR  - PPM interrogation inconclusive, defibrillation function turned off  - c/w heparin gtt with eventual transition to DOAC  - c/w amio gtt, transition to PO once able to tolerate  - Acute on chronic HFrEF (LVEF 20-25%)  - Monitor off lasix, volume status improving  - Hold home coreg  - c/w levophed, titrate PRN to maintain MAP >65, requirements decreasing  - Hx CAD, c/w ASA and statin   - Troponins peaked and downtrending, can be demand 2/2 acute on chronic HFrEF  - Defibrillator function of pacemaker discontinued yesterday in line with DNR order, pacemaker function permitted to continue    Pulm:  - Pt initially with acute hypoxemic respiratory failure 2/2 flash pulmonary edema  - Now saturating well on 3LNC  - CXR show improving pulmonary vascular congestion  - Monitor off lasix, volume status improving  - Maintain SO2>92  - Patient demonstrating Cheyne-Dominguez respirations with periods of difficulty breathing and low O2 sat, but improved comfort on 3LNC    GI:  - Pt with abdominal tenderness on palpation of unclear etiology  - f/u abdominal US  - f/u AM CMP for improvement in LFTs  - NPO pending bedside dysphagia  - c/w PPI    Renal:  - HANNAH on CKD, likely pre-renal due to decreased EABV in setting of HFrEF vs ATN  - Cr worsening this AM to 2.70, continue supportive care to optimize  - Hold home Entresto and dapagliflozin  - Monitor daily Cr  - Strict I&Os  - Replete lytes PRN    ID:  - Sepsis of unclear etiology, possible PNA vs intra-abdominal source, follow abdominal u/s results  - Intermittently febrile  - c/w rocephin, increase dose to 2g  - f/u BCx  - f/u abdominal US  - Tylenol PRN for fever  - Trend WBC and fever curve, WBC 17.56 today and uptrending  - Repeat lactate slightly down from yesterday  - Pressor requirements decreasing  - Considered need to escalate antibiotics, will await ultrasound results and monitor for clinical worsening, patient seems clinically stable    Heme:  - Full AC with heparin gtt    Endo:  - Decrease to LDISS q6h while NPO  - Goal -180    Dispo:  - DNR/DNI

## 2025-04-24 NOTE — PROGRESS NOTE ADULT - SUBJECTIVE AND OBJECTIVE BOX
Patient is a 89y old  Male who presents with a chief complaint of SOB (24 Apr 2025 12:33)    pt seen and examine today confuse / deliriums  , npo  , family bedside.     INTERVAL HPI/OVERNIGHT EVENTS:     T(C): 36.7 (04-24-25 @ 12:09), Max: 38.1 (04-23-25 @ 14:00)  HR: 79 (04-24-25 @ 12:00) (63 - 91)  BP: 133/63 (04-24-25 @ 12:00) (59/18 - 172/103)  RR: 31 (04-24-25 @ 12:00) (13 - 48)  SpO2: 99% (04-24-25 @ 12:00) (80% - 100%)  Wt(kg): --  I&O's Summary    23 Apr 2025 07:01  -  24 Apr 2025 07:00  --------------------------------------------------------  IN: 1176.4 mL / OUT: 315 mL / NET: 861.4 mL        REVIEW OF SYSTEMS:  confuse / delirium     PHYSICAL EXAM:  GENERAL: NAD,    HEAD:  Atraumatic, Normocephalic  EYES: EOMI, PERRLA, conjunctiva and sclera clear  ENMT:  Moist mucous membranes  NECK: Supple, No JVD  NERVOUS SYSTEM:  confuse ; Motor Strength 5/5 B/L upper and lower extremities; DTRs 2+ intact and symmetric  CHEST/LUNG: percussion bilaterally decreased at  bl bases  ; No rales, rhonchi, wheezing,    HEART: Regular rate and rhythm; No murmurs,    ABDOMEN: Soft, Nontender, Nondistended; Bowel sounds present  EXTREMITIES:  2+ Peripheral Pulses, No clubbing, cyanosis, or edema  SKIN: No rashes or lesions      MEDICATIONS  (STANDING):  aMIOdarone Infusion 0.501 mG/Min (16.7 mL/Hr) IV Continuous <Continuous>  aspirin  chewable 81 milliGRAM(s) Oral daily  atorvastatin 80 milliGRAM(s) Oral at bedtime  cefTRIAXone   IVPB 1000 milliGRAM(s) IV Intermittent once  chlorhexidine 2% Cloths 1 Application(s) Topical <User Schedule>  dexMEDEtomidine Infusion 0.2 MICROgram(s)/kG/Hr (4.04 mL/Hr) IV Continuous <Continuous>  dextrose 5%. 1000 milliLiter(s) (50 mL/Hr) IV Continuous <Continuous>  dextrose 5%. 1000 milliLiter(s) (100 mL/Hr) IV Continuous <Continuous>  dextrose 5%. 1000 milliLiter(s) (50 mL/Hr) IV Continuous <Continuous>  dextrose 50% Injectable 25 Gram(s) IV Push once  dextrose 50% Injectable 12.5 Gram(s) IV Push once  dextrose 50% Injectable 25 Gram(s) IV Push once  finasteride 5 milliGRAM(s) Oral daily  glucagon  Injectable 1 milliGRAM(s) IntraMuscular once  heparin  Infusion.  Unit(s)/Hr (15 mL/Hr) IV Continuous <Continuous>  insulin lispro (ADMELOG) corrective regimen sliding scale   SubCutaneous every 6 hours  mupirocin 2% Nasal 1 Application(s) Both Nostrils two times a day  norepinephrine Infusion 0.05 MICROgram(s)/kG/Min (7.57 mL/Hr) IV Continuous <Continuous>  pantoprazole  Injectable 40 milliGRAM(s) IV Push daily  tamsulosin 0.8 milliGRAM(s) Oral at bedtime    MEDICATIONS  (PRN):  dextrose Oral Gel 15 Gram(s) Oral once PRN Blood Glucose LESS THAN 70 milliGRAM(s)/deciliter  heparin   Injectable 6500 Unit(s) IV Push every 6 hours PRN For aPTT less than 40  heparin   Injectable 3000 Unit(s) IV Push every 6 hours PRN For aPTT between 40 - 57      LABS:                        14.3   17.56 )-----------( 173      ( 24 Apr 2025 05:52 )             43.3     04-24    142  |  106  |  87[H]  ----------------------------<  201[H]  4.1   |  22  |  3.20[H]    Ca    9.1      24 Apr 2025 05:52  Phos  5.4     04-24  Mg     2.5     04-24    TPro  5.8[L]  /  Alb  3.2[L]  /  TBili  2.2[H]  /  DBili  x   /  AST  77[H]  /  ALT  107[H]  /  AlkPhos  52  04-23    PTT - ( 24 Apr 2025 05:52 )  PTT:59.5 sec  Urinalysis Basic - ( 24 Apr 2025 05:52 )    Color: x / Appearance: x / SG: x / pH: x  Gluc: 201 mg/dL / Ketone: x  / Bili: x / Urobili: x   Blood: x / Protein: x / Nitrite: x   Leuk Esterase: x / RBC: x / WBC x   Sq Epi: x / Non Sq Epi: x / Bacteria: x      CAPILLARY BLOOD GLUCOSE      POCT Blood Glucose.: 134 mg/dL (24 Apr 2025 12:11)  POCT Blood Glucose.: 137 mg/dL (24 Apr 2025 06:13)  POCT Blood Glucose.: 138 mg/dL (23 Apr 2025 23:43)  POCT Blood Glucose.: 187 mg/dL (23 Apr 2025 18:00)    ABG - ( 23 Apr 2025 12:03 )  pH, Arterial: 7.55  pH, Blood: x     /  pCO2: 19    /  pO2: 105   / HCO3: 17    / Base Excess: -5.8  /  SaO2: 99.3                      RADIOLOGY & ADDITIONAL TESTS:    Imaging Personally Reviewed:     no new test   Advance Directives:  dnr/dni    Palliative Care:  Appropriate

## 2025-04-24 NOTE — PROGRESS NOTE ADULT - ASSESSMENT
89 y/o male with PMHx of CHF, HLD, HTN, prostate ca (s/p radioactive seed implantation in 2015) T2DM, PPM presents to ER c/o SOB. Pt states that he has had intermittent SOB    systolic HF  OP  OA  Atelectasis  Pleural Eff  Dyspnea  eval ACS  HTN  HLD  hx of Prostate Ca  DM  PPM    dnr dni  on abx  vs noted  GOC documented  remains critically ill    fio2 wean  I apple  goal sat > 88 pct  trend Trops  eval ACS  Cardio eval  tele monitoring  replete lytes  I and O  cvs rx regimen optimization  TTE reviewed from prior visits - EF 20 pct  pleural eff - atelectasis - I apple - no indication for pleurocentesis at present  DM care - serial FS  GOC discussion

## 2025-04-24 NOTE — PROGRESS NOTE ADULT - ASSESSMENT
The patient is an 89 year old male with a history of HTN, HL, DM, CKD, chronic systolic heart failure s/p ICD prostate cancer who presents with shortness of breath in the setting of acute on chronic systolic heart failure.    Plan:  - ECG with sinus rhythm and LBBB  - Echo 3/20/25 with severely reduced LV systolic function (EF 20-25%), mod MR  - BNP 35333  - CXR with pulm congestion and small left pleural effusion  - Troponin elevated at 3785 in the setting of acute heart failure, HANNAH  - He had a cardiac catheterization in the past for work-up of his heart failure which showed mild non-obstructive CAD  - Hold carvedilol due to hypotension  - Continue aspirin 81 mg daily  - Continue atorvastatin 80 mg daily  - Hold furosemide - worsening HANNAH  - Ventricular tachycardia - EP follow-up. Transition to oral amiodarone if able. Tachytherapies have been deactivated on the ICD in light of the patient's goals of care.  - Reportedly had an episode of atrial fibrillation (strips not available). Continue amiodarone. Continue heparin drip with probable transition to DOAC at a later date.  - Febrile - possible PNA. On IV antibiotics.

## 2025-04-24 NOTE — PROGRESS NOTE ADULT - PROBLEM SELECTOR PLAN 2
New onset afib with RVR  -RTT called on 4/21 due to vtach and afib with RVR, patient found to be SOB with flash pulmonary edema and hypotensive; transferred to ICU and started on amiodarone  and levophed drip  -Continue amio gtt , heparin drip will switch oral   -Resume coreg once BP tolerates - now levophed   -Plan per ICU

## 2025-04-24 NOTE — PROGRESS NOTE ADULT - SUBJECTIVE AND OBJECTIVE BOX
Patient is a 89y old  Male who presents with a chief complaint of SOB (24 Apr 2025 20:30)    PAST MEDICAL & SURGICAL HISTORY:  NICM (nonischemic cardiomyopathy)      HTN (hypertension)      HLD (hyperlipidemia)      Prostate CA      T2DM (type 2 diabetes mellitus)      CHF (congestive heart failure)      H/O prostate biopsy      H/O cataract extraction      H/O inguinal hernia repair      AICD (automatic cardioverter/defibrillator) present        KURT CHING   89y    Male    BRIEF HOSPITAL COURSE:    Review of Systems:                       All other ROS are negative.    Allergies    No Known Allergies    Intolerances          ICU Vital Signs Last 24 Hrs  T(C): 35.7 (24 Apr 2025 23:09), Max: 37.1 (24 Apr 2025 17:06)  T(F): 96.3 (24 Apr 2025 23:09), Max: 98.7 (24 Apr 2025 17:06)  HR: 76 (24 Apr 2025 23:18) (63 - 83)  BP: 110/65 (24 Apr 2025 23:18) (59/18 - 172/103)  BP(mean): 77 (24 Apr 2025 23:18) (29 - 166)  ABP: --  ABP(mean): --  RR: 28 (24 Apr 2025 23:18) (14 - 41)  SpO2: 99% (24 Apr 2025 23:18) (80% - 100%)    O2 Parameters below as of 24 Apr 2025 07:00  Patient On (Oxygen Delivery Method): nasal cannula  O2 Flow (L/min): 3        Physical Examination:    General:     HEENT:     PULM:     CVS:     ABD:     EXT:     SKIN:     Neuro:    ABG - ( 23 Apr 2025 12:03 )  pH, Arterial: 7.55  pH, Blood: x     /  pCO2: 19    /  pO2: 105   / HCO3: 17    / Base Excess: -5.8  /  SaO2: 99.3                  LABS:                        14.3   17.56 )-----------( 173      ( 24 Apr 2025 05:52 )             43.3     04-24    142  |  106  |  87[H]  ----------------------------<  201[H]  4.1   |  22  |  3.20[H]    Ca    9.1      24 Apr 2025 05:52  Phos  5.4     04-24  Mg     2.5     04-24    TPro  6.7  /  Alb  3.8  /  TBili  2.4[H]  /  DBili  0.8[H]  /  AST  454[H]  /  ALT  590[H]  /  AlkPhos  59  04-24      CARDIAC MARKERS ( 23 Apr 2025 05:30 )  x     / x     / x     / x     / <1.0 ng/mL      CAPILLARY BLOOD GLUCOSE      POCT Blood Glucose.: 137 mg/dL (24 Apr 2025 18:15)  POCT Blood Glucose.: 134 mg/dL (24 Apr 2025 12:11)  POCT Blood Glucose.: 137 mg/dL (24 Apr 2025 06:13)    PTT - ( 24 Apr 2025 05:52 )  PTT:59.5 sec  Urinalysis Basic - ( 24 Apr 2025 05:52 )    Color: x / Appearance: x / SG: x / pH: x  Gluc: 201 mg/dL / Ketone: x  / Bili: x / Urobili: x   Blood: x / Protein: x / Nitrite: x   Leuk Esterase: x / RBC: x / WBC x   Sq Epi: x / Non Sq Epi: x / Bacteria: x      CULTURES:  Rapid RVP Result: NotDetec (04-23 @ 10:20)  Culture Results:   No growth at 24 hours (04-23 @ 09:45)  Culture Results:   No growth at 24 hours (04-23 @ 09:40)      Medications:  MEDICATIONS  (STANDING):  aMIOdarone Infusion 0.501 mG/Min (16.7 mL/Hr) IV Continuous <Continuous>  aspirin  chewable 81 milliGRAM(s) Oral daily  atorvastatin 80 milliGRAM(s) Oral at bedtime  chlorhexidine 2% Cloths 1 Application(s) Topical <User Schedule>  dexMEDEtomidine Infusion 0.2 MICROgram(s)/kG/Hr (4.04 mL/Hr) IV Continuous <Continuous>  dextrose 5%. 1000 milliLiter(s) (50 mL/Hr) IV Continuous <Continuous>  dextrose 5%. 1000 milliLiter(s) (100 mL/Hr) IV Continuous <Continuous>  dextrose 5%. 1000 milliLiter(s) (50 mL/Hr) IV Continuous <Continuous>  dextrose 50% Injectable 25 Gram(s) IV Push once  dextrose 50% Injectable 12.5 Gram(s) IV Push once  dextrose 50% Injectable 25 Gram(s) IV Push once  finasteride 5 milliGRAM(s) Oral daily  glucagon  Injectable 1 milliGRAM(s) IntraMuscular once  heparin  Infusion.  Unit(s)/Hr (15 mL/Hr) IV Continuous <Continuous>  insulin lispro (ADMELOG) corrective regimen sliding scale   SubCutaneous every 6 hours  mupirocin 2% Nasal 1 Application(s) Both Nostrils two times a day  norepinephrine Infusion 0.05 MICROgram(s)/kG/Min (7.57 mL/Hr) IV Continuous <Continuous>  OLANZapine Disintegrating Tablet 10 milliGRAM(s) Oral once  pantoprazole  Injectable 40 milliGRAM(s) IV Push daily  piperacillin/tazobactam IVPB.. 3.375 Gram(s) IV Intermittent every 12 hours  tamsulosin 0.8 milliGRAM(s) Oral at bedtime    MEDICATIONS  (PRN):  dextrose Oral Gel 15 Gram(s) Oral once PRN Blood Glucose LESS THAN 70 milliGRAM(s)/deciliter  heparin   Injectable 6500 Unit(s) IV Push every 6 hours PRN For aPTT less than 40  heparin   Injectable 3000 Unit(s) IV Push every 6 hours PRN For aPTT between 40 - 57        04-23 @ 07:01 - 04-24 @ 07:00  --------------------------------------------------------  IN: 1176.4 mL / OUT: 315 mL / NET: 861.4 mL    04-24 @ 07:01 - 04-24 @ 23:58  --------------------------------------------------------  IN: 911.2 mL / OUT: 365 mL / NET: 546.2 mL        RADIOLOGY/IMAGING/ECHO    Critical care point of care ultrasound:    Assessment/Plan:          CRITICAL CARE TIME SPENT: 37 minutes assessing presenting problems of acute illness, which pose high probability of life threatening deterioration or end organ damage/dysfunction, as well as medical decision making including initiating plan of care, reviewing data, reviewing radiologic exams, discussing with multidisciplinary team,  discussing goals of care with patient/family, and writing this note.  Non-inclusive of procedures performed,         Patient is a 89y old  Male who presents with a chief complaint of SOB (24 Apr 2025 20:30)    PAST MEDICAL & SURGICAL HISTORY:  NICM (nonischemic cardiomyopathy)      HTN (hypertension)      HLD (hyperlipidemia)      Prostate CA      T2DM (type 2 diabetes mellitus)      CHF (congestive heart failure)      H/O prostate biopsy      H/O cataract extraction      H/O inguinal hernia repair      AICD (automatic cardioverter/defibrillator) present        KURT CHING   89y    Male    BRIEF HOSPITAL COURSE:    Review of Systems:                       All other ROS are negative.    Allergies    No Known Allergies    Intolerances          ICU Vital Signs Last 24 Hrs  T(C): 35.7 (24 Apr 2025 23:09), Max: 37.1 (24 Apr 2025 17:06)  T(F): 96.3 (24 Apr 2025 23:09), Max: 98.7 (24 Apr 2025 17:06)  HR: 76 (24 Apr 2025 23:18) (63 - 83)  BP: 110/65 (24 Apr 2025 23:18) (59/18 - 172/103)  BP(mean): 77 (24 Apr 2025 23:18) (29 - 166)  ABP: --  ABP(mean): --  RR: 28 (24 Apr 2025 23:18) (14 - 41)  SpO2: 99% (24 Apr 2025 23:18) (80% - 100%)    O2 Parameters below as of 24 Apr 2025 07:00  Patient On (Oxygen Delivery Method): nasal cannula  O2 Flow (L/min): 3        Physical Examination:    General: Slightly agitated    HEENT: NCAT    PULM: CTA BL    CVS: S1S2 RRR    ABD: Soft NT ND    EXT: Compartments soft    SKIN: Warm and Dry    Neuro: Intact, RICHARDS, slight confusion at times    ABG - ( 23 Apr 2025 12:03 )  pH, Arterial: 7.55  pH, Blood: x     /  pCO2: 19    /  pO2: 105   / HCO3: 17    / Base Excess: -5.8  /  SaO2: 99.3                  LABS:                        14.3   17.56 )-----------( 173      ( 24 Apr 2025 05:52 )             43.3     04-24    142  |  106  |  87[H]  ----------------------------<  201[H]  4.1   |  22  |  3.20[H]    Ca    9.1      24 Apr 2025 05:52  Phos  5.4     04-24  Mg     2.5     04-24    TPro  6.7  /  Alb  3.8  /  TBili  2.4[H]  /  DBili  0.8[H]  /  AST  454[H]  /  ALT  590[H]  /  AlkPhos  59  04-24      CARDIAC MARKERS ( 23 Apr 2025 05:30 )  x     / x     / x     / x     / <1.0 ng/mL      CAPILLARY BLOOD GLUCOSE      POCT Blood Glucose.: 137 mg/dL (24 Apr 2025 18:15)  POCT Blood Glucose.: 134 mg/dL (24 Apr 2025 12:11)  POCT Blood Glucose.: 137 mg/dL (24 Apr 2025 06:13)    PTT - ( 24 Apr 2025 05:52 )  PTT:59.5 sec  Urinalysis Basic - ( 24 Apr 2025 05:52 )    Color: x / Appearance: x / SG: x / pH: x  Gluc: 201 mg/dL / Ketone: x  / Bili: x / Urobili: x   Blood: x / Protein: x / Nitrite: x   Leuk Esterase: x / RBC: x / WBC x   Sq Epi: x / Non Sq Epi: x / Bacteria: x      CULTURES:  Rapid RVP Result: NotDetec (04-23 @ 10:20)  Culture Results:   No growth at 24 hours (04-23 @ 09:45)  Culture Results:   No growth at 24 hours (04-23 @ 09:40)      Medications:  MEDICATIONS  (STANDING):  aMIOdarone Infusion 0.501 mG/Min (16.7 mL/Hr) IV Continuous <Continuous>  aspirin  chewable 81 milliGRAM(s) Oral daily  atorvastatin 80 milliGRAM(s) Oral at bedtime  chlorhexidine 2% Cloths 1 Application(s) Topical <User Schedule>  dexMEDEtomidine Infusion 0.2 MICROgram(s)/kG/Hr (4.04 mL/Hr) IV Continuous <Continuous>  dextrose 5%. 1000 milliLiter(s) (50 mL/Hr) IV Continuous <Continuous>  dextrose 5%. 1000 milliLiter(s) (100 mL/Hr) IV Continuous <Continuous>  dextrose 5%. 1000 milliLiter(s) (50 mL/Hr) IV Continuous <Continuous>  dextrose 50% Injectable 25 Gram(s) IV Push once  dextrose 50% Injectable 12.5 Gram(s) IV Push once  dextrose 50% Injectable 25 Gram(s) IV Push once  finasteride 5 milliGRAM(s) Oral daily  glucagon  Injectable 1 milliGRAM(s) IntraMuscular once  heparin  Infusion.  Unit(s)/Hr (15 mL/Hr) IV Continuous <Continuous>  insulin lispro (ADMELOG) corrective regimen sliding scale   SubCutaneous every 6 hours  mupirocin 2% Nasal 1 Application(s) Both Nostrils two times a day  norepinephrine Infusion 0.05 MICROgram(s)/kG/Min (7.57 mL/Hr) IV Continuous <Continuous>  OLANZapine Disintegrating Tablet 10 milliGRAM(s) Oral once  pantoprazole  Injectable 40 milliGRAM(s) IV Push daily  piperacillin/tazobactam IVPB.. 3.375 Gram(s) IV Intermittent every 12 hours  tamsulosin 0.8 milliGRAM(s) Oral at bedtime    MEDICATIONS  (PRN):  dextrose Oral Gel 15 Gram(s) Oral once PRN Blood Glucose LESS THAN 70 milliGRAM(s)/deciliter  heparin   Injectable 6500 Unit(s) IV Push every 6 hours PRN For aPTT less than 40  heparin   Injectable 3000 Unit(s) IV Push every 6 hours PRN For aPTT between 40 - 57        04-23 @ 07:01 - 04-24 @ 07:00  --------------------------------------------------------  IN: 1176.4 mL / OUT: 315 mL / NET: 861.4 mL    04-24 @ 07:01 - 04-24 @ 23:58  --------------------------------------------------------  IN: 911.2 mL / OUT: 365 mL / NET: 546.2 mL        RADIOLOGY/IMAGING/ECHO    Critical care point of care ultrasound:    Assessment/Plan:    1. NSVT / AFIB  2. Flash Pulm Edema  3. Hypotension  4. Acute agitated delirium     Remains on levophed for hypotension, titrate to MAP of 65  Precedex drip for agitation  Zyprexa ODT x 1 for anxiety and agitation  Surgery consulted for distended gallbladder in setting of hypotension  Cont zosyn  NPO  PPI  DVT ppx  Lasix daily  DNR DNI          CRITICAL CARE TIME SPENT: 37 minutes assessing presenting problems of acute illness, which pose high probability of life threatening deterioration or end organ damage/dysfunction, as well as medical decision making including initiating plan of care, reviewing data, reviewing radiologic exams, discussing with multidisciplinary team,  discussing goals of care with patient/family, and writing this note.  Non-inclusive of procedures performed,

## 2025-04-24 NOTE — PROGRESS NOTE ADULT - PROBLEM SELECTOR PLAN 3
arnulfo on ckd3  Per chart review pt baseline Cr is around 1.8  -Cr 2.1 on this admission  -Avoid nephrotoxic agents   -s/p 40mg IV Lasix in ED  - off lasix as worsening cr   -Nephro consulted dr solorio   -Plan per ICU

## 2025-04-24 NOTE — CASE MANAGEMENT PROGRESS NOTE - NSCMPROGRESSNOTE_GEN_ALL_CORE
Patient discussed during rounds and remains acute in ICU on 3L NC. Dx: AHRF and acute decompensated HF. Patient receiving IV Heparin, IVPB Rocephin, IV Precedex, and IV Levophed. CM will continue to collaborate with interdisciplinary team and remain available to assist.

## 2025-04-24 NOTE — PROGRESS NOTE ADULT - SUBJECTIVE AND OBJECTIVE BOX
Date/Time Patient Seen:  		  Referring MD:   Data Reviewed	       Patient is a 89y old  Male who presents with a chief complaint of SOB (23 Apr 2025 14:55)      Subjective/HPI     PAST MEDICAL & SURGICAL HISTORY:  NICM (nonischemic cardiomyopathy)    HTN (hypertension)    HLD (hyperlipidemia)    Prostate CA    T2DM (type 2 diabetes mellitus)    CHF (congestive heart failure)    H/O prostate biopsy    H/O cataract extraction    H/O inguinal hernia repair    AICD (automatic cardioverter/defibrillator) present          Medication list         MEDICATIONS  (STANDING):  aMIOdarone Infusion 0.501 mG/Min (16.7 mL/Hr) IV Continuous <Continuous>  aspirin  chewable 81 milliGRAM(s) Oral daily  atorvastatin 80 milliGRAM(s) Oral at bedtime  cefTRIAXone   IVPB 1000 milliGRAM(s) IV Intermittent every 24 hours  cefTRIAXone   IVPB      chlorhexidine 2% Cloths 1 Application(s) Topical <User Schedule>  dexMEDEtomidine Infusion 0.2 MICROgram(s)/kG/Hr (4.04 mL/Hr) IV Continuous <Continuous>  dextrose 5%. 1000 milliLiter(s) (100 mL/Hr) IV Continuous <Continuous>  dextrose 5%. 1000 milliLiter(s) (50 mL/Hr) IV Continuous <Continuous>  dextrose 50% Injectable 25 Gram(s) IV Push once  dextrose 50% Injectable 12.5 Gram(s) IV Push once  dextrose 50% Injectable 25 Gram(s) IV Push once  finasteride 5 milliGRAM(s) Oral daily  glucagon  Injectable 1 milliGRAM(s) IntraMuscular once  heparin  Infusion.  Unit(s)/Hr (15 mL/Hr) IV Continuous <Continuous>  insulin lispro (ADMELOG) corrective regimen sliding scale   SubCutaneous three times a day before meals  mupirocin 2% Nasal 1 Application(s) Both Nostrils two times a day  norepinephrine Infusion 0.05 MICROgram(s)/kG/Min (7.57 mL/Hr) IV Continuous <Continuous>  pantoprazole  Injectable 40 milliGRAM(s) IV Push daily  tamsulosin 0.8 milliGRAM(s) Oral at bedtime    MEDICATIONS  (PRN):  dextrose Oral Gel 15 Gram(s) Oral once PRN Blood Glucose LESS THAN 70 milliGRAM(s)/deciliter  heparin   Injectable 6500 Unit(s) IV Push every 6 hours PRN For aPTT less than 40  heparin   Injectable 3000 Unit(s) IV Push every 6 hours PRN For aPTT between 40 - 57         Vitals log        ICU Vital Signs Last 24 Hrs  T(C): 36.4 (24 Apr 2025 04:23), Max: 38.3 (23 Apr 2025 10:00)  T(F): 97.5 (24 Apr 2025 04:23), Max: 100.9 (23 Apr 2025 10:00)  HR: 70 (24 Apr 2025 03:45) (63 - 96)  BP: 171/160 (24 Apr 2025 03:45) (62/48 - 172/103)  BP(mean): 166 (24 Apr 2025 03:45) (49 - 166)  ABP: --  ABP(mean): --  RR: 31 (24 Apr 2025 03:45) (10 - 48)  SpO2: 91% (24 Apr 2025 03:45) (90% - 100%)    O2 Parameters below as of 24 Apr 2025 04:15  Patient On (Oxygen Delivery Method): room air                 Input and Output:  I&O's Detail    22 Apr 2025 07:01  -  23 Apr 2025 07:00  --------------------------------------------------------  IN:    Amiodarone: 83.5 mL    Amiodarone: 317.3 mL    Dexmedetomidine: 16.1 mL    Dexmedetomidine: 527.7 mL    Heparin Infusion: 226 mL    IV PiggyBack: 600 mL    IV PiggyBack: 100 mL    Norepinephrine: 262.4 mL  Total IN: 2133 mL    OUT:    Indwelling Catheter - Urethral (mL): 1470 mL  Total OUT: 1470 mL    Total NET: 663 mL      23 Apr 2025 07:01  -  24 Apr 2025 05:35  --------------------------------------------------------  IN:    Amiodarone: 350.7 mL    Dexmedetomidine: 303 mL    Heparin Infusion: 147 mL    IV PiggyBack: 100 mL    Norepinephrine: 86.3 mL  Total IN: 987 mL    OUT:    Indwelling Catheter - Urethral (mL): 165 mL  Total OUT: 165 mL    Total NET: 822 mL          Lab Data                        15.2   13.37 )-----------( 180      ( 23 Apr 2025 10:00 )             45.5     04-23    142  |  106  |  65[H]  ----------------------------<  261[H]  4.3   |  23  |  2.70[H]    Ca    9.4      23 Apr 2025 10:00  Phos  3.7     04-23  Mg     2.6     04-23    TPro  5.8[L]  /  Alb  3.2[L]  /  TBili  2.2[H]  /  DBili  x   /  AST  77[H]  /  ALT  107[H]  /  AlkPhos  52  04-23    ABG - ( 23 Apr 2025 12:03 )  pH, Arterial: 7.55  pH, Blood: x     /  pCO2: 19    /  pO2: 105   / HCO3: 17    / Base Excess: -5.8  /  SaO2: 99.3              CARDIAC MARKERS ( 23 Apr 2025 05:30 )  x     / x     / x     / x     / <1.0 ng/mL  CARDIAC MARKERS ( 22 Apr 2025 16:10 )  x     / x     / x     / x     / 1.9 ng/mL        Review of Systems	      Objective     Physical Examination    heart s1s2  lung dec bs  head nc  head at      Pertinent Lab findings & Imaging      Amberly:  NO   Adequate UO     I&O's Detail    22 Apr 2025 07:01  -  23 Apr 2025 07:00  --------------------------------------------------------  IN:    Amiodarone: 83.5 mL    Amiodarone: 317.3 mL    Dexmedetomidine: 16.1 mL    Dexmedetomidine: 527.7 mL    Heparin Infusion: 226 mL    IV PiggyBack: 600 mL    IV PiggyBack: 100 mL    Norepinephrine: 262.4 mL  Total IN: 2133 mL    OUT:    Indwelling Catheter - Urethral (mL): 1470 mL  Total OUT: 1470 mL    Total NET: 663 mL      23 Apr 2025 07:01  -  24 Apr 2025 05:35  --------------------------------------------------------  IN:    Amiodarone: 350.7 mL    Dexmedetomidine: 303 mL    Heparin Infusion: 147 mL    IV PiggyBack: 100 mL    Norepinephrine: 86.3 mL  Total IN: 987 mL    OUT:    Indwelling Catheter - Urethral (mL): 165 mL  Total OUT: 165 mL    Total NET: 822 mL               Discussed with:     Cultures:	        Radiology

## 2025-04-24 NOTE — PROGRESS NOTE ADULT - ASSESSMENT
HANNAH on CKD 3  CHF EF 20-25%  Dyspnea   HTN     -Baseline Creatinine 1.5  -HANNAH Likely 2/2 Decreased EABV, ATN  -Urine indices reviewed  -Has been on lasix but now on hold due to rising SCr and dropping bp, ok to give prn only  -Monitor resp status, currently stable  -Albumin prn for intermittent hypotension and need for active diuresis  -Monitor UOP  -No indication for RRT. Volume and lytes acceptable    D/w ICU and family at bedside     Renal critical care time spent > 32 min

## 2025-04-24 NOTE — PROGRESS NOTE ADULT - SUBJECTIVE AND OBJECTIVE BOX
Chief Complaint: Shortness of breath    Interval Events: No events overnight.    Review of Systems:  Unable to obtain.    Physical Exam:  Vital Signs Last 24 Hrs  T(C): 36.7 (24 Apr 2025 08:07), Max: 38.3 (23 Apr 2025 10:00)  T(F): 98 (24 Apr 2025 08:07), Max: 100.9 (23 Apr 2025 10:00)  HR: 72 (24 Apr 2025 09:00) (63 - 96)  BP: 88/70 (24 Apr 2025 08:45) (59/18 - 172/103)  BP(mean): 78 (24 Apr 2025 08:45) (29 - 166)  RR: 31 (24 Apr 2025 09:00) (10 - 48)  SpO2: 97% (24 Apr 2025 09:00) (80% - 100%)  Parameters below as of 24 Apr 2025 07:00  Patient On (Oxygen Delivery Method): nasal cannula  O2 Flow (L/min): 3  General: NAD  HEENT: MMM  Neck: No JVD, no carotid bruit  Lungs: CTAB  CV: RRR, nl S1/S2, no M/R/G  Abdomen: S/NT/ND, +BS  Extremities: No LE edema, no cyanosis  Neuro: AAOx0  Skin: No rash    Labs:    04-24    142  |  106  |  87[H]  ----------------------------<  201[H]  4.1   |  22  |  3.20[H]    Ca    9.1      24 Apr 2025 05:52  Phos  5.4     04-24  Mg     2.5     04-24    TPro  5.8[L]  /  Alb  3.2[L]  /  TBili  2.2[H]  /  DBili  x   /  AST  77[H]  /  ALT  107[H]  /  AlkPhos  52  04-23                        14.3   17.56 )-----------( 173      ( 24 Apr 2025 05:52 )             43.3       ECG/Telemetry: Sinus rhythm, PVCs

## 2025-04-24 NOTE — CONSULT NOTE ADULT - SUBJECTIVE AND OBJECTIVE BOX
SURGERY PA CONSULT NOTE:    CHIEF COMPLAINT:  Patient is a 89y old  Male who presents with a chief complaint of SOB (24 Apr 2025 13:48)    HPI FROM ED:   88 y/o male with PMHx of CHF, HLD, HTN, prostate ca (s/p radioactive seed implantation in 2015) T2DM, PPM presents to ER c/o SOB. Pt states that he has had intermittent SOB for the past few days, Pt notes he was visiting his wife today at rehab and felt SOB as he was leaving. He sat down to catch his breath, a staff member noticed he did not look well and they decided to call EMS. Pt notes he has had SOB both at rest and with exertion. Pt denies chest pain, fever, cough. Of note, pt was admitted 1 month ago for CHF exacerbation and also 1 week ago at [facility]. Pt adds a bunch of his medications were changed including his Entresto was dc'ed, Farxiga dosage was doubled and his Lasix was switched to Torsemide. Pt currently on NC and denies any SOB. Pt denies fever, chills, chest pain, palpitations, abdominal pain, nausea, vomiting, diarrhea.         PAST MEDICAL HISTORY:  PAST MEDICAL & SURGICAL HISTORY:  NICM (nonischemic cardiomyopathy)      HTN (hypertension)      HLD (hyperlipidemia)      Prostate CA      T2DM (type 2 diabetes mellitus)      CHF (congestive heart failure)      H/O prostate biopsy      H/O cataract extraction      H/O inguinal hernia repair      AICD (automatic cardioverter/defibrillator) present          PAST SURGICAL HISTORY:    REVIEW OF SYSTEMS:  General/Constitutional: No acute distress, no headache, weakness, fevers, or chills   HEENT: Denies auditory or visual changes/disturbances, no vertigo, no throat pain, no dysphagia    Neck: Denies neck pain/stiffness, denies swelling/lumps/hoarseness   Lymphatic: Denies lumps or swelling in the axillae, groin, or neck bilaterally   Respiratory: Denies cough/hemoptysis, denies wheezing/SOB/dyspnea  Cardiac: Denies chest pain, palpitations  Abdomen: Denies abdominal bloating/fullness, nausea or vomiting, denies abdominal pain  Extremities: Denies sores, swelling, discoloration bilat UE/LE  Genitourinary: Denies urinary issues or complaints, denies dysuria/hematuria  Neuro: Denies weakness, paraesthesias, paralysis, syncope, loss of vision  Skin: Denies pruritus, pain, rashes  Psych: Denies hallucinations, visual disturbances, or depression    MEDICATIONS:  Home Medications:  aspirin 81 mg oral tablet: orally once a day (21 Apr 2025 05:16)  atorvastatin 80 mg oral tablet: 1 tab(s) orally once a day (21 Apr 2025 05:16)  carvedilol 6.25 mg oral tablet: 0.5 tab(s) orally every 12 hours (21 Apr 2025 05:12)  Farxiga 10 mg oral tablet: 1 tab(s) orally once a day (21 Apr 2025 05:13)  finasteride 5 mg oral tablet: 1 tab(s) orally once a day (21 Apr 2025 05:16)  Flomax 0.4 mg oral capsule: 2 cap(s) orally once a day (at bedtime) (21 Apr 2025 05:16)  Torsemide:  (21 Apr 2025 05:14)    MEDICATIONS  (STANDING):  aMIOdarone Infusion 0.501 mG/Min (16.7 mL/Hr) IV Continuous <Continuous>  aspirin  chewable 81 milliGRAM(s) Oral daily  atorvastatin 80 milliGRAM(s) Oral at bedtime  chlorhexidine 2% Cloths 1 Application(s) Topical <User Schedule>  dexMEDEtomidine Infusion 0.2 MICROgram(s)/kG/Hr (4.04 mL/Hr) IV Continuous <Continuous>  dextrose 5%. 1000 milliLiter(s) (50 mL/Hr) IV Continuous <Continuous>  dextrose 5%. 1000 milliLiter(s) (100 mL/Hr) IV Continuous <Continuous>  dextrose 5%. 1000 milliLiter(s) (50 mL/Hr) IV Continuous <Continuous>  dextrose 50% Injectable 25 Gram(s) IV Push once  dextrose 50% Injectable 12.5 Gram(s) IV Push once  dextrose 50% Injectable 25 Gram(s) IV Push once  finasteride 5 milliGRAM(s) Oral daily  glucagon  Injectable 1 milliGRAM(s) IntraMuscular once  heparin  Infusion.  Unit(s)/Hr (15 mL/Hr) IV Continuous <Continuous>  insulin lispro (ADMELOG) corrective regimen sliding scale   SubCutaneous every 6 hours  mupirocin 2% Nasal 1 Application(s) Both Nostrils two times a day  norepinephrine Infusion 0.05 MICROgram(s)/kG/Min (7.57 mL/Hr) IV Continuous <Continuous>  pantoprazole  Injectable 40 milliGRAM(s) IV Push daily  piperacillin/tazobactam IVPB.. 3.375 Gram(s) IV Intermittent every 12 hours  tamsulosin 0.8 milliGRAM(s) Oral at bedtime    MEDICATIONS  (PRN):  dextrose Oral Gel 15 Gram(s) Oral once PRN Blood Glucose LESS THAN 70 milliGRAM(s)/deciliter  heparin   Injectable 6500 Unit(s) IV Push every 6 hours PRN For aPTT less than 40  heparin   Injectable 3000 Unit(s) IV Push every 6 hours PRN For aPTT between 40 - 57      ALLERGIES:  Allergies    No Known Allergies    Intolerances        SOCIAL HISTORY:  Social History:  Tobacco: Denies  EtOH: Denies  Recreational drug use: Denies  Lives with: Wife at home   Ambulates: Independently   ADLs: Independent (21 Apr 2025 03:55)    Smoking: Yes [ ]  No [ ]   ______pk yrs  ETOH  Yes [ ]  No [ ]  Social [ ]  DRUGS:  Yes [ ]  No [ ]  if so what______________    FAMILY HISTORY:  FAMILY HISTORY:  Family history of cardiomyopathy (Father)    FH: CHF (congestive heart failure) (Sibling)        VITAL SIGNS:  Vital Signs Last 24 Hrs  T(C): 37.1 (24 Apr 2025 17:06), Max: 37.3 (23 Apr 2025 23:42)  T(F): 98.7 (24 Apr 2025 17:06), Max: 99.1 (23 Apr 2025 23:42)  HR: 79 (24 Apr 2025 20:00) (63 - 86)  BP: 112/69 (24 Apr 2025 20:00) (59/18 - 172/103)  BP(mean): 84 (24 Apr 2025 20:00) (29 - 166)  RR: 31 (24 Apr 2025 20:00) (13 - 48)  SpO2: 96% (24 Apr 2025 20:00) (80% - 100%)    Parameters below as of 24 Apr 2025 07:00  Patient On (Oxygen Delivery Method): nasal cannula  O2 Flow (L/min): 3      PHYSICAL EXAM:  General: No acute distress, appears comfortable, well nourished, well-groomed, appears stated age  Head, Eyes, Ears, Nose, Throat: Normal cephalic/atraumatic, anicteric, conjunctiva-non injected and moist, vision grossly intact, hearing grossly intact, no nasal discharge, ears and nose symmetrical and atraumatic.  Nasal, oral, and oropharyngeal mucosa pink moist with no evidence of ulceration  Neck: Supple, carotids have good upstroke, trachea in the midline, without JVD or thyromegaly  Lymphatic: No evidence of masses or lymphadenopathy in the head, neck, trunk, axillary, inguinal, or supraclavicular regions  Chest: Lungs are clear to P&A, no wheezing, no rales, no ronchi, with good inspiratory effort  Heart: Heart rhythm regular, no murmurs  Abdomen: Soft, non tender, good bowel sounds present in all four quadrants.  No guarding, rebound, and no peritoneal signs.  No evidence of hepatosplenomegaly.  No evidence of abdominal wall hernias.  Inguinal regions are unremarkable with no evidence of hernias.   Extremity: No swelling, or open sores, no gross deformities,  good range of motion, 2+ peripheral pulses bilat UE/LE, no edema,  negative Ellen's sign, no lymphadenopathy  Neuro: Alert and oriented x3, motor and sensory intact  Psychiatric: Awake , alert, oriented x3 with an appropriate affect.   Skin: Good color, turgor, texture with no gross lesions, no eruptions, no rashes, no subcutaneous nodules and normal temperature.     LABS:                        14.3   17.56 )-----------( 173      ( 24 Apr 2025 05:52 )             43.3     04-24    142  |  106  |  87[H]  ----------------------------<  201[H]  4.1   |  22  |  3.20[H]    Ca    9.1      24 Apr 2025 05:52  Phos  5.4     04-24  Mg     2.5     04-24    TPro  6.7  /  Alb  3.8  /  TBili  2.4[H]  /  DBili  0.8[H]  /  AST  454[H]  /  ALT  590[H]  /  AlkPhos  59  04-24    PTT - ( 24 Apr 2025 05:52 )  PTT:59.5 sec  Urinalysis Basic - ( 24 Apr 2025 05:52 )    Color: x / Appearance: x / SG: x / pH: x  Gluc: 201 mg/dL / Ketone: x  / Bili: x / Urobili: x   Blood: x / Protein: x / Nitrite: x   Leuk Esterase: x / RBC: x / WBC x   Sq Epi: x / Non Sq Epi: x / Bacteria: x      LIVER FUNCTIONS - ( 24 Apr 2025 05:52 )  Alb: 3.8 g/dL / Pro: 6.7 g/dL / ALK PHOS: 59 U/L / ALT: 590 U/L / AST: 454 U/L / GGT: x             Culture - Blood (collected 23 Apr 2025 09:45)  Source: Blood Blood-Peripheral  Preliminary Report (24 Apr 2025 18:01):    No growth at 24 hours    Culture - Blood (collected 23 Apr 2025 09:40)  Source: Blood Blood-Peripheral  Preliminary Report (24 Apr 2025 18:01):    No growth at 24 hours        RADIOLOGY & ADDITIONAL STUDIES:    ASSESSMENT:    PLAN: SURGERY PA CONSULT NOTE:    CHIEF COMPLAINT:  Patient is a 89y old  Male who presents with a chief complaint of SOB (24 Apr 2025 13:48)    HPI FROM ED:   88 y/o male with PMHx of CHF, HLD, HTN, prostate ca (s/p radioactive seed implantation in 2015) T2DM, PPM presents to ER c/o SOB. Pt states that he has had intermittent SOB for the past few days, Pt notes he was visiting his wife today at rehab and felt SOB as he was leaving. He sat down to catch his breath, a staff member noticed he did not look well and they decided to call EMS. Pt notes he has had SOB both at rest and with exertion. Pt denies chest pain, fever, cough. Of note, pt was admitted 1 month ago for CHF exacerbation and also 1 week ago at [facility]. Pt adds a bunch of his medications were changed including his Entresto was dc'ed, Farxiga dosage was doubled and his Lasix was switched to Torsemide. Pt currently on NC and denies any SOB. Pt denies fever, chills, chest pain, palpitations, abdominal pain, nausea, vomiting, diarrhea.     Surgery consulted for transaminitis leukocytosis and lactic acidosis. RUQ sono obtained, demonstrating distended gallbladder w/ wall thickening. Pt without abdominal complaints at time of encounter.    PAST MEDICAL HISTORY:  PAST MEDICAL & SURGICAL HISTORY:  NICM (nonischemic cardiomyopathy)      HTN (hypertension)      HLD (hyperlipidemia)      Prostate CA      T2DM (type 2 diabetes mellitus)      CHF (congestive heart failure)      H/O prostate biopsy      H/O cataract extraction      H/O inguinal hernia repair      AICD (automatic cardioverter/defibrillator) present          PAST SURGICAL HISTORY:    REVIEW OF SYSTEMS:  General/Constitutional: No acute distress, no headache, weakness, fevers, or chills   HEENT: Denies auditory or visual changes/disturbances, no vertigo, no throat pain, no dysphagia    Neck: Denies neck pain/stiffness, denies swelling/lumps/hoarseness   Lymphatic: Denies lumps or swelling in the axillae, groin, or neck bilaterally   Respiratory: Denies cough/hemoptysis, denies wheezing/SOB/dyspnea  Cardiac: Denies chest pain, palpitations  Abdomen: Denies abdominal bloating/fullness, nausea or vomiting, denies abdominal pain  Extremities: Denies sores, swelling, discoloration bilat UE/LE  Genitourinary: Denies urinary issues or complaints, denies dysuria/hematuria  Neuro: Denies weakness, paraesthesias, paralysis, syncope, loss of vision  Skin: Denies pruritus, pain, rashes  Psych: Denies hallucinations, visual disturbances, or depression    MEDICATIONS:  Home Medications:  aspirin 81 mg oral tablet: orally once a day (21 Apr 2025 05:16)  atorvastatin 80 mg oral tablet: 1 tab(s) orally once a day (21 Apr 2025 05:16)  carvedilol 6.25 mg oral tablet: 0.5 tab(s) orally every 12 hours (21 Apr 2025 05:12)  Farxiga 10 mg oral tablet: 1 tab(s) orally once a day (21 Apr 2025 05:13)  finasteride 5 mg oral tablet: 1 tab(s) orally once a day (21 Apr 2025 05:16)  Flomax 0.4 mg oral capsule: 2 cap(s) orally once a day (at bedtime) (21 Apr 2025 05:16)  Torsemide:  (21 Apr 2025 05:14)    MEDICATIONS  (STANDING):  aMIOdarone Infusion 0.501 mG/Min (16.7 mL/Hr) IV Continuous <Continuous>  aspirin  chewable 81 milliGRAM(s) Oral daily  atorvastatin 80 milliGRAM(s) Oral at bedtime  chlorhexidine 2% Cloths 1 Application(s) Topical <User Schedule>  dexMEDEtomidine Infusion 0.2 MICROgram(s)/kG/Hr (4.04 mL/Hr) IV Continuous <Continuous>  dextrose 5%. 1000 milliLiter(s) (50 mL/Hr) IV Continuous <Continuous>  dextrose 5%. 1000 milliLiter(s) (100 mL/Hr) IV Continuous <Continuous>  dextrose 5%. 1000 milliLiter(s) (50 mL/Hr) IV Continuous <Continuous>  dextrose 50% Injectable 25 Gram(s) IV Push once  dextrose 50% Injectable 12.5 Gram(s) IV Push once  dextrose 50% Injectable 25 Gram(s) IV Push once  finasteride 5 milliGRAM(s) Oral daily  glucagon  Injectable 1 milliGRAM(s) IntraMuscular once  heparin  Infusion.  Unit(s)/Hr (15 mL/Hr) IV Continuous <Continuous>  insulin lispro (ADMELOG) corrective regimen sliding scale   SubCutaneous every 6 hours  mupirocin 2% Nasal 1 Application(s) Both Nostrils two times a day  norepinephrine Infusion 0.05 MICROgram(s)/kG/Min (7.57 mL/Hr) IV Continuous <Continuous>  pantoprazole  Injectable 40 milliGRAM(s) IV Push daily  piperacillin/tazobactam IVPB.. 3.375 Gram(s) IV Intermittent every 12 hours  tamsulosin 0.8 milliGRAM(s) Oral at bedtime    MEDICATIONS  (PRN):  dextrose Oral Gel 15 Gram(s) Oral once PRN Blood Glucose LESS THAN 70 milliGRAM(s)/deciliter  heparin   Injectable 6500 Unit(s) IV Push every 6 hours PRN For aPTT less than 40  heparin   Injectable 3000 Unit(s) IV Push every 6 hours PRN For aPTT between 40 - 57      ALLERGIES:  Allergies    No Known Allergies    Intolerances        SOCIAL HISTORY:  Social History:  Tobacco: Denies  EtOH: Denies  Recreational drug use: Denies  Lives with: Wife at home   Ambulates: Independently   ADLs: Independent (21 Apr 2025 03:55)    Smoking: Yes [ ]  No [ ]   ______pk yrs  ETOH  Yes [ ]  No [ ]  Social [ ]  DRUGS:  Yes [ ]  No [ ]  if so what______________    FAMILY HISTORY:  FAMILY HISTORY:  Family history of cardiomyopathy (Father)    FH: CHF (congestive heart failure) (Sibling)        VITAL SIGNS:  Vital Signs Last 24 Hrs  T(C): 37.1 (24 Apr 2025 17:06), Max: 37.3 (23 Apr 2025 23:42)  T(F): 98.7 (24 Apr 2025 17:06), Max: 99.1 (23 Apr 2025 23:42)  HR: 79 (24 Apr 2025 20:00) (63 - 86)  BP: 112/69 (24 Apr 2025 20:00) (59/18 - 172/103)  BP(mean): 84 (24 Apr 2025 20:00) (29 - 166)  RR: 31 (24 Apr 2025 20:00) (13 - 48)  SpO2: 96% (24 Apr 2025 20:00) (80% - 100%)    Parameters below as of 24 Apr 2025 07:00  Patient On (Oxygen Delivery Method): nasal cannula  O2 Flow (L/min): 3      PHYSICAL EXAM:  General: No acute distress, appears comfortable, well nourished, well-groomed, appears stated age  Head, Eyes, Ears, Nose, Throat: Normal cephalic/atraumatic, anicteric, conjunctiva-non injected and moist   Abdomen: Soft, nondistended, nontender; Negative richardson's sign; no rebound TTP, no guarding.  Extremity: No swelling, or open sores, no gross deformities,  good range of motion  Neuro: Alert and oriented x3, motor and sensory intact  Skin: Good color, turgor, texture with no gross lesions, no eruptions, no rashes, no subcutaneous nodules and normal temperature.     LABS:                        14.3   17.56 )-----------( 173      ( 24 Apr 2025 05:52 )             43.3     04-24    142  |  106  |  87[H]  ----------------------------<  201[H]  4.1   |  22  |  3.20[H]    Ca    9.1      24 Apr 2025 05:52  Phos  5.4     04-24  Mg     2.5     04-24    TPro  6.7  /  Alb  3.8  /  TBili  2.4[H]  /  DBili  0.8[H]  /  AST  454[H]  /  ALT  590[H]  /  AlkPhos  59  04-24    PTT - ( 24 Apr 2025 05:52 )  PTT:59.5 sec  Urinalysis Basic - ( 24 Apr 2025 05:52 )    Color: x / Appearance: x / SG: x / pH: x  Gluc: 201 mg/dL / Ketone: x  / Bili: x / Urobili: x   Blood: x / Protein: x / Nitrite: x   Leuk Esterase: x / RBC: x / WBC x   Sq Epi: x / Non Sq Epi: x / Bacteria: x      LIVER FUNCTIONS - ( 24 Apr 2025 05:52 )  Alb: 3.8 g/dL / Pro: 6.7 g/dL / ALK PHOS: 59 U/L / ALT: 590 U/L / AST: 454 U/L / GGT: x             Culture - Blood (collected 23 Apr 2025 09:45)  Source: Blood Blood-Peripheral  Preliminary Report (24 Apr 2025 18:01):    No growth at 24 hours    Culture - Blood (collected 23 Apr 2025 09:40)  Source: Blood Blood-Peripheral  Preliminary Report (24 Apr 2025 18:01):    No growth at 24 hours        RADIOLOGY & ADDITIONAL STUDIES:   ACC: [id number] EXAM:  US ABDOMEN RT UPR QUADRANT   ORDERED BY: FAIZA KEENAN   PROCEDURE DATE:  04/24/2025     COMPARISON: Renal ultrasound 3/20/2025     FINDINGS:  Liver: Within normal limits. There is hepatopedal flow in the main portal   vein  Bile ducts: Normal caliber. Common bile duct measures 5 mm.  Gallbladder: Distendedwith edematous wall thickening to 9 mm.  Pancreas: Obscured by gas.  Right kidney: 10.7 cm. No hydronephrosis. Cysts are again appreciated  Ascites: None.  IVC and aorta: Visualized portions are within normal limits.    Right pleural effusion seen.    IMPRESSION:  Distended gallbladder with wall thickening and edema without evidence of   cholelithiasis.  Right pleural effusion appreciated  findings discussed with CASSANDRA Garrett at 3:25 PM on 4/24/2025    --- End of Report ---  SARAH OMTTA MD;Attending Radiologist

## 2025-04-24 NOTE — PROGRESS NOTE ADULT - SUBJECTIVE AND OBJECTIVE BOX
[facility]                             Nephrology and Hypertension                             Kris  Jameson Ramires                                          [phone]     Patient is a 89y old  Male who presents with a chief complaint of SOB (21 Apr 2025 11:26)       HPI:  88 y/o male with PMHx of CHF, HLD, HTN, prostate ca (s/p radioactive seed implantation in 2015) T2DM, PPM presents to ER c/o SOB. Pt states that he has had intermittent SOB for the past few days, Pt notes he was visiting his wife today at rehab and felt SOB as he was leaving. He sat down to catch his breath, a staff member noticed he did not look well and they decided to call EMS. Pt notes he has had SOB both at rest and with exertion. Pt denies chest pain, fever, cough. Of note, pt was admitted 1 month ago for CHF exacerbation and also 1 week ago at [facility]. Pt adds a bunch of his medications were changed including his Entresto was dc'ed, Farxiga dosage was doubled and his Lasix was switched to Torsemide. Pt currently on NC and denies any SOB. Pt denies fever, chills, chest pain, palpitations, abdominal pain, nausea, vomiting, diarrhea.  Has not seen nephrologist.  States he is urinating.  Complains of dyspnea and orthopnea.      Off of BiPAP  Seen in ICU  Remains on pressors, is on sedation and amio gtt.     PAST MEDICAL & SURGICAL HISTORY:  NICM (nonischemic cardiomyopathy)      HTN (hypertension)      HLD (hyperlipidemia)      Prostate CA      T2DM (type 2 diabetes mellitus)      CHF (congestive heart failure)      H/O prostate biopsy      H/O cataract extraction      H/O inguinal hernia repair      AICD (automatic cardioverter/defibrillator) present           FAMILY HISTORY:  Family history of cardiomyopathy (Father)    FH: CHF (congestive heart failure) (Sibling)    NC    Social History:Non smoker    MEDICATIONS  (STANDING):  aspirin  chewable 81 milliGRAM(s) Oral daily  atorvastatin 80 milliGRAM(s) Oral at bedtime  carvedilol 3.125 milliGRAM(s) Oral every 12 hours  dextrose 5%. 1000 milliLiter(s) (100 mL/Hr) IV Continuous <Continuous>  dextrose 5%. 1000 milliLiter(s) (50 mL/Hr) IV Continuous <Continuous>  dextrose 50% Injectable 25 Gram(s) IV Push once  dextrose 50% Injectable 12.5 Gram(s) IV Push once  dextrose 50% Injectable 25 Gram(s) IV Push once  finasteride 5 milliGRAM(s) Oral daily  furosemide   Injectable 40 milliGRAM(s) IV Push daily  glucagon  Injectable 1 milliGRAM(s) IntraMuscular once  heparin   Injectable 5000 Unit(s) SubCutaneous every 12 hours  insulin lispro (ADMELOG) corrective regimen sliding scale   SubCutaneous three times a day before meals  insulin lispro (ADMELOG) corrective regimen sliding scale   SubCutaneous at bedtime  tamsulosin 0.8 milliGRAM(s) Oral at bedtime    MEDICATIONS  (PRN):  dextrose Oral Gel 15 Gram(s) Oral once PRN Blood Glucose LESS THAN 70 milliGRAM(s)/deciliter   Meds reviewed    Allergies    No Known Allergies    Intolerances         REVIEW OF SYSTEMS: as above per HPI    ICU Vital Signs Last 24 Hrs  T(C): 36.7 (24 Apr 2025 12:09), Max: 38.1 (23 Apr 2025 14:00)  T(F): 98 (24 Apr 2025 12:09), Max: 100.6 (23 Apr 2025 14:00)  HR: 79 (24 Apr 2025 12:00) (63 - 91)  BP: 133/63 (24 Apr 2025 12:00) (59/18 - 172/103)  BP(mean): 80 (24 Apr 2025 12:00) (29 - 166)  ABP: --  ABP(mean): --  RR: 31 (24 Apr 2025 12:00) (13 - 48)  SpO2: 99% (24 Apr 2025 12:00) (80% - 100%)    O2 Parameters below as of 24 Apr 2025 07:00  Patient On (Oxygen Delivery Method): nasal cannula  O2 Flow (L/min): 3      PHYSICAL EXAM:    GENERAL: NAD  HEAD:  Atraumatic, Normocephalic  EYES: EOMI, conjunctiva and sclera clear  ENMT: No Drainage from nares, No drainage from ears  NERVOUS SYSTEM:  Awake and Alert  CHEST/LUNG: decreased BS, some coarseness on left side   EXTREMITIES:  No Edema  SKIN: No rashes No obvious ecchymosis              LABS:                          14.3   17.56 )-----------( 173      ( 24 Apr 2025 05:52 )             43.3     04-24    142  |  106  |  87[H]  ----------------------------<  201[H]  4.1   |  22  |  3.20[H]    Ca    9.1      24 Apr 2025 05:52  Phos  5.4     04-24  Mg     2.5     04-24    TPro  5.8[L]  /  Alb  3.2[L]  /  TBili  2.2[H]  /  DBili  x   /  AST  77[H]  /  ALT  107[H]  /  AlkPhos  52  04-23    LIVER FUNCTIONS - ( 23 Apr 2025 05:30 )  Alb: 3.2 g/dL / Pro: 5.8 g/dL / ALK PHOS: 52 U/L / ALT: 107 U/L / AST: 77 U/L / GGT: x           PTT - ( 24 Apr 2025 05:52 )  PTT:59.5 sec  Urinalysis Basic - ( 24 Apr 2025 05:52 )    Color: x / Appearance: x / SG: x / pH: x  Gluc: 201 mg/dL / Ketone: x  / Bili: x / Urobili: x   Blood: x / Protein: x / Nitrite: x   Leuk Esterase: x / RBC: x / WBC x   Sq Epi: x / Non Sq Epi: x / Bacteria: x

## 2025-04-24 NOTE — PROGRESS NOTE ADULT - PROBLEM SELECTOR PLAN 1
acute on chronic HFrEF exacerbation   cause of acute hypoxic resp failure  with acute pulmonary edema   Pt with hx of chronic systolic congestive heart failure last admitted for CHF exacerbation in March to [facility] and SF last week-Pt with inc SOB at rest and on exertion   -CXR with pulmonary congestion and b/l pleural effusions  -Troponin 3051, 3543, pro-BNP 71435  -EKG: NSR with frequent PVC's, 90 BPM, QTc 518  -Vitals: BP: 135/73, HR: 95 , Temp: 98.2F , RR: 18 , SpO2: 94% on 6LNC  -on bipap   -s/p 40mg Lasix IVP in ED  -Previous TTE from March with severely reduced LV systolic function (EF 20-25%), mod MR  - Strict I&Os, daily weights, fluid restriction  - Remote tele, continuous pulse ox   -Pt recently switched from Lasix to Torsemide per SF discharge but is unsure of dosage   -C/w Lasix 40mg IVP bid - now off  iv  Lasix  - euvolemic   -- PPM interrogation inconclusive, defibrillation function turned off  -Cardio consulted, Dr. Boothe,   -Plan per ICU

## 2025-04-24 NOTE — CONSULT NOTE ADULT - ASSESSMENT
As in above full notation, unless countered below.  Mr. Dumas personally seen/ examined in ICU.  Complains of shortness of breath, but denies abdominal pain.  Vitals reviewed.  Abdomen soft without any appreciable tenderness at time of my examination.  Labs reviewed with leukocytosis and LFTs noted.  However, serum lactate continues to slowly improve.  RUQ sono:  Distended gallbladder with wall thickening and edema without evidence of cholelithiasis.  Clinically, without pain or tenderness and no stones on imaging, suspect LFTs and gallbladder thickening/ edema may be part of CHF/ hepatic congestion.  Surgically, stable at present, but await HIDA.  To continue current supportive care with consideration of advanced imaging in WBCs continue to progress and serum lactate stops improving.

## 2025-04-24 NOTE — PROGRESS NOTE ADULT - PROBLEM SELECTOR PLAN 6
Chronic   -On home Atorvastatin   -Continue home meds  -Lipid panel wnl  -Plan per ICU [No Acute Distress] : no acute distress [Well Developed] : well developed [Well Nourished] : well nourished [Well-Appearing] : well-appearing [Normal Sclera/Conjunctiva] : normal sclera/conjunctiva [PERRL] : pupils equal round and reactive to light [EOMI] : extraocular movements intact [Normal Outer Ear/Nose] : the outer ears and nose were normal in appearance [Normal Oropharynx] : the oropharynx was normal [No JVD] : no jugular venous distention [No Lymphadenopathy] : no lymphadenopathy [Supple] : supple [Thyroid Normal, No Nodules] : the thyroid was normal and there were no nodules present [No Respiratory Distress] : no respiratory distress  [No Accessory Muscle Use] : no accessory muscle use [Clear to Auscultation] : lungs were clear to auscultation bilaterally [Normal Rate] : normal rate  [Regular Rhythm] : with a regular rhythm [Normal S1, S2] : normal S1 and S2 [No Murmur] : no murmur heard [No Carotid Bruits] : no carotid bruits [No Abdominal Bruit] : a ~M bruit was not heard ~T in the abdomen [No Varicosities] : no varicosities [No Edema] : there was no peripheral edema [Pedal Pulses Present] : the pedal pulses are present [No Palpable Aorta] : no palpable aorta [No Extremity Clubbing/Cyanosis] : no extremity clubbing/cyanosis [Soft] : abdomen soft [Non Tender] : non-tender [Non-distended] : non-distended [No Masses] : no abdominal mass palpated [No HSM] : no HSM [Normal Bowel Sounds] : normal bowel sounds [Normal Posterior Cervical Nodes] : no posterior cervical lymphadenopathy [Normal Anterior Cervical Nodes] : no anterior cervical lymphadenopathy [No CVA Tenderness] : no CVA  tenderness [No Spinal Tenderness] : no spinal tenderness [No Joint Swelling] : no joint swelling [Grossly Normal Strength/Tone] : grossly normal strength/tone [No Rash] : no rash [Coordination Grossly Intact] : coordination grossly intact [No Focal Deficits] : no focal deficits [Normal Gait] : normal gait [Normal Affect] : the affect was normal [Deep Tendon Reflexes (DTR)] : deep tendon reflexes were 2+ and symmetric [Normal Insight/Judgement] : insight and judgment were intact

## 2025-04-24 NOTE — PROGRESS NOTE ADULT - ATTENDING COMMENTS
Do you mind to put in the tdap? Thank you! 88 yo man with Hx HFrEF, ICD, prostate cancer, CKD admitted with ADHF, overnight with episode of likely VT and acute pulmonary edema resulting in acute hypoxemic respiratory failure. Possible septic component, ?acalculous cholecystitis.    --agitated delirium improved, continue precedex  --cardiogenic shock v septic shock requiring low dose levophed, weaning down  new afib, and likely VT, continue amio gtt, AC with heparin gtt  CAD, continue ASA and statin  --acute hypoxemic respiratory failure from pulmonary edema  improving, tolerating NC  --HANNAH on CKD 3 worse, likely in setting of diuresis  hold diuresis  --NPO until mental status improves  --concern for sepsis with new fever and lactate, pneumonia v acalculous cholecystitis  HIDA scan  surgical eval  escalate CTX to zosyn  --DM2, continue ISS  --daughter updated  --DNR/DNI

## 2025-04-25 LAB
ALBUMIN SERPL ELPH-MCNC: 3.8 G/DL — SIGNIFICANT CHANGE UP (ref 3.3–5)
ALP SERPL-CCNC: 76 U/L — SIGNIFICANT CHANGE UP (ref 40–120)
ALT FLD-CCNC: 1455 U/L — HIGH (ref 12–78)
ANION GAP SERPL CALC-SCNC: 15 MMOL/L — SIGNIFICANT CHANGE UP (ref 5–17)
APTT BLD: 49.5 SEC — HIGH (ref 26.1–36.8)
APTT BLD: 90.4 SEC — HIGH (ref 26.1–36.8)
AST SERPL-CCNC: 964 U/L — HIGH (ref 15–37)
BASOPHILS # BLD AUTO: 0.04 K/UL — SIGNIFICANT CHANGE UP (ref 0–0.2)
BASOPHILS NFR BLD AUTO: 0.3 % — SIGNIFICANT CHANGE UP (ref 0–2)
BILIRUB SERPL-MCNC: 2.1 MG/DL — HIGH (ref 0.2–1.2)
BUN SERPL-MCNC: 99 MG/DL — HIGH (ref 7–23)
CALCIUM SERPL-MCNC: 9.5 MG/DL — SIGNIFICANT CHANGE UP (ref 8.5–10.1)
CHLORIDE SERPL-SCNC: 111 MMOL/L — HIGH (ref 96–108)
CO2 SERPL-SCNC: 19 MMOL/L — LOW (ref 22–31)
CREAT SERPL-MCNC: 3.7 MG/DL — HIGH (ref 0.5–1.3)
EGFR: 15 ML/MIN/1.73M2 — LOW
EGFR: 15 ML/MIN/1.73M2 — LOW
EOSINOPHIL # BLD AUTO: 0.04 K/UL — SIGNIFICANT CHANGE UP (ref 0–0.5)
EOSINOPHIL NFR BLD AUTO: 0.3 % — SIGNIFICANT CHANGE UP (ref 0–6)
GLUCOSE SERPL-MCNC: 212 MG/DL — HIGH (ref 70–99)
HCT VFR BLD CALC: 47.6 % — SIGNIFICANT CHANGE UP (ref 39–50)
HGB BLD-MCNC: 15.5 G/DL — SIGNIFICANT CHANGE UP (ref 13–17)
IMM GRANULOCYTES NFR BLD AUTO: 1.3 % — HIGH (ref 0–0.9)
LACTATE SERPL-SCNC: 5.1 MMOL/L — CRITICAL HIGH (ref 0.7–2)
LYMPHOCYTES # BLD AUTO: 15.7 % — SIGNIFICANT CHANGE UP (ref 13–44)
LYMPHOCYTES # BLD AUTO: 2.38 K/UL — SIGNIFICANT CHANGE UP (ref 1–3.3)
MAGNESIUM SERPL-MCNC: 2.8 MG/DL — HIGH (ref 1.6–2.6)
MCHC RBC-ENTMCNC: 31.7 PG — SIGNIFICANT CHANGE UP (ref 27–34)
MCHC RBC-ENTMCNC: 32.6 G/DL — SIGNIFICANT CHANGE UP (ref 32–36)
MCV RBC AUTO: 97.3 FL — SIGNIFICANT CHANGE UP (ref 80–100)
MONOCYTES # BLD AUTO: 1.25 K/UL — HIGH (ref 0–0.9)
MONOCYTES NFR BLD AUTO: 8.2 % — SIGNIFICANT CHANGE UP (ref 2–14)
NEUTROPHILS # BLD AUTO: 11.27 K/UL — HIGH (ref 1.8–7.4)
NEUTROPHILS NFR BLD AUTO: 74.2 % — SIGNIFICANT CHANGE UP (ref 43–77)
NRBC BLD AUTO-RTO: 0 /100 WBCS — SIGNIFICANT CHANGE UP (ref 0–0)
PHOSPHATE SERPL-MCNC: 7.3 MG/DL — HIGH (ref 2.5–4.5)
PLATELET # BLD AUTO: 175 K/UL — SIGNIFICANT CHANGE UP (ref 150–400)
POTASSIUM SERPL-MCNC: 3.9 MMOL/L — SIGNIFICANT CHANGE UP (ref 3.5–5.3)
POTASSIUM SERPL-SCNC: 3.9 MMOL/L — SIGNIFICANT CHANGE UP (ref 3.5–5.3)
PROT SERPL-MCNC: 7.2 G/DL — SIGNIFICANT CHANGE UP (ref 6–8.3)
RBC # BLD: 4.89 M/UL — SIGNIFICANT CHANGE UP (ref 4.2–5.8)
RBC # FLD: 15 % — HIGH (ref 10.3–14.5)
SODIUM SERPL-SCNC: 145 MMOL/L — SIGNIFICANT CHANGE UP (ref 135–145)
WBC # BLD: 15.17 K/UL — HIGH (ref 3.8–10.5)
WBC # FLD AUTO: 15.17 K/UL — HIGH (ref 3.8–10.5)

## 2025-04-25 PROCEDURE — 99291 CRITICAL CARE FIRST HOUR: CPT

## 2025-04-25 PROCEDURE — 99233 SBSQ HOSP IP/OBS HIGH 50: CPT

## 2025-04-25 PROCEDURE — 78226 HEPATOBILIARY SYSTEM IMAGING: CPT | Mod: 26

## 2025-04-25 PROCEDURE — 99233 SBSQ HOSP IP/OBS HIGH 50: CPT | Mod: GC

## 2025-04-25 RX ORDER — ASPIRIN 325 MG
300 TABLET ORAL DAILY
Refills: 0 | Status: DISCONTINUED | OUTPATIENT
Start: 2025-04-25 | End: 2025-04-26

## 2025-04-25 RX ORDER — METOPROLOL SUCCINATE 50 MG/1
5 TABLET, EXTENDED RELEASE ORAL EVERY 6 HOURS
Refills: 0 | Status: DISCONTINUED | OUTPATIENT
Start: 2025-04-25 | End: 2025-04-26

## 2025-04-25 RX ORDER — AMIODARONE HYDROCHLORIDE 50 MG/ML
0.5 INJECTION, SOLUTION INTRAVENOUS
Qty: 450 | Refills: 0 | Status: DISCONTINUED | OUTPATIENT
Start: 2025-04-25 | End: 2025-04-25

## 2025-04-25 RX ORDER — SINCALIDE 5 UG/5ML
1.6 INJECTION, POWDER, LYOPHILIZED, FOR SOLUTION INTRAVENOUS ONCE
Refills: 0 | Status: COMPLETED | OUTPATIENT
Start: 2025-04-25 | End: 2025-04-25

## 2025-04-25 RX ORDER — ALBUMIN (HUMAN) 12.5 G/50ML
100 INJECTION, SOLUTION INTRAVENOUS EVERY 6 HOURS
Refills: 0 | Status: COMPLETED | OUTPATIENT
Start: 2025-04-25 | End: 2025-04-25

## 2025-04-25 RX ADMIN — DEXMEDETOMIDINE HYDROCHLORIDE IN SODIUM CHLORIDE 4.04 MICROGRAM(S)/KG/HR: 4 INJECTION INTRAVENOUS at 02:38

## 2025-04-25 RX ADMIN — DEXMEDETOMIDINE HYDROCHLORIDE IN SODIUM CHLORIDE 4.04 MICROGRAM(S)/KG/HR: 4 INJECTION INTRAVENOUS at 08:48

## 2025-04-25 RX ADMIN — HEPARIN SODIUM 900 UNIT(S)/HR: 1000 INJECTION INTRAVENOUS; SUBCUTANEOUS at 08:37

## 2025-04-25 RX ADMIN — SINCALIDE 51.6 MICROGRAM(S): 5 INJECTION, POWDER, LYOPHILIZED, FOR SOLUTION INTRAVENOUS at 13:12

## 2025-04-25 RX ADMIN — Medication 40 MILLIGRAM(S): at 15:38

## 2025-04-25 RX ADMIN — OLANZAPINE 10 MILLIGRAM(S): 10 TABLET ORAL at 00:34

## 2025-04-25 RX ADMIN — Medication 300 MILLIGRAM(S): at 15:39

## 2025-04-25 RX ADMIN — Medication 25 GRAM(S): at 18:17

## 2025-04-25 RX ADMIN — INSULIN LISPRO 1: 100 INJECTION, SOLUTION INTRAVENOUS; SUBCUTANEOUS at 06:06

## 2025-04-25 RX ADMIN — ALBUMIN (HUMAN) 50 MILLILITER(S): 12.5 INJECTION, SOLUTION INTRAVENOUS at 18:17

## 2025-04-25 RX ADMIN — ALBUMIN (HUMAN) 50 MILLILITER(S): 12.5 INJECTION, SOLUTION INTRAVENOUS at 15:40

## 2025-04-25 RX ADMIN — MUPIROCIN CALCIUM 1 APPLICATION(S): 20 CREAM TOPICAL at 18:19

## 2025-04-25 RX ADMIN — INSULIN LISPRO 1: 100 INJECTION, SOLUTION INTRAVENOUS; SUBCUTANEOUS at 18:42

## 2025-04-25 RX ADMIN — METOPROLOL SUCCINATE 5 MILLIGRAM(S): 50 TABLET, EXTENDED RELEASE ORAL at 18:17

## 2025-04-25 RX ADMIN — Medication 25 GRAM(S): at 05:53

## 2025-04-25 RX ADMIN — Medication 1 APPLICATION(S): at 05:55

## 2025-04-25 RX ADMIN — HEPARIN SODIUM 3000 UNIT(S): 1000 INJECTION INTRAVENOUS; SUBCUTANEOUS at 08:42

## 2025-04-25 RX ADMIN — HEPARIN SODIUM 900 UNIT(S)/HR: 1000 INJECTION INTRAVENOUS; SUBCUTANEOUS at 19:21

## 2025-04-25 RX ADMIN — MUPIROCIN CALCIUM 1 APPLICATION(S): 20 CREAM TOPICAL at 05:54

## 2025-04-25 RX ADMIN — HEPARIN SODIUM 900 UNIT(S)/HR: 1000 INJECTION INTRAVENOUS; SUBCUTANEOUS at 16:04

## 2025-04-25 RX ADMIN — HEPARIN SODIUM 700 UNIT(S)/HR: 1000 INJECTION INTRAVENOUS; SUBCUTANEOUS at 06:53

## 2025-04-25 RX ADMIN — HEPARIN SODIUM 900 UNIT(S)/HR: 1000 INJECTION INTRAVENOUS; SUBCUTANEOUS at 15:58

## 2025-04-25 RX ADMIN — INSULIN LISPRO 1: 100 INJECTION, SOLUTION INTRAVENOUS; SUBCUTANEOUS at 15:18

## 2025-04-25 RX ADMIN — DEXMEDETOMIDINE HYDROCHLORIDE IN SODIUM CHLORIDE 4.04 MICROGRAM(S)/KG/HR: 4 INJECTION INTRAVENOUS at 15:56

## 2025-04-25 NOTE — CONSULT NOTE ADULT - CONSULT REASON
Initial Anesthesia Post-op Note    Patient: Sharri Gamble  Procedure(s) Performed: SUCTION DILATION AND CURETTAGE  Anesthesia type: No value filed.    Vitals Value Taken Time   Temp 36 02/09/23 1348   Pulse 72 02/09/23 1348   Resp 16 02/09/23 1348   SpO2 97 % 02/09/23 1348   /78 02/09/23 1345   Vitals shown include unvalidated device data.      Patient Location: PACU Phase 1  Post-op Vital Signs:stable  Level of Consciousness: participates in exam, awake and alert  Respiratory Status: spontaneous ventilation  Cardiovascular stable  Hydration: euvolemic  Pain Management: adequately managed  Handoff: Handoff to receiving clinician was performed and questions were answered  Vomiting: none  Nausea: None  Airway Patency:patent  Post-op Assessment: awake, alert, appropriately conversant, or baseline, no complications, patient tolerated procedure well with no complications, no evidence of recall, dentition within defined limits, moving all extremities and No Corneal Abrasion  Comments: #404 Anesthesiology Smoking Abstinence    The patient is a current smoker (e.g. cigarette, cigar, pipe, e-cigarette/vaping/marijuana)? No ()      #424 Perioperative Temperature Management    Anesthesia start to Anesthesia end time was 60 minutes or longer? No (4256F)      #477 Multimodal Pain Management    The Case is Emergent - Exclusion No Patient was administered multimodal pain management (2 or more, non-opioid, between 6 hours prior to anesthesia and discharge from PACU) ()      #430 ADULT prevention of Post-Operative Nausea & Vomiting (PONV) - Combination Therapy (18 years and older)    Patient received an inhalational anesthetic? No ()      #463 PEDIATRIC Prevention of Post-Operative Nausea & Vomiting (PONV) - Combination Therapy (17 years and younger)    The inhalational anesthetic was only used for induction? No Patient did NOT receive an inhalational anesthetic ()        Optimetrix Number:       No 
notable events documented.  
HANNAH
Transaminitis
abnormal ekg
Acute on chronic systolic heart failure, elevated troponin
dyspnea  pleural eff  atelectasis  o2 support
R/o acute cholecystitis/biliary pathology
SOB 2/2 acute pulmonary edema from CHF exacerbation
CHF Exacerbation

## 2025-04-25 NOTE — PROGRESS NOTE ADULT - ASSESSMENT
HANNAH on CKD 3  CHF EF 20-25%  Dyspnea   HTN   -Baseline Creatinine 1.5  -HANNAH Likely 2/2 Decreased EABV, ATN  -Urine indices reviewed  -Has been on lasix but now on hold due to rising SCr and dropping bp, ok to give prn only  -Monitor resp status, currently stable  -Albumin prn for intermittent hypotension and need for active diuresis  -Monitor UOP  -No indication for RRT. Volume and lytes acceptable     HANNAH on CKD 3  CHF EF 20-25%  Dyspnea   HTN   -Baseline Creatinine 1.5  -HANNAH Likely 2/2 Decreased EABV, ATN  -Urine indices reviewed  -Has been on lasix but now on hold due to rising SCr and dropping bp, ok to give prn only  -Monitor resp status, currently stable  -Albumin 100mg q6hrs x2  -Monitor UOP. Improving today.  -No indication for RRT. Volume and lytes acceptable     HANNAH on CKD 3  CHF EF 20-25%  Dyspnea   HTN   -Baseline Creatinine 1.5  -HANNAH Likely 2/2 Decreased EABV, ATN  -Urine indices reviewed  -Has been on lasix but now on hold due to rising SCr and dropping bp, ok to give prn only  -Monitor resp status, currently stable  -Albumin 100mg q6hrs x2  -Monitor UOP. Improving today.  -No indication for RRT. Volume and lytes acceptable and with UOP will hold off for now    d/w ICU    Critical Care time 35 minutes between seeing patient, discussing with staff, placing orders and reviewing chart

## 2025-04-25 NOTE — PROGRESS NOTE ADULT - PROBLEM SELECTOR PLAN 2
New onset afib with RVR  -RTT called on 4/21 due to vtach and afib with RVR, patient found to be SOB with flash pulmonary edema and hypotensive; transferred to ICU and s/p  amiodarone  and  s/p  levophed drip  -Continue amio gtt , heparin drip   - now off  levophed   -Plan per ICU

## 2025-04-25 NOTE — PROGRESS NOTE ADULT - ATTENDING COMMENTS
88 yo man with Hx HFrEF, ICD, prostate cancer, CKD admitted with ADHF, overnight with episode of likely VT and acute pulmonary edema resulting in acute hypoxemic respiratory failure. Now possible septic component, ?acalculous cholecystitis.    --agitated delirium improved, continue low dose precedex  --shock now resolved, off pressors  new afib, and likely VT, hold amio for transaminitis, restart low dose lopressor if BP tolerates, AC with heparin gtt  CAD, continue ASA and statin  --respiratory status acceptable, tolerating NC  --HANNAH on CKD 3 worse, likely in setting of diuresis  hold diuresis, UOP improved  --dysphagia, NPO until mental status improves  --concern for sepsis with new fever and lactate, pneumonia v acalculous cholecystitis  HIDA scan negative  continue zosyn  --DM2, continue ISS  --daughter updated  --DNR/DNI

## 2025-04-25 NOTE — CHART NOTE - NSCHARTNOTEFT_GEN_A_CORE
HIDA Scan is negative, no acute biliary pathology requiring surgical intervention.   Please reconsult as needed, plan per the primary team.

## 2025-04-25 NOTE — SOCIAL WORK PROGRESS NOTE - NSSWPROGRESSNOTE_GEN_ALL_CORE
Pt not in the room went for a hiatal scan test. Pt remains acute in the ICU at this time. Will remain available and continue to follow up.

## 2025-04-25 NOTE — CASE MANAGEMENT PROGRESS NOTE - NSCMPROGRESSNOTE_GEN_ALL_CORE
Patient discussed during rounds and remains acute in ICU on 3L NC. DX: AHRF, acute decompensated HF. No weekend discharge per ICU medical team. Patient receiving IV Heparin, IVPB Zosyn, IV Amiodarone, IV Precedex, IV Levophed. Patient for Hida scan today. CM will continue to collaborate with interdisciplinary team and remain available to assist.

## 2025-04-25 NOTE — PROGRESS NOTE ADULT - SUBJECTIVE AND OBJECTIVE BOX
Patient is a 89y old  Male who presents with a chief complaint of SOB (25 Apr 2025 11:40)    pt seen and examine today  npo , no fever  , pulse ox 100 on nc.   INTERVAL HPI/OVERNIGHT EVENTS:     T(C): 36.4 (04-25-25 @ 07:54), Max: 37.1 (04-24-25 @ 17:06)  HR: 111 (04-25-25 @ 09:30) (72 - 127)  BP: 115/76 (04-25-25 @ 09:30) (88/70 - 149/108)  RR: 32 (04-25-25 @ 09:30) (21 - 38)  SpO2: 100% (04-25-25 @ 09:30) (94% - 100%)  Wt(kg): --  I&O's Summary    24 Apr 2025 07:01  -  25 Apr 2025 07:00  --------------------------------------------------------  IN: 1130.8 mL / OUT: 845 mL / NET: 285.8 mL        REVIEW OF SYSTEMS:  confuse / delirium     PHYSICAL EXAM:  GENERAL: NAD,    HEAD:  Atraumatic, Normocephalic  EYES: EOMI, PERRLA, conjunctiva and sclera clear  ENMT:  Moist mucous membranes  NECK: Supple, No JVD  NERVOUS SYSTEM:  confuse ; Motor Strength 5/5 B/L upper and lower extremities; DTRs 2+ intact and symmetric  CHEST/LUNG: percussion bilaterally decreased at  bl bases No rales, rhonchi, wheezing,    HEART: Regular rate and rhythm; No murmurs,    ABDOMEN: Soft, Nontender, Nondistended; Bowel sounds present  EXTREMITIES:  2+ Peripheral Pulses, No clubbing, cyanosis, or edema  SKIN: No rashes or lesions    MEDICATIONS  (STANDING):  albumin human 25% IVPB 100 milliLiter(s) IV Intermittent every 6 hours  aspirin Suppository 300 milliGRAM(s) Rectal daily  chlorhexidine 2% Cloths 1 Application(s) Topical <User Schedule>  dexMEDEtomidine Infusion 0.2 MICROgram(s)/kG/Hr (4.04 mL/Hr) IV Continuous <Continuous>  dextrose 5%. 1000 milliLiter(s) (50 mL/Hr) IV Continuous <Continuous>  dextrose 5%. 1000 milliLiter(s) (100 mL/Hr) IV Continuous <Continuous>  dextrose 5%. 1000 milliLiter(s) (50 mL/Hr) IV Continuous <Continuous>  dextrose 50% Injectable 25 Gram(s) IV Push once  dextrose 50% Injectable 12.5 Gram(s) IV Push once  dextrose 50% Injectable 25 Gram(s) IV Push once  glucagon  Injectable 1 milliGRAM(s) IntraMuscular once  heparin  Infusion.  Unit(s)/Hr (15 mL/Hr) IV Continuous <Continuous>  insulin lispro (ADMELOG) corrective regimen sliding scale   SubCutaneous every 6 hours  mupirocin 2% Nasal 1 Application(s) Both Nostrils two times a day  pantoprazole  Injectable 40 milliGRAM(s) IV Push daily  piperacillin/tazobactam IVPB.. 3.375 Gram(s) IV Intermittent every 12 hours  tamsulosin 0.8 milliGRAM(s) Oral at bedtime    MEDICATIONS  (PRN):  dextrose Oral Gel 15 Gram(s) Oral once PRN Blood Glucose LESS THAN 70 milliGRAM(s)/deciliter  heparin   Injectable 6500 Unit(s) IV Push every 6 hours PRN For aPTT less than 40  heparin   Injectable 3000 Unit(s) IV Push every 6 hours PRN For aPTT between 40 - 57      LABS:                        15.5   15.17 )-----------( 175      ( 25 Apr 2025 05:30 )             47.6     04-25    145  |  111[H]  |  99[H]  ----------------------------<  212[H]  3.9   |  19[L]  |  3.70[H]    Ca    9.5      25 Apr 2025 05:30  Phos  7.3     04-25  Mg     2.8     04-25    TPro  7.2  /  Alb  3.8  /  TBili  2.1[H]  /  DBili  x   /  AST  964[H]  /  ALT  1455[H]  /  AlkPhos  76  04-25    PTT - ( 25 Apr 2025 05:30 )  PTT:49.5 sec  Urinalysis Basic - ( 25 Apr 2025 05:30 )    Color: x / Appearance: x / SG: x / pH: x  Gluc: 212 mg/dL / Ketone: x  / Bili: x / Urobili: x   Blood: x / Protein: x / Nitrite: x   Leuk Esterase: x / RBC: x / WBC x   Sq Epi: x / Non Sq Epi: x / Bacteria: x      CAPILLARY BLOOD GLUCOSE      POCT Blood Glucose.: 190 mg/dL (25 Apr 2025 06:01)  POCT Blood Glucose.: 141 mg/dL (25 Apr 2025 00:39)  POCT Blood Glucose.: 137 mg/dL (24 Apr 2025 18:15)      04-23 @ 09:45   No growth at 24 hours  --  --  04-23 @ 09:40   No growth at 24 hours  --  --          RADIOLOGY & ADDITIONAL TESTS:    Imaging Personally Reviewed:   bertha dotson

## 2025-04-25 NOTE — PROGRESS NOTE ADULT - ASSESSMENT
88 y/o male with PMHx of CHF, HLD, HTN, prostate ca (s/p radioactive seed implantation in 2015) T2DM, PPM presents to ER c/o SOB. Pt states that he has had intermittent SOB    systolic HF  OP  OA  Atelectasis  Pleural Eff  Dyspnea  eval ACS  HTN  HLD  hx of Prostate Ca  DM  PPM    dnr dni  on abx  vs noted  GOC documented  remains critically ill  surgery cx noted - HIDA scanned planned - US Abd noted -     fio2 wean  I apple  goal sat > 88 pct  trend Trops  eval ACS  Cardio eval  tele monitoring  replete lytes  I and O  cvs rx regimen optimization  TTE reviewed from prior visits - EF 20 pct  pleural eff - atelectasis - I apple - no indication for pleurocentesis at present  DM care - serial FS  GOC discussion

## 2025-04-25 NOTE — CONSULT NOTE ADULT - SUBJECTIVE AND OBJECTIVE BOX
[facility]  Uriel Onofre PA-C  [address] [place] [id number]  [phone]      Chief Complaint:  Patient is a 89y old  Male who presents with a chief complaint of SOB (2025 07:05)      HPI:  89 y/o male with PMHx of CHF, HLD, HTN, prostate ca (s/p radioactive seed implantation in 2015) T2DM, PPM presents to ER c/o SOB. Pt states that he has had intermittent SOB for the past few days, Pt notes he was visiting his wife today at rehab and felt SOB as he was leaving. He sat down to catch his breath, a staff member noticed he did not look well and they decided to call EMS. Pt notes he has had SOB both at rest and with exertion. Pt denies chest pain, fever, cough. Of note, pt was admitted 1 month ago for CHF exacerbation and also 1 week ago at [facility]. Pt adds a bunch of his medications were changed including his Entresto was dc'ed, Farxiga dosage was doubled and his Lasix was switched to Torsemide. Pt currently on NC and denies any SOB. Pt denies fever, chills, chest pain, palpitations, abdominal pain, nausea, vomiting, diarrhea.    Allergies:  No Known Allergies      Medications:  albumin human 25% IVPB 100 milliLiter(s) IV Intermittent every 6 hours  aspirin Suppository 300 milliGRAM(s) Rectal daily  chlorhexidine 2% Cloths 1 Application(s) Topical <User Schedule>  dexMEDEtomidine Infusion 0.2 MICROgram(s)/kG/Hr IV Continuous <Continuous>  dextrose 5%. 1000 milliLiter(s) IV Continuous <Continuous>  dextrose 5%. 1000 milliLiter(s) IV Continuous <Continuous>  dextrose 5%. 1000 milliLiter(s) IV Continuous <Continuous>  dextrose 50% Injectable 25 Gram(s) IV Push once  dextrose 50% Injectable 12.5 Gram(s) IV Push once  dextrose 50% Injectable 25 Gram(s) IV Push once  dextrose Oral Gel 15 Gram(s) Oral once PRN  glucagon  Injectable 1 milliGRAM(s) IntraMuscular once  heparin   Injectable 6500 Unit(s) IV Push every 6 hours PRN  heparin   Injectable 3000 Unit(s) IV Push every 6 hours PRN  heparin  Infusion.  Unit(s)/Hr IV Continuous <Continuous>  insulin lispro (ADMELOG) corrective regimen sliding scale   SubCutaneous every 6 hours  mupirocin 2% Nasal 1 Application(s) Both Nostrils two times a day  pantoprazole  Injectable 40 milliGRAM(s) IV Push daily  piperacillin/tazobactam IVPB.. 3.375 Gram(s) IV Intermittent every 12 hours  tamsulosin 0.8 milliGRAM(s) Oral at bedtime      PMHX/PSHX:  NICM (nonischemic cardiomyopathy)    HTN (hypertension)    HLD (hyperlipidemia)    Prostate CA    T2DM (type 2 diabetes mellitus)    CHF (congestive heart failure)    H/O prostate biopsy    H/O cataract extraction    H/O inguinal hernia repair    AICD (automatic cardioverter/defibrillator) present        Family history:  Family history of cardiomyopathy (Father)    FH: CHF (congestive heart failure) (Sibling)      ROS:   General:  No fevers, chills, night sweats, fatigue  Eyes:  Good vision, no reported pain  ENT:  No sore throat, pain, runny nose, dysphagia  CV:  No pain, palpitations, hypo/hypertension  Resp:  +SOB. No dyspnea, cough, tachypnea, wheezing  GI:  No pain, No nausea, No vomiting, No diarrhea, No constipation, No weight loss, No fever, No pruritis, No rectal bleeding, No tarry stools, No dysphagia  :  No pain, bleeding, incontinence, nocturia  Muscle:  No pain, weakness  Neuro:  No weakness, tingling, memory problems  Psych:  No fatigue, insomnia, mood problems, depression  Endocrine:  No polyuria, polydipsia, cold/heat intolerance  Heme:  No petechiae, ecchymosis, easy bruisability  Skin:  No rash, tattoos, scars, edema      PHYSICAL EXAM:   Vital Signs:  Vital Signs Last 24 Hrs  T(C): 36.4 (2025 07:54), Max: 37.1 (2025 17:06)  T(F): 97.5 (2025 07:54), Max: 98.7 (2025 17:06)  HR: 111 (2025 09:30) (72 - 127)  BP: 115/76 (2025 09:30) (88/70 - 149/108)  BP(mean): 88 (2025 09:30) (41 - 119)  RR: 32 (2025 09:30) (21 - 38)  SpO2: 100% (2025 09:30) (94% - 100%)      Daily     Daily Weight in k.7 (2025 06:00)    GENERAL:  Appears stated age  HEENT:  NC/AT  CHEST:  +Nasal cannula  HEART:  Regular rhythm  ABDOMEN:  Soft, non-tender, non-distended  EXTEREMITIES:  no cyanosis, clubbing or edema  SKIN:  No rash  NEURO:  Alert    LABS:                        15.5   15.17 )-----------( 175      ( 2025 05:30 )             47.6         145  |  111[H]  |  99[H]  ----------------------------<  212[H]  3.9   |  19[L]  |  3.70[H]    Ca    9.5      2025 05:30  Phos  7.3       Mg     2.8         TPro  7.2  /  Alb  3.8  /  TBili  2.1[H]  /  DBili  x   /  AST  964[H]  /  ALT  1455[H]  /  AlkPhos  76  0425    LIVER FUNCTIONS - ( 2025 05:30 )  Alb: 3.8 g/dL / Pro: 7.2 g/dL / ALK PHOS: 76 U/L / ALT: 1455 U/L / AST: 964 U/L / GGT: x           PTT - ( 2025 05:30 )  PTT:49.5 sec  Urinalysis Basic - ( 2025 05:30 )    Color: x / Appearance: x / SG: x / pH: x  Gluc: 212 mg/dL / Ketone: x  / Bili: x / Urobili: x   Blood: x / Protein: x / Nitrite: x   Leuk Esterase: x / RBC: x / WBC x   Sq Epi: x / Non Sq Epi: x / Bacteria: x      RADIOLOGY  < from: US Abdomen Upper Quadrant Right (25 @ 14:47) >    ACC: [id number] EXAM:  US ABDOMEN RT UPR QUADRANT   ORDERED BY: FAIZA KEENAN     PROCEDURE DATE:  2025          INTERPRETATION:  CLINICAL INFORMATION: Abdominal pain. Sepsis    COMPARISON: Renal ultrasound 3/20/2025    TECHNIQUE: Sonography of the right upper quadrant. Portable technique is   utilized in the ICU    FINDINGS:  Liver: Within normal limits. There is hepatopedal flow in the main portal   vein  Bile ducts: Normal caliber. Common bile duct measures 5 mm.  Gallbladder: Distendedwith edematous wall thickening to 9 mm.  Pancreas: Obscured by gas.  Right kidney: 10.7 cm. No hydronephrosis. Cysts are again appreciated  Ascites: None.  IVC and aorta: Visualized portions are within normal limits.    Right pleural effusion seen.    IMPRESSION:  Distended gallbladder with wall thickening and edema without evidence of   cholelithiasis.  Right pleural effusion appreciated  findings discussed with CASSANDRA Garrett at 3:25 PM on 2025    --- End of Report ---      SARAH MOTTA MD;Attending Radiologist  This document has been electronically signed. 2025  3:25PM    < end of copied text >

## 2025-04-25 NOTE — PROGRESS NOTE ADULT - PROBLEM SELECTOR PLAN 5
Assessment per SGNA guidelines.  Breathing non labored. Skin warm, dry, intact and appropriate for race.  Abdomen soft.     Chronic  -HOLD home Coreg 3.125 mg BID in setting of hypotension-s/p  levophed - bps table   -Plan per ICU Admission

## 2025-04-25 NOTE — CONSULT NOTE ADULT - CONSULT REQUESTED DATE/TIME
25-Apr-2025 11:41
24-Apr-2025 20:30
21-Apr-2025 06:53
21-Apr-2025 07:02
22-Apr-2025 10:53
21-Apr-2025 07:34
21-Apr-2025 21:30
21-Apr-2025 22:19

## 2025-04-25 NOTE — PROGRESS NOTE ADULT - SUBJECTIVE AND OBJECTIVE BOX
Chief Complaint: Shortness of breath    Interval Events: No events overnight.    Review of Systems:  Unable to obtain.    Physical Exam:  Vital Signs Last 24 Hrs  T(C): 36.4 (25 Apr 2025 07:54), Max: 37.1 (24 Apr 2025 17:06)  T(F): 97.5 (25 Apr 2025 07:54), Max: 98.7 (24 Apr 2025 17:06)  HR: 110 (25 Apr 2025 07:00) (72 - 127)  BP: 113/87 (25 Apr 2025 07:00) (71/44 - 149/108)  BP(mean): 96 (25 Apr 2025 07:00) (41 - 119)  RR: 33 (25 Apr 2025 07:00) (17 - 38)  SpO2: 99% (25 Apr 2025 07:00) (94% - 100%)  General: NAD  HEENT: MMM  Neck: No JVD, no carotid bruit  Lungs: CTAB  CV: RRR, nl S1/S2, no M/R/G  Abdomen: S/NT/ND, +BS  Extremities: No LE edema, no cyanosis  Neuro: AAOx0  Skin: No rash    Labs:    04-25    145  |  111[H]  |  99[H]  ----------------------------<  212[H]  3.9   |  19[L]  |  3.70[H]    Ca    9.5      25 Apr 2025 05:30  Phos  7.3     04-25  Mg     2.8     04-25    TPro  7.2  /  Alb  3.8  /  TBili  2.1[H]  /  DBili  x   /  AST  964[H]  /  ALT  1455[H]  /  AlkPhos  76  04-25                        15.5   15.17 )-----------( 175      ( 25 Apr 2025 05:30 )             47.6       ECG/Telemetry: Sinus rhythm, PVCs

## 2025-04-25 NOTE — PROGRESS NOTE ADULT - SUBJECTIVE AND OBJECTIVE BOX
NEPHROLOGY INTERVAL HPI/OVERNIGHT EVENTS:  HPI:  89 y/o male with PMHx of CHF, HLD, HTN, prostate ca (s/p radioactive seed implantation in ) T2DM, PPM presents to ER c/o SOB. Pt states that he has had intermittent SOB for the past few days, Pt notes he was visiting his wife today at rehab and felt SOB as he was leaving. He sat down to catch his breath, a staff member noticed he did not look well and they decided to call EMS. Pt notes he has had SOB both at rest and with exertion. Pt denies chest pain, fever, cough. Of note, pt was admitted 1 month ago for CHF exacerbation and also 1 week ago at [facility]. Pt adds a bunch of his medications were changed including his Entresto was dc'ed, Farxiga dosage was doubled and his Lasix was switched to Torsemide. Pt currently on NC and denies any SOB. Pt denies fever, chills, chest pain, palpitations, abdominal pain, nausea, vomiting, diarrhea.    ED Course:   Vitals: BP: 135/73, HR: 95 , Temp: 98.2F , RR: 18 , SpO2: 94% on 6LNC  Labs: BUN/Cr 56/2.1, Glucose 125, eGFR 30, Troponin 3051, pro-BNP 75019  CXR: official read pending   EKG: NSR with frequent PVC's, 90 BPM, QTc 518      Received in the ED:  Aspirin 324mg PO x1, Lasix 40mg IVP x1 (2025 03:55)      PAST MEDICAL & SURGICAL HISTORY:  NICM (nonischemic cardiomyopathy)      HTN (hypertension)      HLD (hyperlipidemia)      Prostate CA      T2DM (type 2 diabetes mellitus)      CHF (congestive heart failure)      H/O prostate biopsy      H/O cataract extraction      H/O inguinal hernia repair      AICD (automatic cardioverter/defibrillator) present          MEDICATIONS  (STANDING):  albumin human 25% IVPB 100 milliLiter(s) IV Intermittent every 6 hours  aspirin Suppository 300 milliGRAM(s) Rectal daily  chlorhexidine 2% Cloths 1 Application(s) Topical <User Schedule>  dexMEDEtomidine Infusion 0.2 MICROgram(s)/kG/Hr (4.04 mL/Hr) IV Continuous <Continuous>  dextrose 5%. 1000 milliLiter(s) (50 mL/Hr) IV Continuous <Continuous>  dextrose 5%. 1000 milliLiter(s) (100 mL/Hr) IV Continuous <Continuous>  dextrose 5%. 1000 milliLiter(s) (50 mL/Hr) IV Continuous <Continuous>  dextrose 50% Injectable 25 Gram(s) IV Push once  dextrose 50% Injectable 12.5 Gram(s) IV Push once  dextrose 50% Injectable 25 Gram(s) IV Push once  glucagon  Injectable 1 milliGRAM(s) IntraMuscular once  heparin  Infusion.  Unit(s)/Hr (15 mL/Hr) IV Continuous <Continuous>  insulin lispro (ADMELOG) corrective regimen sliding scale   SubCutaneous every 6 hours  mupirocin 2% Nasal 1 Application(s) Both Nostrils two times a day  pantoprazole  Injectable 40 milliGRAM(s) IV Push daily  piperacillin/tazobactam IVPB.. 3.375 Gram(s) IV Intermittent every 12 hours  tamsulosin 0.8 milliGRAM(s) Oral at bedtime    MEDICATIONS  (PRN):  dextrose Oral Gel 15 Gram(s) Oral once PRN Blood Glucose LESS THAN 70 milliGRAM(s)/deciliter  heparin   Injectable 6500 Unit(s) IV Push every 6 hours PRN For aPTT less than 40  heparin   Injectable 3000 Unit(s) IV Push every 6 hours PRN For aPTT between 40 - 57      Allergies    No Known Allergies    Intolerances        Vital Signs Last 24 Hrs  T(C): 36.4 (2025 07:54), Max: 37.1 (2025 17:06)  T(F): 97.5 (2025 07:54), Max: 98.7 (2025 17:06)  HR: 111 (2025 09:30) (72 - 127)  BP: 115/76 (2025 09:30) (88/70 - 149/108)  BP(mean): 88 (2025 09:30) (41 - 119)  RR: 32 (2025 09:30) (21 - 38)  SpO2: 100% (2025 09:30) (94% - 100%)      Daily     Daily Weight in k.7 (2025 06:00)    PHYSICAL EXAM:    GENERAL: NAD, well-groomed, well-developed  HEAD:  Atraumatic, Normocephalic  EYES: EOMI, PERRLA, conjunctiva and sclera clear  ENMT: No tonsillar erythema, exudates, or enlargement; Moist mucous membranes, Good dentition, No lesions  NECK: Supple, No JVD, Normal thyroid  NERVOUS SYSTEM:  Alert & Oriented X3, Good concentration; Motor Strength 5/5 B/L upper and lower extremities; DTRs 2+ intact and symmetric  CHEST/LUNG: Clear to percussion bilaterally; No rales, rhonchi, wheezing, or rubs  HEART: Regular rate and rhythm; No murmurs, rubs, or gallops  ABDOMEN: Soft, Nontender, Nondistended; Bowel sounds present  EXTREMITIES:  2+ Peripheral Pulses, No clubbing, cyanosis, or edema  SKIN: No rashes or lesions    LABS:                        15.5   15.17 )-----------( 175      ( 2025 05:30 )             47.6         145  |  111[H]  |  99[H]  ----------------------------<  212[H]  3.9   |  19[L]  |  3.70[H]    Ca    9.5      2025 05:30  Phos  7.3       Mg     2.8         TPro  7.2  /  Alb  3.8  /  TBili  2.1[H]  /  DBili  x   /  AST  964[H]  /  ALT  1455[H]  /  AlkPhos  76      PTT - ( 2025 05:30 )  PTT:49.5 sec  Urinalysis Basic - ( 2025 05:30 )    Color: x / Appearance: x / SG: x / pH: x  Gluc: 212 mg/dL / Ketone: x  / Bili: x / Urobili: x   Blood: x / Protein: x / Nitrite: x   Leuk Esterase: x / RBC: x / WBC x   Sq Epi: x / Non Sq Epi: x / Bacteria: x      Magnesium: 2.8 mg/dL ( @ 05:30)  Phosphorus: 7.3 mg/dL ( @ 05:30)          RADIOLOGY & ADDITIONAL TESTS:   NEPHROLOGY INTERVAL HPI/OVERNIGHT EVENTS:  HPI:  89 y/o male with PMHx of CHF, HLD, HTN, prostate ca (s/p radioactive seed implantation in ) T2DM, PPM presents to ER c/o SOB. Pt states that he has had intermittent SOB for the past few days, Pt notes he was visiting his wife today at rehab and felt SOB as he was leaving. He sat down to catch his breath, a staff member noticed he did not look well and they decided to call EMS. Pt notes he has had SOB both at rest and with exertion. Pt denies chest pain, fever, cough. Of note, pt was admitted 1 month ago for CHF exacerbation and also 1 week ago at [facility]. Pt adds a bunch of his medications were changed including his Entresto was dc'ed, Farxiga dosage was doubled and his Lasix was switched to Torsemide. Pt currently on NC and denies any SOB. Pt denies fever, chills, chest pain, palpitations, abdominal pain, nausea, vomiting, diarrhea.    Interval HPI: Unable to obtain any pertinent interval hx 2/2 AMS.       Received in the ED:  Aspirin 324mg PO x1, Lasix 40mg IVP x1 (2025 03:55)      PAST MEDICAL & SURGICAL HISTORY:  NICM (nonischemic cardiomyopathy)      HTN (hypertension)      HLD (hyperlipidemia)      Prostate CA      T2DM (type 2 diabetes mellitus)      CHF (congestive heart failure)      H/O prostate biopsy      H/O cataract extraction      H/O inguinal hernia repair      AICD (automatic cardioverter/defibrillator) present          MEDICATIONS  (STANDING):  albumin human 25% IVPB 100 milliLiter(s) IV Intermittent every 6 hours  aspirin Suppository 300 milliGRAM(s) Rectal daily  chlorhexidine 2% Cloths 1 Application(s) Topical <User Schedule>  dexMEDEtomidine Infusion 0.2 MICROgram(s)/kG/Hr (4.04 mL/Hr) IV Continuous <Continuous>  dextrose 5%. 1000 milliLiter(s) (50 mL/Hr) IV Continuous <Continuous>  dextrose 5%. 1000 milliLiter(s) (100 mL/Hr) IV Continuous <Continuous>  dextrose 5%. 1000 milliLiter(s) (50 mL/Hr) IV Continuous <Continuous>  dextrose 50% Injectable 25 Gram(s) IV Push once  dextrose 50% Injectable 12.5 Gram(s) IV Push once  dextrose 50% Injectable 25 Gram(s) IV Push once  glucagon  Injectable 1 milliGRAM(s) IntraMuscular once  heparin  Infusion.  Unit(s)/Hr (15 mL/Hr) IV Continuous <Continuous>  insulin lispro (ADMELOG) corrective regimen sliding scale   SubCutaneous every 6 hours  mupirocin 2% Nasal 1 Application(s) Both Nostrils two times a day  pantoprazole  Injectable 40 milliGRAM(s) IV Push daily  piperacillin/tazobactam IVPB.. 3.375 Gram(s) IV Intermittent every 12 hours  tamsulosin 0.8 milliGRAM(s) Oral at bedtime    MEDICATIONS  (PRN):  dextrose Oral Gel 15 Gram(s) Oral once PRN Blood Glucose LESS THAN 70 milliGRAM(s)/deciliter  heparin   Injectable 6500 Unit(s) IV Push every 6 hours PRN For aPTT less than 40  heparin   Injectable 3000 Unit(s) IV Push every 6 hours PRN For aPTT between 40 - 57      Allergies    No Known Allergies    Intolerances        Vital Signs Last 24 Hrs  T(C): 36.4 (2025 07:54), Max: 37.1 (2025 17:06)  T(F): 97.5 (2025 07:54), Max: 98.7 (2025 17:06)  HR: 111 (2025 09:30) (72 - 127)  BP: 115/76 (2025 09:30) (88/70 - 149/108)  BP(mean): 88 (2025 09:30) (41 - 119)  RR: 32 (2025 09:30) (21 - 38)  SpO2: 100% (2025 09:30) (94% - 100%)      Daily     Daily Weight in k.7 (2025 06:00)    PHYSICAL EXAM:    GENERAL: Elderly male, in no acute distress, ill appearing  HEAD:  Atraumatic, Normocephalic  NERVOUS SYSTEM:  Alert & Oriented X0.  CHEST/LUNG: Coarse BS bilaterally. No accessory m. use.   HEART: Regular rate and rhythm; No murmurs, rubs, or gallops  ABDOMEN: Soft, Nontender, Nondistended; Bowel sounds present  EXTREMITIES:  2+ Peripheral Pulses, No clubbing, cyanosis, or edema  SKIN: No rashes or lesions    LABS:                        15.5   15.17 )-----------( 175      ( 2025 05:30 )             47.6         145  |  111[H]  |  99[H]  ----------------------------<  212[H]  3.9   |  19[L]  |  3.70[H]    Ca    9.5      2025 05:30  Phos  7.3       Mg     2.8         TPro  7.2  /  Alb  3.8  /  TBili  2.1[H]  /  DBili  x   /  AST  964[H]  /  ALT  1455[H]  /  AlkPhos  76      PTT - ( 2025 05:30 )  PTT:49.5 sec  Urinalysis Basic - ( 2025 05:30 )    Color: x / Appearance: x / SG: x / pH: x  Gluc: 212 mg/dL / Ketone: x  / Bili: x / Urobili: x   Blood: x / Protein: x / Nitrite: x   Leuk Esterase: x / RBC: x / WBC x   Sq Epi: x / Non Sq Epi: x / Bacteria: x      Magnesium: 2.8 mg/dL ( @ 05:30)  Phosphorus: 7.3 mg/dL ( @ 05:30)          RADIOLOGY & ADDITIONAL TESTS:

## 2025-04-25 NOTE — CONSULT NOTE ADULT - NSCONSULTADDITIONALINFOA_GEN_ALL_CORE
I reviewed the overnight course of events on the unit, re-confirming the patient history. I discussed the care with the patient and their family  The plan of care was discussed with the physician assistant and modifications were made to the notation where appropriate.   Differential diagnosis and plan of care discussed with patient after the evaluation  35 minutes spent on total encounter of which more than fifty percent of the encounter was spent counseling and/or coordinating care by the attending physician.  Advanced care planning was discussed with patient and family.  Advanced care planning forms were reviewed and discussed.  Risks, benefits and alternatives of gastroenterologic procedures were discussed in detail and all questions were answered.

## 2025-04-25 NOTE — PROGRESS NOTE ADULT - ASSESSMENT
88 y/o M w/ PMHx of CHF, HLD, HTN, prostate ca (s/p radioactive seed implantation in 2015) T2DM, and PPM was admitted for SOB & questionable CHF exacerbation but also found to have transaminitis, leukocytosis and lactic acidosis.       1. Awaiting HIDA Scan  2. Continue supportive care per ICU  3. Awaiting remainder of AM labs  4. Discussed with Dr. Soto 89 y/o M w/ PMHx of CHF, HLD, HTN, prostate ca (s/p radioactive seed implantation in 2015) T2DM, and PPM was admitted for SOB & questionable CHF exacerbation but also found to have transaminitis, leukocytosis and lactic acidosis. Transaminitis is worsening as is the sCr (HANNAH)       1. Awaiting HIDA Scan  2. Continue supportive care per ICU  3. GI Evaluation for worsening transaminitis  4. Discussed with Dr. Soto 88 y/o M w/ PMHx of CHF, HLD, HTN, prostate ca (s/p radioactive seed implantation in 2015) T2DM, and PPM was admitted for SOB & questionable CHF exacerbation but also found to have transaminitis, leukocytosis and lactic acidosis. Transaminitis is worsening as is the sCr (HANNAH)     1. Awaiting HIDA Scan  2. Continue supportive care per ICU  3. GI Evaluation for worsening transaminitis  4. Discussed with Dr. Soto    As in above full notation, unless countered below.  Mr. Dumas personally seen/ examined during daily rounds.  Denies abdominal pain or GI complaints.  Vitals reviewed.  Abdomen soft without any tenderness.  Labs reassuring overall with decreasing WBCs, no shift and no acidosis on chemistry though LFTs worsening.  HIDA ongoing with visualization of gallbladder on my review of images- await completion of study and full report.  Clinically, suspect hepatic congestion from CHF.  Surgically, stable at present with no plans for General Surgery intervention if HIDA negative.  To continue current ICU supportive care.

## 2025-04-25 NOTE — PROGRESS NOTE ADULT - SUBJECTIVE AND OBJECTIVE BOX
Interval Events: Patient seen at bedside. No events overnight.    Review of Systems: unable to obtain due to sedation.    ICU Vital Signs Last 24 Hrs  T(C): 36.4 (25 Apr 2025 07:54), Max: 37.1 (24 Apr 2025 17:06)  T(F): 97.5 (25 Apr 2025 07:54), Max: 98.7 (24 Apr 2025 17:06)  HR: 111 (25 Apr 2025 09:30) (72 - 127)  BP: 115/76 (25 Apr 2025 09:30) (88/70 - 149/108)  BP(mean): 88 (25 Apr 2025 09:30) (41 - 119)  ABP: --  ABP(mean): --  RR: 32 (25 Apr 2025 09:30) (21 - 38)  SpO2: 100% (25 Apr 2025 09:30) (94% - 100%)      04-24-25 @ 07:01  -  04-25-25 @ 07:00  --------------------------------------------------------  IN: 1130.8 mL / OUT: 845 mL / NET: 285.8 mL        CAPILLARY BLOOD GLUCOSE  POCT Blood Glucose.: 190 mg/dL (25 Apr 2025 06:01)      I&O's Summary    24 Apr 2025 07:01  -  25 Apr 2025 07:00  --------------------------------------------------------  IN: 1130.8 mL / OUT: 845 mL / NET: 285.8 mL        Physical Exam:   Gen: Ill-appearing  Neuro: Patient lethargic, only briefly arousable.  HEENT: Dry mucous membranes  Resp: Rales diffusely  CVS: Tachycardic, no m/r/g, normal rate  Abd: Tenderness to deep palpation in epigastrium, NBS  Ext: No edema    Meds:  piperacillin/tazobactam IVPB.. IV Intermittent  dextrose 50% Injectable IV Push  dextrose 50% Injectable IV Push  dextrose 50% Injectable IV Push  dextrose Oral Gel Oral PRN  glucagon  Injectable IntraMuscular  insulin lispro (ADMELOG) corrective regimen sliding scale SubCutaneous  aspirin Suppository Rectal  dexMEDEtomidine Infusion IV Continuous  heparin   Injectable IV Push PRN  heparin   Injectable IV Push PRN  heparin  Infusion. IV Continuous  pantoprazole  Injectable IV Push  tamsulosin Oral  albumin human 25% IVPB IV Intermittent  dextrose 5%. IV Continuous  dextrose 5%. IV Continuous  dextrose 5%. IV Continuous  chlorhexidine 2% Cloths Topical  mupirocin 2% Nasal Both Nostrils  sincalide IVPB IV Intermittent                          15.5   15.17 )-----------( 175      ( 25 Apr 2025 05:30 )             47.6       04-25    145  |  111[H]  |  99[H]  ----------------------------<  212[H]  3.9   |  19[L]  |  3.70[H]    Ca    9.5      25 Apr 2025 05:30  Phos  7.3     04-25  Mg     2.8     04-25    TPro  7.2  /  Alb  3.8  /  TBili  2.1[H]  /  DBili  x   /  AST  964[H]  /  ALT  1455[H]  /  AlkPhos  76  04-25    Lactate 5.1           04-25 @ 10:30        PTT - ( 25 Apr 2025 05:30 )  PTT:49.5 sec  Urinalysis Basic - ( 25 Apr 2025 05:30 )    Color: x / Appearance: x / SG: x / pH: x  Gluc: 212 mg/dL / Ketone: x  / Bili: x / Urobili: x   Blood: x / Protein: x / Nitrite: x   Leuk Esterase: x / RBC: x / WBC x   Sq Epi: x / Non Sq Epi: x / Bacteria: x      Blood Blood-Peripheral   No growth at 24 hours -- 04-23 @ 09:45  Blood Blood-Peripheral   No growth at 24 hours -- 04-23 @ 09:40      Rapid RVP Result: NotDetec (04-23 @ 10:20)        Radiology:  < from: US Abdomen Upper Quadrant Right (04.24.25 @ 14:47) >    ACC: [id number] EXAM:  US ABDOMEN RT UPR QUADRANT   ORDERED BY: FAIZA KEENAN     PROCEDURE DATE:  04/24/2025          INTERPRETATION:  CLINICAL INFORMATION: Abdominal pain. Sepsis    COMPARISON: Renal ultrasound 3/20/2025    TECHNIQUE: Sonography of the right upper quadrant. Portable technique is   utilized in the ICU    FINDINGS:  Liver: Within normal limits. There is hepatopedal flow in the main portal   vein  Bile ducts: Normal caliber. Common bile duct measures 5 mm.  Gallbladder: Distendedwith edematous wall thickening to 9 mm.  Pancreas: Obscured by gas.  Right kidney: 10.7 cm. No hydronephrosis. Cysts are again appreciated  Ascites: None.  IVC and aorta: Visualized portions are within normal limits.    Right pleural effusion seen.    IMPRESSION:  Distended gallbladder with wall thickening and edema without evidence of   cholelithiasis.  Right pleural effusion appreciated  findings discussed with CASSANDRA Garrett at 3:25 PM on 4/24/2025    --- End of Report ---            SARAH MOTTA MD;Attending Radiologist  This document has been electronically signed. Apr 24 2025  3:25PM    < end of copied text >        Tubes/Lines:  - ramos    GLOBAL ISSUE/BEST PRACTICE:  Analgesia: N  Sedation: N  HOB elevation: Y  Stress ulcer prophylaxis: Y  VTE prophylaxis: Y  Glycemic control: Y  Nutrition: NPO except mouth swab, sips, chips    CODE STATUS: DNR/DNI Interval Events: Patient seen at bedside. Agitated overnight received Zyprexa.     Review of Systems: unable to obtain due to sedation and AMS.    ICU Vital Signs Last 24 Hrs  T(C): 36.4 (25 Apr 2025 07:54), Max: 37.1 (24 Apr 2025 17:06)  T(F): 97.5 (25 Apr 2025 07:54), Max: 98.7 (24 Apr 2025 17:06)  HR: 111 (25 Apr 2025 09:30) (72 - 127)  BP: 115/76 (25 Apr 2025 09:30) (88/70 - 149/108)  BP(mean): 88 (25 Apr 2025 09:30) (41 - 119)  ABP: --  ABP(mean): --  RR: 32 (25 Apr 2025 09:30) (21 - 38)  SpO2: 100% (25 Apr 2025 09:30) (94% - 100%)      04-24-25 @ 07:01  -  04-25-25 @ 07:00  --------------------------------------------------------  IN: 1130.8 mL / OUT: 845 mL / NET: 285.8 mL        CAPILLARY BLOOD GLUCOSE  POCT Blood Glucose.: 190 mg/dL (25 Apr 2025 06:01)      I&O's Summary    24 Apr 2025 07:01  -  25 Apr 2025 07:00  --------------------------------------------------------  IN: 1130.8 mL / OUT: 845 mL / NET: 285.8 mL        Physical Exam:   Gen: Ill-appearing, lethargic   Neuro: Patient lethargic, only briefly arousable  HEENT: Dry mucous membranes  Resp: Rhonchi b/l  CVS: Tachycardic, no m/r/g, normal rate  Abd: Tenderness to deep palpation in epigastrium, +bowel sounds  Ext: No edema    Meds:  piperacillin/tazobactam IVPB.. IV Intermittent  dextrose 50% Injectable IV Push  dextrose 50% Injectable IV Push  dextrose 50% Injectable IV Push  dextrose Oral Gel Oral PRN  glucagon  Injectable IntraMuscular  insulin lispro (ADMELOG) corrective regimen sliding scale SubCutaneous  aspirin Suppository Rectal  dexMEDEtomidine Infusion IV Continuous  heparin   Injectable IV Push PRN  heparin   Injectable IV Push PRN  heparin  Infusion. IV Continuous  pantoprazole  Injectable IV Push  tamsulosin Oral  albumin human 25% IVPB IV Intermittent  dextrose 5%. IV Continuous  dextrose 5%. IV Continuous  dextrose 5%. IV Continuous  chlorhexidine 2% Cloths Topical  mupirocin 2% Nasal Both Nostrils  sincalide IVPB IV Intermittent                          15.5   15.17 )-----------( 175      ( 25 Apr 2025 05:30 )             47.6       04-25    145  |  111[H]  |  99[H]  ----------------------------<  212[H]  3.9   |  19[L]  |  3.70[H]    Ca    9.5      25 Apr 2025 05:30  Phos  7.3     04-25  Mg     2.8     04-25    TPro  7.2  /  Alb  3.8  /  TBili  2.1[H]  /  DBili  x   /  AST  964[H]  /  ALT  1455[H]  /  AlkPhos  76  04-25    Lactate 5.1           04-25 @ 10:30        PTT - ( 25 Apr 2025 05:30 )  PTT:49.5 sec  Urinalysis Basic - ( 25 Apr 2025 05:30 )    Color: x / Appearance: x / SG: x / pH: x  Gluc: 212 mg/dL / Ketone: x  / Bili: x / Urobili: x   Blood: x / Protein: x / Nitrite: x   Leuk Esterase: x / RBC: x / WBC x   Sq Epi: x / Non Sq Epi: x / Bacteria: x      Blood Blood-Peripheral   No growth at 24 hours -- 04-23 @ 09:45  Blood Blood-Peripheral   No growth at 24 hours -- 04-23 @ 09:40      Rapid RVP Result: NotDetec (04-23 @ 10:20)        Radiology:  < from: US Abdomen Upper Quadrant Right (04.24.25 @ 14:47) >    ACC: [id number] EXAM:  US ABDOMEN RT UPR QUADRANT   ORDERED BY: FAIZA KEENAN     PROCEDURE DATE:  04/24/2025          INTERPRETATION:  CLINICAL INFORMATION: Abdominal pain. Sepsis    COMPARISON: Renal ultrasound 3/20/2025    TECHNIQUE: Sonography of the right upper quadrant. Portable technique is   utilized in the ICU    FINDINGS:  Liver: Within normal limits. There is hepatopedal flow in the main portal   vein  Bile ducts: Normal caliber. Common bile duct measures 5 mm.  Gallbladder: Distended with edematous wall thickening to 9 mm.  Pancreas: Obscured by gas.  Right kidney: 10.7 cm. No hydronephrosis. Cysts are again appreciated  Ascites: None.  IVC and aorta: Visualized portions are within normal limits.    Right pleural effusion seen.    IMPRESSION:  Distended gallbladder with wall thickening and edema without evidence of   cholelithiasis.  Right pleural effusion appreciated  findings discussed with CASSANDRA Garrett at 3:25 PM on 4/24/2025    --- End of Report ---            SARAH MOTTA MD;Attending Radiologist  This document has been electronically signed. Apr 24 2025  3:25PM    < end of copied text >        Tubes/Lines: Indwelling    GLOBAL ISSUE/BEST PRACTICE:  Analgesia: N  Sedation: Y  HOB elevation: Y  Stress ulcer prophylaxis: Y  VTE prophylaxis: Y  Glycemic control: Y  Nutrition: NPO except mouth swab, sips, chips    CODE STATUS: DNR/DNI

## 2025-04-25 NOTE — CONSULT NOTE ADULT - ASSESSMENT
Transaminitis  NSVT  A-fib RVR  CHF exacerbation  Pulmonary edema  PMHx of CHF, HLD, HTN, prostate ca (s/p radioactive seed implantation in 2015)   Hx T2DM  Hx PPM admitted for acute on chronic HFrEF    4/24 US abdomen RUQ: Distended gallbladder with wall thickening and edema without evidence of cholelithiasis.    Plan:  - LFTs noted  - Trend LFT  - Suspect shock liver from CHF exacerbation/pulmonary edema  - US noted  - Follow up HIDA  - Follow surgery recs  - Avoid hepatotoxic medications  - Will follow

## 2025-04-25 NOTE — PROGRESS NOTE ADULT - ASSESSMENT
The patient is an 89 year old male with a history of HTN, HL, DM, CKD, chronic systolic heart failure s/p ICD prostate cancer who presents with shortness of breath in the setting of acute on chronic systolic heart failure.    Plan:  - ECG with sinus rhythm and LBBB  - Echo 3/20/25 with severely reduced LV systolic function (EF 20-25%), mod MR  - BNP 34112  - CXR with pulm congestion and small left pleural effusion  - Troponin elevated at 3785 in the setting of acute heart failure, HANNAH  - He had a cardiac catheterization in the past for work-up of his heart failure which showed mild non-obstructive CAD  - Hold carvedilol due to hypotension  - Continue aspirin 81 mg daily  - Continue atorvastatin 80 mg daily  - Hold furosemide - worsening HANNAH  - Ventricular tachycardia - EP follow-up. Tachytherapies have been deactivated on the ICD in light of the patient's goals of care.  - Worsening LFTs - possibly due to shock liver. However, would consider holding amiodarone for now and observing rhythm off of antiarrhythmics.  - Reportedly had an episode of atrial fibrillation (strips not available). Continue heparin drip with probable transition to DOAC at a later date.  - On IV antibiotics

## 2025-04-25 NOTE — PROGRESS NOTE ADULT - SUBJECTIVE AND OBJECTIVE BOX
Date/Time Patient Seen:  		  Referring MD:   Data Reviewed	       Patient is a 89y old  Male who presents with a chief complaint of SOB (24 Apr 2025 23:30)      Subjective/HPI     PAST MEDICAL & SURGICAL HISTORY:  NICM (nonischemic cardiomyopathy)    HTN (hypertension)    HLD (hyperlipidemia)    Prostate CA    T2DM (type 2 diabetes mellitus)    CHF (congestive heart failure)    H/O prostate biopsy    H/O cataract extraction    H/O inguinal hernia repair    AICD (automatic cardioverter/defibrillator) present          Medication list         MEDICATIONS  (STANDING):  aMIOdarone Infusion 0.501 mG/Min (16.7 mL/Hr) IV Continuous <Continuous>  aspirin  chewable 81 milliGRAM(s) Oral daily  atorvastatin 80 milliGRAM(s) Oral at bedtime  chlorhexidine 2% Cloths 1 Application(s) Topical <User Schedule>  dexMEDEtomidine Infusion 0.2 MICROgram(s)/kG/Hr (4.04 mL/Hr) IV Continuous <Continuous>  dextrose 5%. 1000 milliLiter(s) (50 mL/Hr) IV Continuous <Continuous>  dextrose 5%. 1000 milliLiter(s) (100 mL/Hr) IV Continuous <Continuous>  dextrose 5%. 1000 milliLiter(s) (50 mL/Hr) IV Continuous <Continuous>  dextrose 50% Injectable 25 Gram(s) IV Push once  dextrose 50% Injectable 12.5 Gram(s) IV Push once  dextrose 50% Injectable 25 Gram(s) IV Push once  finasteride 5 milliGRAM(s) Oral daily  glucagon  Injectable 1 milliGRAM(s) IntraMuscular once  heparin  Infusion.  Unit(s)/Hr (15 mL/Hr) IV Continuous <Continuous>  insulin lispro (ADMELOG) corrective regimen sliding scale   SubCutaneous every 6 hours  mupirocin 2% Nasal 1 Application(s) Both Nostrils two times a day  norepinephrine Infusion 0.05 MICROgram(s)/kG/Min (7.57 mL/Hr) IV Continuous <Continuous>  pantoprazole  Injectable 40 milliGRAM(s) IV Push daily  piperacillin/tazobactam IVPB.. 3.375 Gram(s) IV Intermittent every 12 hours  tamsulosin 0.8 milliGRAM(s) Oral at bedtime    MEDICATIONS  (PRN):  dextrose Oral Gel 15 Gram(s) Oral once PRN Blood Glucose LESS THAN 70 milliGRAM(s)/deciliter  heparin   Injectable 6500 Unit(s) IV Push every 6 hours PRN For aPTT less than 40  heparin   Injectable 3000 Unit(s) IV Push every 6 hours PRN For aPTT between 40 - 57         Vitals log        ICU Vital Signs Last 24 Hrs  T(C): 35.9 (25 Apr 2025 04:17), Max: 37.1 (24 Apr 2025 17:06)  T(F): 96.7 (25 Apr 2025 04:17), Max: 98.7 (24 Apr 2025 17:06)  HR: 122 (25 Apr 2025 04:45) (72 - 127)  BP: 120/91 (25 Apr 2025 04:45) (59/18 - 149/108)  BP(mean): 100 (25 Apr 2025 04:45) (29 - 119)  ABP: --  ABP(mean): --  RR: 24 (25 Apr 2025 04:45) (17 - 38)  SpO2: 100% (25 Apr 2025 04:45) (94% - 100%)    O2 Parameters below as of 24 Apr 2025 07:00  Patient On (Oxygen Delivery Method): nasal cannula  O2 Flow (L/min): 3               Input and Output:  I&O's Detail    23 Apr 2025 07:01  -  24 Apr 2025 07:00  --------------------------------------------------------  IN:    Amiodarone: 400.8 mL    Dexmedetomidine: 393.9 mL    Heparin Infusion: 168 mL    IV PiggyBack: 100 mL    Norepinephrine: 113.7 mL  Total IN: 1176.4 mL    OUT:    Indwelling Catheter - Urethral (mL): 315 mL  Total OUT: 315 mL    Total NET: 861.4 mL      24 Apr 2025 07:01  -  25 Apr 2025 05:38  --------------------------------------------------------  IN:    Amiodarone: 83.5 mL    Dexmedetomidine: 579.9 mL    Heparin Infusion: 147 mL    IV PiggyBack: 50 mL    Norepinephrine: 212.2 mL  Total IN: 1072.6 mL    OUT:    Indwelling Catheter - Urethral (mL): 725 mL  Total OUT: 725 mL    Total NET: 347.6 mL          Lab Data                        14.3   17.56 )-----------( 173      ( 24 Apr 2025 05:52 )             43.3     04-24    142  |  106  |  87[H]  ----------------------------<  201[H]  4.1   |  22  |  3.20[H]    Ca    9.1      24 Apr 2025 05:52  Phos  5.4     04-24  Mg     2.5     04-24    TPro  6.7  /  Alb  3.8  /  TBili  2.4[H]  /  DBili  0.8[H]  /  AST  454[H]  /  ALT  590[H]  /  AlkPhos  59  04-24    ABG - ( 23 Apr 2025 12:03 )  pH, Arterial: 7.55  pH, Blood: x     /  pCO2: 19    /  pO2: 105   / HCO3: 17    / Base Excess: -5.8  /  SaO2: 99.3                    Review of Systems	      Objective     Physical Examination    heart s1s2  lung dc bs  head nc  head at  abd soft      Pertinent Lab findings & Imaging      Amberly:  NO   Adequate UO     I&O's Detail    23 Apr 2025 07:01  -  24 Apr 2025 07:00  --------------------------------------------------------  IN:    Amiodarone: 400.8 mL    Dexmedetomidine: 393.9 mL    Heparin Infusion: 168 mL    IV PiggyBack: 100 mL    Norepinephrine: 113.7 mL  Total IN: 1176.4 mL    OUT:    Indwelling Catheter - Urethral (mL): 315 mL  Total OUT: 315 mL    Total NET: 861.4 mL      24 Apr 2025 07:01  -  25 Apr 2025 05:38  --------------------------------------------------------  IN:    Amiodarone: 83.5 mL    Dexmedetomidine: 579.9 mL    Heparin Infusion: 147 mL    IV PiggyBack: 50 mL    Norepinephrine: 212.2 mL  Total IN: 1072.6 mL    OUT:    Indwelling Catheter - Urethral (mL): 725 mL  Total OUT: 725 mL    Total NET: 347.6 mL               Discussed with:     Cultures:	        Radiology

## 2025-04-25 NOTE — PROGRESS NOTE ADULT - ASSESSMENT
88 y/o male with PMHx of CHF, HLD, HTN, prostate ca (s/p radioactive seed implantation in 2015) T2DM, PPM admitted for acute on chronic HFrEF. Upgraded to ICU after episode of NSVT and episode of afib with RVR complicated by flash pulmonary edema.    Neuro:  - Off precedex as of today 4/25  - Pt with anxiety, can consider starting buspar once able to tolerate PO  - Recieved zyprexa overnight for agitation  - Hold tylenol i/s/o severely elevated transaminases and NSAIDs i/s/o worsening HANNAH    CV:  - Pt with afib vs VT, now in sinus tachycardia  - PPM interrogation inconclusive, defibrillation function turned off  - c/w heparin gtt with eventual transition to DOAC  - Off amio gtt, transition to PO once able to tolerate  - Start lopressor 5mg q6h  - Acute on chronic HFrEF (LVEF 20-25%)  - Monitor off lasix, volume status improving  - Hold home coreg  - c/w levophed, titrate PRN to maintain MAP >65, requirements decreasing  - Hx CAD, c/w ASA. Hold statin in setting of transaminitis  - Troponins peaked and downtrending, can be demand 2/2 acute on chronic HFrEF  - Defibrillator/ATP function of pacemaker discontinued in line with DNR order, pacemaker function permitted to continue  - EKG today to assess QTc, rhythm  - off Levophed    Pulm:  - Pt initially with acute hypoxemic respiratory failure 2/2 flash pulmonary edema  - Now saturating well on 3LNC  - CXR show improving pulmonary vascular congestion  - Monitor off lasix, volume status improving  - Maintain SO2>92  - Patient demonstrating Cheyne-Dominguez respirations with periods of difficulty breathing and low O2 sat, but improved comfort on 3LNC    GI:  - Pt with abdominal tenderness on palpation of unclear etiology  - NPO pending bedside dysphagia  - c/w PPI  - RUQ u/s demonstrating distended gallbladder with wall thickening and edema without evidence of cholelithiasis.  - Awaiting HIDA scan today.  - Severely elevated AST, ALT, will follow with hepatitis panel.    Renal:  - HANNAH on CKD, likely pre-renal due to decreased EABV in setting of HFrEF vs ATN  - Cr worsening this AM to 3.70, continue supportive care to optimize, possibly i/s/o pseudonephrotoxicity of zosyn vs. worsening HANNAH  - STAT albumin 100mg q6h x2 doses per nephro recs  - Hold home Entresto and dapagliflozin  - Monitor daily Cr  - Strict I&Os  - Replete lytes PRN  - Elevated phos, likely i/s/o HANNAH  - Improved urine output today    ID:  - Sepsis of unclear etiology, possible PNA vs intra-abdominal source  - Transitioned to zosyn given elevated LFTs and RUQ US  - Leukocytosis and lactate downtrending  - Plan for HIDA   - BCx NGTD  - Trend WBC and fever curve    Heme:  - Full AC with heparin gtt    Endo:  - LDISS q6h while NPO  - Goal -180    Dispo:  - DNR/DNI   89 y/o male with PMHx of CHF, HLD, HTN, prostate ca (s/p radioactive seed implantation in 2015) T2DM, PPM admitted for acute on chronic HFrEF. Upgraded to ICU after episode of NSVT and episode of afib with RVR complicated by flash pulmonary edema.    Neuro:  - Off precedex as of today 4/25  - Pt with anxiety, can consider starting buspar once able to tolerate PO  - Recieved zyprexa overnight for agitation  - Delirium likely metabolic encephalopathy    CV:  - Pt with afib vs VT, now in sinus tachycardia  - c/w heparin gtt with eventual transition to DOAC  - Off amio gtt  - Start lopressor 5mg q6h  - Acute on chronic HFrEF (LVEF 20-25%)  - Monitor off lasix, volume status improving  - Hold home coreg  - Hx CAD, c/w ASA. Hold statin in setting of transaminitis  - Troponins peaked and downtrending, can be demand 2/2 acute on chronic HFrEF  - Defibrillator/ATP function of pacemaker discontinued in line with DNR order, pacemaker function permitted to continue  - EKG today to assess QTc, rhythm  - off Levophed    Pulm:  - Pt initially with acute hypoxemic respiratory failure 2/2 flash pulmonary edema  - Now saturating well on 3LNC  - CXR show improving pulmonary vascular congestion  - Monitor off lasix, volume status improving  - Maintain SO2>92    GI:  - Pt with abdominal tenderness on palpation of unclear etiology  - NPO pending bedside dysphagia  - c/w PPI  - RUQ u/s demonstrating distended gallbladder with wall thickening and edema without evidence of cholelithiasis.  - Awaiting HIDA scan today  - Hold tylenol i/s/o severely elevated transaminases and NSAIDs i/s/o worsening HANNAH  - f/u hepatitis panel    Renal:  - HANNAH on CKD, likely pre-renal due to decreased EABV in setting of HFrEF vs ATN  - Cr worsening this AM to 3.70, continue supportive care to optimize, possibly i/s/o pseudonephrotoxicity of zosyn vs. worsening HANNAH  - STAT albumin 100mg q6h x2 doses per nephro recs  - Hold home Entresto and dapagliflozin  - Monitor daily Cr  - Strict I&Os  - Replete lytes PRN  - Elevated phos, likely i/s/o HANNAH  - Hold home finasteride  - Improved urine output today    ID:  - Sepsis of unclear etiology, possible PNA vs intra-abdominal source  - Transitioned to zosyn given elevated LFTs and RUQ US  - Leukocytosis and lactate downtrending  - f/u HIDA   - BCx NGTD  - Trend WBC and fever curve    Heme:  - Full AC with heparin gtt    Endo:  - LDISS q6h while NPO  - Goal -180    Dispo:  - DNR/DNI

## 2025-04-25 NOTE — PROGRESS NOTE ADULT - PROBLEM SELECTOR PLAN 1
acute on chronic HFrEF exacerbation   cause of acute hypoxic resp failure  with acute pulmonary edema   Pt with hx of chronic systolic congestive heart failure last admitted for CHF exacerbation in March to [facility] and SF last week-Pt with inc SOB at rest and on exertion   -CXR with pulmonary congestion and b/l pleural effusions  -Troponin 3051, 3543, pro-BNP 66218  -EKG: NSR with frequent PVC's, 90 BPM, QTc 518  -Vitals: BP: 135/73, HR: 95 , Temp: 98.2F , RR: 18 , SpO2: 94% on 6LNC  -s/p bipap -s/p 40mg Lasix IVP in ED  -Previous TTE from March with severely reduced LV systolic function (EF 20-25%), mod MR  - Strict I&Os, daily weights, fluid restriction  - Remote tele, continuous pulse ox   -Pt recently switched from Lasix to Torsemide per SF discharge but is unsure of dosage   -C/w Lasix 40mg IVP bid - now off  iv  Lasix  - euvolemic   -- PPM interrogation inconclusive, defibrillation function turned off  -Cardio consulted, Dr. Boothe,   -Plan per ICU

## 2025-04-25 NOTE — PROGRESS NOTE ADULT - SUBJECTIVE AND OBJECTIVE BOX
GENERAL SURGERY PROGRESS NOTE      Subjective: No acute overnight events. Patient denies abdominal pain, nausea and vomiting      PMHx: CHF, HTN, HLD, Prostate Ca, DM, s/p PPM  Social Hx:       VITAL SIGNS:  T(C): 35.9 (04-25-25 @ 04:17), Max: 37.1 (04-24-25 @ 17:06)  HR: 110 (04-25-25 @ 07:00) (72 - 127)  BP: 113/87 (04-25-25 @ 07:00) (71/44 - 149/108)  RR: 33 (04-25-25 @ 07:00) (17 - 38)  SpO2: 99% (04-25-25 @ 07:00) (94% - 100%)      LABS:                        15.5   15.17 )-----------( 175      ( 25 Apr 2025 05:30 )             47.6       PTT - ( 25 Apr 2025 05:30 )  PTT: 49.5 sec        MEDICATIONS:  MEDICATIONS  (STANDING):  aMIOdarone Infusion 0.501 mG/Min (16.7 mL/Hr) IV Continuous <Continuous>  aspirin  chewable 81 milliGRAM(s) Oral daily  atorvastatin 80 milliGRAM(s) Oral at bedtime  chlorhexidine 2% Cloths 1 Application(s) Topical <User Schedule>  dexMEDEtomidine Infusion 0.2 MICROgram(s)/kG/Hr (4.04 mL/Hr) IV Continuous <Continuous>  dextrose 5%. 1000 milliLiter(s) (50 mL/Hr) IV Continuous <Continuous>  dextrose 5%. 1000 milliLiter(s) (100 mL/Hr) IV Continuous <Continuous>  dextrose 5%. 1000 milliLiter(s) (50 mL/Hr) IV Continuous <Continuous>  dextrose 50% Injectable 25 Gram(s) IV Push once  dextrose 50% Injectable 12.5 Gram(s) IV Push once  dextrose 50% Injectable 25 Gram(s) IV Push once  finasteride 5 milliGRAM(s) Oral daily  glucagon  Injectable 1 milliGRAM(s) IntraMuscular once  heparin  Infusion.  Unit(s)/Hr (15 mL/Hr) IV Continuous <Continuous>  insulin lispro (ADMELOG) corrective regimen sliding scale   SubCutaneous every 6 hours  mupirocin 2% Nasal 1 Application(s) Both Nostrils two times a day  norepinephrine Infusion 0.05 MICROgram(s)/kG/Min (7.57 mL/Hr) IV Continuous <Continuous>  pantoprazole  Injectable 40 milliGRAM(s) IV Push daily  piperacillin/tazobactam IVPB.. 3.375 Gram(s) IV Intermittent every 12 hours  tamsulosin 0.8 milliGRAM(s) Oral at bedtime        PHYSICAL EXAM:   General: well developed, well nourished, NAD  Neuro: awake, alert, responds to commands, nonfocal, RICHARDS x 4  Eyes: scleras clear b/l, PERRLA/ EOMI  ENT: Gross hearing intact, grossly patent pharynx, no stridor  Neck: Neck supple, trachea midline, No JVD  Respiratory: CTA B/L, No wheezing, rales, rhonchi  Cardiac: RRR, +S1 +S2, no murmurs, rubs or gallops  Abdominal: Soft, NT, ND, no rebound, no guarding, + bowel sounds, negative Chamberlain's Sign  Extremities: No edema, +peripheral pulses  Skin: No Rashes, Hematoma, Ecchymosis  Psych: awake, alert, responsive, normal affect   GENERAL SURGERY PROGRESS NOTE      Subjective: No acute overnight events. Patient denies abdominal pain, nausea and vomiting      PMHx: CHF, HTN, HLD, Prostate Ca, DM, s/p PPM  Social Hx:       VITAL SIGNS:  T(C): 35.9 (04-25-25 @ 04:17), Max: 37.1 (04-24-25 @ 17:06)  HR: 110 (04-25-25 @ 07:00) (72 - 127)  BP: 113/87 (04-25-25 @ 07:00) (71/44 - 149/108)  RR: 33 (04-25-25 @ 07:00) (17 - 38)  SpO2: 99% (04-25-25 @ 07:00) (94% - 100%)      LABS:                        15.5   15.17 )-----------( 175      ( 25 Apr 2025 05:30 )             47.6     04-25    145  |  111[H]  |  99[H]  ----------------------------<  212[H]  3.9   |  19[L]  |  3.70[H]    Ca    9.5      25 Apr 2025 05:30  Phos  7.3     04-25  Mg     2.8     04-25    TPro  7.2  /  Alb  3.8  /  TBili  2.1[H]  /  DBili  x   /  AST  964[H]  /  ALT  1455[H]  /  AlkPhos  76  04-25      PTT - ( 25 Apr 2025 05:30 )  PTT: 49.5 sec        MEDICATIONS:  MEDICATIONS  (STANDING):  aMIOdarone Infusion 0.501 mG/Min (16.7 mL/Hr) IV Continuous <Continuous>  aspirin  chewable 81 milliGRAM(s) Oral daily  atorvastatin 80 milliGRAM(s) Oral at bedtime  chlorhexidine 2% Cloths 1 Application(s) Topical <User Schedule>  dexMEDEtomidine Infusion 0.2 MICROgram(s)/kG/Hr (4.04 mL/Hr) IV Continuous <Continuous>  dextrose 5%. 1000 milliLiter(s) (50 mL/Hr) IV Continuous <Continuous>  dextrose 5%. 1000 milliLiter(s) (100 mL/Hr) IV Continuous <Continuous>  dextrose 5%. 1000 milliLiter(s) (50 mL/Hr) IV Continuous <Continuous>  dextrose 50% Injectable 25 Gram(s) IV Push once  dextrose 50% Injectable 12.5 Gram(s) IV Push once  dextrose 50% Injectable 25 Gram(s) IV Push once  finasteride 5 milliGRAM(s) Oral daily  glucagon  Injectable 1 milliGRAM(s) IntraMuscular once  heparin  Infusion.  Unit(s)/Hr (15 mL/Hr) IV Continuous <Continuous>  insulin lispro (ADMELOG) corrective regimen sliding scale   SubCutaneous every 6 hours  mupirocin 2% Nasal 1 Application(s) Both Nostrils two times a day  norepinephrine Infusion 0.05 MICROgram(s)/kG/Min (7.57 mL/Hr) IV Continuous <Continuous>  pantoprazole  Injectable 40 milliGRAM(s) IV Push daily  piperacillin/tazobactam IVPB.. 3.375 Gram(s) IV Intermittent every 12 hours  tamsulosin 0.8 milliGRAM(s) Oral at bedtime        PHYSICAL EXAM:   General: well developed, well nourished, NAD  Neuro: awake, alert, responds to commands, nonfocal, RICHARDS x 4  Eyes: scleras clear b/l, PERRLA/ EOMI  ENT: Gross hearing intact, grossly patent pharynx, no stridor  Neck: Neck supple, trachea midline, No JVD  Respiratory: CTA B/L, No wheezing, rales, rhonchi  Cardiac: RRR, +S1 +S2, no murmurs, rubs or gallops  Abdominal: Soft, NT, ND, no rebound, no guarding, + bowel sounds, negative Chamberlain's Sign  Extremities: No edema, +peripheral pulses  Skin: No Rashes, Hematoma, Ecchymosis  Psych: awake, alert, responsive, normal affect

## 2025-04-26 LAB
ALBUMIN SERPL ELPH-MCNC: 3.5 G/DL — SIGNIFICANT CHANGE UP (ref 3.3–5)
ALP SERPL-CCNC: 94 U/L — SIGNIFICANT CHANGE UP (ref 40–120)
ALT FLD-CCNC: >1000 U/L — HIGH (ref 12–78)
ANION GAP SERPL CALC-SCNC: 10 MMOL/L — SIGNIFICANT CHANGE UP (ref 5–17)
AST SERPL-CCNC: 558 U/L — HIGH (ref 15–37)
BASOPHILS # BLD AUTO: 0.02 K/UL — SIGNIFICANT CHANGE UP (ref 0–0.2)
BASOPHILS NFR BLD AUTO: 0.2 % — SIGNIFICANT CHANGE UP (ref 0–2)
BILIRUB SERPL-MCNC: 1.7 MG/DL — HIGH (ref 0.2–1.2)
BUN SERPL-MCNC: 111 MG/DL — HIGH (ref 7–23)
CALCIUM SERPL-MCNC: 9 MG/DL — SIGNIFICANT CHANGE UP (ref 8.5–10.1)
CHLORIDE SERPL-SCNC: 117 MMOL/L — HIGH (ref 96–108)
CO2 SERPL-SCNC: 19 MMOL/L — LOW (ref 22–31)
CREAT SERPL-MCNC: 3.5 MG/DL — HIGH (ref 0.5–1.3)
EGFR: 16 ML/MIN/1.73M2 — LOW
EGFR: 16 ML/MIN/1.73M2 — LOW
EOSINOPHIL # BLD AUTO: 0.04 K/UL — SIGNIFICANT CHANGE UP (ref 0–0.5)
EOSINOPHIL NFR BLD AUTO: 0.3 % — SIGNIFICANT CHANGE UP (ref 0–6)
GLUCOSE SERPL-MCNC: 139 MG/DL — HIGH (ref 70–99)
HAV IGM SER-ACNC: SIGNIFICANT CHANGE UP
HBV CORE AB SER-ACNC: SIGNIFICANT CHANGE UP
HBV CORE IGM SER-ACNC: SIGNIFICANT CHANGE UP
HBV SURFACE AB SER-ACNC: <3.3 MIU/ML — LOW
HBV SURFACE AB SER-ACNC: ABNORMAL
HBV SURFACE AG SER-ACNC: SIGNIFICANT CHANGE UP
HBV SURFACE AG SER-ACNC: SIGNIFICANT CHANGE UP
HCT VFR BLD CALC: 42.2 % — SIGNIFICANT CHANGE UP (ref 39–50)
HCV AB S/CO SERPL IA: 0.08 S/CO — SIGNIFICANT CHANGE UP (ref 0–0.79)
HCV AB S/CO SERPL IA: 0.09 S/CO — SIGNIFICANT CHANGE UP (ref 0–0.79)
HCV AB SERPL-IMP: SIGNIFICANT CHANGE UP
HCV AB SERPL-IMP: SIGNIFICANT CHANGE UP
HGB BLD-MCNC: 13.9 G/DL — SIGNIFICANT CHANGE UP (ref 13–17)
IMM GRANULOCYTES NFR BLD AUTO: 1.2 % — HIGH (ref 0–0.9)
LACTATE SERPL-SCNC: 2.9 MMOL/L — HIGH (ref 0.7–2)
LYMPHOCYTES # BLD AUTO: 1.72 K/UL — SIGNIFICANT CHANGE UP (ref 1–3.3)
LYMPHOCYTES # BLD AUTO: 13.3 % — SIGNIFICANT CHANGE UP (ref 13–44)
MAGNESIUM SERPL-MCNC: 3 MG/DL — HIGH (ref 1.6–2.6)
MCHC RBC-ENTMCNC: 32.1 PG — SIGNIFICANT CHANGE UP (ref 27–34)
MCHC RBC-ENTMCNC: 32.9 G/DL — SIGNIFICANT CHANGE UP (ref 32–36)
MCV RBC AUTO: 97.5 FL — SIGNIFICANT CHANGE UP (ref 80–100)
MONOCYTES # BLD AUTO: 1.16 K/UL — HIGH (ref 0–0.9)
MONOCYTES NFR BLD AUTO: 9 % — SIGNIFICANT CHANGE UP (ref 2–14)
NEUTROPHILS # BLD AUTO: 9.8 K/UL — HIGH (ref 1.8–7.4)
NEUTROPHILS NFR BLD AUTO: 76 % — SIGNIFICANT CHANGE UP (ref 43–77)
NRBC BLD AUTO-RTO: 0 /100 WBCS — SIGNIFICANT CHANGE UP (ref 0–0)
PHOSPHATE SERPL-MCNC: 5.8 MG/DL — HIGH (ref 2.5–4.5)
PLATELET # BLD AUTO: 110 K/UL — LOW (ref 150–400)
POTASSIUM SERPL-MCNC: 3.9 MMOL/L — SIGNIFICANT CHANGE UP (ref 3.5–5.3)
POTASSIUM SERPL-SCNC: 3.9 MMOL/L — SIGNIFICANT CHANGE UP (ref 3.5–5.3)
PROT SERPL-MCNC: 6.4 G/DL — SIGNIFICANT CHANGE UP (ref 6–8.3)
RBC # BLD: 4.33 M/UL — SIGNIFICANT CHANGE UP (ref 4.2–5.8)
RBC # FLD: 14.7 % — HIGH (ref 10.3–14.5)
SODIUM SERPL-SCNC: 146 MMOL/L — HIGH (ref 135–145)
WBC # BLD: 12.89 K/UL — HIGH (ref 3.8–10.5)
WBC # FLD AUTO: 12.89 K/UL — HIGH (ref 3.8–10.5)

## 2025-04-26 PROCEDURE — 99233 SBSQ HOSP IP/OBS HIGH 50: CPT | Mod: GC

## 2025-04-26 PROCEDURE — 93010 ELECTROCARDIOGRAM REPORT: CPT

## 2025-04-26 RX ORDER — ASPIRIN 325 MG
81 TABLET ORAL DAILY
Refills: 0 | Status: DISCONTINUED | OUTPATIENT
Start: 2025-04-26 | End: 2025-05-01

## 2025-04-26 RX ORDER — BUSPIRONE HYDROCHLORIDE 15 MG/1
5 TABLET ORAL
Refills: 0 | Status: DISCONTINUED | OUTPATIENT
Start: 2025-04-26 | End: 2025-05-01

## 2025-04-26 RX ORDER — METOPROLOL SUCCINATE 50 MG/1
25 TABLET, EXTENDED RELEASE ORAL
Refills: 0 | Status: DISCONTINUED | OUTPATIENT
Start: 2025-04-26 | End: 2025-04-30

## 2025-04-26 RX ORDER — METOPROLOL SUCCINATE 50 MG/1
25 TABLET, EXTENDED RELEASE ORAL
Refills: 0 | Status: DISCONTINUED | OUTPATIENT
Start: 2025-04-26 | End: 2025-04-26

## 2025-04-26 RX ADMIN — TAMSULOSIN HYDROCHLORIDE 0.8 MILLIGRAM(S): 0.4 CAPSULE ORAL at 22:26

## 2025-04-26 RX ADMIN — Medication 25 GRAM(S): at 05:21

## 2025-04-26 RX ADMIN — HEPARIN SODIUM 900 UNIT(S)/HR: 1000 INJECTION INTRAVENOUS; SUBCUTANEOUS at 07:25

## 2025-04-26 RX ADMIN — BUSPIRONE HYDROCHLORIDE 5 MILLIGRAM(S): 15 TABLET ORAL at 18:18

## 2025-04-26 RX ADMIN — HEPARIN SODIUM 900 UNIT(S)/HR: 1000 INJECTION INTRAVENOUS; SUBCUTANEOUS at 22:26

## 2025-04-26 RX ADMIN — MUPIROCIN CALCIUM 1 APPLICATION(S): 20 CREAM TOPICAL at 18:18

## 2025-04-26 RX ADMIN — Medication 40 MILLIGRAM(S): at 12:22

## 2025-04-26 RX ADMIN — MUPIROCIN CALCIUM 1 APPLICATION(S): 20 CREAM TOPICAL at 05:22

## 2025-04-26 RX ADMIN — Medication 25 GRAM(S): at 18:18

## 2025-04-26 RX ADMIN — HEPARIN SODIUM 900 UNIT(S)/HR: 1000 INJECTION INTRAVENOUS; SUBCUTANEOUS at 19:22

## 2025-04-26 RX ADMIN — HEPARIN SODIUM 900 UNIT(S)/HR: 1000 INJECTION INTRAVENOUS; SUBCUTANEOUS at 01:03

## 2025-04-26 RX ADMIN — METOPROLOL SUCCINATE 25 MILLIGRAM(S): 50 TABLET, EXTENDED RELEASE ORAL at 18:18

## 2025-04-26 RX ADMIN — Medication 1 APPLICATION(S): at 05:21

## 2025-04-26 RX ADMIN — Medication 81 MILLIGRAM(S): at 12:22

## 2025-04-26 NOTE — PROGRESS NOTE ADULT - PROBLEM SELECTOR PLAN 1
acute on chronic HFrEF exacerbation   cause of acute hypoxic resp failure  with acute pulmonary edema   Pt with hx of chronic systolic congestive heart failure last admitted for CHF exacerbation in March to [facility] and SF last week-Pt with inc SOB at rest and on exertion   -CXR with pulmonary congestion and b/l pleural effusions  -Troponin 3051, 3543, pro-BNP 71530  -EKG: NSR with frequent PVC's, 90 BPM, QTc 518  -Vitals: BP: 135/73, HR: 95 , Temp: 98.2F , RR: 18 , SpO2: 94% on 6LNC  -s/p bipap -s/p 40mg Lasix IVP in ED  -Previous TTE from March with severely reduced LV systolic function (EF 20-25%), mod MR  - Strict I&Os, daily weights, fluid restriction  - Remote tele, continuous pulse ox   -Pt recently switched from Lasix to Torsemide per SF discharge but is unsure of dosage   -C/w Lasix 40mg IVP bid - now off  iv  Lasix  - euvolemic   -- PPM interrogation inconclusive, defibrillation function turned off  -Cardio consulted, Dr. Boothe,   -Plan per ICU

## 2025-04-26 NOTE — PROGRESS NOTE ADULT - SUBJECTIVE AND OBJECTIVE BOX
[facility]      Kin Peterson NP    [address] [place] [address] [phone]      INTERVAL HPI/OVERNIGHT EVENTS:  seen and examined  offers no gi complaints  lfts slowly improving    MEDICATIONS  (STANDING):  aspirin  chewable 81 milliGRAM(s) Oral daily  chlorhexidine 2% Cloths 1 Application(s) Topical <User Schedule>  dextrose 5%. 1000 milliLiter(s) (50 mL/Hr) IV Continuous <Continuous>  dextrose 5%. 1000 milliLiter(s) (100 mL/Hr) IV Continuous <Continuous>  dextrose 5%. 1000 milliLiter(s) (50 mL/Hr) IV Continuous <Continuous>  dextrose 50% Injectable 25 Gram(s) IV Push once  dextrose 50% Injectable 12.5 Gram(s) IV Push once  dextrose 50% Injectable 25 Gram(s) IV Push once  glucagon  Injectable 1 milliGRAM(s) IntraMuscular once  heparin  Infusion.  Unit(s)/Hr (15 mL/Hr) IV Continuous <Continuous>  insulin lispro (ADMELOG) corrective regimen sliding scale   SubCutaneous every 6 hours  metoprolol tartrate 25 milliGRAM(s) Oral two times a day  mupirocin 2% Nasal 1 Application(s) Both Nostrils two times a day  pantoprazole   Suspension 40 milliGRAM(s) Oral daily  piperacillin/tazobactam IVPB.. 3.375 Gram(s) IV Intermittent every 12 hours  tamsulosin 0.8 milliGRAM(s) Oral at bedtime    MEDICATIONS  (PRN):  dextrose Oral Gel 15 Gram(s) Oral once PRN Blood Glucose LESS THAN 70 milliGRAM(s)/deciliter  heparin   Injectable 6500 Unit(s) IV Push every 6 hours PRN For aPTT less than 40  heparin   Injectable 3000 Unit(s) IV Push every 6 hours PRN For aPTT between 40 - 57      Allergies    No Known Allergies    Intolerances            PHYSICAL EXAM:   Vital Signs:  Vital Signs Last 24 Hrs  T(C): 36.6 (2025 11:51), Max: 37.8 (2025 16:14)  T(F): 97.9 (2025 11:51), Max: 100 (2025 16:14)  HR: 70 (2025 11:00) (62 - 104)  BP: 99/70 (2025 11:00) (90/55 - 125/88)  BP(mean): 79 (2025 11:00) (63 - 101)  RR: 22 (2025 11:00) (17 - 43)  SpO2: 99% (2025 11:00) (99% - 100%)    Parameters below as of 2025 11:00  Patient On (Oxygen Delivery Method): room air      Daily     Daily Weight in k.6 (2025 06:00)        GENERAL:  Appears stated age  HEENT:  NC/AT  CHEST:  Full & symmetric excursion  HEART:  Regular rhythm  ABDOMEN:  Soft, non tender non distended   EXTEREMITIES:  no cyanosis  SKIN:  No rash  NEURO:  Alert          LABS:                        13.9   12.89 )-----------( 110      ( 2025 09:05 )             42.2     04-    146[H]  |  117[H]  |  111[H]  ----------------------------<  139[H]  3.9   |  19[L]  |  3.50[H]    Ca    9.0      2025 09:05  Phos  5.8     04  Mg     3.0         TPro  6.4  /  Alb  3.5  /  TBili  1.7[H]  /  DBili  x   /  AST  558[H]  /  ALT  >1000[H]  /  AlkPhos  94  04-26    PTT - ( 2025 23:45 )  PTT:89.5 sec  Urinalysis Basic - ( 2025 09:05 )    Color: x / Appearance: x / SG: x / pH: x  Gluc: 139 mg/dL / Ketone: x  / Bili: x / Urobili: x   Blood: x / Protein: x / Nitrite: x   Leuk Esterase: x / RBC: x / WBC x   Sq Epi: x / Non Sq Epi: x / Bacteria: x        RADIOLOGY & ADDITIONAL TESTS:

## 2025-04-26 NOTE — PROGRESS NOTE ADULT - SUBJECTIVE AND OBJECTIVE BOX
Patient is a 89y old  Male who presents with a chief complaint of SOB (26 Apr 2025 12:00)    pt seen and examine  today alert awake , no fever no distress , tolerating diet.   INTERVAL HPI/OVERNIGHT EVENTS:     T(C): 36.6 (04-26-25 @ 11:51), Max: 37.8 (04-25-25 @ 16:14)  HR: 70 (04-26-25 @ 11:00) (62 - 104)  BP: 99/70 (04-26-25 @ 11:00) (90/55 - 125/88)  RR: 22 (04-26-25 @ 11:00) (17 - 43)  SpO2: 99% (04-26-25 @ 11:00) (99% - 100%)  Wt(kg): --  I&O's Summary    25 Apr 2025 07:01  -  26 Apr 2025 07:00  --------------------------------------------------------  IN: 448 mL / OUT: 1035 mL / NET: -587 mL    26 Apr 2025 07:01  -  26 Apr 2025 12:16  --------------------------------------------------------  IN: 43 mL / OUT: 100 mL / NET: -57 mL      REVIEW OF SYSTEMS:  confuse     PHYSICAL EXAM:  GENERAL: NAD,    HEAD:  Atraumatic, Normocephalic  EYES: EOMI, PERRLA, conjunctiva and sclera clear  ENMT:  Moist mucous membranes  NECK: Supple, No JVD  NERVOUS SYSTEM:  confuse awake  ; Motor Strength 5/5 B/L upper and lower extremities; DTRs 2+ intact and symmetric  CHEST/LUNG: percussion bilaterally decreased at  bl bases No rales, rhonchi, wheezing,    HEART: Regular rate and rhythm; No murmurs,    ABDOMEN: Soft, Nontender, Nondistended; Bowel sounds present  EXTREMITIES:  2+ Peripheral Pulses, No clubbing, cyanosis, or edema  SKIN: No rashes or lesions    MEDICATIONS  (STANDING):  aspirin  chewable 81 milliGRAM(s) Oral daily  busPIRone 5 milliGRAM(s) Oral two times a day  chlorhexidine 2% Cloths 1 Application(s) Topical <User Schedule>  dextrose 5%. 1000 milliLiter(s) (50 mL/Hr) IV Continuous <Continuous>  dextrose 5%. 1000 milliLiter(s) (100 mL/Hr) IV Continuous <Continuous>  dextrose 5%. 1000 milliLiter(s) (50 mL/Hr) IV Continuous <Continuous>  dextrose 50% Injectable 25 Gram(s) IV Push once  dextrose 50% Injectable 12.5 Gram(s) IV Push once  dextrose 50% Injectable 25 Gram(s) IV Push once  glucagon  Injectable 1 milliGRAM(s) IntraMuscular once  heparin  Infusion.  Unit(s)/Hr (15 mL/Hr) IV Continuous <Continuous>  insulin lispro (ADMELOG) corrective regimen sliding scale   SubCutaneous every 6 hours  metoprolol tartrate 25 milliGRAM(s) Oral two times a day  mupirocin 2% Nasal 1 Application(s) Both Nostrils two times a day  pantoprazole   Suspension 40 milliGRAM(s) Oral daily  piperacillin/tazobactam IVPB.. 3.375 Gram(s) IV Intermittent every 12 hours  tamsulosin 0.8 milliGRAM(s) Oral at bedtime    MEDICATIONS  (PRN):  dextrose Oral Gel 15 Gram(s) Oral once PRN Blood Glucose LESS THAN 70 milliGRAM(s)/deciliter  heparin   Injectable 6500 Unit(s) IV Push every 6 hours PRN For aPTT less than 40  heparin   Injectable 3000 Unit(s) IV Push every 6 hours PRN For aPTT between 40 - 57      LABS:                        13.9   12.89 )-----------( 110      ( 26 Apr 2025 09:05 )             42.2     04-26    146[H]  |  117[H]  |  111[H]  ----------------------------<  139[H]  3.9   |  19[L]  |  3.50[H]    Ca    9.0      26 Apr 2025 09:05  Phos  5.8     04-26  Mg     3.0     04-26    TPro  6.4  /  Alb  3.5  /  TBili  1.7[H]  /  DBili  x   /  AST  558[H]  /  ALT  >1000[H]  /  AlkPhos  94  04-26    PTT - ( 25 Apr 2025 23:45 )  PTT:89.5 sec  Urinalysis Basic - ( 26 Apr 2025 09:05 )    Color: x / Appearance: x / SG: x / pH: x  Gluc: 139 mg/dL / Ketone: x  / Bili: x / Urobili: x   Blood: x / Protein: x / Nitrite: x   Leuk Esterase: x / RBC: x / WBC x   Sq Epi: x / Non Sq Epi: x / Bacteria: x      CAPILLARY BLOOD GLUCOSE      POCT Blood Glucose.: 118 mg/dL (26 Apr 2025 05:29)  POCT Blood Glucose.: 132 mg/dL (25 Apr 2025 23:39)  POCT Blood Glucose.: 159 mg/dL (25 Apr 2025 18:38)  POCT Blood Glucose.: 166 mg/dL (25 Apr 2025 18:32)  POCT Blood Glucose.: 171 mg/dL (25 Apr 2025 15:00)      04-23 @ 09:45   No growth at 48 Hours  --  --  04-23 @ 09:40   No growth at 48 Hours  --  --          RADIOLOGY & ADDITIONAL TESTS:    Imaging Personally Reviewed:   no new test

## 2025-04-26 NOTE — PROGRESS NOTE ADULT - PROBLEM SELECTOR PLAN 5
Chronic  -HOLD home Coreg 3.125 mg BID in setting of hypotension-s/p  levophed -  on bb  bps table   -Plan per ICU

## 2025-04-26 NOTE — PROGRESS NOTE ADULT - ASSESSMENT
The patient is an 89 year old male with a history of HTN, HL, DM, CKD, chronic systolic heart failure s/p ICD prostate cancer who presents with shortness of breath in the setting of acute on chronic systolic heart failure.    Plan:  - ECG with sinus rhythm and LBBB  - Echo 3/20/25 with severely reduced LV systolic function (EF 20-25%), mod MR  - BNP 64676  - CXR with pulm congestion and small left pleural effusion  - Troponin elevated at 3785 in the setting of acute heart failure, HANNAH  - He had a cardiac catheterization in the past for work-up of his heart failure which showed mild non-obstructive CAD  - Continue metoprolol tartrate 5 mg IV q6h  - Continue aspirin 81 mg daily  - Continue atorvastatin 80 mg daily  - Hold furosemide - worsening HANNAH  - Ventricular tachycardia - EP follow-up. Tachytherapies have been deactivated on the ICD in light of the patient's goals of care.  - Worsening LFTs - possibly due to shock liver. However, would hold amiodarone for now and observe rhythm off of antiarrhythmics.  - Reportedly had an episode of atrial fibrillation (strips not available). Continue heparin drip with probable transition to DOAC at a later date.  - On IV antibiotics

## 2025-04-26 NOTE — PROGRESS NOTE ADULT - SUBJECTIVE AND OBJECTIVE BOX
NEPHROLOGY INTERVAL HPI/OVERNIGHT EVENTS:  HPI:  88 y/o male with PMHx of CHF, HLD, HTN, prostate ca (s/p radioactive seed implantation in ) T2DM, PPM presents to ER c/o SOB. Pt states that he has had intermittent SOB for the past few days, Pt notes he was visiting his wife today at rehab and felt SOB as he was leaving. He sat down to catch his breath, a staff member noticed he did not look well and they decided to call EMS. Pt notes he has had SOB both at rest and with exertion. Pt denies chest pain, fever, cough. Of note, pt was admitted 1 month ago for CHF exacerbation and also 1 week ago at [facility]. Pt adds a bunch of his medications were changed including his Entresto was dc'ed, Farxiga dosage was doubled and his Lasix was switched to Torsemide. Pt currently on NC and denies any SOB. Pt denies fever, chills, chest pain, palpitations, abdominal pain, nausea, vomiting, diarrhea.    Interval HPI: Unable to obtain any pertinent interval hx 2/2 AMS.       Received in the ED:  Aspirin 324mg PO x1, Lasix 40mg IVP x1 (2025 03:55)      PAST MEDICAL & SURGICAL HISTORY:  NICM (nonischemic cardiomyopathy)      HTN (hypertension)      HLD (hyperlipidemia)      Prostate CA      T2DM (type 2 diabetes mellitus)      CHF (congestive heart failure)      H/O prostate biopsy      H/O cataract extraction      H/O inguinal hernia repair      AICD (automatic cardioverter/defibrillator) present          MEDICATIONS  (STANDING):  albumin human 25% IVPB 100 milliLiter(s) IV Intermittent every 6 hours  aspirin Suppository 300 milliGRAM(s) Rectal daily  chlorhexidine 2% Cloths 1 Application(s) Topical <User Schedule>  dexMEDEtomidine Infusion 0.2 MICROgram(s)/kG/Hr (4.04 mL/Hr) IV Continuous <Continuous>  dextrose 5%. 1000 milliLiter(s) (50 mL/Hr) IV Continuous <Continuous>  dextrose 5%. 1000 milliLiter(s) (100 mL/Hr) IV Continuous <Continuous>  dextrose 5%. 1000 milliLiter(s) (50 mL/Hr) IV Continuous <Continuous>  dextrose 50% Injectable 25 Gram(s) IV Push once  dextrose 50% Injectable 12.5 Gram(s) IV Push once  dextrose 50% Injectable 25 Gram(s) IV Push once  glucagon  Injectable 1 milliGRAM(s) IntraMuscular once  heparin  Infusion.  Unit(s)/Hr (15 mL/Hr) IV Continuous <Continuous>  insulin lispro (ADMELOG) corrective regimen sliding scale   SubCutaneous every 6 hours  mupirocin 2% Nasal 1 Application(s) Both Nostrils two times a day  pantoprazole  Injectable 40 milliGRAM(s) IV Push daily  piperacillin/tazobactam IVPB.. 3.375 Gram(s) IV Intermittent every 12 hours  tamsulosin 0.8 milliGRAM(s) Oral at bedtime    MEDICATIONS  (PRN):  dextrose Oral Gel 15 Gram(s) Oral once PRN Blood Glucose LESS THAN 70 milliGRAM(s)/deciliter  heparin   Injectable 6500 Unit(s) IV Push every 6 hours PRN For aPTT less than 40  heparin   Injectable 3000 Unit(s) IV Push every 6 hours PRN For aPTT between 40 - 57      Allergies    No Known Allergies    Intolerances    ICU Vital Signs Last 24 Hrs  T(C): 36.6 (2025 08:05), Max: 37.8 (2025 16:14)  T(F): 97.9 (2025 08:05), Max: 100 (2025 16:14)  HR: 75 (2025 08:00) (62 - 112)  BP: 96/57 (2025 08:00) (90/55 - 125/88)  BP(mean): 70 (2025 08:00) (63 - 108)  ABP: --  ABP(mean): --  RR: 26 (2025 08:00) (17 - 43)  SpO2: 100% (2025 08:00) (97% - 100%)    O2 Parameters below as of 2025 07:00  Patient On (Oxygen Delivery Method): nasal cannula  O2 Flow (L/min): 3            Daily     Daily Weight in k.7 (2025 06:00)    PHYSICAL EXAM:    GENERAL: Elderly male, in no acute distress, ill appearing  HEAD:  Atraumatic, Normocephalic  NERVOUS SYSTEM:  Alert & Oriented X0.  CHEST/LUNG: Coarse BS bilaterally. No accessory m. use.   HEART: Regular rate and rhythm; No murmurs, rubs, or gallops  ABDOMEN: Soft, Nontender, Nondistended; Bowel sounds present  EXTREMITIES:  2+ Peripheral Pulses, No clubbing, cyanosis, or edema  SKIN: No rashes or lesions    LABS:                        15.5   15.17 )-----------( 175      ( 2025 05:30 )             47.6     -    145  |  111[H]  |  99[H]  ----------------------------<  212[H]  3.9   |  19[L]  |  3.70[H]    Ca    9.5      2025 05:30  Phos  7.3     -  Mg     2.8     -    TPro  7.2  /  Alb  3.8  /  TBili  2.1[H]  /  DBili  x   /  AST  964[H]  /  ALT  1455[H]  /  AlkPhos  76  04-25    PTT - ( 2025 23:45 )  PTT:89.5 sec  Urinalysis Basic - ( 2025 05:30 )    Color: x / Appearance: x / SG: x / pH: x  Gluc: 212 mg/dL / Ketone: x  / Bili: x / Urobili: x   Blood: x / Protein: x / Nitrite: x   Leuk Esterase: x / RBC: x / WBC x   Sq Epi: x / Non Sq Epi: x / Bacteria: x

## 2025-04-26 NOTE — CHART NOTE - NSCHARTNOTEFT_GEN_A_CORE
Assessment:   brief history: Pt is a "88 y/o male with PMHx of CHF, HLD, HTN, prostate ca (s/p radioactive seed implantation in 2015) T2DM, PPM admitted for acute on chronic HFrEF. Upgraded to ICU after episode of NSVT and episode of afib with RVR complicated by flash pulmonary edema, requiring heparin and amio gtt."    (4/26) Patient seen for nutrition follow up. Hospital course noted, chart reviewed, plan of care discussed with ICU team. Patient remains NPO with ice chips/sips of water. Complains of extreme thirst. I was able to give some ice chips by spoon at time of my visit. per discussion with team, as patient now seems more awake and alert than he has been this admission, plan for bedside dysphagia screen to determine patient's eligibility for initiation of PO diet.    Noted upon initial RD assessment, patient was unable to be interviewed due to bi-pap. At time of my visit, patient denies food allergies, denies hx of chewing/swallowing difficulty, no complaints of GI distress. At baseline, patient with good appetite and good PO intake, no reports of weight loss.    Factors impacting intake: [ ] none [ ] nausea  [ ] vomiting [ ] diarrhea [ ] constipation  [ ]chewing problems [ ] swallowing issues  [ ] other:     Diet Presciption: Diet, NPO:   With Ice Chips/Sips of Water (04-24-25 @ 20:53)    Intake: NPO    Current Weight: Weight (kg): 80.7 (04-21 @ 22:00) weights reviewed, question accuracy of 4/26 weight, will continue to monitor  % Weight Change    Pertinent Medications: MEDICATIONS  (STANDING):  aspirin Suppository 300 milliGRAM(s) Rectal daily  chlorhexidine 2% Cloths 1 Application(s) Topical <User Schedule>  dextrose 5%. 1000 milliLiter(s) (50 mL/Hr) IV Continuous <Continuous>  dextrose 5%. 1000 milliLiter(s) (50 mL/Hr) IV Continuous <Continuous>  dextrose 5%. 1000 milliLiter(s) (100 mL/Hr) IV Continuous <Continuous>  dextrose 50% Injectable 25 Gram(s) IV Push once  dextrose 50% Injectable 12.5 Gram(s) IV Push once  dextrose 50% Injectable 25 Gram(s) IV Push once  glucagon  Injectable 1 milliGRAM(s) IntraMuscular once  heparin  Infusion.  Unit(s)/Hr (15 mL/Hr) IV Continuous <Continuous>  insulin lispro (ADMELOG) corrective regimen sliding scale   SubCutaneous every 6 hours  metoprolol tartrate Injectable 5 milliGRAM(s) IV Push every 6 hours  mupirocin 2% Nasal 1 Application(s) Both Nostrils two times a day  pantoprazole  Injectable 40 milliGRAM(s) IV Push daily  piperacillin/tazobactam IVPB.. 3.375 Gram(s) IV Intermittent every 12 hours  tamsulosin 0.8 milliGRAM(s) Oral at bedtime    MEDICATIONS  (PRN):  dextrose Oral Gel 15 Gram(s) Oral once PRN Blood Glucose LESS THAN 70 milliGRAM(s)/deciliter  heparin   Injectable 6500 Unit(s) IV Push every 6 hours PRN For aPTT less than 40  heparin   Injectable 3000 Unit(s) IV Push every 6 hours PRN For aPTT between 40 - 57    Pertinent Labs: 04-26 Na146 mmol/L[H] Glu 139 mg/dL[H] K+ 3.9 mmol/L Cr  3.50 mg/dL[H]  mg/dL[H] 04-25 Phos 7.3 mg/dL[H] 04-26 Alb 3.5 g/dL 04-22 Chol 144 mg/dL LDL --    HDL 42 mg/dL Trig 75 mg/dL     CAPILLARY BLOOD GLUCOSE      POCT Blood Glucose.: 118 mg/dL (26 Apr 2025 05:29)  POCT Blood Glucose.: 132 mg/dL (25 Apr 2025 23:39)  POCT Blood Glucose.: 159 mg/dL (25 Apr 2025 18:38)  POCT Blood Glucose.: 166 mg/dL (25 Apr 2025 18:32)  POCT Blood Glucose.: 171 mg/dL (25 Apr 2025 15:00)    Skin: no edema, no pressure injuries per documentation    Estimated Needs:   [ x] no change since previous assessment  IBW 69.8 kg  25-30 calories/kg 6360-6148 calories/day  1-1.2 g protein/kg 70-84 g protein/day      Previous Nutrition Diagnosis:   [ x] Inadequate Energy Intake  [ x]Decreased  Nutrient Needs    Nutrition Diagnosis is [x ] ongoing      New Nutrition Diagnosis: [x ] not applicable       Interventions:   Recommend  [ ] Change Diet To:  [ ] Nutrition Supplement  [ ] Nutrition Support  [x ] Other: initiate PO diet as medically feasible. add DASH/Consistent CHO restriction    Monitoring and Evaluation:   [x ] PO intake /diet advancement[ x ] Tolerance to diet prescription [ x ] weights [ x ] labs[ x ] follow up per protocol  [ ] other:

## 2025-04-26 NOTE — PROGRESS NOTE ADULT - SUBJECTIVE AND OBJECTIVE BOX
Date/Time Patient Seen:  		  Referring MD:   Data Reviewed	       Patient is a 89y old  Male who presents with a chief complaint of SOB (25 Apr 2025 13:09)      Subjective/HPI     PAST MEDICAL & SURGICAL HISTORY:  NICM (nonischemic cardiomyopathy)    HTN (hypertension)    HLD (hyperlipidemia)    Prostate CA    T2DM (type 2 diabetes mellitus)    CHF (congestive heart failure)    H/O prostate biopsy    H/O cataract extraction    H/O inguinal hernia repair    AICD (automatic cardioverter/defibrillator) present          Medication list         MEDICATIONS  (STANDING):  aspirin Suppository 300 milliGRAM(s) Rectal daily  chlorhexidine 2% Cloths 1 Application(s) Topical <User Schedule>  dexMEDEtomidine Infusion 0.2 MICROgram(s)/kG/Hr (4.04 mL/Hr) IV Continuous <Continuous>  dextrose 5%. 1000 milliLiter(s) (50 mL/Hr) IV Continuous <Continuous>  dextrose 5%. 1000 milliLiter(s) (100 mL/Hr) IV Continuous <Continuous>  dextrose 5%. 1000 milliLiter(s) (50 mL/Hr) IV Continuous <Continuous>  dextrose 50% Injectable 25 Gram(s) IV Push once  dextrose 50% Injectable 12.5 Gram(s) IV Push once  dextrose 50% Injectable 25 Gram(s) IV Push once  glucagon  Injectable 1 milliGRAM(s) IntraMuscular once  heparin  Infusion.  Unit(s)/Hr (15 mL/Hr) IV Continuous <Continuous>  insulin lispro (ADMELOG) corrective regimen sliding scale   SubCutaneous every 6 hours  metoprolol tartrate Injectable 5 milliGRAM(s) IV Push every 6 hours  mupirocin 2% Nasal 1 Application(s) Both Nostrils two times a day  pantoprazole  Injectable 40 milliGRAM(s) IV Push daily  piperacillin/tazobactam IVPB.. 3.375 Gram(s) IV Intermittent every 12 hours  tamsulosin 0.8 milliGRAM(s) Oral at bedtime    MEDICATIONS  (PRN):  dextrose Oral Gel 15 Gram(s) Oral once PRN Blood Glucose LESS THAN 70 milliGRAM(s)/deciliter  heparin   Injectable 6500 Unit(s) IV Push every 6 hours PRN For aPTT less than 40  heparin   Injectable 3000 Unit(s) IV Push every 6 hours PRN For aPTT between 40 - 57         Vitals log        ICU Vital Signs Last 24 Hrs  T(C): 36.2 (26 Apr 2025 04:46), Max: 37.8 (25 Apr 2025 16:14)  T(F): 97.2 (26 Apr 2025 04:46), Max: 100 (25 Apr 2025 16:14)  HR: 65 (26 Apr 2025 05:16) (62 - 116)  BP: 90/55 (26 Apr 2025 03:00) (90/55 - 125/88)  BP(mean): 67 (26 Apr 2025 03:00) (67 - 108)  ABP: --  ABP(mean): --  RR: 17 (26 Apr 2025 03:00) (17 - 43)  SpO2: 100% (26 Apr 2025 05:16) (96% - 100%)    O2 Parameters below as of 26 Apr 2025 03:00  Patient On (Oxygen Delivery Method): nasal cannula  O2 Flow (L/min): 3               Input and Output:  I&O's Detail    24 Apr 2025 07:01  -  25 Apr 2025 07:00  --------------------------------------------------------  IN:    Amiodarone: 83.5 mL    Dexmedetomidine: 612.1 mL    Heparin Infusion: 161 mL    IV PiggyBack: 50 mL    Norepinephrine: 224.2 mL  Total IN: 1130.8 mL    OUT:    Indwelling Catheter - Urethral (mL): 845 mL  Total OUT: 845 mL    Total NET: 285.8 mL      25 Apr 2025 07:01  -  26 Apr 2025 06:00  --------------------------------------------------------  IN:    Dexmedetomidine: 68 mL    Heparin Infusion: 169 mL    IV PiggyBack: 100 mL  Total IN: 337 mL    OUT:    Indwelling Catheter - Urethral (mL): 135 mL  Total OUT: 135 mL    Total NET: 202 mL          Lab Data                        15.5   15.17 )-----------( 175      ( 25 Apr 2025 05:30 )             47.6     04-25    145  |  111[H]  |  99[H]  ----------------------------<  212[H]  3.9   |  19[L]  |  3.70[H]    Ca    9.5      25 Apr 2025 05:30  Phos  7.3     04-25  Mg     2.8     04-25    TPro  7.2  /  Alb  3.8  /  TBili  2.1[H]  /  DBili  x   /  AST  964[H]  /  ALT  1455[H]  /  AlkPhos  76  04-25            Review of Systems	      Objective     Physical Examination    heart s1s2  lung dc bs  head nc  head at      Pertinent Lab findings & Imaging      Amberly:  NO   Adequate UO     I&O's Detail    24 Apr 2025 07:01  -  25 Apr 2025 07:00  --------------------------------------------------------  IN:    Amiodarone: 83.5 mL    Dexmedetomidine: 612.1 mL    Heparin Infusion: 161 mL    IV PiggyBack: 50 mL    Norepinephrine: 224.2 mL  Total IN: 1130.8 mL    OUT:    Indwelling Catheter - Urethral (mL): 845 mL  Total OUT: 845 mL    Total NET: 285.8 mL      25 Apr 2025 07:01  -  26 Apr 2025 06:00  --------------------------------------------------------  IN:    Dexmedetomidine: 68 mL    Heparin Infusion: 169 mL    IV PiggyBack: 100 mL  Total IN: 337 mL    OUT:    Indwelling Catheter - Urethral (mL): 135 mL  Total OUT: 135 mL    Total NET: 202 mL               Discussed with:     Cultures:	        Radiology

## 2025-04-26 NOTE — PROGRESS NOTE ADULT - PROBLEM SELECTOR PLAN 2
New onset afib with RVR  -RTT called on 4/21 due to vtach and afib with RVR, patient found to be SOB with flash pulmonary edema and hypotensive; transferred to ICU and s/p  amiodarone  and  s/p  levophed drip  - s/p   amio gtt , heparin drip - metoprolol 25 mg po bid   - now off  levophed   -Plan per ICU

## 2025-04-26 NOTE — PROGRESS NOTE ADULT - SUBJECTIVE AND OBJECTIVE BOX
Interval Events: Pt seen and examined at bedside. Now off precedex gtt. Mentation improving. Pt continues to endose dry mouth and is requesting water. Denies any other acute complaints.    Review of Systems:  Limited 2/2 mental status. Tangential. Denies SOB, abdominal pain, cough.     ICU Vital Signs Last 24 Hrs  T(C): 36.6 (26 Apr 2025 11:51), Max: 37.8 (25 Apr 2025 16:14)  T(F): 97.9 (26 Apr 2025 11:51), Max: 100 (25 Apr 2025 16:14)  HR: 70 (26 Apr 2025 11:00) (62 - 104)  BP: 99/70 (26 Apr 2025 11:00) (90/55 - 125/88)  BP(mean): 79 (26 Apr 2025 11:00) (63 - 101)  ABP: --  ABP(mean): --  RR: 22 (26 Apr 2025 11:00) (17 - 43)  SpO2: 99% (26 Apr 2025 11:00) (99% - 100%)    O2 Parameters below as of 26 Apr 2025 11:00  Patient On (Oxygen Delivery Method): room air              04-25-25 @ 07:01  -  04-26-25 @ 07:00  --------------------------------------------------------  IN: 448 mL / OUT: 1035 mL / NET: -587 mL    04-26-25 @ 07:01  -  04-26-25 @ 12:00  --------------------------------------------------------  IN: 43 mL / OUT: 100 mL / NET: -57 mL        CAPILLARY BLOOD GLUCOSE      POCT Blood Glucose.: 118 mg/dL (26 Apr 2025 05:29)      I&O's Summary    25 Apr 2025 07:01  -  26 Apr 2025 07:00  --------------------------------------------------------  IN: 448 mL / OUT: 1035 mL / NET: -587 mL    26 Apr 2025 07:01  -  26 Apr 2025 12:00  --------------------------------------------------------  IN: 43 mL / OUT: 100 mL / NET: -57 mL        Physical Exam:   Gen: Mild distress  Neuro: Awake and alert. Answers question, but tangential and requests water.   HEENT: NC/AT  Resp: Scattered rhonchi bilaterally. No accessory muscle us. +NC  CVS: RRR. +s1/s2. No murmurs  Abd: Soft. NT/ND. +BS  Ext: No edema  Skin: Warm and dry    Meds:  piperacillin/tazobactam IVPB.. IV Intermittent    metoprolol tartrate Oral    dextrose 50% Injectable IV Push  dextrose 50% Injectable IV Push  dextrose 50% Injectable IV Push  dextrose Oral Gel Oral PRN  glucagon  Injectable IntraMuscular  insulin lispro (ADMELOG) corrective regimen sliding scale SubCutaneous          aspirin  chewable Oral  heparin   Injectable IV Push PRN  heparin   Injectable IV Push PRN  heparin  Infusion. IV Continuous    pantoprazole   Suspension Oral    tamsulosin Oral    dextrose 5%. IV Continuous  dextrose 5%. IV Continuous  dextrose 5%. IV Continuous      chlorhexidine 2% Cloths Topical  mupirocin 2% Nasal Both Nostrils                              13.9   12.89 )-----------( 110      ( 26 Apr 2025 09:05 )             42.2       04-26    146[H]  |  117[H]  |  111[H]  ----------------------------<  139[H]  3.9   |  19[L]  |  3.50[H]    Ca    9.0      26 Apr 2025 09:05  Phos  5.8     04-26  Mg     3.0     04-26    TPro  6.4  /  Alb  3.5  /  TBili  1.7[H]  /  DBili  x   /  AST  558[H]  /  ALT  >1000[H]  /  AlkPhos  94  04-26    Lactate 2.9           04-26 @ 09:05          PTT - ( 25 Apr 2025 23:45 )  PTT:89.5 sec  Urinalysis Basic - ( 26 Apr 2025 09:05 )    Color: x / Appearance: x / SG: x / pH: x  Gluc: 139 mg/dL / Ketone: x  / Bili: x / Urobili: x   Blood: x / Protein: x / Nitrite: x   Leuk Esterase: x / RBC: x / WBC x   Sq Epi: x / Non Sq Epi: x / Bacteria: x      Blood Blood-Peripheral   No growth at 48 Hours -- 04-23 @ 09:45  Blood Blood-Peripheral   No growth at 48 Hours -- 04-23 @ 09:40      Rapid RVP Result: NotDetec (04-23 @ 10:20)      Radiology:  < from: [facility] Hepatobiliary Imaging (04.25.25 @ 14:08) >  IMPRESSION: Normal hepatobiliary scan. No radionuclide evidence of acute   cholecystitis.    < end of copied text >      Tubes/Lines: Gaming      GLOBAL ISSUE/BEST PRACTICE:  Analgesia: N  Sedation: N  HOB elevation: Y  Stress ulcer prophylaxis: Y  VTE prophylaxis: Y  Glycemic control: Y  Nutrition: Soft and bite sizes    CODE STATUS: DNR/DNI  Interval Events: Pt seen and examined at bedside. Now off precedex gtt. Mentation improving. Pt continues to endose dry mouth and is requesting water. Denies any other acute complaints.    Review of Systems:  Limited 2/2 mental status. Tangential. Denies SOB, abdominal pain, cough.     ICU Vital Signs Last 24 Hrs  T(C): 36.6 (26 Apr 2025 11:51), Max: 37.8 (25 Apr 2025 16:14)  T(F): 97.9 (26 Apr 2025 11:51), Max: 100 (25 Apr 2025 16:14)  HR: 70 (26 Apr 2025 11:00) (62 - 104)  BP: 99/70 (26 Apr 2025 11:00) (90/55 - 125/88)  BP(mean): 79 (26 Apr 2025 11:00) (63 - 101)  ABP: --  ABP(mean): --  RR: 22 (26 Apr 2025 11:00) (17 - 43)  SpO2: 99% (26 Apr 2025 11:00) (99% - 100%)    O2 Parameters below as of 26 Apr 2025 11:00  Patient On (Oxygen Delivery Method): room air              04-25-25 @ 07:01  -  04-26-25 @ 07:00  --------------------------------------------------------  IN: 448 mL / OUT: 1035 mL / NET: -587 mL    04-26-25 @ 07:01  -  04-26-25 @ 12:00  --------------------------------------------------------  IN: 43 mL / OUT: 100 mL / NET: -57 mL        CAPILLARY BLOOD GLUCOSE      POCT Blood Glucose.: 118 mg/dL (26 Apr 2025 05:29)      I&O's Summary    25 Apr 2025 07:01  -  26 Apr 2025 07:00  --------------------------------------------------------  IN: 448 mL / OUT: 1035 mL / NET: -587 mL    26 Apr 2025 07:01  -  26 Apr 2025 12:00  --------------------------------------------------------  IN: 43 mL / OUT: 100 mL / NET: -57 mL        Physical Exam:   Gen: Mild distress  Neuro: Awake and alert. Answers question, but tangential and requests water.   HEENT: NC/AT  Resp: Scattered rhonchi bilaterally. No accessory muscle us. +NC  CVS: RRR. +s1/s2. No murmurs  Abd: Soft. NT/ND. +BS  Ext: No edema  Skin: Warm and dry    Meds:  piperacillin/tazobactam IVPB.. IV Intermittent    metoprolol tartrate Oral    dextrose 50% Injectable IV Push  dextrose 50% Injectable IV Push  dextrose 50% Injectable IV Push  dextrose Oral Gel Oral PRN  glucagon  Injectable IntraMuscular  insulin lispro (ADMELOG) corrective regimen sliding scale SubCutaneous          aspirin  chewable Oral  heparin   Injectable IV Push PRN  heparin   Injectable IV Push PRN  heparin  Infusion. IV Continuous    pantoprazole   Suspension Oral    tamsulosin Oral    dextrose 5%. IV Continuous  dextrose 5%. IV Continuous  dextrose 5%. IV Continuous      chlorhexidine 2% Cloths Topical  mupirocin 2% Nasal Both Nostrils                              13.9   12.89 )-----------( 110      ( 26 Apr 2025 09:05 )             42.2       04-26    146[H]  |  117[H]  |  111[H]  ----------------------------<  139[H]  3.9   |  19[L]  |  3.50[H]    Ca    9.0      26 Apr 2025 09:05  Phos  5.8     04-26  Mg     3.0     04-26    TPro  6.4  /  Alb  3.5  /  TBili  1.7[H]  /  DBili  x   /  AST  558[H]  /  ALT  >1000[H]  /  AlkPhos  94  04-26    Lactate 2.9           04-26 @ 09:05          PTT - ( 25 Apr 2025 23:45 )  PTT:89.5 sec  Urinalysis Basic - ( 26 Apr 2025 09:05 )    Color: x / Appearance: x / SG: x / pH: x  Gluc: 139 mg/dL / Ketone: x  / Bili: x / Urobili: x   Blood: x / Protein: x / Nitrite: x   Leuk Esterase: x / RBC: x / WBC x   Sq Epi: x / Non Sq Epi: x / Bacteria: x      Blood Blood-Peripheral   No growth at 48 Hours -- 04-23 @ 09:45  Blood Blood-Peripheral   No growth at 48 Hours -- 04-23 @ 09:40      Rapid RVP Result: NotDetec (04-23 @ 10:20)      Radiology:  < from: NM Hepatobiliary Imaging (04.25.25 @ 14:08) >  IMPRESSION: Normal hepatobiliary scan. No radionuclide evidence of acute   cholecystitis.    < end of copied text >    DATA REVIEW  All data personally reviewed.  See above for details    Laboratory and Micro results-- stable WBC, and Hb, improved Cr, stable bicarb and lytes, mildly elevated lactate  Radiology results--neg HIDA  Cardiology results--      Tubes/Lines: Gaming      GLOBAL ISSUE/BEST PRACTICE:  Analgesia: N  Sedation: N  HOB elevation: Y  Stress ulcer prophylaxis: Y  VTE prophylaxis: Y  Glycemic control: Y  Nutrition: Soft and bite sizes    CODE STATUS: DNR/DNI

## 2025-04-26 NOTE — PROGRESS NOTE ADULT - SUBJECTIVE AND OBJECTIVE BOX
Chief Complaint: Shortness of breath    Interval Events: No events overnight.    Review of Systems:  Unable to obtain.    Physical Exam:  Vital Signs Last 24 Hrs  T(C): 36.2 (26 Apr 2025 04:46), Max: 37.8 (25 Apr 2025 16:14)  T(F): 97.2 (26 Apr 2025 04:46), Max: 100 (25 Apr 2025 16:14)  HR: 75 (26 Apr 2025 08:00) (62 - 113)  BP: 96/57 (26 Apr 2025 08:00) (90/55 - 125/88)  BP(mean): 70 (26 Apr 2025 08:00) (63 - 108)  RR: 26 (26 Apr 2025 08:00) (17 - 43)  SpO2: 100% (26 Apr 2025 08:00) (97% - 100%)  Parameters below as of 26 Apr 2025 07:00  Patient On (Oxygen Delivery Method): nasal cannula  O2 Flow (L/min): 3  General: NAD  HEENT: MMM  Neck: No JVD, no carotid bruit  Lungs: CTAB  CV: RRR, nl S1/S2, no M/R/G  Abdomen: S/NT/ND, +BS  Extremities: No LE edema, no cyanosis  Neuro: AAOx0  Skin: No rash    Labs:    04-25    145  |  111[H]  |  99[H]  ----------------------------<  212[H]  3.9   |  19[L]  |  3.70[H]    Ca    9.5      25 Apr 2025 05:30  Phos  7.3     04-25  Mg     2.8     04-25    TPro  7.2  /  Alb  3.8  /  TBili  2.1[H]  /  DBili  x   /  AST  964[H]  /  ALT  1455[H]  /  AlkPhos  76  04-25                        15.5   15.17 )-----------( 175      ( 25 Apr 2025 05:30 )             47.6       ECG/Telemetry: Sinus rhythm, PVCs

## 2025-04-26 NOTE — PROGRESS NOTE ADULT - ASSESSMENT
89 y/o male with PMHx of CHF, HLD, HTN, prostate ca (s/p radioactive seed implantation in 2015) T2DM, PPM presents to ER c/o SOB. Pt states that he has had intermittent SOB    systolic HF  OP  OA  Atelectasis  Pleural Eff  Dyspnea  eval ACS  HTN  HLD  hx of Prostate Ca  DM  PPM    dnr dni  on abx  vs noted  GOC documented  ICU notes reviewed -   HIDA scan NEG - Surgery follow up noted -     fio2 wean  I apple  goal sat > 88 pct  trend Trops  eval ACS  Cardio eval  tele monitoring  replete lytes  I and O  cvs rx regimen optimization  TTE reviewed from prior visits - EF 20 pct  pleural eff - atelectasis - I apple - no indication for pleurocentesis at present  DM care - serial FS  GOC discussion

## 2025-04-26 NOTE — PROGRESS NOTE ADULT - ATTENDING COMMENTS
88 yo man with Hx HFrEF, ICD, prostate cancer, CKD admitted with ADHF, course complicated by episode of likely afib then VT and acute pulmonary edema resulting in acute hypoxemic respiratory failure, HANNAH, agitated delirium, and ?pneumonia. Today is markedly improved.    --mentating well  start buspar for anxiety  --ADHF, hold diuresis, volume status acceptable  new afib, and likely VT, hold amio for transaminitis, continue lopressor  AC with heparin gtt  CAD, continue ASA, holding statin for transaminitis  --hypoxemia improved, on RA  --HANNAH on CKD 3 from diuresis, beginning to improve  hold diuresis, UOP improved  lactic acidosis improved  --dysphagia, passed bedside eval today  --continue zosyn for likely pneumonia  --DM2, continue ISS  --daughter updated  --DNR/DNI

## 2025-04-26 NOTE — PHARMACOTHERAPY INTERVENTION NOTE - COMMENTS
Patient is an 89 year old male currently ordered for metoprolol tartrate 5 mg IVP Q6H for atrial fibrillation. As patient now on soft and bite sized diet, medications to be switched orally. Assisted in conversion from metoprolol tartrate 5 mg Q6H IV to 25 mg PO BID (IV to PO 1:2.5). 
89 yo male ordered for aspirin 81 mg daily. Patient NPO, recommended switching to suppository formulation. Additionally patients LFTs very elevated, recommended d/c atorvastatin and finasteride. Discussed with Dr Dee and updated.

## 2025-04-26 NOTE — PROGRESS NOTE ADULT - ASSESSMENT
HANNAH on CKD 3  CHF EF 20-25%  Dyspnea   HTN       -Baseline Creatinine 1.5  -HANNAH Likely 2/2 Decreased EABV, ATN  -Urine indices reviewed  -Albumin 100mg q6hrs x2  -Monitor UOP. Improving today.  -No indication for RRT. Volume and lytes acceptable and with UOP will hold off for now    d/w ICU    Critical Care time 35 minutes between seeing patient, discussing with staff, placing orders and reviewing chart

## 2025-04-26 NOTE — PROGRESS NOTE ADULT - ASSESSMENT
88 y/o male with PMHx of CHF, HLD, HTN, prostate ca (s/p radioactive seed implantation in 2015) T2DM, PPM admitted for acute on chronic HFrEF. Upgraded to ICU after episode of NSVT and episode of afib with RVR complicated by flash pulmonary edema.    Neuro:  - Now off precedex, mentation improving  - Pt with anxiety, can consider starting buspar once able to tolerate PO?    CV:  - Pt with afib vs VT, now in sinus tachycardia  - EKG today with SR with occasional PVCs, LBBB. LAD. QTc 528ms  - c/w heparin gtt with eventual transition to DOAC  - c/w PO lopressor BID  - Acute on chronic HFrEF (LVEF 20-25%)  - Monitor off lasix  - Hold home coreg?  - Hx CAD, c/w ASA. Hold statin in setting of transaminitis  - Troponins peaked and downtrending, can be demand 2/2 acute on chronic HFrEF  - Defibrillator/ATP function of pacemaker discontinued in line with DNR order, pacemaker function permitted to continue    Pulm:  - Pt initially with acute hypoxemic respiratory failure 2/2 flash pulmonary edema  - CXR show improving pulmonary vascular congestion  - Saturating well on NC, wean as tolerated  - Monitor off lasix, volume status improving  - Maintain SO2>92    GI:  - Pt with abdominal tenderness on palpation of unclear etiology  - RUQ u/s demonstrating distended gallbladder with wall thickening and edema without evidence of cholelithiasis.  - HIDA negative  - c/w PPI  - Hold tylenol, statin i/s/o severely elevated transaminases and NSAIDs i/s/o worsening HANNAH  - f/u hepatitis panel    Renal:  - HANNAH on CKD, likely pre-renal due to decreased EABV in setting of HFrEF vs ATN   - s/p albumin 100mg q6h x2 doses yesterday  - Cr improving  - Hold home Entresto and dapagliflozin, hold home finasteride  - Monitor daily Cr  - Strict I&Os  - Replete lytes PRN    ID:  - Sepsis of unclear etiology, possible PNA?  - Previously suspected intrabdominal source given transaminitis and positive RUQ US, however HIDA negative  - Leukocytosis and lactate downtrending. Remains afebrile  - c/w zosyn  - BCx NGTD  - Trend WBC and fever curve    Heme:  - Full AC with heparin gtt    Endo:  - LDISS q6h while NPO  - Goal -180    Dispo:  - DNR/DNI     88 y/o male with PMHx of CHF, HLD, HTN, prostate ca (s/p radioactive seed implantation in 2015) T2DM, PPM admitted for acute on chronic HFrEF. Upgraded to ICU after episode of NSVT and episode of afib with RVR complicated by flash pulmonary edema.    Neuro:  - Now off precedex, mentation improving  - Pt with anxiety, start buspar 5mg PO BID    CV:  - Pt with afib vs VT, now in sinus tachycardia  - EKG today with SR with occasional PVCs, LBBB. LAD. QTc 528ms  - c/w heparin gtt with eventual transition to DOAC  - c/w PO lopressor BID  - Acute on chronic HFrEF (LVEF 20-25%)  - Monitor off lasix  - Hold home coreg?  - Hx CAD, c/w ASA. Hold statin in setting of transaminitis  - Troponins peaked and downtrending, can be demand 2/2 acute on chronic HFrEF  - Defibrillator/ATP function of pacemaker discontinued in line with DNR order, pacemaker function permitted to continue    Pulm:  - Pt initially with acute hypoxemic respiratory failure 2/2 flash pulmonary edema  - CXR show improving pulmonary vascular congestion  - Saturating well on NC, wean as tolerated  - Monitor off lasix, volume status improving  - Maintain SO2>92    GI:  - Pt with abdominal tenderness on palpation of unclear etiology  - RUQ u/s demonstrating distended gallbladder with wall thickening and edema without evidence of cholelithiasis.  - HIDA negative  - c/w PPI  - Hold tylenol, statin i/s/o severely elevated transaminases and NSAIDs i/s/o worsening HANNAH  - f/u hepatitis panel    Renal:  - HANNAH on CKD, likely pre-renal due to decreased EABV in setting of HFrEF vs ATN   - s/p albumin 100mg q6h x2 doses yesterday  - Cr improving  - Hold home Entresto and dapagliflozin, hold home finasteride  - Monitor daily Cr  - Strict I&Os  - Replete lytes PRN    ID:  - Sepsis of unclear etiology, possible PNA?  - Previously suspected intrabdominal source given transaminitis and positive RUQ US, however HIDA negative  - Leukocytosis and lactate downtrending. Remains afebrile  - c/w zosyn  - BCx NGTD  - Trend WBC and fever curve    Heme:  - Full AC with heparin gtt    Endo:  - LDISS q6h while NPO  - Goal -180    Dispo:  - DNR/DNI     89 y/o male with PMHx of CHF, HLD, HTN, prostate ca (s/p radioactive seed implantation in 2015) T2DM, PPM admitted for acute on chronic HFrEF. Upgraded to ICU after episode of NSVT and episode of afib with RVR complicated by flash pulmonary edema.    Neuro:  - Now off precedex, mentation improving  - Pt with anxiety, start buspar 5mg PO BID    CV:  - Pt with afib vs VT, now in sinus tachycardia  - EKG today with SR with occasional PVCs, LBBB. LAD. QTc 528ms  - c/w heparin gtt with eventual transition to DOAC  - c/w PO lopressor BID  - Acute on chronic HFrEF (LVEF 20-25%)  - Monitor off lasix  - Hold home coreg  - Hx CAD, c/w ASA. Hold statin in setting of transaminitis  - Troponins peaked and downtrending, can be demand 2/2 acute on chronic HFrEF  - Defibrillator/ATP function of pacemaker discontinued in line with DNR order, pacemaker function permitted to continue    Pulm:  - Pt initially with acute hypoxemic respiratory failure 2/2 flash pulmonary edema  - CXR show improving pulmonary vascular congestion  - Saturating well on NC, wean as tolerated  - Monitor off lasix, volume status improving  - Maintain SO2>92    GI:  - Pt with abdominal tenderness on palpation of unclear etiology  - RUQ u/s demonstrating distended gallbladder with wall thickening and edema without evidence of cholelithiasis.  - HIDA negative  - c/w PPI  - Hold tylenol, statin i/s/o severely elevated transaminases and NSAIDs i/s/o worsening HANNAH  - f/u hepatitis panel    Renal:  - HANNAH on CKD, likely pre-renal due to decreased EABV in setting of HFrEF vs ATN   - s/p albumin 100mg q6h x2 doses yesterday  - Cr improving  - Hold home Entresto and dapagliflozin, hold home finasteride  - Monitor daily Cr  - Strict I&Os  - Replete lytes PRN    ID:  - Sepsis of unclear etiology, possible PNA?  - Previously suspected intrabdominal source given transaminitis and positive RUQ US, however HIDA negative  - Leukocytosis and lactate downtrending. Remains afebrile  - c/w zosyn  - BCx NGTD  - Trend WBC and fever curve    Heme:  - Full AC with heparin gtt    Endo:  - LDISS q6h while NPO  - Goal -180    Dispo:  - DNR/DNI

## 2025-04-27 LAB
ALBUMIN SERPL ELPH-MCNC: 3.6 G/DL — SIGNIFICANT CHANGE UP (ref 3.3–5)
ALP SERPL-CCNC: 92 U/L — SIGNIFICANT CHANGE UP (ref 40–120)
ALT FLD-CCNC: 966 U/L — HIGH (ref 12–78)
ANION GAP SERPL CALC-SCNC: 9 MMOL/L — SIGNIFICANT CHANGE UP (ref 5–17)
APTT BLD: 89.5 SEC — HIGH (ref 26.1–36.8)
AST SERPL-CCNC: 314 U/L — HIGH (ref 15–37)
BASE EXCESS BLDA CALC-SCNC: -3.7 MMOL/L — LOW (ref -2–3)
BASOPHILS # BLD AUTO: 0.01 K/UL — SIGNIFICANT CHANGE UP (ref 0–0.2)
BASOPHILS NFR BLD AUTO: 0.1 % — SIGNIFICANT CHANGE UP (ref 0–2)
BILIRUB SERPL-MCNC: 1.8 MG/DL — HIGH (ref 0.2–1.2)
BLOOD GAS COMMENTS ARTERIAL: SIGNIFICANT CHANGE UP
BUN SERPL-MCNC: 94 MG/DL — HIGH (ref 7–23)
CALCIUM SERPL-MCNC: 8.9 MG/DL — SIGNIFICANT CHANGE UP (ref 8.5–10.1)
CHLORIDE SERPL-SCNC: 119 MMOL/L — HIGH (ref 96–108)
CO2 SERPL-SCNC: 21 MMOL/L — LOW (ref 22–31)
CREAT SERPL-MCNC: 2.6 MG/DL — HIGH (ref 0.5–1.3)
EGFR: 23 ML/MIN/1.73M2 — LOW
EGFR: 23 ML/MIN/1.73M2 — LOW
EOSINOPHIL # BLD AUTO: 0.13 K/UL — SIGNIFICANT CHANGE UP (ref 0–0.5)
EOSINOPHIL NFR BLD AUTO: 1.5 % — SIGNIFICANT CHANGE UP (ref 0–6)
GLUCOSE SERPL-MCNC: 138 MG/DL — HIGH (ref 70–99)
HCO3 BLDA-SCNC: 21 MMOL/L — SIGNIFICANT CHANGE UP (ref 21–28)
HCT VFR BLD CALC: 38.9 % — LOW (ref 39–50)
HGB BLD-MCNC: 13 G/DL — SIGNIFICANT CHANGE UP (ref 13–17)
IMM GRANULOCYTES NFR BLD AUTO: 0.8 % — SIGNIFICANT CHANGE UP (ref 0–0.9)
LYMPHOCYTES # BLD AUTO: 1.23 K/UL — SIGNIFICANT CHANGE UP (ref 1–3.3)
LYMPHOCYTES # BLD AUTO: 14.6 % — SIGNIFICANT CHANGE UP (ref 13–44)
MAGNESIUM SERPL-MCNC: 3.3 MG/DL — HIGH (ref 1.6–2.6)
MCHC RBC-ENTMCNC: 32 PG — SIGNIFICANT CHANGE UP (ref 27–34)
MCHC RBC-ENTMCNC: 33.4 G/DL — SIGNIFICANT CHANGE UP (ref 32–36)
MCV RBC AUTO: 95.8 FL — SIGNIFICANT CHANGE UP (ref 80–100)
MONOCYTES # BLD AUTO: 0.66 K/UL — SIGNIFICANT CHANGE UP (ref 0–0.9)
MONOCYTES NFR BLD AUTO: 7.8 % — SIGNIFICANT CHANGE UP (ref 2–14)
NEUTROPHILS # BLD AUTO: 6.31 K/UL — SIGNIFICANT CHANGE UP (ref 1.8–7.4)
NEUTROPHILS NFR BLD AUTO: 75.2 % — SIGNIFICANT CHANGE UP (ref 43–77)
NRBC BLD AUTO-RTO: 0 /100 WBCS — SIGNIFICANT CHANGE UP (ref 0–0)
PCO2 BLDA: 33 MMHG — LOW (ref 35–48)
PH BLDA: 7.41 — SIGNIFICANT CHANGE UP (ref 7.35–7.45)
PHOSPHATE SERPL-MCNC: 3 MG/DL — SIGNIFICANT CHANGE UP (ref 2.5–4.5)
PLATELET # BLD AUTO: 109 K/UL — LOW (ref 150–400)
PO2 BLDA: 118 MMHG — HIGH (ref 83–108)
POTASSIUM SERPL-MCNC: 3.4 MMOL/L — LOW (ref 3.5–5.3)
POTASSIUM SERPL-SCNC: 3.4 MMOL/L — LOW (ref 3.5–5.3)
PROT SERPL-MCNC: 6.1 G/DL — SIGNIFICANT CHANGE UP (ref 6–8.3)
RBC # BLD: 4.06 M/UL — LOW (ref 4.2–5.8)
RBC # FLD: 14.7 % — HIGH (ref 10.3–14.5)
SAO2 % BLDA: 99.4 % — HIGH (ref 94–98)
SODIUM SERPL-SCNC: 149 MMOL/L — HIGH (ref 135–145)
WBC # BLD: 8.41 K/UL — SIGNIFICANT CHANGE UP (ref 3.8–10.5)
WBC # FLD AUTO: 8.41 K/UL — SIGNIFICANT CHANGE UP (ref 3.8–10.5)

## 2025-04-27 PROCEDURE — 99233 SBSQ HOSP IP/OBS HIGH 50: CPT | Mod: GC

## 2025-04-27 PROCEDURE — 71045 X-RAY EXAM CHEST 1 VIEW: CPT | Mod: 26

## 2025-04-27 RX ORDER — MELATONIN 5 MG
3 TABLET ORAL ONCE
Refills: 0 | Status: COMPLETED | OUTPATIENT
Start: 2025-04-27 | End: 2025-04-27

## 2025-04-27 RX ORDER — DEXTROMETHORPHAN HBR, GUAIFENESIN 200 MG/10ML
200 LIQUID ORAL ONCE
Refills: 0 | Status: COMPLETED | OUTPATIENT
Start: 2025-04-27 | End: 2025-04-27

## 2025-04-27 RX ORDER — SODIUM CHLORIDE 9 G/1000ML
1000 INJECTION, SOLUTION INTRAVENOUS
Refills: 0 | Status: DISCONTINUED | OUTPATIENT
Start: 2025-04-27 | End: 2025-04-29

## 2025-04-27 RX ORDER — INSULIN LISPRO 100 U/ML
INJECTION, SOLUTION INTRAVENOUS; SUBCUTANEOUS
Refills: 0 | Status: DISCONTINUED | OUTPATIENT
Start: 2025-04-27 | End: 2025-05-01

## 2025-04-27 RX ORDER — INSULIN LISPRO 100 U/ML
INJECTION, SOLUTION INTRAVENOUS; SUBCUTANEOUS AT BEDTIME
Refills: 0 | Status: DISCONTINUED | OUTPATIENT
Start: 2025-04-27 | End: 2025-05-01

## 2025-04-27 RX ORDER — ALPRAZOLAM 0.5 MG
0.5 TABLET, EXTENDED RELEASE 24 HR ORAL ONCE
Refills: 0 | Status: DISCONTINUED | OUTPATIENT
Start: 2025-04-27 | End: 2025-04-27

## 2025-04-27 RX ORDER — FUROSEMIDE 10 MG/ML
40 INJECTION INTRAMUSCULAR; INTRAVENOUS ONCE
Refills: 0 | Status: COMPLETED | OUTPATIENT
Start: 2025-04-27 | End: 2025-04-27

## 2025-04-27 RX ADMIN — Medication 81 MILLIGRAM(S): at 11:59

## 2025-04-27 RX ADMIN — Medication 25 GRAM(S): at 17:38

## 2025-04-27 RX ADMIN — Medication 3 MILLIGRAM(S): at 01:19

## 2025-04-27 RX ADMIN — HEPARIN SODIUM 900 UNIT(S)/HR: 1000 INJECTION INTRAVENOUS; SUBCUTANEOUS at 07:14

## 2025-04-27 RX ADMIN — Medication 25 GRAM(S): at 06:29

## 2025-04-27 RX ADMIN — Medication 40 MILLIEQUIVALENT(S): at 12:24

## 2025-04-27 RX ADMIN — MUPIROCIN CALCIUM 1 APPLICATION(S): 20 CREAM TOPICAL at 06:29

## 2025-04-27 RX ADMIN — Medication 0.5 MILLIGRAM(S): at 18:34

## 2025-04-27 RX ADMIN — Medication 40 MILLIEQUIVALENT(S): at 17:26

## 2025-04-27 RX ADMIN — Medication 40 MILLIGRAM(S): at 11:59

## 2025-04-27 RX ADMIN — BUSPIRONE HYDROCHLORIDE 5 MILLIGRAM(S): 15 TABLET ORAL at 17:28

## 2025-04-27 RX ADMIN — SODIUM CHLORIDE 84 MILLILITER(S): 9 INJECTION, SOLUTION INTRAVENOUS at 14:24

## 2025-04-27 RX ADMIN — Medication 1 APPLICATION(S): at 06:30

## 2025-04-27 RX ADMIN — INSULIN LISPRO 1: 100 INJECTION, SOLUTION INTRAVENOUS; SUBCUTANEOUS at 12:00

## 2025-04-27 RX ADMIN — INSULIN LISPRO 1: 100 INJECTION, SOLUTION INTRAVENOUS; SUBCUTANEOUS at 00:17

## 2025-04-27 RX ADMIN — INSULIN LISPRO 2: 100 INJECTION, SOLUTION INTRAVENOUS; SUBCUTANEOUS at 17:27

## 2025-04-27 RX ADMIN — METOPROLOL SUCCINATE 25 MILLIGRAM(S): 50 TABLET, EXTENDED RELEASE ORAL at 17:28

## 2025-04-27 RX ADMIN — HEPARIN SODIUM 900 UNIT(S)/HR: 1000 INJECTION INTRAVENOUS; SUBCUTANEOUS at 19:41

## 2025-04-27 RX ADMIN — FUROSEMIDE 40 MILLIGRAM(S): 10 INJECTION INTRAMUSCULAR; INTRAVENOUS at 21:55

## 2025-04-27 RX ADMIN — DEXTROMETHORPHAN HBR, GUAIFENESIN 200 MILLIGRAM(S): 200 LIQUID ORAL at 00:13

## 2025-04-27 RX ADMIN — BUSPIRONE HYDROCHLORIDE 5 MILLIGRAM(S): 15 TABLET ORAL at 06:31

## 2025-04-27 RX ADMIN — MUPIROCIN CALCIUM 1 APPLICATION(S): 20 CREAM TOPICAL at 17:28

## 2025-04-27 RX ADMIN — FUROSEMIDE 40 MILLIGRAM(S): 10 INJECTION INTRAMUSCULAR; INTRAVENOUS at 17:27

## 2025-04-27 RX ADMIN — HEPARIN SODIUM 900 UNIT(S)/HR: 1000 INJECTION INTRAVENOUS; SUBCUTANEOUS at 10:35

## 2025-04-27 NOTE — PHYSICAL THERAPY INITIAL EVALUATION ADULT - ADDITIONAL COMMENTS
Pt lives in a house w/ stairs/rail.  Pt is a community ambulator and is able to drive a car.  Of note, pt's wife is at [facility] will be going home in ~2 weeks.  Pt report is the primary caregiver to pt.

## 2025-04-27 NOTE — PROGRESS NOTE ADULT - SUBJECTIVE AND OBJECTIVE BOX
NEPHROLOGY INTERVAL HPI/OVERNIGHT EVENTS:  HPI:  89 y/o male with PMHx of CHF, HLD, HTN, prostate ca (s/p radioactive seed implantation in ) T2DM, PPM presents to ER c/o SOB. Pt states that he has had intermittent SOB for the past few days, Pt notes he was visiting his wife today at rehab and felt SOB as he was leaving. He sat down to catch his breath, a staff member noticed he did not look well and they decided to call EMS. Pt notes he has had SOB both at rest and with exertion. Pt denies chest pain, fever, cough. Of note, pt was admitted 1 month ago for CHF exacerbation and also 1 week ago at [facility]. Pt adds a bunch of his medications were changed including his Entresto was dc'ed, Farxiga dosage was doubled and his Lasix was switched to Torsemide. Pt currently on NC and denies any SOB. Pt denies fever, chills, chest pain, palpitations, abdominal pain, nausea, vomiting, diarrhea.    Interval HPI: Unable to obtain any pertinent interval hx 2/2 AMS.       Received in the ED:  Aspirin 324mg PO x1, Lasix 40mg IVP x1 (2025 03:55)      PAST MEDICAL & SURGICAL HISTORY:  NICM (nonischemic cardiomyopathy)      HTN (hypertension)      HLD (hyperlipidemia)      Prostate CA      T2DM (type 2 diabetes mellitus)      CHF (congestive heart failure)      H/O prostate biopsy      H/O cataract extraction      H/O inguinal hernia repair      AICD (automatic cardioverter/defibrillator) present          MEDICATIONS  (STANDING):  albumin human 25% IVPB 100 milliLiter(s) IV Intermittent every 6 hours  aspirin Suppository 300 milliGRAM(s) Rectal daily  chlorhexidine 2% Cloths 1 Application(s) Topical <User Schedule>  dexMEDEtomidine Infusion 0.2 MICROgram(s)/kG/Hr (4.04 mL/Hr) IV Continuous <Continuous>  dextrose 5%. 1000 milliLiter(s) (50 mL/Hr) IV Continuous <Continuous>  dextrose 5%. 1000 milliLiter(s) (100 mL/Hr) IV Continuous <Continuous>  dextrose 5%. 1000 milliLiter(s) (50 mL/Hr) IV Continuous <Continuous>  dextrose 50% Injectable 25 Gram(s) IV Push once  dextrose 50% Injectable 12.5 Gram(s) IV Push once  dextrose 50% Injectable 25 Gram(s) IV Push once  glucagon  Injectable 1 milliGRAM(s) IntraMuscular once  heparin  Infusion.  Unit(s)/Hr (15 mL/Hr) IV Continuous <Continuous>  insulin lispro (ADMELOG) corrective regimen sliding scale   SubCutaneous every 6 hours  mupirocin 2% Nasal 1 Application(s) Both Nostrils two times a day  pantoprazole  Injectable 40 milliGRAM(s) IV Push daily  piperacillin/tazobactam IVPB.. 3.375 Gram(s) IV Intermittent every 12 hours  tamsulosin 0.8 milliGRAM(s) Oral at bedtime    MEDICATIONS  (PRN):  dextrose Oral Gel 15 Gram(s) Oral once PRN Blood Glucose LESS THAN 70 milliGRAM(s)/deciliter  heparin   Injectable 6500 Unit(s) IV Push every 6 hours PRN For aPTT less than 40  heparin   Injectable 3000 Unit(s) IV Push every 6 hours PRN For aPTT between 40 - 57      Allergies    No Known Allergies    Intolerances      Vital Signs Last 24 Hrs  T(C): 36.4 (2025 08:00), Max: 36.7 (2025 19:50)  T(F): 97.6 (2025 08:00), Max: 98.1 (2025 19:50)  HR: 75 (2025 07:00) (67 - 83)  BP: 105/62 (2025 07:00) (88/55 - 112/69)  BP(mean): 76 (2025 07:00) (62 - 92)  RR: 19 (2025 07:00) (15 - 30)  SpO2: 100% (2025 07:00) (97% - 100%)    Parameters below as of 2025 07:00  Patient On (Oxygen Delivery Method): room air          Daily     Daily Weight in k.7 (2025 06:00)    PHYSICAL EXAM:    GENERAL: Elderly male, in no acute distress, ill appearing  HEAD:  Atraumatic, Normocephalic  NERVOUS SYSTEM:  Alert & Oriented X0.  CHEST/LUNG: Coarse BS bilaterally. No accessory m. use.   HEART: Regular rate and rhythm; No murmurs, rubs, or gallops  ABDOMEN: Soft, Nontender, Nondistended; Bowel sounds present  EXTREMITIES:  2+ Peripheral Pulses, No clubbing, cyanosis, or edema  SKIN: No rashes or lesions    LABS:                        13.9   12.89 )-----------( 110      ( 2025 09:05 )             42.2         146[H]  |  117[H]  |  111[H]  ----------------------------<  139[H]  3.9   |  19[L]  |  3.50[H]    Ca    9.0      2025 09:05  Phos  5.8       Mg     3.0         TPro  6.4  /  Alb  3.5  /  TBili  1.7[H]  /  DBili  x   /  AST  558[H]  /  ALT  >1000[H]  /  AlkPhos  94      PTT - ( 2025 23:45 )  PTT:89.5 sec  Urinalysis Basic - ( 2025 09:05 )    Color: x / Appearance: x / SG: x / pH: x  Gluc: 139 mg/dL / Ketone: x  / Bili: x / Urobili: x   Blood: x / Protein: x / Nitrite: x   Leuk Esterase: x / RBC: x / WBC x   Sq Epi: x / Non Sq Epi: x / Bacteria: x

## 2025-04-27 NOTE — PROGRESS NOTE ADULT - PROBLEM SELECTOR PLAN 5
Chronic  -HOLD home Coreg 3.125 mg BID in setting of hypotension-s/p  levophed -  on bb  metoprolol 25 mg po bid -  bps table

## 2025-04-27 NOTE — PROGRESS NOTE ADULT - PROBLEM SELECTOR PLAN 7
S/p prostate radioactive seed implantation in 2015  -On home Flomax 0.4mg QHS and Finasteride 5mg QD

## 2025-04-27 NOTE — PROGRESS NOTE ADULT - SUBJECTIVE AND OBJECTIVE BOX
Patient is a 89y old  Male who presents with a chief complaint of SOB (27 Apr 2025 10:49)    pt seen and examine today awake alert , hospital induce delirium resolved  , downgrade to tele today   ramos / voiding  .   INTERVAL HPI/OVERNIGHT EVENTS:     T(C): 36.4 (04-27-25 @ 08:00), Max: 36.7 (04-26-25 @ 19:50)  HR: 84 (04-27-25 @ 09:00) (67 - 84)  BP: 96/71 (04-27-25 @ 09:00) (88/55 - 112/69)  RR: 19 (04-27-25 @ 09:00) (15 - 30)  SpO2: 100% (04-27-25 @ 09:00) (97% - 100%)  Wt(kg): --  I&O's Summary    26 Apr 2025 07:01  -  27 Apr 2025 07:00  --------------------------------------------------------  IN: 1271 mL / OUT: 1850 mL / NET: -579 mL    27 Apr 2025 07:01  -  27 Apr 2025 11:58  --------------------------------------------------------  IN: 308 mL / OUT: 200 mL / NET: 108 mL        REVIEW OF SYSTEMS:  CONSTITUTIONAL: No fever, weight loss, or fatigue  EYES: No eye pain, visual disturbances, or discharge  ENMT:  No difficulty hearing, tinnitus, vertigo; No sinus or throat pain  NECK: No pain or stiffness  RESPIRATORY: No cough, wheezing, chills or hemoptysis; No shortness of breath  CARDIOVASCULAR: No chest pain, palpitations, dizziness, or leg swelling  GASTROINTESTINAL: No abdominal or epigastric pain. No nausea, vomiting, or hematemesis; No diarrhea or constipation.   GENITOURINARY: No dysuria, frequency, hematuria, or incontinence  NEUROLOGICAL: No headaches, memory loss, loss of strength, numbness, or tremors  SKIN: No itching, burning, rashes, or lesions   MUSCULOSKELETAL: No joint pain or swelling; No muscle, back, or extremity pain    PHYSICAL EXAM:  GENERAL: NAD,    HEAD:  Atraumatic, Normocephalic  EYES: EOMI, PERRLA, conjunctiva and sclera clear  ENMT: No tonsillar erythema, exudates, or enlargement; Moist mucous membranes  NECK: Supple, No JVD  NERVOUS SYSTEM:  Alert & Oriented X3; Motor Strength 5/5 B/L upper and lower extremities; DTRs 2+ intact and symmetric  CHEST/LUNG:  percussion bilaterally; No rales, rhonchi, wheezing,    HEART: Regular rate and rhythm; No murmurs   ABDOMEN: Soft, Nontender, Nondistended; Bowel sounds present  EXTREMITIES:  2+ Peripheral Pulses, No clubbing, cyanosis, or edema  SKIN: No rashes or lesions  gu  on ramos     MEDICATIONS  (STANDING):  aspirin  chewable 81 milliGRAM(s) Oral daily  busPIRone 5 milliGRAM(s) Oral two times a day  chlorhexidine 2% Cloths 1 Application(s) Topical <User Schedule>  dextrose 5%. 1000 milliLiter(s) (50 mL/Hr) IV Continuous <Continuous>  dextrose 5%. 1000 milliLiter(s) (100 mL/Hr) IV Continuous <Continuous>  dextrose 5%. 1000 milliLiter(s) (50 mL/Hr) IV Continuous <Continuous>  dextrose 50% Injectable 25 Gram(s) IV Push once  dextrose 50% Injectable 12.5 Gram(s) IV Push once  dextrose 50% Injectable 25 Gram(s) IV Push once  glucagon  Injectable 1 milliGRAM(s) IntraMuscular once  heparin  Infusion.  Unit(s)/Hr (15 mL/Hr) IV Continuous <Continuous>  insulin lispro (ADMELOG) corrective regimen sliding scale   SubCutaneous three times a day before meals  insulin lispro (ADMELOG) corrective regimen sliding scale   SubCutaneous at bedtime  metoprolol tartrate 25 milliGRAM(s) Oral two times a day  mupirocin 2% Nasal 1 Application(s) Both Nostrils two times a day  pantoprazole   Suspension 40 milliGRAM(s) Oral daily  piperacillin/tazobactam IVPB.. 3.375 Gram(s) IV Intermittent every 12 hours  tamsulosin 0.8 milliGRAM(s) Oral at bedtime    MEDICATIONS  (PRN):  dextrose Oral Gel 15 Gram(s) Oral once PRN Blood Glucose LESS THAN 70 milliGRAM(s)/deciliter  heparin   Injectable 6500 Unit(s) IV Push every 6 hours PRN For aPTT less than 40  heparin   Injectable 3000 Unit(s) IV Push every 6 hours PRN For aPTT between 40 - 57      LABS:                        13.0   8.41  )-----------( 109      ( 27 Apr 2025 06:11 )             38.9     04-27    149[H]  |  119[H]  |  94[H]  ----------------------------<  138[H]  3.4[L]   |  21[L]  |  2.60[H]    Ca    8.9      27 Apr 2025 06:11  Phos  3.0     04-27  Mg     3.3     04-27    TPro  6.1  /  Alb  3.6  /  TBili  1.8[H]  /  DBili  x   /  AST  314[H]  /  ALT  966[H]  /  AlkPhos  92  04-27    PTT - ( 27 Apr 2025 06:11 )  PTT:89.5 sec  Urinalysis Basic - ( 27 Apr 2025 06:11 )    Color: x / Appearance: x / SG: x / pH: x  Gluc: 138 mg/dL / Ketone: x  / Bili: x / Urobili: x   Blood: x / Protein: x / Nitrite: x   Leuk Esterase: x / RBC: x / WBC x   Sq Epi: x / Non Sq Epi: x / Bacteria: x      CAPILLARY BLOOD GLUCOSE      POCT Blood Glucose.: 140 mg/dL (27 Apr 2025 06:34)  POCT Blood Glucose.: 188 mg/dL (27 Apr 2025 00:16)  POCT Blood Glucose.: 127 mg/dL (26 Apr 2025 17:09)  POCT Blood Glucose.: 107 mg/dL (26 Apr 2025 12:15)              RADIOLOGY & ADDITIONAL TESTS:    Imaging Personally Reviewed:   no new test      Advance Directives:  dnr / dni    Palliative Care:  Appropriate

## 2025-04-27 NOTE — CHART NOTE - NSCHARTNOTEFT_GEN_A_CORE
Called for patient with c/o shortness of breath. Seen and evaluated at bedside, recent downgrade from ICU, admitted for CHF exacerbation, recent sparing use of diuretics in setting of HANNAH, and on IV abx for PNA. Patient states he noticed increasing shortness of breath over the past few hours. Saturating well on room air 92-93% with HR 90s-100s. On exam with mild end expiratory wheezing. STAT CXR with worsening infiltrates compared to 4/23 and in setting of symptoms, will administer IV Lasix 40mg x1. Continue to monitor on tele with continuous pulse ox, RN to call with any change

## 2025-04-27 NOTE — CHART NOTE - NSCHARTNOTEFT_GEN_A_CORE
called by RN stating pt having SOB and hypoxia to 89% put on 5L NC w/ O2 at 92%. Pt admitted for CHF exacerbation, HANNAH. Pt was seen and examined at bedside. Pt confused w/ increased work of breathing and tachycardic to 109.     VITALS:   T(C): 36.3 (04-27-25 @ 20:23), Max: 36.6 (04-26-25 @ 23:39)  HR: 88 (04-27-25 @ 20:23) (67 - 132)  BP: 123/76 (04-27-25 @ 20:23) (94/59 - 141/83)  RR: 17 (04-27-25 @ 20:23) (15 - 30)  SpO2: 92% (04-27-25 @ 20:23) (88% - 100%)    GENERAL: in respiratory distress. lying in bed   NECK: Supple, no JVD  HEART: Regular rate and rhythm, no murmurs, rubs, or gallops  LUNGS: labored respirations.  expiratory wheezes b/l   EXTREMITIES: 2+ peripheral pulses bilaterally. No clubbing, cyanosis, or edema  NERVOUS SYSTEM:  A&Ox1    SKIN: Warm and dry    89 y/o male with PMHx of CHF, HLD, HTN, prostate ca (s/p radioactive seed implantation in 2015) T2DM, PPM presents to ER c/o SOB admitted for acute on chronic systolic CHF exacerbation. Now w/ increased work of breathing and hypoxia.   -pt on D5 IVF likely for HANNAH however will stop given pt is fluid overloaded - hannah likely due to CHF exacerbation   -lung exam w/ expiratory wheezes   -hypoxia and increased WOB likely due to fluid overload   -abg ordered   -started on BIPAP (pt previously on in the ICU) w/ improvement in WOB   -give worsening respiratory status and hypoxia will give additional 40 IV lasix x1   -CXR done earlier at 5pm showed worsening infiltrates   -Will continue to monitor   -RN to notify w/ any changes called by RN stating pt having SOB and hypoxia to 89% put on 5L NC w/ O2 at 92%. Pt admitted for CHF exacerbation, HANNAH. Pt was seen and examined at bedside. Pt confused w/ increased work of breathing and tachycardic to 109.     VITALS:   T(C): 36.3 (04-27-25 @ 20:23), Max: 36.6 (04-26-25 @ 23:39)  HR: 88 (04-27-25 @ 20:23) (67 - 132)  BP: 123/76 (04-27-25 @ 20:23) (94/59 - 141/83)  RR: 17 (04-27-25 @ 20:23) (15 - 30)  SpO2: 92% (04-27-25 @ 20:23) (88% - 100%)    GENERAL: in respiratory distress. lying in bed   NECK: Supple, no JVD  HEART: Regular rate and rhythm, no murmurs, rubs, or gallops  LUNGS: labored respirations.  expiratory wheezes b/l   EXTREMITIES: 2+ peripheral pulses bilaterally. No clubbing, cyanosis, or edema  NERVOUS SYSTEM:  A&Ox1    SKIN: Warm and dry    88 y/o male with PMHx of CHF, HLD, HTN, prostate ca (s/p radioactive seed implantation in 2015) T2DM, PPM presents to ER c/o SOB admitted for acute on chronic systolic CHF exacerbation. Now w/ increased work of breathing and hypoxia.   -pt on D5 IVF likely for HANNAH however will stop given pt is fluid overloaded - hannah likely due to CHF exacerbation   -lung exam w/ expiratory wheezes   -hypoxia and increased WOB likely due to fluid overload   -abg ordered   -started on BIPAP (pt previously on in the ICU) w/ improvement in WOB   -give worsening respiratory status and hypoxia will give additional 40 IV lasix x1   -CXR done earlier at 5pm showed worsening infiltrates repeat CXR ordered   -Will continue to monitor   -RN to notify w/ any changes

## 2025-04-27 NOTE — PROGRESS NOTE ADULT - PROBLEM SELECTOR PLAN 8
Chronic  -On home Farxiga 10mg qd   -Hold home oral meds  -Continue LDSS with FS QAC, QHS  -Hypoglycemia protocol  -Consistent carb diet

## 2025-04-27 NOTE — DISCHARGE NOTE PROVIDER - CARE PROVIDER_API CALL
Cayetano Boothe  Cardiology  [address], [address]  [place] [address]  Phone: [phone]  Fax: [phone]  Follow Up Time: 2 weeks    Lex Jara  Internal Medicine  [address] [place] [address]  Phone: [phone]  Fax: [phone]  Established Patient  Follow Up Time: 1-3 days    Mark Kaur  Nephrology  [address], [address]  [place] [address]  Phone: [phone]  Fax: [phone]  Follow Up Time: 2 weeks

## 2025-04-27 NOTE — DISCHARGE NOTE PROVIDER - HOSPITAL COURSE
FROM ADMISSION H+P:   HPI:  89 y/o male with PMHx of CHF, HLD, HTN, prostate ca (s/p radioactive seed implantation in 2015) T2DM, PPM presents to ER c/o SOB. Pt states that he has had intermittent SOB for the past few days, Pt notes he was visiting his wife today at rehab and felt SOB as he was leaving. He sat down to catch his breath, a staff member noticed he did not look well and they decided to call EMS. Pt notes he has had SOB both at rest and with exertion. Pt denies chest pain, fever, cough. Of note, pt was admitted 1 month ago for CHF exacerbation and also 1 week ago at [facility]. Pt adds a bunch of his medications were changed including his Entresto was dc'ed, Farxiga dosage was doubled and his Lasix was switched to Torsemide. Pt currently on NC and denies any SOB. Pt denies fever, chills, chest pain, palpitations, abdominal pain, nausea, vomiting, diarrhea.    ED Course:   Vitals: BP: 135/73, HR: 95 , Temp: 98.2F , RR: 18 , SpO2: 94% on 6LNC  Labs: BUN/Cr 56/2.1, Glucose 125, eGFR 30, Troponin 3051, pro-BNP 82839  CXR: official read pending   EKG: NSR with frequent PVC's, 90 BPM, QTc 518      Received in the ED:  Aspirin 324mg PO x1, Lasix 40mg IVP x1 (21 Apr 2025 03:55)      ---  HOSPITAL COURSE: Pt was admitted to the hospital for acute HF exacerbation initially on med floors. RRT was called for afib RVR vs VT was started on amio and heparin gtt and in the ICU requiring pressor assisted diuresis and NIV. Pt was diuresed with improvement in fluid status. Was started on precedex for AMS likely metabolic enceph in the setting of PNA vs intraabdominal infx on IV abx, weaned as tolerated with significant improvement in mental status. RUQ obtained for increasing LFTs showing distended gallbladder/wall thickening/perichole fluid --> HIDA wnl. Pt overall improved clinically euvolemic,     ---  CONSULTANTS:   Cardio: Dr. Boothe  EP: Dr. King  Pulm: Dr. Chau  Nephro: Dr. Webb  GI: Dr. Pérez  ---  TIME SPENT:  I, the attending physician, was physically present for the key portions of the evaluation and management (E/M) service provided. The total amount of time spent reviewing the hospital notes, laboratory values, imaging findings, assessing/counseling the patient, discussing with consultant physicians, social work, nursing staff was -- minutes    ---  Primary care provider was made aware of plan for discharge:      [  ] NO     [  ] YES   FROM ADMISSION H+P:   HPI:  89 y/o male with PMHx of CHF, HLD, HTN, prostate ca (s/p radioactive seed implantation in 2015) T2DM, PPM presents to ER c/o SOB. Pt states that he has had intermittent SOB for the past few days, Pt notes he was visiting his wife today at rehab and felt SOB as he was leaving. He sat down to catch his breath, a staff member noticed he did not look well and they decided to call EMS. Pt notes he has had SOB both at rest and with exertion. Pt denies chest pain, fever, cough. Of note, pt was admitted 1 month ago for CHF exacerbation and also 1 week ago at [facility]. Pt adds a bunch of his medications were changed including his Entresto was dc'ed, Farxiga dosage was doubled and his Lasix was switched to Torsemide. Pt currently on NC and denies any SOB. Pt denies fever, chills, chest pain, palpitations, abdominal pain, nausea, vomiting, diarrhea.    ED Course:   Vitals: BP: 135/73, HR: 95 , Temp: 98.2F , RR: 18 , SpO2: 94% on 6LNC  Labs: BUN/Cr 56/2.1, Glucose 125, eGFR 30, Troponin 3051, pro-BNP 99355  CXR: official read pending   EKG: NSR with frequent PVC's, 90 BPM, QTc 518      Received in the ED:  Aspirin 324mg PO x1, Lasix 40mg IVP x1 (21 Apr 2025 03:55)      ---  HOSPITAL COURSE: Pt was admitted to the hospital for acute HF exacerbation initially on med floors. RRT was called for afib RVR vs VT was started on amio and heparin gtt and in the ICU requiring pressor assisted diuresis and NIV. Pt was diuresed with improvement in fluid status. Was started on precedex for AMS likely metabolic enceph in the setting of PNA vs intraabdominal infx on IV abx, weaned as tolerated with significant improvement in mental status. RUQ obtained for increasing LFTs showing distended gallbladder/wall thickening/perichole fluid --> HIDA wnl. Pt overall improved clinically euvolemic. Pt was made DNR/DNI trial NIV and defib function of ICD was turned while maintaining PPM function after GOC discussion with family who was in agreement that this was in line with pt's DNR and care goals.     ---  CONSULTANTS:   Cardio: Dr. Boothe  EP: Dr. King  Pulm: Dr. Chau  Nephro: Dr. Webb  GI: Dr. Pérez  ---  TIME SPENT:  I, the attending physician, was physically present for the key portions of the evaluation and management (E/M) service provided. The total amount of time spent reviewing the hospital notes, laboratory values, imaging findings, assessing/counseling the patient, discussing with consultant physicians, social work, nursing staff was -- minutes    ---  Primary care provider was made aware of plan for discharge:      [  ] NO     [  ] YES   FROM ADMISSION H+P:   HPI:  88 y/o male with PMHx of CHF, HLD, HTN, prostate ca (s/p radioactive seed implantation in 2015) T2DM, PPM presents to ER c/o SOB. Pt states that he has had intermittent SOB for the past few days, Pt notes he was visiting his wife today at rehab and felt SOB as he was leaving. He sat down to catch his breath, a staff member noticed he did not look well and they decided to call EMS. Pt notes he has had SOB both at rest and with exertion. Pt denies chest pain, fever, cough. Of note, pt was admitted 1 month ago for CHF exacerbation and also 1 week ago at [facility]. Pt adds a bunch of his medications were changed including his Entresto was dc'ed, Farxiga dosage was doubled and his Lasix was switched to Torsemide. Pt currently on NC and denies any SOB. Pt denies fever, chills, chest pain, palpitations, abdominal pain, nausea, vomiting, diarrhea.    ED Course:   Vitals: BP: 135/73, HR: 95 , Temp: 98.2F , RR: 18 , SpO2: 94% on 6LNC  Labs: BUN/Cr 56/2.1, Glucose 125, eGFR 30, Troponin 3051, pro-BNP 37505  CXR: official read pending   EKG: NSR with frequent PVC's, 90 BPM, QTc 518      Received in the ED:  Aspirin 324mg PO x1, Lasix 40mg IVP x1 (21 Apr 2025 03:55)      ---  HOSPITAL COURSE: Pt was admitted to the hospital for acute HF exacerbation initially on med floors. RRT was called for afib RVR vs VT was started on amio and heparin gtt and in the ICU requiring pressor assisted diuresis and NIV. Pt was diuresed with improvement in fluid status. Was started on precedex for AMS likely metabolic enceph in the setting of PNA vs intraabdominal infx on IV abx, weaned as tolerated with significant improvement in mental status. RUQ obtained for increasing LFTs showing distended gallbladder/wall thickening/perichole fluid --> HIDA wnl. Pt overall improved clinically euvolemic. Pt was made DNR/DNI trial NIV and defib function of ICD was turned while maintaining PPM function after GOC discussion with family who was in agreement that this was in line with pt's DNR and care goals. Pt was downgraded from ICU to tele floor. Oral torsemide daily was started. Metoprolol bid and eliquis given for afib. Home coreg held in setting of hypotension s/p pressors in ICU. Supplemental oxygen via nasal cannula was weaned as tolerated. PT recommended MYRA.     Patient seen and examined on day of discharge.     VITALS AND PHYSICAL EXAM ON DAY OF DISCHARGE:  T(C): 36.3 (04-30-25 @ 11:26), Max: 36.4 (04-29-25 @ 12:22)  HR: 85 (04-30-25 @ 11:26) (61 - 85)  BP: 104/70 (04-30-25 @ 11:26) (97/63 - 110/76)  RR: 18 (04-30-25 @ 11:26) (18 - 19)  SpO2: 95% (04-30-25 @ 11:26) (91% - 96%)    PHYSICAL EXAM:  GENERAL: NAD, sitting comfortably in chair   HEAD:  Atraumatic, Normocephalic  EYES: conjunctiva and sclera clear  ENMT:  Moist mucous membranes  NERVOUS SYSTEM:  awake, alert answers questions appropriately and follows commands   CHEST/LUNG:  decreased breath sounds b/l at the bases, no  rhonchi,  no  wheezing,    HEART: Regular rate and rhythm; S1S2  ABDOMEN: Soft, Nontender, Nondistended; Bowel sounds present  EXTREMITIES:  No leg edema b/l  SKIN: No visible rashes or lesions    ---  CONSULTANTS:   Cardio: Dr. Boothe  EP: Dr. King  Pulm: Dr. Chau  Nephro: Dr. Webb  GI: Dr. Pérez  ---  TIME SPENT:  I, the attending physician, was physically present for the key portions of the evaluation and management (E/M) service provided. The total amount of time spent reviewing the hospital notes, laboratory values, imaging findings, assessing/counseling the patient, discussing with consultant physicians, social work, nursing staff was -- minutes    ---  Primary care provider was made aware of plan for discharge:      [  ] NO     [  ] YES   FROM ADMISSION H+P:   HPI:  89 y/o male with PMHx of CHF, HLD, HTN, prostate ca (s/p radioactive seed implantation in 2015) T2DM, PPM presents to ER c/o SOB. Pt states that he has had intermittent SOB for the past few days, Pt notes he was visiting his wife today at rehab and felt SOB as he was leaving. He sat down to catch his breath, a staff member noticed he did not look well and they decided to call EMS. Pt notes he has had SOB both at rest and with exertion. Pt denies chest pain, fever, cough. Of note, pt was admitted 1 month ago for CHF exacerbation and also 1 week ago at [facility]. Pt adds a bunch of his medications were changed including his Entresto was dc'ed, Farxiga dosage was doubled and his Lasix was switched to Torsemide. Pt currently on NC and denies any SOB. Pt denies fever, chills, chest pain, palpitations, abdominal pain, nausea, vomiting, diarrhea.    ED Course:   Vitals: BP: 135/73, HR: 95 , Temp: 98.2F , RR: 18 , SpO2: 94% on 6LNC  Labs: BUN/Cr 56/2.1, Glucose 125, eGFR 30, Troponin 3051, pro-BNP 47562  CXR: official read pending   EKG: NSR with frequent PVC's, 90 BPM, QTc 518      Received in the ED:  Aspirin 324mg PO x1, Lasix 40mg IVP x1 (21 Apr 2025 03:55)      ---  HOSPITAL COURSE: Pt was admitted to the hospital for acute HF exacerbation initially on med floors. RRT was called for afib RVR vs VT was started on amio and heparin gtt and in the ICU requiring pressor assisted diuresis and NIV. Pt was diuresed with improvement in fluid status. Was started on precedex for AMS likely metabolic enceph in the setting of PNA vs intraabdominal infx on IV abx (finished 5 day course of IV zosyn), weaned as tolerated with significant improvement in mental status. RUQ obtained for increasing LFTs showing distended gallbladder/wall thickening/perichole fluid --> HIDA wnl. Home statin was held. Pt overall improved clinically euvolemic. Pt was made DNR/DNI trial NIV and defib function of ICD was turned while maintaining PPM function after GOC discussion with family who was in agreement that this was in line with pt's DNR and care goals. Pt was downgraded from ICU to tele floor. Oral torsemide daily was started. Metoprolol bid and eliquis given for afib. Home coreg held in setting of hypotension s/p pressors in ICU. Supplemental oxygen via nasal cannula was weaned as tolerated. PT recommended MYRA.     Patient seen and examined on day of discharge.     VITALS AND PHYSICAL EXAM ON DAY OF DISCHARGE:  T(C): 36.3 (04-30-25 @ 11:26), Max: 36.4 (04-29-25 @ 12:22)  HR: 85 (04-30-25 @ 11:26) (61 - 85)  BP: 104/70 (04-30-25 @ 11:26) (97/63 - 110/76)  RR: 18 (04-30-25 @ 11:26) (18 - 19)  SpO2: 95% (04-30-25 @ 11:26) (91% - 96%)    PHYSICAL EXAM:  GENERAL: NAD, sitting comfortably in chair   HEAD:  Atraumatic, Normocephalic  EYES: conjunctiva and sclera clear  ENMT:  Moist mucous membranes  NERVOUS SYSTEM:  awake, alert answers questions appropriately and follows commands   CHEST/LUNG:  decreased breath sounds b/l at the bases, no  rhonchi,  no  wheezing,    HEART: Regular rate and rhythm; S1S2  ABDOMEN: Soft, Nontender, Nondistended; Bowel sounds present  EXTREMITIES:  No leg edema b/l  SKIN: No visible rashes or lesions    ---  CONSULTANTS:   Cardio: Dr. Boothe  EP: Dr. King  Pulm: Dr. Chau  Nephro: Dr. Webb  GI: Dr. Pérez  ---  TIME SPENT:  I, the attending physician, was physically present for the key portions of the evaluation and management (E/M) service provided. The total amount of time spent reviewing the hospital notes, laboratory values, imaging findings, assessing/counseling the patient, discussing with consultant physicians, social work, nursing staff was -- minutes    ---  Primary care provider was made aware of plan for discharge:      [  ] NO     [  ] YES   FROM ADMISSION H+P:   HPI:  88 y/o male with PMHx of CHF, HLD, HTN, prostate ca (s/p radioactive seed implantation in 2015) T2DM, PPM presents to ER c/o SOB. Pt states that he has had intermittent SOB for the past few days, Pt notes he was visiting his wife today at rehab and felt SOB as he was leaving. He sat down to catch his breath, a staff member noticed he did not look well and they decided to call EMS. Pt notes he has had SOB both at rest and with exertion. Pt denies chest pain, fever, cough. Of note, pt was admitted 1 month ago for CHF exacerbation and also 1 week ago at [facility]. Pt adds a bunch of his medications were changed including his Entresto was dc'ed, Farxiga dosage was doubled and his Lasix was switched to Torsemide. Pt currently on NC and denies any SOB. Pt denies fever, chills, chest pain, palpitations, abdominal pain, nausea, vomiting, diarrhea.    ED Course:   Vitals: BP: 135/73, HR: 95 , Temp: 98.2F , RR: 18 , SpO2: 94% on 6LNC  Labs: BUN/Cr 56/2.1, Glucose 125, eGFR 30, Troponin 3051, pro-BNP 27226  CXR: official read pending   EKG: NSR with frequent PVC's, 90 BPM, QTc 518      Received in the ED:  Aspirin 324mg PO x1, Lasix 40mg IVP x1 (21 Apr 2025 03:55)      ---  HOSPITAL COURSE: Pt was admitted to the hospital for acute HF exacerbation initially on med floors. RRT was called for afib RVR vs VT was started on amio and heparin gtt and in the ICU requiring pressor assisted diuresis and NIV. Pt was diuresed with improvement in fluid status. Was started on precedex for AMS likely metabolic enceph in the setting of PNA vs intraabdominal infx on IV abx (finished 5 day course of IV zosyn), weaned as tolerated with significant improvement in mental status. RUQ obtained for increasing LFTs showing distended gallbladder/wall thickening/perichole fluid --> HIDA wnl. Home statin was held. Pt overall improved clinically euvolemic. Pt was made DNR/DNI trial NIV and defib function of ICD was turned off while maintaining PPM function after GOC discussion with family who was in agreement that this was in line with pt's DNR and care goals.  ICD tachy-therapy turned off by EP. Patient does not want any invasive procedures.  Medical mgmt only / no icd upgrade to manage LBBB related CHF symptoms. Pt was downgraded from ICU to tele floor. Oral torsemide daily was started. Metoprolol 25mg OD and eliquis 2.5mg bid given for afib. Home coreg held in setting of hypotension s/p pressors in ICU. Amiodarone  was discontinued with transaminitis. Nephro in agreement to continue on torsemide 20mg daily Oral on discharge. Supplemental oxygen via nasal cannula was weaned as tolerated. PT recommended MYRA. Patient also has anxiety .Pt to continue on buspar bid started by your and can take xanax 0.25mg daily prn  .    Patient seen and examined on day of discharge.     VITALS AND PHYSICAL EXAM ON DAY OF DISCHARGE:  T(C): 36.3 (04-30-25 @ 11:26), Max: 36.4 (04-29-25 @ 12:22)  HR: 85 (04-30-25 @ 11:26) (61 - 85)  BP: 104/70 (04-30-25 @ 11:26) (97/63 - 110/76)  RR: 18 (04-30-25 @ 11:26) (18 - 19)  SpO2: 95% (04-30-25 @ 11:26) (91% - 96%)    PHYSICAL EXAM:  GENERAL: NAD, sitting comfortably in chair   HEAD:  Atraumatic, Normocephalic  EYES: conjunctiva and sclera clear  ENMT:  Moist mucous membranes  NERVOUS SYSTEM:  awake, alert answers questions appropriately and follows commands   CHEST/LUNG:  decreased breath sounds b/l at the bases, no  rhonchi,  no  wheezing,    HEART: Regular rate and rhythm; S1S2  ABDOMEN: Soft, Nontender, Nondistended; Bowel sounds present  EXTREMITIES:  No leg edema b/l  SKIN: No visible rashes or lesions    ---  CONSULTANTS:   Cardio: Dr. Boothe  EP: Dr. King  Pulm: Dr. Chau  Nephro: Dr. Webb  GI: Dr. Pérez  ---  TIME SPENT:  I, the attending physician, was physically present for the key portions of the evaluation and management (E/M) service provided. The total amount of time spent reviewing the hospital notes, laboratory values, imaging findings, assessing/counseling the patient, discussing with consultant physicians, social work, nursing staff was -- minutes    ---  Primary care provider was made aware of plan for discharge:      [  ] NO     [  ] YES   FROM ADMISSION H+P:   HPI:  88 y/o male with PMHx of CHF, HLD, HTN, prostate ca (s/p radioactive seed implantation in 2015) T2DM, PPM presents to ER c/o SOB. Pt states that he has had intermittent SOB for the past few days, Pt notes he was visiting his wife today at rehab and felt SOB as he was leaving. He sat down to catch his breath, a staff member noticed he did not look well and they decided to call EMS. Pt notes he has had SOB both at rest and with exertion. Pt denies chest pain, fever, cough. Of note, pt was admitted 1 month ago for CHF exacerbation and also 1 week ago at [facility]. Pt adds a bunch of his medications were changed including his Entresto was dc'ed, Farxiga dosage was doubled and his Lasix was switched to Torsemide. Pt currently on NC and denies any SOB. Pt denies fever, chills, chest pain, palpitations, abdominal pain, nausea, vomiting, diarrhea.    ED Course:   Vitals: BP: 135/73, HR: 95 , Temp: 98.2F , RR: 18 , SpO2: 94% on 6LNC  Labs: BUN/Cr 56/2.1, Glucose 125, eGFR 30, Troponin 3051, pro-BNP 35719  CXR: official read pending   EKG: NSR with frequent PVC's, 90 BPM, QTc 518      Received in the ED:  Aspirin 324mg PO x1, Lasix 40mg IVP x1 (21 Apr 2025 03:55)      ---  HOSPITAL COURSE: Pt was admitted to the hospital for acute HF exacerbation initially on med floors. RRT was called for afib RVR vs VT was started on amio and heparin gtt and in the ICU requiring pressor assisted diuresis and NIV. Pt was diuresed with improvement in fluid status. Was started on precedex for AMS likely metabolic enceph in the setting of PNA vs intraabdominal infx on IV abx (finished 5 day course of IV zosyn), weaned as tolerated with significant improvement in mental status. RUQ obtained for increasing LFTs showing distended gallbladder/wall thickening/perichole fluid --> HIDA wnl. Home statin was held. Pt overall improved clinically euvolemic. Pt was made DNR/DNI trial NIV and defib function of ICD was turned off while maintaining PPM function after GOC discussion with family who was in agreement that this was in line with pt's DNR and care goals.  ICD tachy-therapy turned off by EP. Patient does not want any invasive procedures.  Medical mgmt only / no icd upgrade to manage LBBB related CHF symptoms. Pt was downgraded from ICU to tele floor. Oral torsemide daily was started. Metoprolol 25mg OD and eliquis 2.5mg bid given for afib. Home coreg held in setting of hypotension s/p pressors in ICU. Amiodarone  was discontinued with transaminitis. Nephro in agreement to continue on torsemide 20mg daily Oral on discharge. Pt with 25 beats of vtach nonsustained, asymptomatic. Then shortly after had HR in the 30s associated with diaphoresis. Patient with pacemaker in place, seen by EP. Supplemental oxygen via nasal cannula was weaned as tolerated. PT recommended MYRA. Patient also has anxiety .Pt to continue on buspar bid started by your and can take xanax 0.25mg daily prn  .    Patient seen and examined on day of discharge.     VITALS AND PHYSICAL EXAM ON DAY OF DISCHARGE:  T(C): 36.3 (04-30-25 @ 11:26), Max: 36.4 (04-29-25 @ 12:22)  HR: 85 (04-30-25 @ 11:26) (61 - 85)  BP: 104/70 (04-30-25 @ 11:26) (97/63 - 110/76)  RR: 18 (04-30-25 @ 11:26) (18 - 19)  SpO2: 95% (04-30-25 @ 11:26) (91% - 96%)    PHYSICAL EXAM:  GENERAL: NAD, sitting comfortably in chair   HEAD:  Atraumatic, Normocephalic  EYES: conjunctiva and sclera clear  ENMT:  Moist mucous membranes  NERVOUS SYSTEM:  awake, alert answers questions appropriately and follows commands   CHEST/LUNG:  decreased breath sounds b/l at the bases, no  rhonchi,  no  wheezing,    HEART: Regular rate and rhythm; S1S2  ABDOMEN: Soft, Nontender, Nondistended; Bowel sounds present  EXTREMITIES:  No leg edema b/l  SKIN: No visible rashes or lesions    ---  CONSULTANTS:   Cardio: Dr. Boothe  EP: Dr. King  Pulm: Dr. Chau  Nephro: Dr. Webb  GI: Dr. Pérez  ---  TIME SPENT:  I, the attending physician, was physically present for the key portions of the evaluation and management (E/M) service provided. The total amount of time spent reviewing the hospital notes, laboratory values, imaging findings, assessing/counseling the patient, discussing with consultant physicians, social work, nursing staff was -- minutes    ---  Primary care provider was made aware of plan for discharge:      [  ] NO     [  ] YES   FROM ADMISSION H+P:   HPI:  89 y/o male with PMHx of CHF, HLD, HTN, prostate ca (s/p radioactive seed implantation in 2015) T2DM, PPM presents to ER c/o SOB. Pt states that he has had intermittent SOB for the past few days, Pt notes he was visiting his wife today at rehab and felt SOB as he was leaving. He sat down to catch his breath, a staff member noticed he did not look well and they decided to call EMS. Pt notes he has had SOB both at rest and with exertion. Pt denies chest pain, fever, cough. Of note, pt was admitted 1 month ago for CHF exacerbation and also 1 week ago at [facility]. Pt adds a bunch of his medications were changed including his Entresto was dc'ed, Farxiga dosage was doubled and his Lasix was switched to Torsemide. Pt currently on NC and denies any SOB. Pt denies fever, chills, chest pain, palpitations, abdominal pain, nausea, vomiting, diarrhea.    ED Course:   Vitals: BP: 135/73, HR: 95 , Temp: 98.2F , RR: 18 , SpO2: 94% on 6LNC  Labs: BUN/Cr 56/2.1, Glucose 125, eGFR 30, Troponin 3051, pro-BNP 78437  CXR: official read pending   EKG: NSR with frequent PVC's, 90 BPM, QTc 518      Received in the ED:  Aspirin 324mg PO x1, Lasix 40mg IVP x1 (21 Apr 2025 03:55)      ---  HOSPITAL COURSE: Pt was admitted to the hospital for acute HF exacerbation initially on med floors. RRT was called for afib RVR vs VT was started on amio and heparin gtt and in the ICU requiring pressor assisted diuresis and NIV. Pt was diuresed with improvement in fluid status. Was started on precedex for AMS likely metabolic enceph in the setting of PNA vs intraabdominal infx on IV abx (finished 5 day course of IV zosyn), weaned as tolerated with significant improvement in mental status. RUQ obtained for increasing LFTs showing distended gallbladder/wall thickening/perichole fluid --> HIDA wnl. Home statin was held. Pt overall improved clinically euvolemic. Pt was made DNR/DNI trial NIV and defib function of ICD was turned off while maintaining PPM function after GOC discussion with family who was in agreement that this was in line with pt's DNR and care goals.  ICD tachy-therapy turned off by EP. Patient does not want any invasive procedures.  Medical mgmt only / no icd upgrade to manage LBBB related CHF symptoms. Pt was downgraded from ICU to tele floor. Oral torsemide daily was started. Metoprolol 25mg OD and eliquis 2.5mg bid given for afib. Home coreg held in setting of hypotension s/p pressors in ICU. Amiodarone  was discontinued with transaminitis. Nephro in agreement to continue on torsemide 20mg daily Oral on discharge. Pt with 25 beats of vtach nonsustained, asymptomatic. Then shortly after had HR in the 30s associated with diaphoresis. Patient with pacemaker in place (backup pace set at 40), seen by EP.  Suspect given frequent ectopy on telemetry, *pulse* not created that can be detected by vitals machine. there is no absence of electrical activity that indicate a need for RV pacing. Supplemental oxygen via nasal cannula was weaned as tolerated. PT recommended [facility]. Patient also has anxiety .Pt to continue on home buspar bid  and can take xanax 0.25mg daily prn  .    Patient seen and examined on day of discharge. Patient to be discharged to [facility] with close outpatient follow up.     VITALS AND PHYSICAL EXAM ON DAY OF DISCHARGE:  T(C): 36.3 (05-01-25 @ 04:18), Max: 36.5 (04-30-25 @ 14:12)  T(F): 97.4 (05-01-25 @ 04:18), Max: 97.7 (04-30-25 @ 14:12)  HR: 85 (05-01-25 @ 04:18) (67 - 94)  BP: 94/50 (05-01-25 @ 04:20) (94/50 - 123/83)  ABP: --  ABP(mean): --  RR: 19 (05-01-25 @ 04:18) (18 - 96)  SpO2: 96% (05-01-25 @ 04:18) (86% - 96%)      PHYSICAL EXAM:  GENERAL: NAD, sitting up comfortably in bed  HEAD:  Atraumatic, Normocephalic  EYES: conjunctiva and sclera clear  ENMT:  Moist mucous membranes  NERVOUS SYSTEM:  awake, alert answers questions appropriately and follows commands   CHEST/LUNG:  decreased breath sounds b/l at the bases, no  rhonchi,  no  wheezing,    HEART: Regular rate and rhythm; S1S2  ABDOMEN: Soft, Nontender, Nondistended; Bowel sounds present  EXTREMITIES:  No leg edema b/l  SKIN: No visible rashes or lesions    ---  CONSULTANTS:   Cardio: Dr. Boothe  EP: Dr. King  Pulm: Dr. Chau  Nephro: Dr. Webb  GI: Dr. Pérez  ---  TIME SPENT:  I, the attending physician, was physically present for the key portions of the evaluation and management (E/M) service provided. The total amount of time spent reviewing the hospital notes, laboratory values, imaging findings, assessing/counseling the patient, discussing with consultant physicians, social work, nursing staff was -- minutes    ---  Primary care provider was made aware of plan for discharge:      [  ] NO     [  ] YES   FROM ADMISSION H+P:   HPI:  88 y/o male with PMHx of CHF, HLD, HTN, prostate ca (s/p radioactive seed implantation in 2015) T2DM, PPM presents to ER c/o SOB. Pt states that he has had intermittent SOB for the past few days, Pt notes he was visiting his wife today at rehab and felt SOB as he was leaving. He sat down to catch his breath, a staff member noticed he did not look well and they decided to call EMS. Pt notes he has had SOB both at rest and with exertion. Pt denies chest pain, fever, cough. Of note, pt was admitted 1 month ago for CHF exacerbation and also 1 week ago at [facility]. Pt adds a bunch of his medications were changed including his Entresto was dc'ed, Farxiga dosage was doubled and his Lasix was switched to Torsemide. Pt currently on NC and denies any SOB. Pt denies fever, chills, chest pain, palpitations, abdominal pain, nausea, vomiting, diarrhea.    ED Course:   Vitals: BP: 135/73, HR: 95 , Temp: 98.2F , RR: 18 , SpO2: 94% on 6LNC  Labs: BUN/Cr 56/2.1, Glucose 125, eGFR 30, Troponin 3051, pro-BNP 52929  CXR: official read pending   EKG: NSR with frequent PVC's, 90 BPM, QTc 518      Received in the ED:  Aspirin 324mg PO x1, Lasix 40mg IVP x1 (21 Apr 2025 03:55)      ---  HOSPITAL COURSE: Pt was admitted to the hospital for acute HF exacerbation initially on med floors. RRT was called for afib RVR vs VT was started on amio and heparin gtt and in the ICU requiring pressor assisted diuresis and NIV. Pt was diuresed with improvement in fluid status. Was started on precedex for AMS likely metabolic enceph in the setting of PNA vs intraabdominal infx on IV abx (finished 5 day course of IV zosyn), weaned as tolerated with significant improvement in mental status. RUQ obtained for increasing LFTs showing distended gallbladder/wall thickening/perichole fluid --> HIDA wnl. Home statin was held. Pt overall improved clinically euvolemic. Pt was made DNR/DNI trial NIV and defib function of ICD was turned off while maintaining PPM function after GOC discussion with family who was in agreement that this was in line with pt's DNR and care goals.  ICD tachy-therapy turned off by EP. Patient does not want any invasive procedures.  Medical mgmt only / no icd upgrade to manage LBBB related CHF symptoms. Pt was downgraded from ICU to tele floor. Oral torsemide daily was started. Metoprolol 25mg OD and eliquis 2.5mg bid given for afib. Home coreg held in setting of hypotension s/p pressors in ICU. Amiodarone  was discontinued with transaminitis. Nephro in agreement to continue on torsemide 20mg daily Oral on discharge. Pt with 25 beats of vtach nonsustained, asymptomatic. Then shortly after had HR in the 30s associated with diaphoresis. Patient with pacemaker in place (backup pace set at 40), seen by EP.  Suspect given frequent ectopy on telemetry, *pulse* not created that can be detected by vitals machine. there is no absence of electrical activity that indicate a need for RV pacing. Supplemental oxygen via nasal cannula was weaned as tolerated. PT recommended [facility] [place]. Patient also has anxiety .Pt to continue on home buspar bid  and can take xanax 0.25mg daily prn  .    Patient seen and examined on day of discharge. Patient to be discharged to [facility] [place] with close outpatient follow up.     VITALS AND PHYSICAL EXAM ON DAY OF DISCHARGE:  T(C): 36.5 (05-01-25 @ 11:42), Max: 36.5 (04-30-25 @ 14:12)  T(F): 97.7 (05-01-25 @ 11:42), Max: 97.7 (04-30-25 @ 14:12)  HR: 92 (05-01-25 @ 11:42) (67 - 94)  BP: 109/66 (05-01-25 @ 11:42) (94/50 - 123/83)  ABP: --  ABP(mean): --  RR: 18 (05-01-25 @ 11:42) (18 - 96)  SpO2: 96% (05-01-25 @ 11:42) (86% - 96%)      PHYSICAL EXAM:  GENERAL: NAD, sitting up comfortably in bed  HEAD:  Atraumatic, Normocephalic  EYES: conjunctiva and sclera clear  ENMT:  Moist mucous membranes  NERVOUS SYSTEM:  awake, alert answers questions appropriately and follows commands   CHEST/LUNG:  decreased breath sounds b/l at the bases, no  rhonchi,  no  wheezing,    HEART: Regular rate and rhythm; S1S2  ABDOMEN: Soft, Nontender, Nondistended; Bowel sounds present  EXTREMITIES:  No leg edema b/l  SKIN: No visible rashes or lesions    ---  CONSULTANTS:   Cardio: Dr. Boothe  EP: Dr. King  Pulm: Dr. Chau  Nephro: Dr. Webb  GI: Dr. Pérez  ---  TIME SPENT:  I, the attending physician, was physically present for the key portions of the evaluation and management (E/M) service provided. The total amount of time spent reviewing the hospital notes, laboratory values, imaging findings, assessing/counseling the patient, discussing with consultant physicians, social work, nursing staff was -- minutes    ---  Primary care provider was made aware of plan for discharge:      [  ] NO     [  ] YES   FROM ADMISSION H+P:   HPI:  89 y/o male with PMHx of CHF, HLD, HTN, prostate ca (s/p radioactive seed implantation in 2015) T2DM, PPM presents to ER c/o SOB. Pt states that he has had intermittent SOB for the past few days, Pt notes he was visiting his wife today at rehab and felt SOB as he was leaving. He sat down to catch his breath, a staff member noticed he did not look well and they decided to call EMS. Pt notes he has had SOB both at rest and with exertion. Pt denies chest pain, fever, cough. Of note, pt was admitted 1 month ago for CHF exacerbation and also 1 week ago at [facility]. Pt adds a bunch of his medications were changed including his Entresto was dc'ed, Farxiga dosage was doubled and his Lasix was switched to Torsemide. Pt currently on NC and denies any SOB. Pt denies fever, chills, chest pain, palpitations, abdominal pain, nausea, vomiting, diarrhea.    ED Course:   Vitals: BP: 135/73, HR: 95 , Temp: 98.2F , RR: 18 , SpO2: 94% on 6LNC  Labs: BUN/Cr 56/2.1, Glucose 125, eGFR 30, Troponin 3051, pro-BNP 07599  CXR: official read pending   EKG: NSR with frequent PVC's, 90 BPM, QTc 518      Received in the ED:  Aspirin 324mg PO x1, Lasix 40mg IVP x1 (21 Apr 2025 03:55)      ---  HOSPITAL COURSE: Pt was admitted to the hospital for acute HF exacerbation initially on med floors. RRT was called for afib RVR vs VT was started on amio and heparin gtt and in the ICU requiring pressor assisted diuresis and NIV. Pt was diuresed with improvement in fluid status. Was started on precedex for AMS likely metabolic enceph in the setting of PNA vs intraabdominal infx on IV abx (finished 5 day course of IV zosyn), weaned as tolerated with significant improvement in mental status. RUQ obtained for increasing LFTs showing distended gallbladder/wall thickening/perichole fluid --> HIDA wnl. Home statin was held. Pt overall improved clinically euvolemic. Pt was made DNR/DNI trial NIV and defib function of ICD was turned off while maintaining PPM function after GOC discussion with family who was in agreement that this was in line with pt's DNR and care goals.  ICD tachy-therapy turned off by EP. Patient does not want any invasive procedures.  Medical mgmt only / no icd upgrade to manage LBBB related CHF symptoms. Pt was downgraded from ICU to tele floor. Oral torsemide daily was started. Metoprolol 25mg OD and eliquis 2.5mg bid given for afib. Home coreg held in setting of hypotension s/p pressors in ICU. Amiodarone  was discontinued with transaminitis. Nephro in agreement to continue on torsemide 20mg daily Oral on discharge. Pt with 25 beats of vtach nonsustained, asymptomatic. Then shortly after had HR in the 30s associated with diaphoresis. Patient with pacemaker in place (backup pace set at 40), seen by EP.  Suspect given frequent ectopy on telemetry, *pulse* not created that can be detected by vitals machine. there is no absence of electrical activity that indicate a need for RV pacing. Supplemental oxygen via nasal cannula was weaned as tolerated. PT recommended [facility]. Patient also has anxiety .Pt to continue on home buspar bid  and can take xanax 0.25mg daily prn  .    Patient seen and examined on day of discharge. Patient to be discharged to [facility] with close outpatient follow up.     VITALS AND PHYSICAL EXAM ON DAY OF DISCHARGE:  T(C): 36.5 (05-01-25 @ 11:42), Max: 36.5 (04-30-25 @ 14:12)  T(F): 97.7 (05-01-25 @ 11:42), Max: 97.7 (04-30-25 @ 14:12)  HR: 92 (05-01-25 @ 11:42) (67 - 94)  BP: 109/66 (05-01-25 @ 11:42) (94/50 - 123/83)  ABP: --  ABP(mean): --  RR: 18 (05-01-25 @ 11:42) (18 - 96)  SpO2: 96% (05-01-25 @ 11:42) (86% - 96%)      PHYSICAL EXAM:  GENERAL: NAD, sitting up comfortably in bed  HEAD:  Atraumatic, Normocephalic  EYES: conjunctiva and sclera clear  ENMT:  Moist mucous membranes  NERVOUS SYSTEM:  awake, alert answers questions appropriately and follows commands   CHEST/LUNG:  decreased breath sounds b/l at the bases, no  rhonchi,  no  wheezing,    HEART: Regular rate and rhythm; S1S2  ABDOMEN: Soft, Nontender, Nondistended; Bowel sounds present  EXTREMITIES:  No leg edema b/l  SKIN: No visible rashes or lesions    ---  CONSULTANTS:   Cardio: Dr. Boothe  EP: Dr. King  Pulm: Dr. Chau  Nephro: Dr. eWbb  GI: Dr. Pérez  ---  TIME SPENT:  I, the attending physician, was physically present for the key portions of the evaluation and management (E/M) service provided. The total amount of time spent reviewing the hospital notes, laboratory values, imaging findings, assessing/counseling the patient, discussing with consultant physicians, social work, nursing staff was 35 minutes    ---

## 2025-04-27 NOTE — DISCHARGE NOTE PROVIDER - NSDCCPCAREPLAN_GEN_ALL_CORE_FT
PRINCIPAL DISCHARGE DIAGNOSIS  Diagnosis: CHF exacerbation  Assessment and Plan of Treatment: CHF (Congestive Heart Failure) exacerbation refers to a sudden worsening of heart failure symptoms, which can lead to increased fluid retention, decreased exercise tolerance, and the need for medical intervention. This exacerbation occurs when the heart is unable to pump enough blood effectively, resulting in a buildup of fluid in the body. Common symptoms include shortness of breath, fatigue, and swelling in the legs and abdomen  You were treated with intravenous diuretics and switched to oral diuretics. Diuretics are also known as "water pill"  - START Torsemide 20 mg once a day  - Avoid salty foods  - Follow up with your cardiologist 1-2 weeks after discharge      SECONDARY DISCHARGE DIAGNOSES  Diagnosis: Atrial fibrillation with RVR  Assessment and Plan of Treatment:      PRINCIPAL DISCHARGE DIAGNOSIS  Diagnosis: CHF exacerbation  Assessment and Plan of Treatment: CHF (Congestive Heart Failure) exacerbation refers to a sudden worsening of heart failure symptoms, which can lead to increased fluid retention, decreased exercise tolerance, and the need for medical intervention. This exacerbation occurs when the heart is unable to pump enough blood effectively, resulting in a buildup of fluid in the body. Common symptoms include shortness of breath, fatigue, and swelling in the legs and abdomen  You were treated with intravenous diuretics and switched to oral diuretics. Diuretics are also known as "water pill"  - START Torsemide 20 mg once a day  - STOP Furosemide  - CONTINUE Eliquis 2.5 mg twice a day  - CONTINUE home Farxiga  - Avoid salty foods  - Follow up with your cardiologist 1-2 weeks after discharge      SECONDARY DISCHARGE DIAGNOSES  Diagnosis: Atrial fibrillation with RVR  Assessment and Plan of Treatment: Atrial fibrillation (AFib) is an irregular and often very rapid heart rhythm. It is a type of arrhythmia, or abnormal heartbeat. You had new onset AFib during your admission and required medications to control your heart rate and rhythm. You were seen by the cardiologist in the hospital.   - Continue Metoprolol tartrate 25 mg twice a day  - STOP Carvedilol  - Follow up with your cardiologist 1-2 weeks after discharge  - Follow up with your PCP 2-3 days after discharge    Diagnosis: HTN (hypertension)  Assessment and Plan of Treatment: You have a history of high blood pressure, however during your admission your blood pressure was low and you required medications in the ICU to keep your blood pressure up. Your home carvedilol was held given your low blood pressure.  - STOP Carvedilol  - Monitor your blood pressure at home.   - Follow up with your cardiologist 1-2 weeks after discharge  - Follow up with your PCP 2-3 days after discharge      Diagnosis: Acute kidney injury superimposed on CKD  Assessment and Plan of Treatment: HANNAH stands for Acute Kidney Injury, which is a sudden episode of kidney failure or damage that occurs within a few hours or days. It leads to a build-up of waste products in the blood and disrupts the body's fluid balance. You were seen by the nephrologist in the hospital. Your kidney function was closely monitored and returned to your baseline.  - Follow up with your PCP 2-3 days after discharge, he may want to repeat blood work  - Follow up with your nephrologist 1-2 weeks after discharge      Diagnosis: HLD (hyperlipidemia)  Assessment and Plan of Treatment: You have a history of high cholesterol. Your home statin medication was held because your liver function tests were elevated.   - STOP atorvastatin  - Follow up with your PCP 2-3 days after discharge, he may want to repeat blood work. He will tell you when to resume your home statin.     PRINCIPAL DISCHARGE DIAGNOSIS  Diagnosis: CHF exacerbation  Assessment and Plan of Treatment: CHF (Congestive Heart Failure) exacerbation refers to a sudden worsening of heart failure symptoms, which can lead to increased fluid retention, decreased exercise tolerance, and the need for medical intervention. This exacerbation occurs when the heart is unable to pump enough blood effectively, resulting in a buildup of fluid in the body. Common symptoms include shortness of breath, fatigue, and swelling in the legs and abdomen  You were treated with intravenous diuretics and switched to oral diuretics. Diuretics are also known as "water pill"  - START Torsemide 20 mg once a day  - STOP Furosemide  - CONTINUE Eliquis 2.5 mg twice a day  - CONTINUE home Farxiga  - Avoid salty foods  - Follow up with your cardiologist 1-2 weeks after discharge      SECONDARY DISCHARGE DIAGNOSES  Diagnosis: Atrial fibrillation with RVR  Assessment and Plan of Treatment: Atrial fibrillation (AFib) is an irregular and often very rapid heart rhythm. It is a type of arrhythmia, or abnormal heartbeat. You had new onset AFib during your admission and required medications to control your heart rate and rhythm. You were seen by the cardiologist in the hospital.   - START Metoprolol succinate 25 mg once a day  - STOP Metoprolol tartrate  - STOP Carvedilol  - Follow up with your cardiologist 1-2 weeks after discharge  - Follow up with your PCP 2-3 days after discharge    Diagnosis: HTN (hypertension)  Assessment and Plan of Treatment: You have a history of high blood pressure, however during your admission your blood pressure was low and you required medications in the ICU to keep your blood pressure up. Your home carvedilol was held given your low blood pressure.  - STOP Carvedilol  - Monitor your blood pressure at home.   - Follow up with your cardiologist 1-2 weeks after discharge  - Follow up with your PCP 2-3 days after discharge      Diagnosis: Acute kidney injury superimposed on CKD  Assessment and Plan of Treatment: HANNAH stands for Acute Kidney Injury, which is a sudden episode of kidney failure or damage that occurs within a few hours or days. It leads to a build-up of waste products in the blood and disrupts the body's fluid balance. You were seen by the nephrologist in the hospital. Your kidney function was closely monitored and returned to your baseline.  - Follow up with your PCP 2-3 days after discharge, he may want to repeat blood work  - Follow up with your nephrologist 1-2 weeks after discharge      Diagnosis: HLD (hyperlipidemia)  Assessment and Plan of Treatment: You have a history of high cholesterol. Your home statin medication was held because your liver function tests were elevated.   - STOP atorvastatin  - Follow up with your PCP 2-3 days after discharge, he may want to repeat blood work. He will tell you when to resume your home statin.     PRINCIPAL DISCHARGE DIAGNOSIS  Diagnosis: CHF exacerbation  Assessment and Plan of Treatment: CHF (Congestive Heart Failure) exacerbation refers to a sudden worsening of heart failure symptoms, which can lead to increased fluid retention, decreased exercise tolerance, and the need for medical intervention. This exacerbation occurs when the heart is unable to pump enough blood effectively, resulting in a buildup of fluid in the body. Common symptoms include shortness of breath, fatigue, and swelling in the legs and abdomen  You were treated with intravenous diuretics and switched to oral diuretics. Diuretics are also known as "water pill"  - START Torsemide 20 mg once a day  - Hold atorvastatin in setting of transaminitis  - STOP Furosemide  - CONTINUE Eliquis 2.5 mg twice a day  - CONTINUE home Farxiga  - Avoid salty foods  - Follow up with your cardiologist 1-2 weeks after discharge      SECONDARY DISCHARGE DIAGNOSES  Diagnosis: Atrial fibrillation with RVR  Assessment and Plan of Treatment: Atrial fibrillation (AFib) is an irregular and often very rapid heart rhythm. It is a type of arrhythmia, or abnormal heartbeat. You had new onset AFib during your admission and required medications to control your heart rate and rhythm. You were seen by the cardiologist in the hospital.   (To be faxed to Dr Kelton Yee)  - START Metoprolol succinate 25 mg once a day  - STOP Metoprolol tartrate  - STOP Carvedilol  - Follow up with your cardiologist 1-2 weeks after discharge  - Follow up with your PCP 2-3 days after discharge    Diagnosis: Acute kidney injury superimposed on CKD  Assessment and Plan of Treatment: HANNAH stands for Acute Kidney Injury, which is a sudden episode of kidney failure or damage that occurs within a few hours or days. It leads to a build-up of waste products in the blood and disrupts the body's fluid balance. You were seen by the nephrologist in the hospital. Your kidney function was closely monitored and returned to your baseline.  - Follow up with your PCP 2-3 days after discharge, he may want to repeat blood work  - Follow up with your nephrologist 1-2 weeks after discharge      Diagnosis: HTN (hypertension)  Assessment and Plan of Treatment: You have a history of high blood pressure, however during your admission your blood pressure was low and you required medications in the ICU to keep your blood pressure up. Your home carvedilol was held given your low blood pressure.  - STOP Carvedilol  - Monitor your blood pressure at home.   - Follow up with your cardiologist 1-2 weeks after discharge  - Follow up with your PCP 2-3 days after discharge      Diagnosis: HLD (hyperlipidemia)  Assessment and Plan of Treatment: You have a history of high cholesterol. Your home statin medication was held because your liver function tests were elevated.   - STOP atorvastatin  - Follow up with your PCP 2-3 days after discharge, he may want to repeat blood work. He will tell you when to resume your home statin.    Diagnosis: Anxiety  Assessment and Plan of Treatment: Please continue on buspar bid started by your PCP  -Please take xanax 0.25mg daily prn     PRINCIPAL DISCHARGE DIAGNOSIS  Diagnosis: CHF exacerbation  Assessment and Plan of Treatment: CHF (Congestive Heart Failure) exacerbation refers to a sudden worsening of heart failure symptoms, which can lead to increased fluid retention, decreased exercise tolerance, and the need for medical intervention. This exacerbation occurs when the heart is unable to pump enough blood effectively, resulting in a buildup of fluid in the body. Common symptoms include shortness of breath, fatigue, and swelling in the legs and abdomen  You were treated with intravenous diuretics and switched to oral diuretics. Diuretics are also known as "water pill"  - START Torsemide 20 mg once a day  - Hold atorvastatin in setting of transaminitis  - STOP Furosemide  - CONTINUE Eliquis 2.5 mg twice a day  - CONTINUE home Farxiga  - Avoid salty foods  - Follow up with your cardiologist 1-2 weeks after discharge      SECONDARY DISCHARGE DIAGNOSES  Diagnosis: Atrial fibrillation with RVR  Assessment and Plan of Treatment: Atrial fibrillation (AFib) is an irregular and often very rapid heart rhythm. It is a type of arrhythmia, or abnormal heartbeat. You had new onset AFib during your admission and required medications to control your heart rate and rhythm. You were seen by the cardiologist in the hospital.   (To be faxed to Dr Kelton Yee)  - START Metoprolol succinate 25 mg once a day  - STOP Metoprolol tartrate  - STOP Carvedilol  - Follow up with your cardiologist and electrophysiologist 1-2 weeks after discharge  - Follow up with your PCP 2-3 days after discharge    Diagnosis: HTN (hypertension)  Assessment and Plan of Treatment: You have a history of high blood pressure, however during your admission your blood pressure was low and you required medications in the ICU to keep your blood pressure up. Your home carvedilol was held given your low blood pressure.  - STOP Carvedilol  - Monitor your blood pressure at home.   - Follow up with your cardiologist 1-2 weeks after discharge  - Follow up with your PCP 2-3 days after discharge      Diagnosis: Acute kidney injury superimposed on CKD  Assessment and Plan of Treatment: HANNAH stands for Acute Kidney Injury, which is a sudden episode of kidney failure or damage that occurs within a few hours or days. It leads to a build-up of waste products in the blood and disrupts the body's fluid balance. You were seen by the nephrologist in the hospital. Your kidney function was closely monitored and returned to your baseline.  - Follow up with your PCP 2-3 days after discharge, he may want to repeat blood work  - Follow up with your nephrologist 1-2 weeks after discharge      Diagnosis: HLD (hyperlipidemia)  Assessment and Plan of Treatment: You have a history of high cholesterol. Your home statin medication was held because your liver function tests were elevated.   - STOP atorvastatin  - Follow up with your PCP 2-3 days after discharge, he may want to repeat blood work. He will tell you when to resume your home statin.    Diagnosis: Anxiety  Assessment and Plan of Treatment: Please continue on buspar bid started by your PCP  -Please take xanax 0.25mg daily prn

## 2025-04-27 NOTE — DISCHARGE NOTE PROVIDER - NSDCMRMEDTOKEN_GEN_ALL_CORE_FT
aspirin 81 mg oral tablet: orally once a day  atorvastatin 80 mg oral tablet: 1 tab(s) orally once a day  carvedilol 6.25 mg oral tablet: 0.5 tab(s) orally every 12 hours  Farxiga 10 mg oral tablet: 1 tab(s) orally once a day  finasteride 5 mg oral tablet: 1 tab(s) orally once a day  Flomax 0.4 mg oral capsule: 2 cap(s) orally once a day (at bedtime)  Torsemide:    apixaban 2.5 mg oral tablet: 1 tab(s) orally 2 times a day  aspirin 81 mg oral tablet: orally once a day  aspirin 81 mg oral tablet, chewable: 1 tab(s) orally once a day  atorvastatin 80 mg oral tablet: 1 tab(s) orally once a day  busPIRone 5 mg oral tablet: 1 tab(s) orally 2 times a day  carvedilol 6.25 mg oral tablet: 0.5 tab(s) orally every 12 hours  Farxiga 10 mg oral tablet: 1 tab(s) orally once a day  finasteride 5 mg oral tablet: 1 tab(s) orally once a day  Flomax 0.4 mg oral capsule: 2 cap(s) orally once a day (at bedtime)  metoprolol tartrate 25 mg oral tablet: 1 tab(s) orally 2 times a day  Torsemide:    apixaban 2.5 mg oral tablet: 1 tab(s) orally 2 times a day  aspirin 81 mg oral tablet: orally once a day  aspirin 81 mg oral tablet, chewable: 1 tab(s) orally once a day  atorvastatin 80 mg oral tablet: 1 tab(s) orally once a day  busPIRone 5 mg oral tablet: 1 tab(s) orally 2 times a day  carvedilol 6.25 mg oral tablet: 0.5 tab(s) orally every 12 hours  Farxiga 10 mg oral tablet: 1 tab(s) orally once a day  finasteride 5 mg oral tablet: 1 tab(s) orally once a day  Flomax 0.4 mg oral capsule: 2 cap(s) orally once a day (at bedtime)  metoprolol tartrate 25 mg oral tablet: 1 tab(s) orally 2 times a day  torsemide 20 mg oral tablet: 1 tab(s) orally once a day   ALPRAZolam 0.25 mg oral tablet: 1 tab(s) orally once a day As needed anxitey  apixaban 2.5 mg oral tablet: 1 tab(s) orally 2 times a day  aspirin 81 mg oral tablet, chewable: 1 tab(s) orally once a day  busPIRone 5 mg oral tablet: 1 tab(s) orally 2 times a day  Farxiga 10 mg oral tablet: 1 tab(s) orally once a day  finasteride 5 mg oral tablet: 1 tab(s) orally once a day  Flomax 0.4 mg oral capsule: 2 cap(s) orally once a day (at bedtime)  metoprolol tartrate 25 mg oral tablet: 1 tab(s) orally 2 times a day  polyethylene glycol 3350 oral powder for reconstitution: 17 gram(s) orally once a day As needed Constipation  senna leaf extract oral tablet: 2 tab(s) orally once a day (at bedtime)  torsemide 20 mg oral tablet: 1 tab(s) orally once a day   ALPRAZolam 0.25 mg oral tablet: 1 tab(s) orally once a day As needed anxitey  apixaban 2.5 mg oral tablet: 1 tab(s) orally 2 times a day  aspirin 81 mg oral tablet, chewable: 1 tab(s) orally once a day  busPIRone 5 mg oral tablet: 1 tab(s) orally 2 times a day  Farxiga 10 mg oral tablet: 1 tab(s) orally once a day  finasteride 5 mg oral tablet: 1 tab(s) orally once a day  Flomax 0.4 mg oral capsule: 2 cap(s) orally once a day (at bedtime)  metoprolol succinate 25 mg oral tablet, extended release: 1 tab(s) orally once a day  polyethylene glycol 3350 oral powder for reconstitution: 17 gram(s) orally once a day As needed Constipation  senna leaf extract oral tablet: 2 tab(s) orally once a day (at bedtime)  torsemide 20 mg oral tablet: 1 tab(s) orally once a day

## 2025-04-27 NOTE — PROGRESS NOTE ADULT - PROBLEM SELECTOR PLAN 1
acute on chronic HFrEF exacerbation   cause of acute hypoxic resp failure  with acute pulmonary edema   Pt with hx of chronic systolic congestive heart failure last admitted for CHF exacerbation in March to [facility] and SF last week-Pt with inc SOB at rest and on exertion   -CXR with pulmonary congestion and b/l pleural effusions  -Troponin 3051, 3543, pro-BNP 81662  -EKG: NSR with frequent PVC's, 90 BPM, QTc 518  -Vitals: BP: 135/73, HR: 95 , Temp: 98.2F , RR: 18 , SpO2: 94% on 6LNC  -s/p bipap -s/p 40mg Lasix IVP in ED  -Previous TTE from March with severely reduced LV systolic function (EF 20-25%), mod MR  - Strict I&Os, daily weights, fluid restriction  - Remote tele, continuous pulse ox   -Pt recently switched from Lasix to Torsemide per SF discharge but is unsure of dosage   -C/w Lasix 40mg IVP bid - now off  iv  Lasix  - euvolemic   -- PPM interrogation inconclusive, defibrillation function turned off  -Cardio consulted, Dr. Boothe,   -ICU downgrade tele today

## 2025-04-27 NOTE — PROGRESS NOTE ADULT - ASSESSMENT
88 y/o male with PMHx of CHF, HLD, HTN, prostate ca (s/p radioactive seed implantation in 2015) T2DM, PPM presents to ER c/o SOB. Pt states that he has had intermittent SOB    systolic HF  OP  OA  Atelectasis  Pleural Eff  Dyspnea  eval ACS  HTN  HLD  hx of Prostate Ca  DM  PPM    dnr dni  on AC  on ABX  trend LFT  vs noted  meds reviewed    fio2 wean  I apple  goal sat > 88 pct  trend Trops  eval ACS  Cardio eval  tele monitoring  replete lytes  I and O  cvs rx regimen optimization  TTE reviewed from prior visits - EF 20 pct  pleural eff - atelectasis - I apple - no indication for pleurocentesis at present  DM care - serial FS  GOC discussion

## 2025-04-27 NOTE — PROGRESS NOTE ADULT - SUBJECTIVE AND OBJECTIVE BOX
Chief Complaint: Shortness of breath    Interval Events: No events overnight.    Review of Systems:  Unable to obtain.    Physical Exam:  Vital Signs Last 24 Hrs  T(C): 36.4 (27 Apr 2025 08:00), Max: 36.7 (26 Apr 2025 19:50)  T(F): 97.6 (27 Apr 2025 08:00), Max: 98.1 (26 Apr 2025 19:50)  HR: 75 (27 Apr 2025 07:00) (67 - 83)  BP: 105/62 (27 Apr 2025 07:00) (88/55 - 112/69)  BP(mean): 76 (27 Apr 2025 07:00) (62 - 92)  RR: 19 (27 Apr 2025 07:00) (15 - 30)  SpO2: 100% (27 Apr 2025 07:00) (97% - 100%)  Parameters below as of 27 Apr 2025 07:00  Patient On (Oxygen Delivery Method): room air  General: NAD  HEENT: MMM  Neck: No JVD, no carotid bruit  Lungs: CTAB  CV: RRR, nl S1/S2, no M/R/G  Abdomen: S/NT/ND, +BS  Extremities: No LE edema, no cyanosis  Neuro: AAOx0  Skin: No rash    Labs:    04-26    146[H]  |  117[H]  |  111[H]  ----------------------------<  139[H]  3.9   |  19[L]  |  3.50[H]    Ca    9.0      26 Apr 2025 09:05  Phos  5.8     04-26  Mg     3.0     04-26    TPro  6.4  /  Alb  3.5  /  TBili  1.7[H]  /  DBili  x   /  AST  558[H]  /  ALT  >1000[H]  /  AlkPhos  94  04-26                        13.0   8.41  )-----------( x        ( 27 Apr 2025 06:11 )             38.9     ECG/Telemetry: Sinus rhythm, PVCs

## 2025-04-27 NOTE — DISCHARGE NOTE PROVIDER - CARE PROVIDERS DIRECT ADDRESSES
,DirectAddress_Unknown,[email or web address],DirectAddress_Unknown

## 2025-04-27 NOTE — PROGRESS NOTE ADULT - SUBJECTIVE AND OBJECTIVE BOX
[facility]      Kin Peterson NP    [address] [place] [id number]  [phone]      INTERVAL HPI/OVERNIGHT EVENTS:  seen and examined  offers no gi complaints  lfts slowly improving            MEDICATIONS  (STANDING):  aspirin  chewable 81 milliGRAM(s) Oral daily  busPIRone 5 milliGRAM(s) Oral two times a day  chlorhexidine 2% Cloths 1 Application(s) Topical <User Schedule>  dextrose 5%. 1000 milliLiter(s) (50 mL/Hr) IV Continuous <Continuous>  dextrose 5%. 1000 milliLiter(s) (100 mL/Hr) IV Continuous <Continuous>  dextrose 5%. 1000 milliLiter(s) (50 mL/Hr) IV Continuous <Continuous>  dextrose 50% Injectable 25 Gram(s) IV Push once  dextrose 50% Injectable 12.5 Gram(s) IV Push once  dextrose 50% Injectable 25 Gram(s) IV Push once  glucagon  Injectable 1 milliGRAM(s) IntraMuscular once  heparin  Infusion.  Unit(s)/Hr (15 mL/Hr) IV Continuous <Continuous>  insulin lispro (ADMELOG) corrective regimen sliding scale   SubCutaneous three times a day before meals  insulin lispro (ADMELOG) corrective regimen sliding scale   SubCutaneous at bedtime  metoprolol tartrate 25 milliGRAM(s) Oral two times a day  mupirocin 2% Nasal 1 Application(s) Both Nostrils two times a day  pantoprazole   Suspension 40 milliGRAM(s) Oral daily  piperacillin/tazobactam IVPB.. 3.375 Gram(s) IV Intermittent every 12 hours  tamsulosin 0.8 milliGRAM(s) Oral at bedtime    MEDICATIONS  (PRN):  dextrose Oral Gel 15 Gram(s) Oral once PRN Blood Glucose LESS THAN 70 milliGRAM(s)/deciliter  heparin   Injectable 6500 Unit(s) IV Push every 6 hours PRN For aPTT less than 40  heparin   Injectable 3000 Unit(s) IV Push every 6 hours PRN For aPTT between 40 - 57      Allergies    No Known Allergies    Intolerances          PHYSICAL EXAM  Vital Signs Last 24 Hrs  T(C): 36.4 (27 Apr 2025 08:00), Max: 36.7 (26 Apr 2025 19:50)  T(F): 97.6 (27 Apr 2025 08:00), Max: 98.1 (26 Apr 2025 19:50)  HR: 84 (27 Apr 2025 09:00) (67 - 84)  BP: 96/71 (27 Apr 2025 09:00) (88/55 - 112/69)  BP(mean): 80 (27 Apr 2025 09:00) (62 - 92)  RR: 19 (27 Apr 2025 09:00) (15 - 30)  SpO2: 100% (27 Apr 2025 09:00) (97% - 100%)    Parameters below as of 27 Apr 2025 07:00  Patient On (Oxygen Delivery Method): room air          GENERAL:  Appears stated age  HEENT:  NC/AT  CHEST:  Full & symmetric excursion  HEART:  Regular rhythm  ABDOMEN:  Soft, non-tender, non-distended  EXTEREMITIES:  no cyanosis  SKIN:  No rash  NEURO:  Alert            LABS:                        13.0   8.41  )-----------( 109      ( 27 Apr 2025 06:11 )             38.9     04-27    149[H]  |  119[H]  |  94[H]  ----------------------------<  138[H]  3.4[L]   |  21[L]  |  2.60[H]    Ca    8.9      27 Apr 2025 06:11  Phos  3.0     04-27  Mg     3.3     04-27    TPro  6.1  /  Alb  3.6  /  TBili  1.8[H]  /  DBili  x   /  AST  314[H]  /  ALT  966[H]  /  AlkPhos  92  04-27    PTT - ( 27 Apr 2025 06:11 )  PTT:89.5 sec      04-26-25 @ 07:01  -  04-27-25 @ 07:00  --------------------------------------------------------  IN: 1271 mL / OUT: 1850 mL / NET: -579 mL    04-27-25 @ 07:01  -  04-27-25 @ 10:50  --------------------------------------------------------  IN: 308 mL / OUT: 200 mL / NET: 108 mL                    RADIOLOGY & ADDITIONAL TESTS:

## 2025-04-27 NOTE — PROGRESS NOTE ADULT - SUBJECTIVE AND OBJECTIVE BOX
Date/Time Patient Seen:  		  Referring MD:   Data Reviewed	       Patient is a 89y old  Male who presents with a chief complaint of SOB (26 Apr 2025 12:16)      Subjective/HPI     PAST MEDICAL & SURGICAL HISTORY:  NICM (nonischemic cardiomyopathy)    HTN (hypertension)    HLD (hyperlipidemia)    Prostate CA    T2DM (type 2 diabetes mellitus)    CHF (congestive heart failure)    H/O prostate biopsy    H/O cataract extraction    H/O inguinal hernia repair    AICD (automatic cardioverter/defibrillator) present          Medication list         MEDICATIONS  (STANDING):  aspirin  chewable 81 milliGRAM(s) Oral daily  busPIRone 5 milliGRAM(s) Oral two times a day  chlorhexidine 2% Cloths 1 Application(s) Topical <User Schedule>  dextrose 5%. 1000 milliLiter(s) (50 mL/Hr) IV Continuous <Continuous>  dextrose 5%. 1000 milliLiter(s) (100 mL/Hr) IV Continuous <Continuous>  dextrose 5%. 1000 milliLiter(s) (50 mL/Hr) IV Continuous <Continuous>  dextrose 50% Injectable 25 Gram(s) IV Push once  dextrose 50% Injectable 12.5 Gram(s) IV Push once  dextrose 50% Injectable 25 Gram(s) IV Push once  glucagon  Injectable 1 milliGRAM(s) IntraMuscular once  heparin  Infusion.  Unit(s)/Hr (15 mL/Hr) IV Continuous <Continuous>  insulin lispro (ADMELOG) corrective regimen sliding scale   SubCutaneous every 6 hours  metoprolol tartrate 25 milliGRAM(s) Oral two times a day  mupirocin 2% Nasal 1 Application(s) Both Nostrils two times a day  pantoprazole   Suspension 40 milliGRAM(s) Oral daily  piperacillin/tazobactam IVPB.. 3.375 Gram(s) IV Intermittent every 12 hours  tamsulosin 0.8 milliGRAM(s) Oral at bedtime    MEDICATIONS  (PRN):  dextrose Oral Gel 15 Gram(s) Oral once PRN Blood Glucose LESS THAN 70 milliGRAM(s)/deciliter  heparin   Injectable 6500 Unit(s) IV Push every 6 hours PRN For aPTT less than 40  heparin   Injectable 3000 Unit(s) IV Push every 6 hours PRN For aPTT between 40 - 57         Vitals log        ICU Vital Signs Last 24 Hrs  T(C): 36.6 (26 Apr 2025 23:39), Max: 36.7 (26 Apr 2025 19:50)  T(F): 97.8 (26 Apr 2025 23:39), Max: 98.1 (26 Apr 2025 19:50)  HR: 67 (27 Apr 2025 06:00) (67 - 83)  BP: 98/58 (27 Apr 2025 06:00) (88/55 - 112/69)  BP(mean): 69 (27 Apr 2025 06:00) (62 - 92)  ABP: --  ABP(mean): --  RR: 15 (27 Apr 2025 06:00) (15 - 30)  SpO2: 100% (27 Apr 2025 06:00) (97% - 100%)    O2 Parameters below as of 26 Apr 2025 19:00  Patient On (Oxygen Delivery Method): room air                 Input and Output:  I&O's Detail    26 Apr 2025 07:01  -  27 Apr 2025 07:00  --------------------------------------------------------  IN:    Heparin Infusion: 216 mL    IV PiggyBack: 175 mL    Oral Fluid: 820 mL  Total IN: 1211 mL    OUT:    Indwelling Catheter - Urethral (mL): 1850 mL  Total OUT: 1850 mL    Total NET: -639 mL          Lab Data                        13.9   12.89 )-----------( 110      ( 26 Apr 2025 09:05 )             42.2     04-26    146[H]  |  117[H]  |  111[H]  ----------------------------<  139[H]  3.9   |  19[L]  |  3.50[H]    Ca    9.0      26 Apr 2025 09:05  Phos  5.8     04-26  Mg     3.0     04-26    TPro  6.4  /  Alb  3.5  /  TBili  1.7[H]  /  DBili  x   /  AST  558[H]  /  ALT  >1000[H]  /  AlkPhos  94  04-26            Review of Systems	      Objective     Physical Examination    heart s1s2  lung dc bs  head nc  head at      Pertinent Lab findings & Imaging      Amberly:  NO   Adequate UO     I&O's Detail    26 Apr 2025 07:01  -  27 Apr 2025 07:00  --------------------------------------------------------  IN:    Heparin Infusion: 216 mL    IV PiggyBack: 175 mL    Oral Fluid: 820 mL  Total IN: 1211 mL    OUT:    Indwelling Catheter - Urethral (mL): 1850 mL  Total OUT: 1850 mL    Total NET: -639 mL               Discussed with:     Cultures:	        Radiology

## 2025-04-27 NOTE — PHYSICAL THERAPY INITIAL EVALUATION ADULT - PERTINENT HX OF CURRENT PROBLEM, REHAB EVAL
89 y/o male with PMHx of CHF, HLD, HTN, prostate ca (s/p radioactive seed implantation in 2015) T2DM, PPM presents to ER c/o SOB admitted for acute on chronic systolic CHF exacerbation. -RTT called on 4/21 due to vtach and afib with RVR, patient found to be SOB with flash pulmonary edema and hypotensive; transferred to ICU and s/p  amiodarone  and s/p levophed drip. Pt downgraded to tele today.

## 2025-04-27 NOTE — PROGRESS NOTE ADULT - ASSESSMENT
The patient is an 89 year old male with a history of HTN, HL, DM, CKD, chronic systolic heart failure s/p ICD prostate cancer who presents with shortness of breath in the setting of acute on chronic systolic heart failure.    Plan:  - ECG with sinus rhythm and LBBB  - Echo 3/20/25 with severely reduced LV systolic function (EF 20-25%), mod MR  - BNP 05240  - CXR with pulm congestion and small left pleural effusion  - Troponin elevated at 3785 in the setting of acute heart failure, HANNAH  - He had a cardiac catheterization in the past for work-up of his heart failure which showed mild non-obstructive CAD  - On metoprolol tartrate 25 mg bid  - Continue aspirin 81 mg daily  - Continue atorvastatin 80 mg daily  - Hold furosemide - worsening HANNAH  - Ventricular tachycardia - EP follow-up. Tachytherapies have been deactivated on the ICD in light of the patient's goals of care.  - Worsening LFTs - possibly due to shock liver. However, would hold amiodarone for now and observe rhythm off of antiarrhythmics.  - Reportedly had an episode of atrial fibrillation (strips not available). Continue heparin drip with probable transition to DOAC at a later date.  - On IV antibiotics

## 2025-04-27 NOTE — PROGRESS NOTE ADULT - PROBLEM SELECTOR PLAN 3
arnulfo on ckd3  Per chart review pt baseline Cr is around 1.8  -Cr 2.1 on this admission  -Avoid nephrotoxic agents   -s/p 40mg IV Lasix in ED  - off Lasix as worsening cr  with hypernatremia  - now cr better 2.6   -Nephro consulted dr solorio

## 2025-04-27 NOTE — DISCHARGE NOTE PROVIDER - INSTRUCTIONS
From: Shana Brizuela  To: Blossom Cheatham  Sent: 7/11/2022 7:36 AM CDT  Subject: Covid positive     Good morning, me and my  went to urgent care and tested positive for Covid yesterday. The dr said we will be fine cause we had our shots and booster, no prescriptions for anything. My cousin insisted I contact PCP because maybe you can prescribe paxlovid?  Our symptoms are increasing   Please advise   Valentina Rob 1200mL fluid restriction

## 2025-04-27 NOTE — PROGRESS NOTE ADULT - PROBLEM SELECTOR PLAN 2
New onset afib with RVR  -RTT called on 4/21 due to vtach and afib with RVR, patient found to be SOB with flash pulmonary edema and hypotensive; transferred to ICU and s/p  amiodarone  and  s/p  levophed drip  - s/p   amio gtt , heparin drip - metoprolol 25 mg po bid - hr stable   - now off  levophed -  transfer to  ICU to tele today

## 2025-04-27 NOTE — PROGRESS NOTE ADULT - ASSESSMENT
HANNAH on CKD 3  CHF EF 20-25%  Dyspnea   HTN     -Baseline Creatinine 1.5  -HANNAH Likely 2/2 Decreased EABV, ATN  -Urine indices reviewed  -S/p Albumin 100mg q6hrs x2  -Monitor UOP. Improving today  -Renal function now starting to improve   -No indication for RRT. Volume and lytes acceptable and with UOP will hold off for now    d/w ICU    Critical Care time 35 minutes between seeing patient, discussing with staff, placing orders and reviewing chart     HANNAH on CKD 3  CHF EF 20-25%  Dyspnea   HTN     -Baseline Creatinine 1.5  -HANNAH Likely 2/2 Decreased EABV, ATN  -Urine indices reviewed  -S/p Albumin 100mg q6hrs x2  -Monitor UOP. Improving today  -Renal function now starting to improve   -No indication for RRT. Volume and lytes acceptable and with UOP will hold off for now    d/w ICU    Addendum: Renal function improving but hypernatremia worsen, with post atn auto diuresis. Will start D5W x 1 day. D/w Dr. Deal     Critical Care time 35 minutes between seeing patient, discussing with staff, placing orders and reviewing chart

## 2025-04-28 LAB
ALBUMIN SERPL ELPH-MCNC: 3.6 G/DL — SIGNIFICANT CHANGE UP (ref 3.3–5)
ALP SERPL-CCNC: 97 U/L — SIGNIFICANT CHANGE UP (ref 40–120)
ALT FLD-CCNC: 854 U/L — HIGH (ref 12–78)
ANION GAP SERPL CALC-SCNC: 7 MMOL/L — SIGNIFICANT CHANGE UP (ref 5–17)
APTT BLD: 80.3 SEC — HIGH (ref 26.1–36.8)
AST SERPL-CCNC: 188 U/L — HIGH (ref 15–37)
BASOPHILS # BLD AUTO: 0.02 K/UL — SIGNIFICANT CHANGE UP (ref 0–0.2)
BASOPHILS NFR BLD AUTO: 0.2 % — SIGNIFICANT CHANGE UP (ref 0–2)
BILIRUB SERPL-MCNC: 1.7 MG/DL — HIGH (ref 0.2–1.2)
BUN SERPL-MCNC: 81 MG/DL — HIGH (ref 7–23)
CALCIUM SERPL-MCNC: 9 MG/DL — SIGNIFICANT CHANGE UP (ref 8.5–10.1)
CHLORIDE SERPL-SCNC: 117 MMOL/L — HIGH (ref 96–108)
CO2 SERPL-SCNC: 25 MMOL/L — SIGNIFICANT CHANGE UP (ref 22–31)
CREAT SERPL-MCNC: 2.6 MG/DL — HIGH (ref 0.5–1.3)
CULTURE RESULTS: SIGNIFICANT CHANGE UP
CULTURE RESULTS: SIGNIFICANT CHANGE UP
EGFR: 23 ML/MIN/1.73M2 — LOW
EGFR: 23 ML/MIN/1.73M2 — LOW
EOSINOPHIL # BLD AUTO: 0.03 K/UL — SIGNIFICANT CHANGE UP (ref 0–0.5)
EOSINOPHIL NFR BLD AUTO: 0.3 % — SIGNIFICANT CHANGE UP (ref 0–6)
GLUCOSE BLDC GLUCOMTR-MCNC: 158 MG/DL — HIGH (ref 70–99)
GLUCOSE BLDC GLUCOMTR-MCNC: 166 MG/DL — HIGH (ref 70–99)
GLUCOSE BLDC GLUCOMTR-MCNC: 167 MG/DL — HIGH (ref 70–99)
GLUCOSE BLDC GLUCOMTR-MCNC: 171 MG/DL — HIGH (ref 70–99)
GLUCOSE SERPL-MCNC: 188 MG/DL — HIGH (ref 70–99)
HCT VFR BLD CALC: 46.6 % — SIGNIFICANT CHANGE UP (ref 39–50)
HGB BLD-MCNC: 15.1 G/DL — SIGNIFICANT CHANGE UP (ref 13–17)
IMM GRANULOCYTES NFR BLD AUTO: 1.3 % — HIGH (ref 0–0.9)
LYMPHOCYTES # BLD AUTO: 0.96 K/UL — LOW (ref 1–3.3)
LYMPHOCYTES # BLD AUTO: 10.1 % — LOW (ref 13–44)
MAGNESIUM SERPL-MCNC: 3.1 MG/DL — HIGH (ref 1.6–2.6)
MCHC RBC-ENTMCNC: 31.9 PG — SIGNIFICANT CHANGE UP (ref 27–34)
MCHC RBC-ENTMCNC: 32.4 G/DL — SIGNIFICANT CHANGE UP (ref 32–36)
MCV RBC AUTO: 98.5 FL — SIGNIFICANT CHANGE UP (ref 80–100)
MONOCYTES # BLD AUTO: 0.85 K/UL — SIGNIFICANT CHANGE UP (ref 0–0.9)
MONOCYTES NFR BLD AUTO: 9 % — SIGNIFICANT CHANGE UP (ref 2–14)
NEUTROPHILS # BLD AUTO: 7.48 K/UL — HIGH (ref 1.8–7.4)
NEUTROPHILS NFR BLD AUTO: 79.1 % — HIGH (ref 43–77)
NRBC BLD AUTO-RTO: 0 /100 WBCS — SIGNIFICANT CHANGE UP (ref 0–0)
PHOSPHATE SERPL-MCNC: 3.7 MG/DL — SIGNIFICANT CHANGE UP (ref 2.5–4.5)
PLATELET # BLD AUTO: 121 K/UL — LOW (ref 150–400)
POTASSIUM SERPL-MCNC: 4.5 MMOL/L — SIGNIFICANT CHANGE UP (ref 3.5–5.3)
POTASSIUM SERPL-SCNC: 4.5 MMOL/L — SIGNIFICANT CHANGE UP (ref 3.5–5.3)
PROT SERPL-MCNC: 6.8 G/DL — SIGNIFICANT CHANGE UP (ref 6–8.3)
RBC # BLD: 4.73 M/UL — SIGNIFICANT CHANGE UP (ref 4.2–5.8)
RBC # FLD: 15.9 % — HIGH (ref 10.3–14.5)
SODIUM SERPL-SCNC: 149 MMOL/L — HIGH (ref 135–145)
SPECIMEN SOURCE: SIGNIFICANT CHANGE UP
SPECIMEN SOURCE: SIGNIFICANT CHANGE UP
WBC # BLD: 9.46 K/UL — SIGNIFICANT CHANGE UP (ref 3.8–10.5)
WBC # FLD AUTO: 9.46 K/UL — SIGNIFICANT CHANGE UP (ref 3.8–10.5)

## 2025-04-28 PROCEDURE — 99233 SBSQ HOSP IP/OBS HIGH 50: CPT | Mod: GC

## 2025-04-28 PROCEDURE — 71045 X-RAY EXAM CHEST 1 VIEW: CPT | Mod: 26

## 2025-04-28 PROCEDURE — 93010 ELECTROCARDIOGRAM REPORT: CPT

## 2025-04-28 RX ORDER — APIXABAN 2.5 MG/1
2.5 TABLET, FILM COATED ORAL
Refills: 0 | Status: DISCONTINUED | OUTPATIENT
Start: 2025-04-28 | End: 2025-05-01

## 2025-04-28 RX ADMIN — Medication 81 MILLIGRAM(S): at 13:10

## 2025-04-28 RX ADMIN — INSULIN LISPRO 1: 100 INJECTION, SOLUTION INTRAVENOUS; SUBCUTANEOUS at 08:42

## 2025-04-28 RX ADMIN — TAMSULOSIN HYDROCHLORIDE 0.8 MILLIGRAM(S): 0.4 CAPSULE ORAL at 21:27

## 2025-04-28 RX ADMIN — Medication 40 MILLIGRAM(S): at 13:10

## 2025-04-28 RX ADMIN — Medication 25 GRAM(S): at 05:46

## 2025-04-28 RX ADMIN — BUSPIRONE HYDROCHLORIDE 5 MILLIGRAM(S): 15 TABLET ORAL at 17:45

## 2025-04-28 RX ADMIN — INSULIN LISPRO 1: 100 INJECTION, SOLUTION INTRAVENOUS; SUBCUTANEOUS at 17:46

## 2025-04-28 RX ADMIN — MUPIROCIN CALCIUM 1 APPLICATION(S): 20 CREAM TOPICAL at 05:46

## 2025-04-28 RX ADMIN — Medication 25 GRAM(S): at 17:46

## 2025-04-28 RX ADMIN — APIXABAN 2.5 MILLIGRAM(S): 2.5 TABLET, FILM COATED ORAL at 17:46

## 2025-04-28 RX ADMIN — APIXABAN 2.5 MILLIGRAM(S): 2.5 TABLET, FILM COATED ORAL at 08:42

## 2025-04-28 RX ADMIN — HEPARIN SODIUM 900 UNIT(S)/HR: 1000 INJECTION INTRAVENOUS; SUBCUTANEOUS at 05:09

## 2025-04-28 RX ADMIN — INSULIN LISPRO 1: 100 INJECTION, SOLUTION INTRAVENOUS; SUBCUTANEOUS at 13:10

## 2025-04-28 NOTE — PROGRESS NOTE ADULT - ASSESSMENT
The patient is an 89 year old male with a history of HTN, HL, DM, CKD, chronic systolic heart failure s/p ICD prostate cancer who presents with shortness of breath in the setting of acute on chronic systolic heart failure.    Plan:  - ECG with sinus rhythm and LBBB  - Echo 3/20/25 with severely reduced LV systolic function (EF 20-25%), mod MR  - BNP 67402  - CXR with pulm congestion and small left pleural effusion  - Troponin elevated at 3785 in the setting of acute heart failure, HANNAH  - He had a cardiac catheterization in the past for work-up of his heart failure which showed mild non-obstructive CAD  - On metoprolol tartrate 25 mg bid  - Continue aspirin 81 mg daily  - Continue atorvastatin 80 mg daily  - Ventricular tachycardia - EP follow-up. Tachytherapies have been deactivated on the ICD in light of the patient's goals of care.  - Off of amiodarone due to elevated LFTs  - Atrial fibrillation - transition from heparin to apixaban 2.5 mg bid  - Hypoxic overnight - received furosemide 40 mg IV. Breathing improved. Would keep euvolemic today as he has had poor oral intake and some signs of hypovolemia with hypernatremia.

## 2025-04-28 NOTE — PROVIDER CONTACT NOTE (CHANGE IN STATUS NOTIFICATION) - ASSESSMENT
VSS as per Flow sheet.   97.6 oral, HR 85, 116/76 ALYSHA, 94% on RA. Patient is asymptomatic no c/o of SOB, dizziness, CP. and or blurry vision.

## 2025-04-28 NOTE — PROGRESS NOTE ADULT - SUBJECTIVE AND OBJECTIVE BOX
[facility]  Uriel Onofre PA-C  [address] [place] [id number]  [phone]      INTERVAL HPI/OVERNIGHT EVENTS:  Pt s/e  LFTs trending down  HIDA negative  Pt denies abdominal pain, N/V/D or further GI complaints  SLP eval noted    MEDICATIONS  (STANDING):  apixaban 2.5 milliGRAM(s) Oral two times a day  aspirin  chewable 81 milliGRAM(s) Oral daily  busPIRone 5 milliGRAM(s) Oral two times a day  dextrose 5%. 1000 milliLiter(s) (50 mL/Hr) IV Continuous <Continuous>  dextrose 5%. 1000 milliLiter(s) (100 mL/Hr) IV Continuous <Continuous>  dextrose 5%. 1000 milliLiter(s) (50 mL/Hr) IV Continuous <Continuous>  dextrose 5%. 1000 milliLiter(s) (84 mL/Hr) IV Continuous <Continuous>  dextrose 50% Injectable 25 Gram(s) IV Push once  dextrose 50% Injectable 12.5 Gram(s) IV Push once  dextrose 50% Injectable 25 Gram(s) IV Push once  glucagon  Injectable 1 milliGRAM(s) IntraMuscular once  insulin lispro (ADMELOG) corrective regimen sliding scale   SubCutaneous three times a day before meals  insulin lispro (ADMELOG) corrective regimen sliding scale   SubCutaneous at bedtime  metoprolol tartrate 25 milliGRAM(s) Oral two times a day  pantoprazole   Suspension 40 milliGRAM(s) Oral daily  piperacillin/tazobactam IVPB.. 3.375 Gram(s) IV Intermittent every 12 hours  tamsulosin 0.8 milliGRAM(s) Oral at bedtime    MEDICATIONS  (PRN):  dextrose Oral Gel 15 Gram(s) Oral once PRN Blood Glucose LESS THAN 70 milliGRAM(s)/deciliter      Allergies    No Known Allergies      PHYSICAL EXAM:   Vital Signs:  Vital Signs Last 24 Hrs  T(C): 36.3 (2025 20:23), Max: 36.6 (2025 17:20)  T(F): 97.3 (2025 20:23), Max: 97.8 (2025 17:20)  HR: 88 (2025 08:35) (78 - 132)  BP: 106/69 (2025 05:37) (106/69 - 141/83)  BP(mean): --  RR: 17 (2025 05:37) (17 - 20)  SpO2: 95% (2025 08:35) (88% - 97%)    Parameters below as of 2025 05:37  Patient On (Oxygen Delivery Method): BiPAP/CPAP      Daily     Daily Weight in k.2 (2025 05:37)    GENERAL:  Appears stated age  HEENT:  NC/AT  CHEST:  Full & symmetric excursion  HEART:  Regular rhythm  ABDOMEN:  Soft, non-tender, non-distended  EXTEREMITIES:  no cyanosis  SKIN:  No rash  NEURO:  Alert      LABS:                        15.1   9.46  )-----------( 121      ( 2025 08:00 )             46.6         149[H]  |  117[H]  |  81[H]  ----------------------------<  188[H]  4.5   |  25  |  2.60[H]    Ca    9.0      2025 08:00  Phos  3.7       Mg     3.1         TPro  6.8  /  Alb  3.6  /  TBili  1.7[H]  /  DBili  x   /  AST  188[H]  /  ALT  854[H]  /  AlkPhos  97      PTT - ( 2025 08:00 )  PTT:80.3 sec  Urinalysis Basic - ( 2025 08:00 )    Color: x / Appearance: x / SG: x / pH: x  Gluc: 188 mg/dL / Ketone: x  / Bili: x / Urobili: x   Blood: x / Protein: x / Nitrite: x   Leuk Esterase: x / RBC: x / WBC x   Sq Epi: x / Non Sq Epi: x / Bacteria: x

## 2025-04-28 NOTE — PROGRESS NOTE ADULT - SUBJECTIVE AND OBJECTIVE BOX
Date/Time Patient Seen:  		  Referring MD:   Data Reviewed	       Patient is a 89y old  Male who presents with a chief complaint of SOB (27 Apr 2025 11:58)      Subjective/HPI     PAST MEDICAL & SURGICAL HISTORY:  NICM (nonischemic cardiomyopathy)    HTN (hypertension)    HLD (hyperlipidemia)    Prostate CA    T2DM (type 2 diabetes mellitus)    CHF (congestive heart failure)    H/O prostate biopsy    H/O cataract extraction    H/O inguinal hernia repair    AICD (automatic cardioverter/defibrillator) present          Medication list         MEDICATIONS  (STANDING):  apixaban 2.5 milliGRAM(s) Oral two times a day  aspirin  chewable 81 milliGRAM(s) Oral daily  busPIRone 5 milliGRAM(s) Oral two times a day  dextrose 5%. 1000 milliLiter(s) (50 mL/Hr) IV Continuous <Continuous>  dextrose 5%. 1000 milliLiter(s) (100 mL/Hr) IV Continuous <Continuous>  dextrose 5%. 1000 milliLiter(s) (50 mL/Hr) IV Continuous <Continuous>  dextrose 5%. 1000 milliLiter(s) (84 mL/Hr) IV Continuous <Continuous>  dextrose 50% Injectable 25 Gram(s) IV Push once  dextrose 50% Injectable 12.5 Gram(s) IV Push once  dextrose 50% Injectable 25 Gram(s) IV Push once  glucagon  Injectable 1 milliGRAM(s) IntraMuscular once  insulin lispro (ADMELOG) corrective regimen sliding scale   SubCutaneous three times a day before meals  insulin lispro (ADMELOG) corrective regimen sliding scale   SubCutaneous at bedtime  metoprolol tartrate 25 milliGRAM(s) Oral two times a day  pantoprazole   Suspension 40 milliGRAM(s) Oral daily  piperacillin/tazobactam IVPB.. 3.375 Gram(s) IV Intermittent every 12 hours  tamsulosin 0.8 milliGRAM(s) Oral at bedtime    MEDICATIONS  (PRN):  dextrose Oral Gel 15 Gram(s) Oral once PRN Blood Glucose LESS THAN 70 milliGRAM(s)/deciliter         Vitals log        ICU Vital Signs Last 24 Hrs  T(C): 36.3 (27 Apr 2025 20:23), Max: 36.6 (27 Apr 2025 17:20)  T(F): 97.3 (27 Apr 2025 20:23), Max: 97.8 (27 Apr 2025 17:20)  HR: 78 (28 Apr 2025 05:37) (73 - 132)  BP: 106/69 (28 Apr 2025 05:37) (96/71 - 141/83)  BP(mean): 80 (27 Apr 2025 09:00) (74 - 80)  ABP: --  ABP(mean): --  RR: 17 (28 Apr 2025 05:37) (17 - 20)  SpO2: 97% (28 Apr 2025 05:37) (88% - 100%)    O2 Parameters below as of 28 Apr 2025 05:37  Patient On (Oxygen Delivery Method): BiPAP/CPAP                 Input and Output:  I&O's Detail    27 Apr 2025 07:01  -  28 Apr 2025 07:00  --------------------------------------------------------  IN:    Heparin Infusion: 18 mL    IV PiggyBack: 50 mL    Oral Fluid: 240 mL  Total IN: 308 mL    OUT:    Indwelling Catheter - Urethral (mL): 1100 mL  Total OUT: 1100 mL    Total NET: -792 mL          Lab Data                        13.0   8.41  )-----------( 109      ( 27 Apr 2025 06:11 )             38.9     04-27    149[H]  |  119[H]  |  94[H]  ----------------------------<  138[H]  3.4[L]   |  21[L]  |  2.60[H]    Ca    8.9      27 Apr 2025 06:11  Phos  3.0     04-27  Mg     3.3     04-27    TPro  6.1  /  Alb  3.6  /  TBili  1.8[H]  /  DBili  x   /  AST  314[H]  /  ALT  966[H]  /  AlkPhos  92  04-27    ABG - ( 27 Apr 2025 21:48 )  pH, Arterial: 7.41  pH, Blood: x     /  pCO2: 33    /  pO2: 118   / HCO3: 21    / Base Excess: -3.7  /  SaO2: 99.4                    Review of Systems	      Objective     Physical Examination    heart s1s2  lung dc bs  head nc  head at      Pertinent Lab findings & Imaging      Amberly:  NO   Adequate UO     I&O's Detail    27 Apr 2025 07:01  -  28 Apr 2025 07:00  --------------------------------------------------------  IN:    Heparin Infusion: 18 mL    IV PiggyBack: 50 mL    Oral Fluid: 240 mL  Total IN: 308 mL    OUT:    Indwelling Catheter - Urethral (mL): 1100 mL  Total OUT: 1100 mL    Total NET: -792 mL               Discussed with:     Cultures:	        Radiology

## 2025-04-28 NOTE — CASE MANAGEMENT PROGRESS NOTE - NSCMPROGRESSNOTE_GEN_ALL_CORE
Patient discussed during interdisciplinary rounds. Star CHF status noted. Patient remains acute on IV zosyn and uses bipap and 6L oxygen as appropriate. SW following for discharge to sub acute rehab.

## 2025-04-28 NOTE — PROGRESS NOTE ADULT - PROBLEM SELECTOR PLAN 1
acute on chronic HFrEF exacerbation   cause of acute hypoxic resp failure  with acute pulmonary edema   Pt with hx of chronic systolic congestive heart failure last admitted for CHF exacerbation in March to [facility] and SF last week-Pt with inc SOB at rest and on exertion   -CXR with pulmonary congestion and b/l pleural effusions  -Troponin 3051, 3543, pro-BNP 00911  -EKG: NSR with frequent PVC's, 90 BPM, QTc 518  -Vitals: BP: 135/73, HR: 95 , Temp: 98.2F , RR: 18 , SpO2: 94% on 6LNC  -s/p bipap -s/p 40mg Lasix IVP in ED  -Previous TTE from March with severely reduced LV systolic function (EF 20-25%), mod MR  - Strict I&Os, daily weights, fluid restriction  - Remote tele, continuous pulse ox   -Pt recently switched from Lasix to Torsemide per SF discharge but is unsure of dosage   -C/w Lasix 40mg IVP bid -   xray chest pvc    - s/p lasix 40 mg -   now off  iv  Lasix  - euvolemic   -- PPM interrogation inconclusive, defibrillation function turned off  -Cardio consulted, Dr. Boothe, - obeserve off diuretics   -ICU downgrade to  tele

## 2025-04-28 NOTE — PROGRESS NOTE ADULT - SUBJECTIVE AND OBJECTIVE BOX
NEPHROLOGY INTERVAL HPI/OVERNIGHT EVENTS:  HPI:  88 y/o male with PMHx of CHF, HLD, HTN, prostate ca (s/p radioactive seed implantation in 2015) T2DM, PPM presents to ER c/o SOB. Pt states that he has had intermittent SOB for the past few days, Pt notes he was visiting his wife today at rehab and felt SOB as he was leaving. He sat down to catch his breath, a staff member noticed he did not look well and they decided to call EMS. Pt notes he has had SOB both at rest and with exertion. Pt denies chest pain, fever, cough. Of note, pt was admitted 1 month ago for CHF exacerbation and also 1 week ago at [facility]. Pt adds a bunch of his medications were changed including his Entresto was dc'ed, Farxiga dosage was doubled and his Lasix was switched to Torsemide. Pt currently on NC and denies any SOB. Pt denies fever, chills, chest pain, palpitations, abdominal pain, nausea, vomiting, diarrhea.    Had SOB this AM         PAST MEDICAL & SURGICAL HISTORY:  NICM (nonischemic cardiomyopathy)      HTN (hypertension)      HLD (hyperlipidemia)      Prostate CA      T2DM (type 2 diabetes mellitus)      CHF (congestive heart failure)      H/O prostate biopsy      H/O cataract extraction      H/O inguinal hernia repair      AICD (automatic cardioverter/defibrillator) present          MEDICATIONS  (STANDING):  albumin human 25% IVPB 100 milliLiter(s) IV Intermittent every 6 hours  aspirin Suppository 300 milliGRAM(s) Rectal daily  chlorhexidine 2% Cloths 1 Application(s) Topical <User Schedule>  dexMEDEtomidine Infusion 0.2 MICROgram(s)/kG/Hr (4.04 mL/Hr) IV Continuous <Continuous>  dextrose 5%. 1000 milliLiter(s) (50 mL/Hr) IV Continuous <Continuous>  dextrose 5%. 1000 milliLiter(s) (100 mL/Hr) IV Continuous <Continuous>  dextrose 5%. 1000 milliLiter(s) (50 mL/Hr) IV Continuous <Continuous>  dextrose 50% Injectable 25 Gram(s) IV Push once  dextrose 50% Injectable 12.5 Gram(s) IV Push once  dextrose 50% Injectable 25 Gram(s) IV Push once  glucagon  Injectable 1 milliGRAM(s) IntraMuscular once  heparin  Infusion.  Unit(s)/Hr (15 mL/Hr) IV Continuous <Continuous>  insulin lispro (ADMELOG) corrective regimen sliding scale   SubCutaneous every 6 hours  mupirocin 2% Nasal 1 Application(s) Both Nostrils two times a day  pantoprazole  Injectable 40 milliGRAM(s) IV Push daily  piperacillin/tazobactam IVPB.. 3.375 Gram(s) IV Intermittent every 12 hours  tamsulosin 0.8 milliGRAM(s) Oral at bedtime    MEDICATIONS  (PRN):  dextrose Oral Gel 15 Gram(s) Oral once PRN Blood Glucose LESS THAN 70 milliGRAM(s)/deciliter  heparin   Injectable 6500 Unit(s) IV Push every 6 hours PRN For aPTT less than 40  heparin   Injectable 3000 Unit(s) IV Push every 6 hours PRN For aPTT between 40 - 57      Allergies    No Known Allergies    Intolerances      ICU Vital Signs Last 24 Hrs  T(C): 36.3 (27 Apr 2025 20:23), Max: 36.6 (27 Apr 2025 17:20)  T(F): 97.3 (27 Apr 2025 20:23), Max: 97.8 (27 Apr 2025 17:20)  HR: 84 (28 Apr 2025 13:30) (78 - 132)  BP: 114/77 (28 Apr 2025 13:30) (106/69 - 141/83)  BP(mean): --  ABP: --  ABP(mean): --  RR: 18 (28 Apr 2025 13:30) (17 - 20)  SpO2: 94% (28 Apr 2025 13:30) (88% - 97%)    O2 Parameters below as of 28 Apr 2025 13:30  Patient On (Oxygen Delivery Method): room air            PHYSICAL EXAM:    GENERAL: Elderly male, in no acute distress, ill appearing  HEAD:  Atraumatic, Normocephalic  NERVOUS SYSTEM:  Awake  CHEST/LUNG: Coarse BS bilaterally.  SKIN: No rashes or lesions    LABS:                                   15.1   9.46  )-----------( 121      ( 28 Apr 2025 08:00 )             46.6     04-28    149[H]  |  117[H]  |  81[H]  ----------------------------<  188[H]  4.5   |  25  |  2.60[H]    Ca    9.0      28 Apr 2025 08:00  Phos  3.7     04-28  Mg     3.1     04-28    TPro  6.8  /  Alb  3.6  /  TBili  1.7[H]  /  DBili  x   /  AST  188[H]  /  ALT  854[H]  /  AlkPhos  97  04-28    PTT - ( 28 Apr 2025 08:00 )  PTT:80.3 sec  Urinalysis Basic - ( 28 Apr 2025 08:00 )    Color: x / Appearance: x / SG: x / pH: x  Gluc: 188 mg/dL / Ketone: x  / Bili: x / Urobili: x   Blood: x / Protein: x / Nitrite: x   Leuk Esterase: x / RBC: x / WBC x   Sq Epi: x / Non Sq Epi: x / Bacteria: x        ABG - ( 27 Apr 2025 21:48 )  pH, Arterial: 7.41  pH, Blood: x     /  pCO2: 33    /  pO2: 118   / HCO3: 21    / Base Excess: -3.7  /  SaO2: 99.4

## 2025-04-28 NOTE — CHART NOTE - NSCHARTNOTEFT_GEN_A_CORE
called by RN pt w/ 9 beats of vtach. Spoke to tele team pt w/ 9 beats of vtach at 21:13. No other events HR 85. Pt is asymptomatic vitals stable.   -EKG ordered called by RN pt w/ 9 beats of vtach. Spoke to tele team pt w/ 9 beats of vtach at 21:13. No other events HR 85. Pt is asymptomatic vitals stable.   -EKG ordered  -mag and phos for AM called by RN pt w/ 9 beats of vtach. Spoke to tele team pt w/ 9 beats of vtach at 21:13. No other events HR 85. Pt is asymptomatic vitals stable.   -EKG done - Sinus rhythm w/ frequent PVCs HR 97 appears to have no significant change from prior when compared   -mag and phos for AM

## 2025-04-28 NOTE — SWALLOW BEDSIDE ASSESSMENT ADULT - SWALLOW EVAL: RECOMMENDED DIET
Consider NPO with considerations for short-term alternate means of nutrition/hydration as in align with pt/family wishes

## 2025-04-28 NOTE — PROGRESS NOTE ADULT - ASSESSMENT
HANNAH on CKD 3  CHF EF 20-25%  Dyspnea   HTN     -Baseline Creatinine 1.5  -HANNAH Likely 2/2 Decreased EABV, ATN  -Urine indices reviewed  -S/p Albumin 100mg q6hrs x2  -Monitor UOP. Improving today  -Renal function now starting to improve   -No indication for RRT. Volume and lytes acceptable and with UOP will hold off for now  -Renal function improving but hypernatremia worsen, with post atn auto diuresis. S/p D5W x 1 day.  -Received a dose of lasix today,  Creatinine stabilizing.

## 2025-04-28 NOTE — PROGRESS NOTE ADULT - SUBJECTIVE AND OBJECTIVE BOX
Chief Complaint: Shortness of breath    Interval Events: No events overnight.    Review of Systems:  Unable to obtain.    Physical Exam:  Vital Signs Last 24 Hrs  T(C): 36.3 (27 Apr 2025 20:23), Max: 36.6 (27 Apr 2025 17:20)  T(F): 97.3 (27 Apr 2025 20:23), Max: 97.8 (27 Apr 2025 17:20)  HR: 78 (28 Apr 2025 05:37) (78 - 132)  BP: 106/69 (28 Apr 2025 05:37) (106/69 - 141/83)  BP(mean): --  RR: 17 (28 Apr 2025 05:37) (17 - 20)  SpO2: 97% (28 Apr 2025 05:37) (88% - 97%)  Parameters below as of 28 Apr 2025 05:37  Patient On (Oxygen Delivery Method): BiPAP/CPAP  General: NAD  HEENT: MMM  Neck: No JVD, no carotid bruit  Lungs: CTAB  CV: RRR, nl S1/S2, no M/R/G  Abdomen: S/NT/ND, +BS  Extremities: No LE edema, no cyanosis  Neuro: AAOx0  Skin: No rash    Labs:    04-28    149[H]  |  117[H]  |  81[H]  ----------------------------<  188[H]  4.5   |  25  |  2.60[H]    Ca    9.0      28 Apr 2025 08:00  Phos  3.7     04-28  Mg     3.1     04-28    TPro  6.8  /  Alb  3.6  /  TBili  1.7[H]  /  DBili  x   /  AST  188[H]  /  ALT  854[H]  /  AlkPhos  97  04-28                        15.1   9.46  )-----------( 121      ( 28 Apr 2025 08:00 )             46.6       ECG/Telemetry: Sinus rhythm, PVCs

## 2025-04-28 NOTE — OCCUPATIONAL THERAPY INITIAL EVALUATION ADULT - PERTINENT HX OF CURRENT PROBLEM, REHAB EVAL
88 y/o male with PMH CHF, HLD, HTN, prostate ca (s/p radioactive seed implantation in 2015) T2DM, PPM presents to ER c/o SOB admitted for acute on chronic systolic CHF exacerbation. RTT called on 4/21 due to vtach and afib with RVR, patient found to be SOB with flash pulmonary edema and hypotensive; transferred to ICU and s/p  amiodarone  and s/p levophed drip. Patient admitted 4/21/25 with heart failure.

## 2025-04-28 NOTE — OCCUPATIONAL THERAPY INITIAL EVALUATION ADULT - DIAGNOSIS, OT EVAL
Patient with decreased ADL status and impairments with functional mobility. Patient requires assistance with ADL's and transfers due to decreased strength, decreased flexibility, decreased endurance and impaired sitting/standing balance.

## 2025-04-28 NOTE — CAREGIVER ENGAGEMENT NOTE - CAREGIVER OUTREACH NOTES - FREE TEXT
SW reached out to pt's daughter Muriel to discuss PT recommendation of [facility]. Muriel is in agreement with recommendation when pt is medically cleared for dc.

## 2025-04-28 NOTE — PROGRESS NOTE ADULT - PROBLEM SELECTOR PLAN 3
arnulfo on ckd3  Per chart review pt baseline Cr is around 1.8  -Cr 2.1 on this admission  -Avoid nephrotoxic agents   -s/p 40mg IV Lasix in ED  - off Lasix as worsening cr  with hypernatremia  -  cr better 2.6   -Nephro consulted dr solorio- pt remain euvolemic

## 2025-04-28 NOTE — PROGRESS NOTE ADULT - PROBLEM SELECTOR PLAN 4
Pt with elevated troponin on admission to [facility]  -Per chart review pt had elevated troponin on last admission most likely 2/2 demand ischemia in setting of severe CHF exacerbation   - trend trops to trending down   -Currently no active CP, will monitor for anginal sx  -EKG with NSR with frequent PVC's, 90 BPM, QTc 518

## 2025-04-28 NOTE — PATIENT CHOICE NOTE. - NSPTCHOICESTATE_GEN_ALL_CORE

## 2025-04-28 NOTE — OCCUPATIONAL THERAPY INITIAL EVALUATION ADULT - ADDITIONAL COMMENTS
What Type Of Note Output Would You Prefer (Optional)?: Bullet Format How Severe Are Your Spot(S)?: mild Have Your Spot(S) Been Treated In The Past?: has not been treated Hpi Title: Evaluation of Skin Lesions Patient lives in a house with 4 stairs to enter and +bathtub/shower with curtain and grab bars. Patient was independent in ADLs and ambulation prior to admission. Patient drives a car. Patient's spouse is [facility] s/p CVA.  Patient performed sit to stand and ambulated 5' using RW with min assist x 2 and +kyphotic posture noted.

## 2025-04-28 NOTE — SOCIAL WORK PROGRESS NOTE - NSSWPROGRESSNOTE_GEN_ALL_CORE
Per PT, pt for [facility]. Per MD, possible discharge for middle of the week. Per interdisciplinary team, pt currently on 6L O2 and bipap. SW met with pt to discuss PT recommendation of [facility], pt in agreement. SW phoned pt's daughter Muriel to discuss PT recommendation of [facility], she is also in agreement. Muriel stated that pt's wife is currently at [facility], she would like a referral sent there and [facility] (close to their home). SW will send referrals. SW will continue to follow.

## 2025-04-28 NOTE — OCCUPATIONAL THERAPY INITIAL EVALUATION ADULT - ADL RETRAINING, OT EVAL
Patient will dress upper body with contact guard in 4-6 sessions. Patient will dress lower body with minimal assistance, AE as needed in 4-6 sessions.

## 2025-04-28 NOTE — OCCUPATIONAL THERAPY INITIAL EVALUATION ADULT - RANGE OF MOTION EXAMINATION, UPPER EXTREMITY
Muscle strength UE's: WFL's. Fine motor skills: WFL's./bilateral UE Active Assistive ROM was WFL  (within functional limits)

## 2025-04-28 NOTE — OCCUPATIONAL THERAPY INITIAL EVALUATION ADULT - GENERAL OBSERVATIONS, REHAB EVAL
Patient found supine in bed with +IV lock, +telemonitor, +external catheter and 6 lpm O2 via nc. Patient chart reviewed, events noted. Patient cleared to be seen for therapy by RN prior to session.

## 2025-04-28 NOTE — PROGRESS NOTE ADULT - ASSESSMENT
89 y/o male with PMHx of CHF, HLD, HTN, prostate ca (s/p radioactive seed implantation in 2015) T2DM, PPM presents to ER c/o SOB. Pt states that he has had intermittent SOB    systolic HF  OP  OA  Atelectasis  Pleural Eff  Dyspnea  eval ACS  HTN  HLD  hx of Prostate Ca  DM  PPM    dnr dni  transferred out of ICU  NIV use  resp distress - VOL overload - HF and CKD -   Diuresis was on HOLD - will benefit from Diuresis and or UF as per Cardio and Renal - respectively    fio2 wean  I apple  goal sat > 88 pct  trend Trops  eval ACS  Cardio eval  tele monitoring  replete lytes  I and O  cvs rx regimen optimization  TTE reviewed from prior visits - EF 20 pct  pleural eff - atelectasis - I apple - no indication for pleurocentesis at present  DM care - serial FS  GOC discussion

## 2025-04-28 NOTE — SWALLOW BEDSIDE ASSESSMENT ADULT - COMMENTS
Charting in progress    The pt was received at the bedside. He was awake and alert. The pt was receiving supplemental O2 via NC (6L). Pt noted with increased WOB. Pt endorsed SOB. Therefore, PO trials were not provided. SpO2 was 95% and HR . NAY Mosher notified. Given pt report of SOB and increased WOB, would recommend pt's MD to consider NPO with this service to reassess pending improvement in the pt's respiratory status. Dr. Deal notified. Pt's chart reviewed and order received. The pt was received at the bedside. He was awake and alert. The pt was receiving supplemental O2 via NC (6L). Pt noted with increased WOB. Pt endorsed SOB. He was in NAD and communicative. However, given pt presentation and pt report, PO trials were not provided and dysphagia evaluation was not completed. SpO2 was 95% and HR . NAY Mosher notified and stated she was aware. Given pt report of SOB and increased WOB, would recommend pt's MD to consider NPO with this service to reassess pending improvement in the pt's respiratory status. Dr. Deal notified. NAY Mosher notified. This service to f/u while the pt is in-house as schedule permits.

## 2025-04-28 NOTE — PROGRESS NOTE ADULT - PROBLEM SELECTOR PLAN 5
Chronic  -HOLD home Coreg 3.125 mg BID in setting of hypotension-s/p  levophed -  on  metoprolol 25 mg po bid -  bps table

## 2025-04-28 NOTE — PROGRESS NOTE ADULT - ASSESSMENT
Transaminitis  NSVT  A-fib RVR  CHF exacerbation  Pulmonary edema  PMHx of CHF, HLD, HTN, prostate ca (s/p radioactive seed implantation in 2015)   Hx T2DM  Hx PPM admitted for acute on chronic HFrEF    4/24 US abdomen RUQ: Distended gallbladder with wall thickening and edema without evidence of cholelithiasis.  4/24 HIDA negative    Plan:  - LFTs noted  - Trend LFT, trending down  - Suspect shock liver from CHF exacerbation/pulmonary edema  - US noted  - HIDA neg  - Follow surgery recs  - Avoid hepatotoxic medications  - Repeat S/S eval when pt's respiratory status improves  - Will follow

## 2025-04-28 NOTE — PROGRESS NOTE ADULT - SUBJECTIVE AND OBJECTIVE BOX
Patient is a 89y old  Male who presents with a chief complaint of SOB (28 Apr 2025 11:49)    pt seen and examine today awake , no fever ,  tolerating  break fast but with  speech  swallow evaluation  was not able to eat ,  ramos / voiding    .    INTERVAL HPI/OVERNIGHT EVENTS:     T(C): 36.3 (04-27-25 @ 20:23), Max: 36.6 (04-27-25 @ 17:20)  HR: 88 (04-28-25 @ 08:35) (78 - 132)  BP: 106/69 (04-28-25 @ 05:37) (106/69 - 141/83)  RR: 17 (04-28-25 @ 05:37) (17 - 20)  SpO2: 95% (04-28-25 @ 08:35) (88% - 97%)  Wt(kg): --  I&O's Summary    27 Apr 2025 07:01  -  28 Apr 2025 07:00  --------------------------------------------------------  IN: 308 mL / OUT: 1100 mL / NET: -792 mL      REVIEW OF SYSTEMS:  CONSTITUTIONAL: No fever, weight loss, or fatigue  EYES: No eye pain, visual disturbances, or discharge  ENMT:  No difficulty hearing, tinnitus, vertigo; No sinus or throat pain  NECK: No pain or stiffness  RESPIRATORY: No cough, wheezing, chills or hemoptysis; No shortness of breath  CARDIOVASCULAR: No chest pain, palpitations, dizziness, or leg swelling  GASTROINTESTINAL: No abdominal or epigastric pain. No nausea, vomiting, ; No diarrhea or constipation.   GENITOURINARY: No dysuria, frequency, hematuria, or incontinence  NEUROLOGICAL: No headaches, memory loss, loss of strength, numbness, or tremors  SKIN: No itching, burning, rashes, or lesions   MUSCULOSKELETAL: No joint pain or swelling; No muscle, back, or extremity pain    PHYSICAL EXAM:  GENERAL: NAD,    HEAD:  Atraumatic, Normocephalic  EYES: EOMI, PERRLA, conjunctiva and sclera clear  ENMT:  Moist mucous membranes  NECK: Supple, No JVD  NERVOUS SYSTEM:  Alert & Oriented X3; Motor Strength 5/5 B/L upper and lower extremities; DTRs 2+ intact and symmetric  CHEST/LUNG:  percussion bilaterally; No rales, rhonchi, wheezing,    HEART: Regular rate and rhythm; No murmurs   ABDOMEN: Soft, Nontender, Nondistended; Bowel sounds present  EXTREMITIES:  2+ Peripheral Pulses, No clubbing, cyanosis, or edema  SKIN: No rashes or lesions  gu  on ramos         MEDICATIONS  (STANDING):  apixaban 2.5 milliGRAM(s) Oral two times a day  aspirin  chewable 81 milliGRAM(s) Oral daily  busPIRone 5 milliGRAM(s) Oral two times a day  dextrose 5%. 1000 milliLiter(s) (50 mL/Hr) IV Continuous <Continuous>  dextrose 5%. 1000 milliLiter(s) (100 mL/Hr) IV Continuous <Continuous>  dextrose 5%. 1000 milliLiter(s) (50 mL/Hr) IV Continuous <Continuous>  dextrose 5%. 1000 milliLiter(s) (84 mL/Hr) IV Continuous <Continuous>  dextrose 50% Injectable 25 Gram(s) IV Push once  dextrose 50% Injectable 12.5 Gram(s) IV Push once  dextrose 50% Injectable 25 Gram(s) IV Push once  glucagon  Injectable 1 milliGRAM(s) IntraMuscular once  insulin lispro (ADMELOG) corrective regimen sliding scale   SubCutaneous three times a day before meals  insulin lispro (ADMELOG) corrective regimen sliding scale   SubCutaneous at bedtime  metoprolol tartrate 25 milliGRAM(s) Oral two times a day  pantoprazole   Suspension 40 milliGRAM(s) Oral daily  piperacillin/tazobactam IVPB.. 3.375 Gram(s) IV Intermittent every 12 hours  tamsulosin 0.8 milliGRAM(s) Oral at bedtime    MEDICATIONS  (PRN):  dextrose Oral Gel 15 Gram(s) Oral once PRN Blood Glucose LESS THAN 70 milliGRAM(s)/deciliter      LABS:                        15.1   9.46  )-----------( 121      ( 28 Apr 2025 08:00 )             46.6     04-28    149[H]  |  117[H]  |  81[H]  ----------------------------<  188[H]  4.5   |  25  |  2.60[H]    Ca    9.0      28 Apr 2025 08:00  Phos  3.7     04-28  Mg     3.1     04-28    TPro  6.8  /  Alb  3.6  /  TBili  1.7[H]  /  DBili  x   /  AST  188[H]  /  ALT  854[H]  /  AlkPhos  97  04-28    PTT - ( 28 Apr 2025 08:00 )  PTT:80.3 sec  Urinalysis Basic - ( 28 Apr 2025 08:00 )    Color: x / Appearance: x / SG: x / pH: x  Gluc: 188 mg/dL / Ketone: x  / Bili: x / Urobili: x   Blood: x / Protein: x / Nitrite: x   Leuk Esterase: x / RBC: x / WBC x   Sq Epi: x / Non Sq Epi: x / Bacteria: x      CAPILLARY BLOOD GLUCOSE      POCT Blood Glucose.: 166 mg/dL (28 Apr 2025 12:13)  POCT Blood Glucose.: 171 mg/dL (28 Apr 2025 08:08)  POCT Blood Glucose.: 225 mg/dL (27 Apr 2025 21:20)  POCT Blood Glucose.: 219 mg/dL (27 Apr 2025 18:06)  POCT Blood Glucose.: 217 mg/dL (27 Apr 2025 16:39)    ABG - ( 27 Apr 2025 21:48 )  pH, Arterial: 7.41  pH, Blood: x     /  pCO2: 33    /  pO2: 118   / HCO3: 21    / Base Excess: -3.7  /  SaO2: 99.4                      RADIOLOGY & ADDITIONAL TESTS:    Imaging Personally Reviewed:     no new test   Advance Directives:    dnr / dni   Palliative Care:  Appropriate

## 2025-04-29 LAB
ANION GAP SERPL CALC-SCNC: 8 MMOL/L — SIGNIFICANT CHANGE UP (ref 5–17)
BASOPHILS # BLD AUTO: 0.02 K/UL — SIGNIFICANT CHANGE UP (ref 0–0.2)
BASOPHILS NFR BLD AUTO: 0.2 % — SIGNIFICANT CHANGE UP (ref 0–2)
BUN SERPL-MCNC: 73 MG/DL — HIGH (ref 7–23)
CALCIUM SERPL-MCNC: 9.1 MG/DL — SIGNIFICANT CHANGE UP (ref 8.5–10.1)
CHLORIDE SERPL-SCNC: 116 MMOL/L — HIGH (ref 96–108)
CO2 SERPL-SCNC: 22 MMOL/L — SIGNIFICANT CHANGE UP (ref 22–31)
CREAT SERPL-MCNC: 2 MG/DL — HIGH (ref 0.5–1.3)
EGFR: 32 ML/MIN/1.73M2 — LOW
EGFR: 32 ML/MIN/1.73M2 — LOW
EOSINOPHIL # BLD AUTO: 0.18 K/UL — SIGNIFICANT CHANGE UP (ref 0–0.5)
EOSINOPHIL NFR BLD AUTO: 1.9 % — SIGNIFICANT CHANGE UP (ref 0–6)
GLUCOSE BLDC GLUCOMTR-MCNC: 146 MG/DL — HIGH (ref 70–99)
GLUCOSE BLDC GLUCOMTR-MCNC: 156 MG/DL — HIGH (ref 70–99)
GLUCOSE BLDC GLUCOMTR-MCNC: 189 MG/DL — HIGH (ref 70–99)
GLUCOSE BLDC GLUCOMTR-MCNC: 259 MG/DL — HIGH (ref 70–99)
GLUCOSE SERPL-MCNC: 161 MG/DL — HIGH (ref 70–99)
HCT VFR BLD CALC: 44.7 % — SIGNIFICANT CHANGE UP (ref 39–50)
HGB BLD-MCNC: 14.6 G/DL — SIGNIFICANT CHANGE UP (ref 13–17)
IMM GRANULOCYTES NFR BLD AUTO: 0.9 % — SIGNIFICANT CHANGE UP (ref 0–0.9)
LYMPHOCYTES # BLD AUTO: 1.54 K/UL — SIGNIFICANT CHANGE UP (ref 1–3.3)
LYMPHOCYTES # BLD AUTO: 16.5 % — SIGNIFICANT CHANGE UP (ref 13–44)
MAGNESIUM SERPL-MCNC: 2.9 MG/DL — HIGH (ref 1.6–2.6)
MCHC RBC-ENTMCNC: 32.1 PG — SIGNIFICANT CHANGE UP (ref 27–34)
MCHC RBC-ENTMCNC: 32.7 G/DL — SIGNIFICANT CHANGE UP (ref 32–36)
MCV RBC AUTO: 98.2 FL — SIGNIFICANT CHANGE UP (ref 80–100)
MONOCYTES # BLD AUTO: 1.03 K/UL — HIGH (ref 0–0.9)
MONOCYTES NFR BLD AUTO: 11 % — SIGNIFICANT CHANGE UP (ref 2–14)
NEUTROPHILS # BLD AUTO: 6.48 K/UL — SIGNIFICANT CHANGE UP (ref 1.8–7.4)
NEUTROPHILS NFR BLD AUTO: 69.5 % — SIGNIFICANT CHANGE UP (ref 43–77)
NRBC BLD AUTO-RTO: 0 /100 WBCS — SIGNIFICANT CHANGE UP (ref 0–0)
PHOSPHATE SERPL-MCNC: 3.4 MG/DL — SIGNIFICANT CHANGE UP (ref 2.5–4.5)
PLATELET # BLD AUTO: 127 K/UL — LOW (ref 150–400)
POTASSIUM SERPL-MCNC: 4.4 MMOL/L — SIGNIFICANT CHANGE UP (ref 3.5–5.3)
POTASSIUM SERPL-SCNC: 4.4 MMOL/L — SIGNIFICANT CHANGE UP (ref 3.5–5.3)
RBC # BLD: 4.55 M/UL — SIGNIFICANT CHANGE UP (ref 4.2–5.8)
RBC # FLD: 15.9 % — HIGH (ref 10.3–14.5)
SODIUM SERPL-SCNC: 146 MMOL/L — HIGH (ref 135–145)
WBC # BLD: 9.33 K/UL — SIGNIFICANT CHANGE UP (ref 3.8–10.5)
WBC # FLD AUTO: 9.33 K/UL — SIGNIFICANT CHANGE UP (ref 3.8–10.5)

## 2025-04-29 PROCEDURE — 99233 SBSQ HOSP IP/OBS HIGH 50: CPT | Mod: GC

## 2025-04-29 RX ORDER — METOPROLOL SUCCINATE 50 MG/1
1 TABLET, EXTENDED RELEASE ORAL
Qty: 0 | Refills: 0 | DISCHARGE
Start: 2025-04-29

## 2025-04-29 RX ORDER — OLANZAPINE 10 MG/1
2.5 TABLET ORAL ONCE
Refills: 0 | Status: COMPLETED | OUTPATIENT
Start: 2025-04-29 | End: 2025-04-29

## 2025-04-29 RX ORDER — SODIUM CHLORIDE 9 G/1000ML
1000 INJECTION, SOLUTION INTRAVENOUS
Refills: 0 | Status: DISCONTINUED | OUTPATIENT
Start: 2025-04-29 | End: 2025-05-01

## 2025-04-29 RX ORDER — ALPRAZOLAM 0.5 MG
0.25 TABLET, EXTENDED RELEASE 24 HR ORAL DAILY
Refills: 0 | Status: DISCONTINUED | OUTPATIENT
Start: 2025-04-29 | End: 2025-05-01

## 2025-04-29 RX ORDER — BUSPIRONE HYDROCHLORIDE 15 MG/1
1 TABLET ORAL
Qty: 0 | Refills: 0 | DISCHARGE
Start: 2025-04-29

## 2025-04-29 RX ORDER — APIXABAN 2.5 MG/1
1 TABLET, FILM COATED ORAL
Qty: 0 | Refills: 0 | DISCHARGE
Start: 2025-04-29

## 2025-04-29 RX ORDER — ASPIRIN 325 MG
1 TABLET ORAL
Qty: 0 | Refills: 0 | DISCHARGE
Start: 2025-04-29

## 2025-04-29 RX ORDER — LORAZEPAM 4 MG/ML
0.25 VIAL (ML) INJECTION ONCE
Refills: 0 | Status: DISCONTINUED | OUTPATIENT
Start: 2025-04-29 | End: 2025-04-29

## 2025-04-29 RX ORDER — FUROSEMIDE 10 MG/ML
40 INJECTION INTRAMUSCULAR; INTRAVENOUS ONCE
Refills: 0 | Status: COMPLETED | OUTPATIENT
Start: 2025-04-29 | End: 2025-04-29

## 2025-04-29 RX ADMIN — Medication 25 GRAM(S): at 17:40

## 2025-04-29 RX ADMIN — APIXABAN 2.5 MILLIGRAM(S): 2.5 TABLET, FILM COATED ORAL at 06:18

## 2025-04-29 RX ADMIN — APIXABAN 2.5 MILLIGRAM(S): 2.5 TABLET, FILM COATED ORAL at 17:40

## 2025-04-29 RX ADMIN — Medication 40 MILLIGRAM(S): at 12:29

## 2025-04-29 RX ADMIN — SODIUM CHLORIDE 75 MILLILITER(S): 9 INJECTION, SOLUTION INTRAVENOUS at 13:31

## 2025-04-29 RX ADMIN — TAMSULOSIN HYDROCHLORIDE 0.8 MILLIGRAM(S): 0.4 CAPSULE ORAL at 21:06

## 2025-04-29 RX ADMIN — FUROSEMIDE 40 MILLIGRAM(S): 10 INJECTION INTRAMUSCULAR; INTRAVENOUS at 12:29

## 2025-04-29 RX ADMIN — BUSPIRONE HYDROCHLORIDE 5 MILLIGRAM(S): 15 TABLET ORAL at 06:18

## 2025-04-29 RX ADMIN — METOPROLOL SUCCINATE 25 MILLIGRAM(S): 50 TABLET, EXTENDED RELEASE ORAL at 06:17

## 2025-04-29 RX ADMIN — INSULIN LISPRO 3: 100 INJECTION, SOLUTION INTRAVENOUS; SUBCUTANEOUS at 12:29

## 2025-04-29 RX ADMIN — BUSPIRONE HYDROCHLORIDE 5 MILLIGRAM(S): 15 TABLET ORAL at 17:40

## 2025-04-29 RX ADMIN — INSULIN LISPRO 1: 100 INJECTION, SOLUTION INTRAVENOUS; SUBCUTANEOUS at 17:40

## 2025-04-29 RX ADMIN — Medication 81 MILLIGRAM(S): at 12:29

## 2025-04-29 RX ADMIN — INSULIN LISPRO 1: 100 INJECTION, SOLUTION INTRAVENOUS; SUBCUTANEOUS at 08:33

## 2025-04-29 RX ADMIN — Medication 0.25 MILLIGRAM(S): at 21:06

## 2025-04-29 NOTE — SOCIAL WORK PROGRESS NOTE - NSSWPROGRESSNOTE_GEN_ALL_CORE
LINDSEY submitted [facility] referrals to requested facilities by pt's daughter Muriel, [facility] and [facility]. Both facilities are accepting, but pt will need to be on 5L O2 or less, pt is currently on 6L. LINDSEY made MD aware. LINDSEY spoke to pt's daughter Muriel about update and potential discharge, she is requesting Thursday discharge due to needing to take off work. LINDSEY also informed MD of request. LINDSEY will continue to follow for [facility] choice.

## 2025-04-29 NOTE — PROGRESS NOTE ADULT - SUBJECTIVE AND OBJECTIVE BOX
Date/Time Patient Seen:  		  Referring MD:   Data Reviewed	       Patient is a 89y old  Male who presents with a chief complaint of SOB (28 Apr 2025 16:35)      Subjective/HPI     PAST MEDICAL & SURGICAL HISTORY:  NICM (nonischemic cardiomyopathy)    HTN (hypertension)    HLD (hyperlipidemia)    Prostate CA    T2DM (type 2 diabetes mellitus)    CHF (congestive heart failure)    H/O prostate biopsy    H/O cataract extraction    H/O inguinal hernia repair    AICD (automatic cardioverter/defibrillator) present          Medication list         MEDICATIONS  (STANDING):  apixaban 2.5 milliGRAM(s) Oral two times a day  aspirin  chewable 81 milliGRAM(s) Oral daily  busPIRone 5 milliGRAM(s) Oral two times a day  dextrose 5%. 1000 milliLiter(s) (50 mL/Hr) IV Continuous <Continuous>  dextrose 5%. 1000 milliLiter(s) (100 mL/Hr) IV Continuous <Continuous>  dextrose 5%. 1000 milliLiter(s) (50 mL/Hr) IV Continuous <Continuous>  dextrose 5%. 1000 milliLiter(s) (84 mL/Hr) IV Continuous <Continuous>  dextrose 50% Injectable 25 Gram(s) IV Push once  dextrose 50% Injectable 12.5 Gram(s) IV Push once  dextrose 50% Injectable 25 Gram(s) IV Push once  glucagon  Injectable 1 milliGRAM(s) IntraMuscular once  insulin lispro (ADMELOG) corrective regimen sliding scale   SubCutaneous three times a day before meals  insulin lispro (ADMELOG) corrective regimen sliding scale   SubCutaneous at bedtime  metoprolol tartrate 25 milliGRAM(s) Oral two times a day  OLANZapine Injectable 2.5 milliGRAM(s) IntraMuscular once  pantoprazole   Suspension 40 milliGRAM(s) Oral daily  piperacillin/tazobactam IVPB.. 3.375 Gram(s) IV Intermittent every 12 hours  tamsulosin 0.8 milliGRAM(s) Oral at bedtime    MEDICATIONS  (PRN):  dextrose Oral Gel 15 Gram(s) Oral once PRN Blood Glucose LESS THAN 70 milliGRAM(s)/deciliter         Vitals log        ICU Vital Signs Last 24 Hrs  T(C): 36.3 (29 Apr 2025 04:47), Max: 36.4 (28 Apr 2025 21:24)  T(F): 97.3 (29 Apr 2025 04:47), Max: 97.6 (28 Apr 2025 21:24)  HR: 77 (29 Apr 2025 04:47) (72 - 88)  BP: 128/83 (29 Apr 2025 04:47) (105/66 - 128/83)  BP(mean): --  ABP: --  ABP(mean): --  RR: 18 (29 Apr 2025 04:47) (18 - 22)  SpO2: 98% (29 Apr 2025 04:47) (94% - 98%)    O2 Parameters below as of 29 Apr 2025 04:47  Patient On (Oxygen Delivery Method): BiPAP/CPAP                 Input and Output:  I&O's Detail    27 Apr 2025 07:01  -  28 Apr 2025 07:00  --------------------------------------------------------  IN:    Heparin Infusion: 18 mL    IV PiggyBack: 50 mL    Oral Fluid: 240 mL  Total IN: 308 mL    OUT:    Indwelling Catheter - Urethral (mL): 1100 mL  Total OUT: 1100 mL    Total NET: -792 mL          Lab Data                        15.1   9.46  )-----------( 121      ( 28 Apr 2025 08:00 )             46.6     04-28    149[H]  |  117[H]  |  81[H]  ----------------------------<  188[H]  4.5   |  25  |  2.60[H]    Ca    9.0      28 Apr 2025 08:00  Phos  3.7     04-28  Mg     3.1     04-28    TPro  6.8  /  Alb  3.6  /  TBili  1.7[H]  /  DBili  x   /  AST  188[H]  /  ALT  854[H]  /  AlkPhos  97  04-28    ABG - ( 27 Apr 2025 21:48 )  pH, Arterial: 7.41  pH, Blood: x     /  pCO2: 33    /  pO2: 118   / HCO3: 21    / Base Excess: -3.7  /  SaO2: 99.4                    Review of Systems	      Objective     Physical Examination    heart s1s2  lung dc bs  head nc  head at      Pertinent Lab findings & Imaging      Amberly:  NO   Adequate UO     I&O's Detail    27 Apr 2025 07:01  -  28 Apr 2025 07:00  --------------------------------------------------------  IN:    Heparin Infusion: 18 mL    IV PiggyBack: 50 mL    Oral Fluid: 240 mL  Total IN: 308 mL    OUT:    Indwelling Catheter - Urethral (mL): 1100 mL  Total OUT: 1100 mL    Total NET: -792 mL               Discussed with:     Cultures:	        Radiology

## 2025-04-29 NOTE — PROGRESS NOTE ADULT - PROBLEM SELECTOR PLAN 3
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   -Nephro consulted dr solorio-

## 2025-04-29 NOTE — PROGRESS NOTE ADULT - PROBLEM SELECTOR PLAN 2
New onset afib with RVR  -RTT called on 4/21 due to vtach and afib with RVR, patient found to be SOB with flash pulmonary edema and hypotensive; transferred to ICU and s/p  amiodarone  and  s/p  levophed drip  - s/p   amio gtt , heparin drip - metoprolol 25 mg po bid - hr stable , on Eliquis .  - now off  levophed -  transfer from   ICU to tele

## 2025-04-29 NOTE — PROGRESS NOTE ADULT - TIME BILLING
pt seen and examine today  see above plan - Acute  hypoxic resp failure    acute pulmonary edema   sec to  Acute on chronic systolic CHF exacerbation  New Afib RVR/NSVT/pulm edema ,s/p VT/afib   s/p  amiodarone gtt   off defibrillator /Precedex weaning  , s/p levophed gtt/heparin gtt      now worsening transaminitis possible sec chf / passive congestions  -  s/p Hida - : Normal hepatobiliary scan. No radionuclide evidence of acute cholecystitis-    ruled out acute  cholecystitis  , still on      iv abx zosyn   for  possible pna     .
pt seen and examine today  see above plan - Acute  hypoxic resp failure    acute pulmonary edema   sec to  Acute on chronic systolic CHF exacerbation  New Afib RVR/NSVT/pulm edema ,s/p VT/afib   s/p  amiodarone gtt   off defibrillator /Precedex weaning  , s/p levophed gtt/heparin gtt      now worsening transaminitis  - need Hida   on iv abx zosyn .
Reviewed with patient in detail, ICU and GI team, Surgical Residents and PA's as well as today's RN.
pt seen and examine today  see above plan - Acute  hypoxic resp failure    acute pulmonary edema   sec to  Acute on chronic systolic CHF exacerbation  New Afib RVR/NSVT/pulm edema ,s/p VT/afib   s/p  amiodarone gtt   off defibrillator /Precedex weaning  , s/p levophed gtt/heparin gtt      now worsening transaminitis possible sec chf / passive congestions  off bipap   -  s/p Hida -  Normal hepatobiliary scan. No radionuclide evidence of acute cholecystitis-    ruled out acute  cholecystitis      -     for  possible pna  iv abx zosyn  5/5 last day   today    blood cult neg   , wbc wnl    , dc Gaming voiding trial      daughter  pamella aware tt /plan   .
coordination of medical plan
pt seen and examine today  see above plan - Acute  hypoxic resp failure    acute pulmonary edema   sec to  Acute on chronic systolic CHF exacerbation  New Afib RVR/NSVT/pulm edema ,s/p VT/afib   s/p  amiodarone gtt   off defibrillator /Precedex weaning  , s/p levophed gtt/heparin gtt      now worsening transaminitis possible sec chf / passive congestions  -  s/p Hida - : Normal hepatobiliary scan. No radionuclide evidence of acute cholecystitis-  so dc     iv abx zosyn  ruled out acute  cholecystitis   .
pt seen and examine today  see above plan - Acute  hypoxic resp failure  sec to  Acute on chronic systolic CHF exacerbation     New Afib RVR/NSVT/pulm edema ,s/p VT/afib and now on amiodarone gtt/precedex gtt/levophed gtt/heparin gtt.
pt seen and examine today  see above plan - Acute  hypoxic resp failure    acute pulmonary edema   sec to  Acute on chronic systolic CHF exacerbation  New Afib RVR/NSVT/pulm edema ,s/p VT/afib and now on amiodarone gtt   off defibrillator /Precedex weaning    gtt/levophed gtt/heparin gtt. icu care .
pt seen and examine today  see above plan - Acute  hypoxic resp failure    acute pulmonary edema   sec to  Acute on chronic systolic CHF exacerbation  New Afib RVR/NSVT/pulm edema ,s/p VT/afib   s/p  amiodarone gtt   off defibrillator /Precedex weaning  , s/p levophed gtt/heparin gtt      now worsening transaminitis possible sec chf / passive congestions  -  s/p Hida - : Normal hepatobiliary scan. No radionuclide evidence of acute cholecystitis-    ruled out acute  cholecystitis      -   iv abx zosyn  4/5 days    for  possible pna  blood cult neg   , wbc wnl       .

## 2025-04-29 NOTE — PROGRESS NOTE ADULT - SUBJECTIVE AND OBJECTIVE BOX
[facility]  Uriel Onofre PA-C  [address] [place], [place] [id number]  [phone]      INTERVAL HPI/OVERNIGHT EVENTS:  Pt s/e  Tolerating diet  No new GI events    MEDICATIONS  (STANDING):  apixaban 2.5 milliGRAM(s) Oral two times a day  aspirin  chewable 81 milliGRAM(s) Oral daily  busPIRone 5 milliGRAM(s) Oral two times a day  dextrose 5%. 1000 milliLiter(s) (50 mL/Hr) IV Continuous <Continuous>  dextrose 5%. 1000 milliLiter(s) (100 mL/Hr) IV Continuous <Continuous>  dextrose 5%. 1000 milliLiter(s) (50 mL/Hr) IV Continuous <Continuous>  dextrose 5%. 1000 milliLiter(s) (84 mL/Hr) IV Continuous <Continuous>  dextrose 50% Injectable 25 Gram(s) IV Push once  dextrose 50% Injectable 12.5 Gram(s) IV Push once  dextrose 50% Injectable 25 Gram(s) IV Push once  furosemide   Injectable 40 milliGRAM(s) IV Push once  glucagon  Injectable 1 milliGRAM(s) IntraMuscular once  insulin lispro (ADMELOG) corrective regimen sliding scale   SubCutaneous three times a day before meals  insulin lispro (ADMELOG) corrective regimen sliding scale   SubCutaneous at bedtime  metoprolol tartrate 25 milliGRAM(s) Oral two times a day  OLANZapine Injectable 2.5 milliGRAM(s) IntraMuscular once  pantoprazole   Suspension 40 milliGRAM(s) Oral daily  piperacillin/tazobactam IVPB.. 3.375 Gram(s) IV Intermittent every 12 hours  tamsulosin 0.8 milliGRAM(s) Oral at bedtime    MEDICATIONS  (PRN):  ALPRAZolam 0.25 milliGRAM(s) Oral daily PRN anxitey  dextrose Oral Gel 15 Gram(s) Oral once PRN Blood Glucose LESS THAN 70 milliGRAM(s)/deciliter      Allergies    No Known Allergies      PHYSICAL EXAM:   Vital Signs:  Vital Signs Last 24 Hrs  T(C): 36.3 (2025 04:47), Max: 36.4 (2025 21:24)  T(F): 97.3 (2025 04:47), Max: 97.6 (2025 21:24)  HR: 77 (2025 04:47) (72 - 86)  BP: 128/83 (2025 04:47) (105/66 - 128/83)  BP(mean): --  RR: 18 (2025 04:47) (18 - 22)  SpO2: 98% (2025 04:47) (94% - 98%)    Parameters below as of 2025 04:47  Patient On (Oxygen Delivery Method): BiPAP/CPAP      Daily     Daily Weight in k (2025 04:47)    GENERAL:  Appears stated age  HEENT:  NC/AT  CHEST:  Full & symmetric excursion  HEART:  Regular rhythm  ABDOMEN:  Soft, non-tender, non-distended  EXTEREMITIES:  no cyanosis  SKIN:  No rash  NEURO:  Alert      LABS:                        14.6   9.33  )-----------( 127      ( 2025 06:55 )             44.7     04    146[H]  |  116[H]  |  73[H]  ----------------------------<  161[H]  4.4   |  22  |  2.00[H]    Ca    9.1      2025 06:55  Phos  3.4       Mg     2.9         TPro  6.8  /  Alb  3.6  /  TBili  1.7[H]  /  DBili  x   /  AST  188[H]  /  ALT  854[H]  /  AlkPhos  97  04-28    PTT - ( 2025 08:00 )  PTT:80.3 sec  Urinalysis Basic - ( 2025 06:55 )    Color: x / Appearance: x / SG: x / pH: x  Gluc: 161 mg/dL / Ketone: x  / Bili: x / Urobili: x   Blood: x / Protein: x / Nitrite: x   Leuk Esterase: x / RBC: x / WBC x   Sq Epi: x / Non Sq Epi: x / Bacteria: x

## 2025-04-29 NOTE — PROGRESS NOTE ADULT - SUBJECTIVE AND OBJECTIVE BOX
EP Attending  HISTORY OF PRESENT ILLNESS: HPI:  88 y/o male with PMHx of CHF, HLD, HTN, prostate ca (s/p radioactive seed implantation in 2015) T2DM, PPM presents to ER c/o SOB. Pt states that he has had intermittent SOB for the past few days, Pt notes he was visiting his wife today at rehab and felt SOB as he was leaving. He sat down to catch his breath, a staff member noticed he did not look well and they decided to call EMS. Pt notes he has had SOB both at rest and with exertion. Pt denies chest pain, fever, cough. Of note, pt was admitted 1 month ago for CHF exacerbation and also 1 week ago at [facility]. Pt adds a bunch of his medications were changed including his Entresto was dc'ed, Farxiga dosage was doubled and his Lasix was switched to Torsemide. Pt currently on NC and denies any SOB. Pt denies fever, chills, chest pain, palpitations, abdominal pain, nausea, vomiting, diarrhea.    ED Course:   Vitals: BP: 135/73, HR: 95 , Temp: 98.2F , RR: 18 , SpO2: 94% on 6LNC  Labs: BUN/Cr 56/2.1, Glucose 125, eGFR 30, Troponin 3051, pro-BNP 64868  CXR: official read pending   EKG: NSR with frequent PVC's, 90 BPM, QTc 518  Received in the ED:  Aspirin 324mg PO x1, Lasix 40mg IVP x1 (21 Apr 2025 03:55)    While hospitalized re: acute on chronic systolic heart failure, he developed rapid VT, and has since been transferred to the ICU.  VT has quelled down with IV amiodarone, but he's been agitated in bed. Required precedex to sedate, then NorEpinephrine infusion to maintain blood pressure after sedation made him hypotensive.  Too sedated this morning to participate in any history/ROS, only opens his eyes for a brief moment.  He's reportedly been hospitalized at [facility] and at [facility] in the last few weeks/months with CHF exacerbations.  He has a dual-coil, VVI (single chamber) Abbott ICD.  This will be interrogated by the EP lab.  He has history of a dilated nonischemic cardiomyopathy, and baseline LBBB ~145-150ms.  There is no known prior history of AFib.  4/23- remains somnolent, on CPAP mask during daylight hours.  no overnight issues. short bursts of NSVT on telemetry. wide QRS rhythm at baseline.  made DNR by family.  Date of service 4/29- out of ICU, resting on tele floor.  no significant V-pacing on telemetry.  does not want any invasive procedures done / requests medical management only    PAST MEDICAL & SURGICAL HISTORY:  NICM (nonischemic cardiomyopathy)  HTN (hypertension)  HLD (hyperlipidemia)  Prostate CA  T2DM (type 2 diabetes mellitus)  CHF (congestive heart failure)  H/O prostate biopsy  H/O cataract extraction  H/O inguinal hernia repair  AICD (automatic cardioverter/defibrillator) present    ALPRAZolam 0.25 milliGRAM(s) Oral daily PRN  apixaban 2.5 milliGRAM(s) Oral two times a day  aspirin  chewable 81 milliGRAM(s) Oral daily  busPIRone 5 milliGRAM(s) Oral two times a day  dextrose 5%. 1000 milliLiter(s) IV Continuous <Continuous>  dextrose 5%. 1000 milliLiter(s) IV Continuous <Continuous>  dextrose 5%. 1000 milliLiter(s) IV Continuous <Continuous>  dextrose 5%. 1000 milliLiter(s) IV Continuous <Continuous>  dextrose 50% Injectable 25 Gram(s) IV Push once  dextrose 50% Injectable 12.5 Gram(s) IV Push once  dextrose 50% Injectable 25 Gram(s) IV Push once  dextrose Oral Gel 15 Gram(s) Oral once PRN  furosemide   Injectable 40 milliGRAM(s) IV Push once  glucagon  Injectable 1 milliGRAM(s) IntraMuscular once  insulin lispro (ADMELOG) corrective regimen sliding scale   SubCutaneous three times a day before meals  insulin lispro (ADMELOG) corrective regimen sliding scale   SubCutaneous at bedtime  metoprolol tartrate 25 milliGRAM(s) Oral two times a day  OLANZapine Injectable 2.5 milliGRAM(s) IntraMuscular once  pantoprazole   Suspension 40 milliGRAM(s) Oral daily  piperacillin/tazobactam IVPB.. 3.375 Gram(s) IV Intermittent every 12 hours  tamsulosin 0.8 milliGRAM(s) Oral at bedtime                            14.6   9.33  )-----------( 127      ( 29 Apr 2025 06:55 )             44.7       04-29    146[H]  |  116[H]  |  73[H]  ----------------------------<  161[H]  4.4   |  22  |  2.00[H]    Ca    9.1      29 Apr 2025 06:55  Phos  3.4     04-29  Mg     2.9     04-29    TPro  6.8  /  Alb  3.6  /  TBili  1.7[H]  /  DBili  x   /  AST  188[H]  /  ALT  854[H]  /  AlkPhos  97  04-28            T(C): 36.3 (04-29-25 @ 04:47), Max: 36.4 (04-28-25 @ 21:24)  HR: 77 (04-29-25 @ 04:47) (72 - 86)  BP: 128/83 (04-29-25 @ 04:47) (105/66 - 128/83)  RR: 18 (04-29-25 @ 04:47) (18 - 22)  SpO2: 97% (04-29-25 @ 09:00) (94% - 98%)  Wt(kg): --    I&O's Summary    28 Apr 2025 07:01  -  29 Apr 2025 07:00  --------------------------------------------------------  IN: 0 mL / OUT: 1000 mL / NET: -1000 mL        Appearance: elderly man, somnolent, on BIPAP.  HEENT:   Normal oral mucosa, PERRL, EOMI	  Lymphatic: No lymphadenopathy , no edema  Cardiovascular: Normal S1 S2, elevated jugular venous pressure, No murmurs , Peripheral pulses palpable 2+ bilaterally, ICD left upper chest.  Respiratory: Lungs clear to auscultation, normal effort 	  Gastrointestinal:  Soft, Non-tender, + BS	  Skin: No rashes, No ecchymoses, No cyanosis, warm to touch  Musculoskeletal: Normal range of motion, normal strength  Psychiatry:  Mood & affect appropriate    TELEMETRY: NSR with APCs, LBBB native QRS, some ventricular premature beats and NSVT.  Telemetry strips of sustained ventricular tachycardia, two morphologies, RBBB in V1.	    ECG:  Sustained VT, 187bpm.	  device check per EP lab:  episodes of sustained VT, some in 'monitor zone', one in VF zone that slowed after anti-tachycardia pacing but did not terminate.  Echo:  < from: TTE W or WO Ultrasound Enhancing Agent (03.20.25 @ 19:47) >  CONCLUSIONS:      1. Left ventricular cavity is severely dilated. Left ventricular wall thickness is mildly increased. Left ventricular systolic function is severely decreased with an ejection fraction visually estimated at 20 to 25 %.   2. Left atrium is moderately dilated.   3. Moderate mitral regurgitation.   4. There is no evidence of a left ventricular thrombus.    < end of copied text >  ** on apical echo views, the RV lead appears reach at least the distal RV cavity)    CXR:  Left upper chest ICD generator, dual coil / single lead system terminating in inferior RV.  Difficult to gauge if it is reaching the apex due to dilated cardiac contours.      ASSESSMENT/PLAN: Mr Dumas is an	89y Male brought in with sudden onset weakness, fatigue and shortness of breath while visiting his spouse who is at rehab.  He's been demonstrating nonsustained VT on telemetry, until ultimately having a sustained VT episode which resulted in transfer to ICU and amiodarone infusion.  He has a chronic dilated nonischemic cardiomyopathy... hard to justify if he is really in exacerbation right now, as there is no edema or hepatomegaly. His limbs are relatively warm.  He is on vasopressor support to balance the vasodilatory effect of sedatives, given for sundowning/agitation last night.  Awaiting improvement in mental status to reattempt an HPI with him.    1) replace K to >4, maintain Mg >2.  2) continue amiodarone 400mg daily  3) ICD tachy-therapy turned off.  DNR/DNI.  4) patient does not want any invasive procedures.  medical mgmt only / no icd upgrade to manage LBBB related CHF symptoms.      Maico King M.D.  Cardiac Electrophysiology    office [phone]  pager [phone]

## 2025-04-29 NOTE — PROGRESS NOTE ADULT - ASSESSMENT
88 y/o male with PMHx of CHF, HLD, HTN, prostate ca (s/p radioactive seed implantation in 2015) T2DM, PPM presents to ER c/o SOB. Pt states that he has had intermittent SOB    systolic HF  OP  OA  Atelectasis  Pleural Eff  Dyspnea  eval ACS  HTN  HLD  hx of Prostate Ca  DM  PPM    dnr dni  niv use  diuresis as per cardio and renal  I and O  prognosis guarded  plan for MYRA - SW follow up reviewed    fio2 wean  I apple  goal sat > 88 pct  trend Trops  eval ACS  Cardio eval  tele monitoring  replete lytes  I and O  cvs rx regimen optimization  TTE reviewed from prior visits - EF 20 pct  pleural eff - atelectasis - I apple - no indication for pleurocentesis at present  DM care - serial FS  GOC discussion   88 y/o male with PMHx of CHF, HLD, HTN, prostate ca (s/p radioactive seed implantation in 2015) T2DM, PPM presents to ER c/o SOB. Pt states that he has had intermittent SOB    systolic HF  OP  OA  Atelectasis  Pleural Eff  Dyspnea  eval ACS  HTN  HLD  hx of Prostate Ca  DM  PPM    dnr dni  niv used last night - now on nc o2 support - alert - verbal -   diuresis as per cardio and renal  I and O  prognosis guarded  plan for MYRA - SW follow up reviewed    fio2 wean  I apple  goal sat > 88 pct  trend Trops  eval ACS  Cardio eval  tele monitoring  replete lytes  I and O  cvs rx regimen optimization  TTE reviewed from prior visits - EF 20 pct  pleural eff - atelectasis - I apple - no indication for pleurocentesis at present  DM care - serial FS  GOC discussion

## 2025-04-29 NOTE — PROGRESS NOTE ADULT - PROBLEM SELECTOR PLAN 1
acute on chronic HFrEF exacerbation   cause of acute hypoxic resp failure  with acute pulmonary edema   Pt with hx of chronic systolic congestive heart failure last admitted for CHF exacerbation in March to [facility] and [facility] last week-Pt with inc SOB at rest and on exertion   -CXR with pulmonary congestion and b/l pleural effusions  -Troponin 3051, 3543, pro-BNP 07810  -EKG: NSR with frequent PVC's, 90 BPM, QTc 518  -Vitals: BP: 135/73, HR: 95 , Temp: 98.2F , RR: 18 , SpO2: 94% on 6LNC  -s/p bipap -s/p 40mg Lasix IVP in ED  -Previous TTE from March with severely reduced LV systolic function (EF 20-25%), mod MR  - Strict I&Os, daily weights, fluid restriction  - Remote tele, continuous pulse ox   -Pt recently switched from Lasix to Torsemide per SF discharge but is unsure of dosage   -C/w Lasix 40mg IVP bid -   xray chest pvc    - s/p lasix 40 mg  as needed   with fu cr  and sodium closely - one dose today given  -    -- PPM interrogation inconclusive, defibrillation function turned off  -Cardio consulted, Dr. Boothe,   -ICU downgrade to  tele

## 2025-04-29 NOTE — PROGRESS NOTE ADULT - SUBJECTIVE AND OBJECTIVE BOX
NEPHROLOGY INTERVAL HPI/OVERNIGHT EVENTS:  HPI:  89 y/o male with PMHx of CHF, HLD, HTN, prostate ca (s/p radioactive seed implantation in 2015) T2DM, PPM presents to ER c/o SOB. Pt states that he has had intermittent SOB for the past few days, Pt notes he was visiting his wife today at rehab and felt SOB as he was leaving. He sat down to catch his breath, a staff member noticed he did not look well and they decided to call EMS. Pt notes he has had SOB both at rest and with exertion. Pt denies chest pain, fever, cough. Of note, pt was admitted 1 month ago for CHF exacerbation and also 1 week ago at [facility]. Pt adds a bunch of his medications were changed including his Entresto was dc'ed, Farxiga dosage was doubled and his Lasix was switched to Torsemide. Pt currently on NC and denies any SOB. Pt denies fever, chills, chest pain, palpitations, abdominal pain, nausea, vomiting, diarrhea.    No acute events noted        PAST MEDICAL & SURGICAL HISTORY:  NICM (nonischemic cardiomyopathy)      HTN (hypertension)      HLD (hyperlipidemia)      Prostate CA      T2DM (type 2 diabetes mellitus)      CHF (congestive heart failure)      H/O prostate biopsy      H/O cataract extraction      H/O inguinal hernia repair      AICD (automatic cardioverter/defibrillator) present          MEDICATIONS  (STANDING):  albumin human 25% IVPB 100 milliLiter(s) IV Intermittent every 6 hours  aspirin Suppository 300 milliGRAM(s) Rectal daily  chlorhexidine 2% Cloths 1 Application(s) Topical <User Schedule>  dexMEDEtomidine Infusion 0.2 MICROgram(s)/kG/Hr (4.04 mL/Hr) IV Continuous <Continuous>  dextrose 5%. 1000 milliLiter(s) (50 mL/Hr) IV Continuous <Continuous>  dextrose 5%. 1000 milliLiter(s) (100 mL/Hr) IV Continuous <Continuous>  dextrose 5%. 1000 milliLiter(s) (50 mL/Hr) IV Continuous <Continuous>  dextrose 50% Injectable 25 Gram(s) IV Push once  dextrose 50% Injectable 12.5 Gram(s) IV Push once  dextrose 50% Injectable 25 Gram(s) IV Push once  glucagon  Injectable 1 milliGRAM(s) IntraMuscular once  heparin  Infusion.  Unit(s)/Hr (15 mL/Hr) IV Continuous <Continuous>  insulin lispro (ADMELOG) corrective regimen sliding scale   SubCutaneous every 6 hours  mupirocin 2% Nasal 1 Application(s) Both Nostrils two times a day  pantoprazole  Injectable 40 milliGRAM(s) IV Push daily  piperacillin/tazobactam IVPB.. 3.375 Gram(s) IV Intermittent every 12 hours  tamsulosin 0.8 milliGRAM(s) Oral at bedtime    MEDICATIONS  (PRN):  dextrose Oral Gel 15 Gram(s) Oral once PRN Blood Glucose LESS THAN 70 milliGRAM(s)/deciliter  heparin   Injectable 6500 Unit(s) IV Push every 6 hours PRN For aPTT less than 40  heparin   Injectable 3000 Unit(s) IV Push every 6 hours PRN For aPTT between 40 - 57      Allergies    No Known Allergies    Intolerances      Vital Signs Last 24 Hrs  T(C): 36.4 (29 Apr 2025 12:22), Max: 36.4 (28 Apr 2025 21:24)  T(F): 97.6 (29 Apr 2025 12:22), Max: 97.6 (28 Apr 2025 21:24)  HR: 85 (29 Apr 2025 12:22) (72 - 86)  BP: 110/76 (29 Apr 2025 12:22) (105/66 - 128/83)  BP(mean): --  RR: 18 (29 Apr 2025 12:22) (18 - 22)  SpO2: 93% (29 Apr 2025 12:22) (93% - 98%)    Parameters below as of 29 Apr 2025 12:22  Patient On (Oxygen Delivery Method): nasal cannula  O2 Flow (L/min): 4        PHYSICAL EXAM:    GENERAL: Elderly male, in no acute distress, ill appearing  HEAD:  Atraumatic, Normocephalic  NERVOUS SYSTEM:  Awake  CHEST/LUNG: Coarse BS bilaterally.  SKIN: No rashes or lesions    LABS:                                        14.6   9.33  )-----------( 127      ( 29 Apr 2025 06:55 )             44.7     04-29    146[H]  |  116[H]  |  73[H]  ----------------------------<  161[H]  4.4   |  22  |  2.00[H]    Ca    9.1      29 Apr 2025 06:55  Phos  3.4     04-29  Mg     2.9     04-29    TPro  6.8  /  Alb  3.6  /  TBili  1.7[H]  /  DBili  x   /  AST  188[H]  /  ALT  854[H]  /  AlkPhos  97  04-28    PTT - ( 28 Apr 2025 08:00 )  PTT:80.3 sec  Urinalysis Basic - ( 29 Apr 2025 06:55 )    Color: x / Appearance: x / SG: x / pH: x  Gluc: 161 mg/dL / Ketone: x  / Bili: x / Urobili: x   Blood: x / Protein: x / Nitrite: x   Leuk Esterase: x / RBC: x / WBC x   Sq Epi: x / Non Sq Epi: x / Bacteria: x        ABG - ( 27 Apr 2025 21:48 )  pH, Arterial: 7.41  pH, Blood: x     /  pCO2: 33    /  pO2: 118   / HCO3: 21    / Base Excess: -3.7  /  SaO2: 99.4

## 2025-04-29 NOTE — PROGRESS NOTE ADULT - PROBLEM SELECTOR PLAN 9
DVT PPx: now on Eliquis   panic attack / anxiety - xanax .25 ,mg po -  once day prn in home use  as per daughter .

## 2025-04-29 NOTE — PROGRESS NOTE ADULT - SUBJECTIVE AND OBJECTIVE BOX
Chief Complaint: Shortness of breath    Interval Events: No events overnight.    Review of Systems:  Unable to obtain.    Physical Exam:  Vital Signs Last 24 Hrs  T(C): 36.3 (29 Apr 2025 04:47), Max: 36.4 (28 Apr 2025 21:24)  T(F): 97.3 (29 Apr 2025 04:47), Max: 97.6 (28 Apr 2025 21:24)  HR: 77 (29 Apr 2025 04:47) (72 - 86)  BP: 128/83 (29 Apr 2025 04:47) (105/66 - 128/83)  BP(mean): --  RR: 18 (29 Apr 2025 04:47) (18 - 22)  SpO2: 98% (29 Apr 2025 04:47) (94% - 98%)  Parameters below as of 29 Apr 2025 04:47  Patient On (Oxygen Delivery Method): BiPAP/CPAP  General: NAD  HEENT: MMM  Neck: No JVD, no carotid bruit  Lungs: CTAB  CV: RRR, nl S1/S2, no M/R/G  Abdomen: S/NT/ND, +BS  Extremities: No LE edema, no cyanosis  Neuro: AAOx0  Skin: No rash    Labs:    04-29    146[H]  |  116[H]  |  73[H]  ----------------------------<  161[H]  4.4   |  22  |  2.00[H]    Ca    9.1      29 Apr 2025 06:55  Phos  3.4     04-29  Mg     2.9     04-29    TPro  6.8  /  Alb  3.6  /  TBili  1.7[H]  /  DBili  x   /  AST  188[H]  /  ALT  854[H]  /  AlkPhos  97  04-28                        14.6   9.33  )-----------( 127      ( 29 Apr 2025 06:55 )             44.7       ECG/Telemetry: Sinus rhythm, PVCs

## 2025-04-29 NOTE — PROGRESS NOTE ADULT - SUBJECTIVE AND OBJECTIVE BOX
Patient is a 89y old  Male who presents with a chief complaint of SOB (29 Apr 2025 06:03)    pt seen and examine today   c/o sob but  o2 nc 6 lit on 99 / taper o2 nc , no distress  , no cp.   INTERVAL HPI/OVERNIGHT EVENTS:     T(C): 36.3 (04-29-25 @ 04:47), Max: 36.4 (04-28-25 @ 21:24)  HR: 77 (04-29-25 @ 04:47) (72 - 86)  BP: 128/83 (04-29-25 @ 04:47) (105/66 - 128/83)  RR: 18 (04-29-25 @ 04:47) (18 - 22)  SpO2: 98% (04-29-25 @ 04:47) (94% - 98%)  Wt(kg): --  I&O's Summary    28 Apr 2025 07:01  -  29 Apr 2025 07:00  --------------------------------------------------------  IN: 0 mL / OUT: 1000 mL / NET: -1000 mL        REVIEW OF SYSTEMS:  CONSTITUTIONAL: No fever, weight loss, or fatigue  EYES: No eye pain, visual disturbances, or discharge  ENMT:  No difficulty hearing, tinnitus, vertigo; No sinus or throat pain  NECK: No pain or stiffness  RESPIRATORY: No cough, wheezing, chills ,  + shortness of breath  CARDIOVASCULAR: No chest pain, palpitations, dizziness, or leg swelling  GASTROINTESTINAL: No abdominal or epigastric pain. No nausea, vomiting, ; No diarrhea or constipation.   GENITOURINARY: No dysuria, frequency, hematuria, or incontinence  NEUROLOGICAL: No headaches, memory loss, loss of strength, numbness, or tremors  SKIN: No itching, burning, rashes, or lesions   MUSCULOSKELETAL: No joint pain or swelling; No muscle, back, or extremity pain    PHYSICAL EXAM:  GENERAL: NAD,    HEAD:  Atraumatic, Normocephalic  EYES: EOMI, PERRLA, conjunctiva and sclera clear  ENMT:  Moist mucous membranes  NECK: Supple, No JVD  NERVOUS SYSTEM:  Alert & Oriented X3; Motor Strength 5/5 B/L upper and lower extremities; DTRs 2+ intact and symmetric  CHEST/LUNG:  percussion bilaterally lower bases    rales, no  rhonchi,  no  wheezing,    HEART: Regular rate and rhythm; No murmurs   ABDOMEN: Soft, Nontender, Nondistended; Bowel sounds present  EXTREMITIES:  2+ Peripheral Pulses, No clubbing, cyanosis, or edema  SKIN: No rashes or lesions  gu  on ramos           MEDICATIONS  (STANDING):  apixaban 2.5 milliGRAM(s) Oral two times a day  aspirin  chewable 81 milliGRAM(s) Oral daily  busPIRone 5 milliGRAM(s) Oral two times a day  dextrose 5%. 1000 milliLiter(s) (50 mL/Hr) IV Continuous <Continuous>  dextrose 5%. 1000 milliLiter(s) (100 mL/Hr) IV Continuous <Continuous>  dextrose 5%. 1000 milliLiter(s) (50 mL/Hr) IV Continuous <Continuous>  dextrose 5%. 1000 milliLiter(s) (84 mL/Hr) IV Continuous <Continuous>  dextrose 50% Injectable 25 Gram(s) IV Push once  dextrose 50% Injectable 12.5 Gram(s) IV Push once  dextrose 50% Injectable 25 Gram(s) IV Push once  glucagon  Injectable 1 milliGRAM(s) IntraMuscular once  insulin lispro (ADMELOG) corrective regimen sliding scale   SubCutaneous three times a day before meals  insulin lispro (ADMELOG) corrective regimen sliding scale   SubCutaneous at bedtime  metoprolol tartrate 25 milliGRAM(s) Oral two times a day  OLANZapine Injectable 2.5 milliGRAM(s) IntraMuscular once  pantoprazole   Suspension 40 milliGRAM(s) Oral daily  piperacillin/tazobactam IVPB.. 3.375 Gram(s) IV Intermittent every 12 hours  tamsulosin 0.8 milliGRAM(s) Oral at bedtime    MEDICATIONS  (PRN):  dextrose Oral Gel 15 Gram(s) Oral once PRN Blood Glucose LESS THAN 70 milliGRAM(s)/deciliter      LABS:                        14.6   9.33  )-----------( 127      ( 29 Apr 2025 06:55 )             44.7     04-29    146[H]  |  116[H]  |  73[H]  ----------------------------<  161[H]  4.4   |  22  |  2.00[H]    Ca    9.1      29 Apr 2025 06:55  Phos  3.4     04-29  Mg     2.9     04-29    TPro  6.8  /  Alb  3.6  /  TBili  1.7[H]  /  DBili  x   /  AST  188[H]  /  ALT  854[H]  /  AlkPhos  97  04-28    PTT - ( 28 Apr 2025 08:00 )  PTT:80.3 sec  Urinalysis Basic - ( 29 Apr 2025 06:55 )    Color: x / Appearance: x / SG: x / pH: x  Gluc: 161 mg/dL / Ketone: x  / Bili: x / Urobili: x   Blood: x / Protein: x / Nitrite: x   Leuk Esterase: x / RBC: x / WBC x   Sq Epi: x / Non Sq Epi: x / Bacteria: x      CAPILLARY BLOOD GLUCOSE      POCT Blood Glucose.: 156 mg/dL (29 Apr 2025 07:43)  POCT Blood Glucose.: 167 mg/dL (28 Apr 2025 21:21)  POCT Blood Glucose.: 158 mg/dL (28 Apr 2025 16:56)  POCT Blood Glucose.: 166 mg/dL (28 Apr 2025 12:13)    ABG - ( 27 Apr 2025 21:48 )  pH, Arterial: 7.41  pH, Blood: x     /  pCO2: 33    /  pO2: 118   / HCO3: 21    / Base Excess: -3.7  /  SaO2: 99.4                      RADIOLOGY & ADDITIONAL TESTS:    Imaging Personally Reviewed:   no new test     Advance Directives:  dnr/ dni    Palliative Care:  Appropriate

## 2025-04-29 NOTE — PROGRESS NOTE ADULT - ASSESSMENT
HANNAH on CKD 3  CHF EF 20-25%  Dyspnea   HTN     -Baseline Creatinine 1.5  -HANNAH Likely 2/2 Decreased EABV, ATN  -Urine indices reviewed  -S/p Albumin 100mg q6hrs x2  -Monitor UOP. Improving   -Renal function is improving well  -Hypernatremia is improving s/p D5W; extend D5W for another 12 hrs  -No indication for RRT. Volume and lytes acceptable and with UOP will hold off for now  -Can continue lasix as needed

## 2025-04-29 NOTE — PROGRESS NOTE ADULT - PROBLEM SELECTOR PLAN 5
Chronic  -HOLD home Coreg 3.125 mg BID in setting of hypotension-s/p  levophed -  on  metoprolol 25 mg po bid -  bp stable

## 2025-04-29 NOTE — PROGRESS NOTE ADULT - ASSESSMENT
The patient is an 89 year old male with a history of HTN, HL, DM, CKD, chronic systolic heart failure s/p ICD prostate cancer who presents with shortness of breath in the setting of acute on chronic systolic heart failure.    Plan:  - ECG with sinus rhythm and LBBB  - Echo 3/20/25 with severely reduced LV systolic function (EF 20-25%), mod MR  - BNP 36984  - CXR with pulm congestion and small left pleural effusion  - Troponin elevated at 3785 in the setting of acute heart failure, HANNAH  - He had a cardiac catheterization in the past for work-up of his heart failure which showed mild non-obstructive CAD  - On metoprolol tartrate 25 mg bid  - Continue aspirin 81 mg daily  - Continue atorvastatin 80 mg daily  - Ventricular tachycardia - EP follow-up. Tachytherapies have been deactivated on the ICD in light of the patient's goals of care.  - Off of amiodarone due to elevated LFTs  - Atrial fibrillation - transition from heparin to apixaban 2.5 mg bid  - If no renal objection, start torsemide 20 mg PO daily

## 2025-04-29 NOTE — PROGRESS NOTE ADULT - PROBLEM SELECTOR PLAN 4
Pt with elevated troponin on admission to [facility]  -Per chart review pt had elevated troponin on last admission most likely 2/2 demand ischemia in setting of severe CHF exacerbation   - trend trops to trending down   -Currently no active CP, will monitor for anginal sx  -EKG with NSR with frequent PVC's, 90 BPM, QTc 518

## 2025-04-30 ENCOUNTER — TRANSCRIPTION ENCOUNTER (OUTPATIENT)
Age: 89
End: 2025-04-30

## 2025-04-30 LAB
ANION GAP SERPL CALC-SCNC: 9 MMOL/L — SIGNIFICANT CHANGE UP (ref 5–17)
BASOPHILS # BLD AUTO: 0.03 K/UL — SIGNIFICANT CHANGE UP (ref 0–0.2)
BASOPHILS NFR BLD AUTO: 0.3 % — SIGNIFICANT CHANGE UP (ref 0–2)
BUN SERPL-MCNC: 60 MG/DL — HIGH (ref 7–23)
CALCIUM SERPL-MCNC: 9 MG/DL — SIGNIFICANT CHANGE UP (ref 8.5–10.1)
CHLORIDE SERPL-SCNC: 110 MMOL/L — HIGH (ref 96–108)
CO2 SERPL-SCNC: 24 MMOL/L — SIGNIFICANT CHANGE UP (ref 22–31)
CREAT SERPL-MCNC: 1.9 MG/DL — HIGH (ref 0.5–1.3)
EGFR: 34 ML/MIN/1.73M2 — LOW
EGFR: 34 ML/MIN/1.73M2 — LOW
EOSINOPHIL # BLD AUTO: 0.37 K/UL — SIGNIFICANT CHANGE UP (ref 0–0.5)
EOSINOPHIL NFR BLD AUTO: 3.8 % — SIGNIFICANT CHANGE UP (ref 0–6)
GLUCOSE BLDC GLUCOMTR-MCNC: 120 MG/DL — HIGH (ref 70–99)
GLUCOSE BLDC GLUCOMTR-MCNC: 221 MG/DL — HIGH (ref 70–99)
GLUCOSE SERPL-MCNC: 130 MG/DL — HIGH (ref 70–99)
HCT VFR BLD CALC: 41.9 % — SIGNIFICANT CHANGE UP (ref 39–50)
HGB BLD-MCNC: 13.8 G/DL — SIGNIFICANT CHANGE UP (ref 13–17)
IMM GRANULOCYTES NFR BLD AUTO: 0.9 % — SIGNIFICANT CHANGE UP (ref 0–0.9)
LYMPHOCYTES # BLD AUTO: 2.11 K/UL — SIGNIFICANT CHANGE UP (ref 1–3.3)
LYMPHOCYTES # BLD AUTO: 21.4 % — SIGNIFICANT CHANGE UP (ref 13–44)
MCHC RBC-ENTMCNC: 32.1 PG — SIGNIFICANT CHANGE UP (ref 27–34)
MCHC RBC-ENTMCNC: 32.9 G/DL — SIGNIFICANT CHANGE UP (ref 32–36)
MCV RBC AUTO: 97.4 FL — SIGNIFICANT CHANGE UP (ref 80–100)
MONOCYTES # BLD AUTO: 1 K/UL — HIGH (ref 0–0.9)
MONOCYTES NFR BLD AUTO: 10.1 % — SIGNIFICANT CHANGE UP (ref 2–14)
NEUTROPHILS # BLD AUTO: 6.26 K/UL — SIGNIFICANT CHANGE UP (ref 1.8–7.4)
NEUTROPHILS NFR BLD AUTO: 63.5 % — SIGNIFICANT CHANGE UP (ref 43–77)
NRBC BLD AUTO-RTO: 0 /100 WBCS — SIGNIFICANT CHANGE UP (ref 0–0)
PLATELET # BLD AUTO: 116 K/UL — LOW (ref 150–400)
POTASSIUM SERPL-MCNC: 4.1 MMOL/L — SIGNIFICANT CHANGE UP (ref 3.5–5.3)
POTASSIUM SERPL-SCNC: 4.1 MMOL/L — SIGNIFICANT CHANGE UP (ref 3.5–5.3)
RBC # BLD: 4.3 M/UL — SIGNIFICANT CHANGE UP (ref 4.2–5.8)
RBC # FLD: 15.5 % — HIGH (ref 10.3–14.5)
SODIUM SERPL-SCNC: 143 MMOL/L — SIGNIFICANT CHANGE UP (ref 135–145)
WBC # BLD: 9.86 K/UL — SIGNIFICANT CHANGE UP (ref 3.8–10.5)
WBC # FLD AUTO: 9.86 K/UL — SIGNIFICANT CHANGE UP (ref 3.8–10.5)

## 2025-04-30 PROCEDURE — 93010 ELECTROCARDIOGRAM REPORT: CPT

## 2025-04-30 PROCEDURE — 93010 ELECTROCARDIOGRAM REPORT: CPT | Mod: 77

## 2025-04-30 PROCEDURE — 99232 SBSQ HOSP IP/OBS MODERATE 35: CPT

## 2025-04-30 RX ORDER — TORSEMIDE 10 MG
20 TABLET ORAL DAILY
Refills: 0 | Status: DISCONTINUED | OUTPATIENT
Start: 2025-04-30 | End: 2025-05-01

## 2025-04-30 RX ORDER — POLYETHYLENE GLYCOL 3350 17 G/17G
17 POWDER, FOR SOLUTION ORAL DAILY
Refills: 0 | Status: DISCONTINUED | OUTPATIENT
Start: 2025-04-30 | End: 2025-05-01

## 2025-04-30 RX ORDER — TORSEMIDE 10 MG
1 TABLET ORAL
Qty: 0 | Refills: 0 | DISCHARGE
Start: 2025-04-30

## 2025-04-30 RX ORDER — METOPROLOL SUCCINATE 50 MG/1
25 TABLET, EXTENDED RELEASE ORAL DAILY
Refills: 0 | Status: DISCONTINUED | OUTPATIENT
Start: 2025-04-30 | End: 2025-05-01

## 2025-04-30 RX ORDER — ALPRAZOLAM 0.5 MG
1 TABLET, EXTENDED RELEASE 24 HR ORAL
Qty: 0 | Refills: 0 | DISCHARGE
Start: 2025-04-30

## 2025-04-30 RX ORDER — SENNA 187 MG
2 TABLET ORAL AT BEDTIME
Refills: 0 | Status: DISCONTINUED | OUTPATIENT
Start: 2025-04-30 | End: 2025-05-01

## 2025-04-30 RX ORDER — TORSEMIDE 10 MG
0 TABLET ORAL
Refills: 0 | DISCHARGE

## 2025-04-30 RX ORDER — POLYETHYLENE GLYCOL 3350 17 G/17G
17 POWDER, FOR SOLUTION ORAL
Qty: 0 | Refills: 0 | DISCHARGE
Start: 2025-04-30

## 2025-04-30 RX ORDER — SENNA 187 MG
2 TABLET ORAL
Qty: 0 | Refills: 0 | DISCHARGE
Start: 2025-04-30

## 2025-04-30 RX ADMIN — APIXABAN 2.5 MILLIGRAM(S): 2.5 TABLET, FILM COATED ORAL at 05:32

## 2025-04-30 RX ADMIN — TAMSULOSIN HYDROCHLORIDE 0.8 MILLIGRAM(S): 0.4 CAPSULE ORAL at 21:21

## 2025-04-30 RX ADMIN — Medication 2 TABLET(S): at 21:21

## 2025-04-30 RX ADMIN — Medication 81 MILLIGRAM(S): at 11:26

## 2025-04-30 RX ADMIN — Medication 40 MILLIGRAM(S): at 11:25

## 2025-04-30 RX ADMIN — Medication 0.25 MILLIGRAM(S): at 01:05

## 2025-04-30 RX ADMIN — BUSPIRONE HYDROCHLORIDE 5 MILLIGRAM(S): 15 TABLET ORAL at 17:15

## 2025-04-30 RX ADMIN — APIXABAN 2.5 MILLIGRAM(S): 2.5 TABLET, FILM COATED ORAL at 17:14

## 2025-04-30 RX ADMIN — BUSPIRONE HYDROCHLORIDE 5 MILLIGRAM(S): 15 TABLET ORAL at 05:31

## 2025-04-30 RX ADMIN — INSULIN LISPRO 2: 100 INJECTION, SOLUTION INTRAVENOUS; SUBCUTANEOUS at 11:52

## 2025-04-30 RX ADMIN — Medication 25 GRAM(S): at 05:30

## 2025-04-30 RX ADMIN — Medication 20 MILLIGRAM(S): at 11:26

## 2025-04-30 NOTE — PROGRESS NOTE ADULT - PROBLEM SELECTOR PLAN 3
arnulfo on ckd3  Per chart review pt baseline Cr is around 1.8  -Cr 2.1 on this admission  -Avoid nephrotoxic agents   -s/p 40mg IV Lasix in ED  - off Lasix as worsening cr  with mild  hypernatremia, now resolved   -  cr improving    -Nephro consulted dr solorio, recs appreciated

## 2025-04-30 NOTE — PROGRESS NOTE ADULT - SUBJECTIVE AND OBJECTIVE BOX
[facility]  Uriel Onofre PA-C  [address] [place] [id number]  [phone]      INTERVAL HPI/OVERNIGHT EVENTS:  Pt s/e  Tolerating diet  No new GI complaints    MEDICATIONS  (STANDING):  apixaban 2.5 milliGRAM(s) Oral two times a day  aspirin  chewable 81 milliGRAM(s) Oral daily  busPIRone 5 milliGRAM(s) Oral two times a day  dextrose 5%. 1000 milliLiter(s) (50 mL/Hr) IV Continuous <Continuous>  dextrose 5%. 1000 milliLiter(s) (100 mL/Hr) IV Continuous <Continuous>  dextrose 5%. 1000 milliLiter(s) (50 mL/Hr) IV Continuous <Continuous>  dextrose 5%. 1000 milliLiter(s) (75 mL/Hr) IV Continuous <Continuous>  dextrose 50% Injectable 25 Gram(s) IV Push once  dextrose 50% Injectable 12.5 Gram(s) IV Push once  dextrose 50% Injectable 25 Gram(s) IV Push once  glucagon  Injectable 1 milliGRAM(s) IntraMuscular once  insulin lispro (ADMELOG) corrective regimen sliding scale   SubCutaneous three times a day before meals  insulin lispro (ADMELOG) corrective regimen sliding scale   SubCutaneous at bedtime  metoprolol tartrate 25 milliGRAM(s) Oral two times a day  pantoprazole   Suspension 40 milliGRAM(s) Oral daily  senna 2 Tablet(s) Oral at bedtime  tamsulosin 0.8 milliGRAM(s) Oral at bedtime  torsemide 20 milliGRAM(s) Oral daily    MEDICATIONS  (PRN):  ALPRAZolam 0.25 milliGRAM(s) Oral daily PRN anxitey  dextrose Oral Gel 15 Gram(s) Oral once PRN Blood Glucose LESS THAN 70 milliGRAM(s)/deciliter  polyethylene glycol 3350 17 Gram(s) Oral daily PRN Constipation      Allergies  No Known Allergies    PHYSICAL EXAM:   Vital Signs:  Vital Signs Last 24 Hrs  T(C): 36.3 (2025 11:26), Max: 36.4 (2025 19:55)  T(F): 97.4 (2025 11:26), Max: 97.6 (2025 19:55)  HR: 85 (2025 11:26) (61 - 85)  BP: 104/70 (2025 11:26) (97/63 - 104/70)  BP(mean): --  RR: 18 (2025 11:26) (18 - 19)  SpO2: 95% (2025 11:) (91% - 96%)    Parameters below as of 2025 11:26  Patient On (Oxygen Delivery Method): room air      Daily     Daily Weight in k.1 (2025 04:39)    GENERAL:  Appears stated age  HEENT:  NC/AT  CHEST:  Full & symmetric excursion  HEART:  Regular rhythm  ABDOMEN:  Soft, non-tender, non-distended  EXTEREMITIES:  no cyanosis  SKIN:  No rash  NEURO:  Alert      LABS:                        13.8   9.86  )-----------( 116      ( 2025 06:00 )             41.9     04-30    143  |  110[H]  |  60[H]  ----------------------------<  130[H]  4.1   |  24  |  1.90[H]    Ca    9.0      2025 06:00  Phos  3.4     04-29  Mg     2.9     04-29        Urinalysis Basic - ( 2025 06:00 )    Color: x / Appearance: x / SG: x / pH: x  Gluc: 130 mg/dL / Ketone: x  / Bili: x / Urobili: x   Blood: x / Protein: x / Nitrite: x   Leuk Esterase: x / RBC: x / WBC x   Sq Epi: x / Non Sq Epi: x / Bacteria: x

## 2025-04-30 NOTE — PROGRESS NOTE ADULT - PROBLEM SELECTOR PLAN 1
acute on chronic HFrEF exacerbation   cause of acute hypoxic resp failure  with acute pulmonary edema   Pt with hx of chronic systolic congestive heart failure last admitted for CHF exacerbation in March to [facility] and SF last week-Pt with inc SOB at rest and on exertion   -CXR with pulmonary congestion and b/l pleural effusions  -Troponin 3051, 3543, pro-BNP 96730  -EKG: NSR with frequent PVC's, 90 BPM, QTc 518  -Vitals: BP: 135/73, HR: 95 , Temp: 98.2F , RR: 18 , SpO2: 94% on 6LNC  -s/p bipap -s/p 40mg Lasix IVP in ED  -Previous TTE from March with severely reduced LV systolic function (EF 20-25%), mod MR  - Strict I&Os, daily weights, fluid restriction  - Remote tele, continuous pulse ox   -Pt recently switched from Lasix to Torsemide per SF discharge but is unsure of dosage   -C/w Lasix 40mg IVP bid -   xray chest pvc    - s/p lasix 40 mg  as needed   with fu cr  and sodium closely - one dose today given  -    -- PPM interrogation inconclusive, defibrillation function turned off  -Cardio consulted, Dr. Boothe,   -ICU downgrade to  tele

## 2025-04-30 NOTE — DISCHARGE NOTE NURSING/CASE MANAGEMENT/SOCIAL WORK - NSDCPEELIQUISREACT_GEN_ALL_CORE
Patient Education        Weeks 32 to 29 of Your Pregnancy: Care Instructions  Overview     During the last few weeks of your pregnancy, you may have more aches and pains. It's important to rest when you can. Your growing baby is putting more pressure on your bladder. So you may need to urinate more often. Hemorrhoids are also common. These are painful, itchy veins in the rectal area. You may want to talk with your doctor about banking your baby's umbilical cord blood. This is the blood left in the cord after birth. If you want to save this blood, you must arrange it ahead of time. You can't decide at the last minute. If you haven't already had the Tdap shot during this pregnancy, talk to your doctor about getting it. It will help protect your  against pertussis infection. Follow-up care is a key part of your treatment and safety. Be sure to make and go to all appointments, and call your doctor if you are having problems. It's also a good idea to know your test results and keep a list of the medicines you take. How can you care for yourself at home? Ease hemorrhoids  · Get more liquids, fruits, vegetables, and fiber in your diet. This will help keep your stools soft. · Avoid sitting for too long. Lie on your left side several times a day. · Clean yourself with soft, moist toilet paper. Or you can use witch hazel pads or personal hygiene pads. · If you are uncomfortable, try ice packs. Or you can sit in a warm sitz bath. Do these for 20 minutes at a time, as needed. · Use hydrocortisone cream for pain and itching. Two examples are Anusol and Preparation H Hydrocortisone. · Ask your doctor about taking an over-the-counter stool softener. Consider breastfeeding  · Experts recommend breastfeeding for 1 year or longer. · Breast milk may help protect your child from some health problems.   babies are less likely than formula-fed babies to:  ? Get ear infections, colds, diarrhea, and pneumonia. ? Be obese or get diabetes later in life. · Breastfeeding causes the release of a hormone called oxytocin. This hormone may help your uterus shrink back faster. · Breastfeeding may help you lose weight faster. Making milk burns calories. · Breastfeeding can lower your risk of breast cancer, ovarian cancer, and osteoporosis. Decide about circumcision for your baby  · As you make this decision, it may help to think about your personal, Hoah[employer]ism, and family traditions. You get to decide if you will keep your baby's penis natural or if your baby will be circumcised. · If you decide that you would like to have your baby circumcised, talk with your doctor. You can share your concerns about pain. And you can discuss your preferences for anesthesia. Where can you learn more? Go to [email or web address]. org and sign in to your [employer] account. Enter F071 in the [employer] box to learn more about \"Weeks 32 to 34 of Your Pregnancy: Care Instructions. \"     If you do not have an account, please click on the \"Sign Up Now\" link. Current as of: June 16, 2021               Content Version: 13.0  © 5508-2429 Healthwise, Incorporated. Care instructions adapted under license by [facility] ([facility] If you have questions about a medical condition or this instruction, always ask your healthcare professional. [address] any warranty or liability for your use of this information. Apixaban/Eliquis increases your risk for bleeding. Notify your doctor if you experience any of the following side effects: bleeding, coughing or vomiting blood, red or black stool, unexpected pain or swelling, itching or hives, chest pain, chest tightness, trouble breathing, changes in how much or how often you urinate, red or pink urine, numbness or tingling in your feet, or unusual muscle weakness. When Apixaban/Eliquis is taken with other medicines, they can affect how it works. Taking other medications such as aspirin, blood thinners, nonsteroidal anti-inflammatories, and medications that treat depression can increase your risk of bleeding. It is very important to tell your health care provider about all of the other medicines, including over-the-counter medications, herbs, and vitamins you are taking. DO NOT start, stop, or change the dosage of any medicine, including over-the-counter medicines, vitamins, and herbal products without your doctor’s approval. Any products containing aspirin or are nonsteroidal anti-inflammatories lessen the blood’s ability to form clots and add to the effect of Apixaban/Eliquis. Never take aspirin or medicines that contain aspirin without speaking to your doctor.

## 2025-04-30 NOTE — SOCIAL WORK PROGRESS NOTE - NSSWPROGRESSNOTE_GEN_ALL_CORE
Per MD, pt is cleared for dc today. SW met with pt at bedside to discuss accepting MYRA's. Pt reports he wishes to be dc to [facility]. Pt to be transferred to [facility] today at 1pm via [facility] ambulance. Pt is in agreement with dc/transport for today; SW also informed pt's son Amado of dc. SW will remain available as needed.

## 2025-04-30 NOTE — PROGRESS NOTE ADULT - ASSESSMENT
HANNAH on CKD 3  CHF EF 20-25%  Dyspnea   HTN     -Baseline Creatinine 1.5  -HANNAH Likely 2/2 Decreased EABV, ATN  -Urine indices reviewed  -S/p Albumin 100mg q6hrs x2   -Renal function is improving well  -Hypernatremia is improved s/p D5W  -No indication for RRT. Volume and lytes acceptable and with UOP will hold off for now  -Can continue lasix as needed    Renally stable for DC planning; follow up outpatient    D/w Primary

## 2025-04-30 NOTE — DISCHARGE NOTE NURSING/CASE MANAGEMENT/SOCIAL WORK - PATIENT PORTAL LINK FT
You can access the FollowMyHealth Patient Portal offered by [facility] by registering at the following website: [email or web address]. By joining [employer]’s FollowMyHealth portal, you will also be able to view your health information using other applications (apps) compatible with our system.

## 2025-04-30 NOTE — DISCHARGE NOTE NURSING/CASE MANAGEMENT/SOCIAL WORK - NSDCPEFALRISK_GEN_ALL_CORE
For information on Fall & Injury Prevention, visit: [email or web address] OR  [email or web address] OR  https://www.cdc.gov/steadi/patient.html

## 2025-04-30 NOTE — DISCHARGE NOTE NURSING/CASE MANAGEMENT/SOCIAL WORK - FINANCIAL ASSISTANCE
[facility] provides services at a reduced cost to those who are determined to be eligible through [facility]’s financial assistance program. Information regarding [facility]’s financial assistance program can be found by going to [email or web address] or by calling [phone].

## 2025-04-30 NOTE — PROVIDER CONTACT NOTE (OTHER) - SITUATION
25 bets of vtach reported by tele tech
tele tech alerted RN that pt had 7 beats of multifocal vtach, wide QRS ruled out.
Pt had 24 Beats of PAF. Asymptomatic
Patient had 33 beats of vtach. Asymptomatic.
The above patient telling that he can't breath and very anxious and swatting   but no rhythm change.
pt has increased work of breathing on 3 L via nasal cannula. complains of SOB.

## 2025-04-30 NOTE — PROGRESS NOTE ADULT - PROBLEM SELECTOR PLAN 1
acute on chronic HFrEF exacerbation  cause of acute hypoxic resp failure  with acute pulmonary edema   -CXR with pulmonary congestion and b/l pleural effusions  -Troponin 3051, 3543, pro-BNP 22378  -EKG: NSR with frequent PVC's, 90 BPM, QTc 518  -s/p bipap   -Previous TTE from March with severely reduced LV systolic function (EF 20-25%), mod MR  - Strict I&Os, daily weights, fluid restriction   - Remote tele, continuous pulse ox   -Pt recently switched from Lasix to Torsemide per SF discharge but is unsure of dosage   -S/p IV Lasix, torsemide 20 mg daily started   - PPM interrogation inconclusive, defibrillation function turned off  - Given episode of bradycardia plan for EP re-eval tmrw AM  -Cardio consulted, Dr. Boothe, recs appreciated acute on chronic HFrEF exacerbation  cause of acute hypoxic resp failure  with acute pulmonary edema   -CXR with pulmonary congestion and b/l pleural effusions  -Troponin 3051, 3543, pro-BNP 15062  -EKG: NSR with frequent PVC's, 90 BPM, QTc 518  -s/p bipap   -S/p 5 day couse of zosyn for possible PNA  -Previous TTE from March with severely reduced LV systolic function (EF 20-25%), mod MR  - Strict I&Os, daily weights, fluid restriction   - Remote tele, continuous pulse ox   -Pt recently switched from Lasix to Torsemide per SF discharge but is unsure of dosage   -S/p IV Lasix, torsemide 20 mg daily started   - PPM interrogation inconclusive, defibrillation function turned off  - Given episode of bradycardia plan for EP re-eval tmrw AM  -Cardio consulted, Dr. Boothe, recs appreciated

## 2025-04-30 NOTE — PROGRESS NOTE ADULT - SUBJECTIVE AND OBJECTIVE BOX
Patient is a 89y old  Male who presents with a chief complaint of SOB (30 Apr 2025 05:42)      Subjective:  INTERVAL HPI/OVERNIGHT EVENTS: Patient seen and examined at bedside. No overnight events occurred. Patient has no complaints at this time. Denies fevers, chills, headache, lightheadedness, chest pain, dyspnea, abdominal pain, n/v/d/c.    MEDICATIONS  (STANDING):  apixaban 2.5 milliGRAM(s) Oral two times a day  aspirin  chewable 81 milliGRAM(s) Oral daily  busPIRone 5 milliGRAM(s) Oral two times a day  dextrose 5%. 1000 milliLiter(s) (50 mL/Hr) IV Continuous <Continuous>  dextrose 5%. 1000 milliLiter(s) (100 mL/Hr) IV Continuous <Continuous>  dextrose 5%. 1000 milliLiter(s) (50 mL/Hr) IV Continuous <Continuous>  dextrose 5%. 1000 milliLiter(s) (75 mL/Hr) IV Continuous <Continuous>  dextrose 50% Injectable 25 Gram(s) IV Push once  dextrose 50% Injectable 12.5 Gram(s) IV Push once  dextrose 50% Injectable 25 Gram(s) IV Push once  glucagon  Injectable 1 milliGRAM(s) IntraMuscular once  insulin lispro (ADMELOG) corrective regimen sliding scale   SubCutaneous three times a day before meals  insulin lispro (ADMELOG) corrective regimen sliding scale   SubCutaneous at bedtime  metoprolol tartrate 25 milliGRAM(s) Oral two times a day  pantoprazole   Suspension 40 milliGRAM(s) Oral daily  piperacillin/tazobactam IVPB.. 3.375 Gram(s) IV Intermittent every 12 hours  senna 2 Tablet(s) Oral at bedtime  tamsulosin 0.8 milliGRAM(s) Oral at bedtime  torsemide 20 milliGRAM(s) Oral daily    MEDICATIONS  (PRN):  ALPRAZolam 0.25 milliGRAM(s) Oral daily PRN anxitey  dextrose Oral Gel 15 Gram(s) Oral once PRN Blood Glucose LESS THAN 70 milliGRAM(s)/deciliter  polyethylene glycol 3350 17 Gram(s) Oral daily PRN Constipation      Allergies    No Known Allergies    Intolerances        REVIEW OF SYSTEMS:  CONSTITUTIONAL: No fever or chills  HEENT:  No headache, no sore throat  RESPIRATORY: No cough, wheezing, or shortness of breath  CARDIOVASCULAR: No chest pain, palpitations  GASTROINTESTINAL: No abd pain, nausea, vomiting, or diarrhea  GENITOURINARY: No dysuria, frequency, or hematuria  NEUROLOGICAL: no focal weakness or dizziness  MUSCULOSKELETAL: no myalgias     Objective:  Vital Signs Last 24 Hrs  T(C): 36.3 (30 Apr 2025 04:39), Max: 36.4 (29 Apr 2025 12:22)  T(F): 97.4 (30 Apr 2025 04:39), Max: 97.6 (29 Apr 2025 12:22)  HR: 61 (30 Apr 2025 04:39) (61 - 85)  BP: 102/62 (30 Apr 2025 04:39) (97/63 - 110/76)  BP(mean): --  RR: 19 (30 Apr 2025 04:39) (18 - 19)  SpO2: 96% (30 Apr 2025 10:33) (91% - 96%)    Parameters below as of 30 Apr 2025 10:33  Patient On (Oxygen Delivery Method): room air        GENERAL: NAD, lying in bed comfortably  HEAD:  Atraumatic, Normocephalic  EYES: EOMI, conjunctiva and sclera clear  ENT: Moist mucous membranes  CHEST/LUNG: Clear to auscultation bilaterally; No rales, rhonchi, wheezing, or rubs. Unlabored respirations  HEART: Regular rate and rhythm; No murmurs, rubs, or gallops  ABDOMEN: Bowel sounds present; Soft, Nontender, Nondistended.  EXTREMITIES:  No leg edema  NERVOUS SYSTEM:  Alert & Oriented X3, speech clear, answers questions appropriately and follows commands  SKIN: Warm, dry    LABS:                        13.8   9.86  )-----------( 116      ( 30 Apr 2025 06:00 )             41.9     CBC Full  -  ( 30 Apr 2025 06:00 )  WBC Count : 9.86 K/uL  Hemoglobin : 13.8 g/dL  Hematocrit : 41.9 %  Platelet Count - Automated : 116 K/uL  Mean Cell Volume : 97.4 fl  Mean Cell Hemoglobin : 32.1 pg  Mean Cell Hemoglobin Concentration : 32.9 g/dL  Auto Neutrophil # : x  Auto Lymphocyte # : x  Auto Monocyte # : x  Auto Eosinophil # : x  Auto Basophil # : x  Auto Neutrophil % : x  Auto Lymphocyte % : x  Auto Monocyte % : x  Auto Eosinophil % : x  Auto Basophil % : x    30 Apr 2025 06:00    143    |  110    |  60     ----------------------------<  130    4.1     |  24     |  1.90     Ca    9.0        30 Apr 2025 06:00        Urinalysis Basic - ( 30 Apr 2025 06:00 )    Color: x / Appearance: x / SG: x / pH: x  Gluc: 130 mg/dL / Ketone: x  / Bili: x / Urobili: x   Blood: x / Protein: x / Nitrite: x   Leuk Esterase: x / RBC: x / WBC x   Sq Epi: x / Non Sq Epi: x / Bacteria: x      CAPILLARY BLOOD GLUCOSE      POCT Blood Glucose.: 120 mg/dL (30 Apr 2025 08:21)  POCT Blood Glucose.: 146 mg/dL (29 Apr 2025 22:35)  POCT Blood Glucose.: 189 mg/dL (29 Apr 2025 16:54)  POCT Blood Glucose.: 259 mg/dL (29 Apr 2025 12:05)          RADIOLOGY & ADDITIONAL TESTS:    Personally reviewed.     Consultant(s) Notes Reviewed:  [x] YES  [ ] NO

## 2025-04-30 NOTE — PROGRESS NOTE ADULT - ASSESSMENT
88 y/o male with PMHx of CHF, HLD, HTN, prostate ca (s/p radioactive seed implantation in 2015) T2DM, PPM presents to ER c/o SOB. Pt states that he has had intermittent SOB    systolic HF  OP  OA  Atelectasis  Pleural Eff  Dyspnea  eval ACS  HTN  HLD  hx of Prostate Ca  DM  PPM    dnr dni  niv used last night - now on nc o2 support - alert - verbal -   diuresis as per cardio and renal  EP follow up reviewed - ICD function is off - DNR DNI  plan for MYRA - SW follow up reviewed    fio2 wean  I apple  goal sat > 88 pct  trend Trops  eval ACS  Cardio eval  tele monitoring  replete lytes  I and O  cvs rx regimen optimization  TTE reviewed from prior visits - EF 20 pct  pleural eff - atelectasis - I apple - no indication for pleurocentesis at present  DM care - serial FS  GOC discussion

## 2025-04-30 NOTE — PROGRESS NOTE ADULT - SUBJECTIVE AND OBJECTIVE BOX
Patient is a 89y old  Male who presents with a chief complaint of SOB (30 Apr 2025 12:31)      Subjective:  INTERVAL HPI/OVERNIGHT EVENTS: Patient seen and examined at bedside. No overnight events occurred. Patient has no complaints at this time, but reports feeling weak. Denies fevers, chills, headache, chest pain, dyspnea, abdominal pain, n/v, dysphagia. Pt satting well on 4L n/c this AM. Pt was planned for discharge however, patient had 25 beats of vtach nonsustained, asymptomatic, HR was in 70s. Then had episode of bradycardia with diaphoresis, decision made to postpone discharge for further eval.     MEDICATIONS  (STANDING):  apixaban 2.5 milliGRAM(s) Oral two times a day  aspirin  chewable 81 milliGRAM(s) Oral daily  busPIRone 5 milliGRAM(s) Oral two times a day  dextrose 5%. 1000 milliLiter(s) (50 mL/Hr) IV Continuous <Continuous>  dextrose 5%. 1000 milliLiter(s) (100 mL/Hr) IV Continuous <Continuous>  dextrose 5%. 1000 milliLiter(s) (50 mL/Hr) IV Continuous <Continuous>  dextrose 5%. 1000 milliLiter(s) (75 mL/Hr) IV Continuous <Continuous>  dextrose 50% Injectable 25 Gram(s) IV Push once  dextrose 50% Injectable 12.5 Gram(s) IV Push once  dextrose 50% Injectable 25 Gram(s) IV Push once  glucagon  Injectable 1 milliGRAM(s) IntraMuscular once  insulin lispro (ADMELOG) corrective regimen sliding scale   SubCutaneous three times a day before meals  insulin lispro (ADMELOG) corrective regimen sliding scale   SubCutaneous at bedtime  metoprolol succinate ER 25 milliGRAM(s) Oral daily  pantoprazole   Suspension 40 milliGRAM(s) Oral daily  senna 2 Tablet(s) Oral at bedtime  tamsulosin 0.8 milliGRAM(s) Oral at bedtime  torsemide 20 milliGRAM(s) Oral daily    MEDICATIONS  (PRN):  ALPRAZolam 0.25 milliGRAM(s) Oral daily PRN anxitey  dextrose Oral Gel 15 Gram(s) Oral once PRN Blood Glucose LESS THAN 70 milliGRAM(s)/deciliter  polyethylene glycol 3350 17 Gram(s) Oral daily PRN Constipation      Allergies    No Known Allergies    Intolerances        REVIEW OF SYSTEMS:  CONSTITUTIONAL: No fever or chills  RESPIRATORY: No cough, wheezing, or shortness of breath  CARDIOVASCULAR: No chest pain  GASTROINTESTINAL: No abd pain, nausea, vomiting,  GENITOURINARY: No dysuria  NEUROLOGICAL: no focal weakness or dizziness  MUSCULOSKELETAL: no myalgias     Objective:  Vital Signs Last 24 Hrs  T(C): 36.5 (30 Apr 2025 14:12), Max: 36.5 (30 Apr 2025 14:12)  T(F): 97.7 (30 Apr 2025 14:12), Max: 97.7 (30 Apr 2025 14:12)  HR: 94 (30 Apr 2025 14:12) (61 - 94)  BP: 123/83 (30 Apr 2025 14:12) (97/63 - 123/83)  BP(mean): --  RR: 96 (30 Apr 2025 14:12) (18 - 96)  SpO2: 86% (30 Apr 2025 14:04) (86% - 96%)    Parameters below as of 30 Apr 2025 14:04  Patient On (Oxygen Delivery Method): room air        GENERAL: NAD, sitting in chair comfortably with nasal cannula in place  HEAD:  Atraumatic, Normocephalic  EYES:  conjunctiva and sclera clear  ENT: Moist mucous membranes  CHEST/LUNG: Clear to auscultation bilaterally; No rales, rhonchi, wheezing, or rubs. Unlabored respirations  HEART: Regular rate and rhythm; S1S2  ABDOMEN: Bowel sounds present; Soft, Nontender, Nondistended.  EXTREMITIES:  No leg edema b/l  NERVOUS SYSTEM:  awake and alert, speech clear, answers questions appropriately and follows commands  SKIN: Warm, dry    LABS:                        13.8   9.86  )-----------( 116      ( 30 Apr 2025 06:00 )             41.9     CBC Full  -  ( 30 Apr 2025 06:00 )  WBC Count : 9.86 K/uL  Hemoglobin : 13.8 g/dL  Hematocrit : 41.9 %  Platelet Count - Automated : 116 K/uL  Mean Cell Volume : 97.4 fl  Mean Cell Hemoglobin : 32.1 pg  Mean Cell Hemoglobin Concentration : 32.9 g/dL  Auto Neutrophil # : x  Auto Lymphocyte # : x  Auto Monocyte # : x  Auto Eosinophil # : x  Auto Basophil # : x  Auto Neutrophil % : x  Auto Lymphocyte % : x  Auto Monocyte % : x  Auto Eosinophil % : x  Auto Basophil % : x    30 Apr 2025 06:00    143    |  110    |  60     ----------------------------<  130    4.1     |  24     |  1.90     Ca    9.0        30 Apr 2025 06:00        Urinalysis Basic - ( 30 Apr 2025 06:00 )    Color: x / Appearance: x / SG: x / pH: x  Gluc: 130 mg/dL / Ketone: x  / Bili: x / Urobili: x   Blood: x / Protein: x / Nitrite: x   Leuk Esterase: x / RBC: x / WBC x   Sq Epi: x / Non Sq Epi: x / Bacteria: x      CAPILLARY BLOOD GLUCOSE      POCT Blood Glucose.: 221 mg/dL (30 Apr 2025 11:50)  POCT Blood Glucose.: 120 mg/dL (30 Apr 2025 08:21)  POCT Blood Glucose.: 146 mg/dL (29 Apr 2025 22:35)          RADIOLOGY & ADDITIONAL TESTS:    Personally reviewed.     Consultant(s) Notes Reviewed:  [x] YES  [ ] NO

## 2025-04-30 NOTE — PROVIDER CONTACT NOTE (OTHER) - BACKGROUND
Pt admitted with heart failure had 25 beats of vtach earlier in the day.
pt is aditted for CHF, hx of afib, HTN, HDL
Pt admitted with heart failure exacerbation currently on heparin gtt was transferred from ICU earlier in the day.
admitted with heart failure On Heparin drip

## 2025-04-30 NOTE — PROGRESS NOTE ADULT - PROBLEM SELECTOR PLAN 5
Chronic  -HOLD home Coreg 3.125 mg BID in setting of hypotension-s/p  levophed -  on  metoprolol 25 mg po bid -  bp stable Chronic  -HOLD home Coreg 3.125 mg BID in setting of hypotension  -s/p  levophed   - switching lopressor bid to metoprolol succinate qd   -  bp stable

## 2025-04-30 NOTE — PROGRESS NOTE ADULT - ASSESSMENT
The patient is an 89 year old male with a history of HTN, HL, DM, CKD, chronic systolic heart failure s/p ICD prostate cancer who presents with shortness of breath in the setting of acute on chronic systolic heart failure.    Plan:  - ECG with sinus rhythm and LBBB  - Echo 3/20/25 with severely reduced LV systolic function (EF 20-25%), mod MR  - BNP 90530  - CXR with pulm congestion and small left pleural effusion  - Troponin elevated at 3785 in the setting of acute heart failure, HANNAH  - He had a cardiac catheterization in the past for work-up of his heart failure which showed mild non-obstructive CAD  - On metoprolol tartrate 25 mg bid  - Continue aspirin 81 mg daily  - Continue atorvastatin 80 mg daily  - Ventricular tachycardia - EP follow-up. Tachytherapies have been deactivated on the ICD in light of the patient's goals of care.  - Off of amiodarone due to elevated LFTs  - Atrial fibrillation - transition from heparin to apixaban 2.5 mg bid  - If no renal objection, start torsemide 20 mg PO daily The patient is an 89 year old male with a history of HTN, HL, DM, CKD, chronic systolic heart failure s/p ICD prostate cancer who presents with shortness of breath in the setting of acute on chronic systolic heart failure.    Plan:  - ECG with sinus rhythm and LBBB  - Echo 3/20/25 with severely reduced LV systolic function (EF 20-25%), mod MR  - BNP 89078  - CXR with pulm congestion and small left pleural effusion  - Troponin elevated at 3785 in the setting of acute heart failure, HANNAH  - He had a cardiac catheterization in the past for work-up of his heart failure which showed mild non-obstructive CAD  - Change to metoprolol succinate 25 mg daily  - Continue aspirin 81 mg daily  - Continue atorvastatin 80 mg daily  - Ventricular tachycardia - EP follow-up. Tachytherapies have been deactivated on the ICD in light of the patient's goals of care.  - Off of amiodarone due to elevated LFTs  - Atrial fibrillation - transition from heparin to apixaban 2.5 mg bid  - If no renal objection, start torsemide 20 mg PO daily

## 2025-04-30 NOTE — PROGRESS NOTE ADULT - SUBJECTIVE AND OBJECTIVE BOX
Chief Complaint: Shortness of breath    Interval Events: No events overnight.    Review of Systems:  Unable to obtain.    Physical Exam:  Vital Signs Last 24 Hrs  T(C): 36.3 (30 Apr 2025 04:39), Max: 36.4 (29 Apr 2025 12:22)  T(F): 97.4 (30 Apr 2025 04:39), Max: 97.6 (29 Apr 2025 12:22)  HR: 61 (30 Apr 2025 04:39) (61 - 85)  BP: 102/62 (30 Apr 2025 04:39) (97/63 - 110/76)  BP(mean): --  RR: 19 (30 Apr 2025 04:39) (18 - 19)  SpO2: 91% (30 Apr 2025 04:39) (91% - 95%)  Parameters below as of 30 Apr 2025 04:39  Patient On (Oxygen Delivery Method): nasal cannula  O2 Flow (L/min): 4  General: NAD  HEENT: MMM  Neck: No JVD, no carotid bruit  Lungs: CTAB  CV: RRR, nl S1/S2, no M/R/G  Abdomen: S/NT/ND, +BS  Extremities: No LE edema, no cyanosis  Neuro: AAOx0  Skin: No rash    Labs:    04-30    143  |  110[H]  |  60[H]  ----------------------------<  130[H]  4.1   |  24  |  1.90[H]    Ca    9.0      30 Apr 2025 06:00  Phos  3.4     04-29  Mg     2.9     04-29                          13.8   9.86  )-----------( 116      ( 30 Apr 2025 06:00 )             41.9       ECG/Telemetry: Sinus rhythm, PVCs

## 2025-04-30 NOTE — PROGRESS NOTE ADULT - PROBLEM SELECTOR PLAN 9
DVT PPx: now on Eliquis   panic attack / anxiety - xanax .25 ,mg po -  once day prn in home use  as per daughter . DVT PPx: on Eliquis    #Hx of panic attack / anxiety   - c/w home xanax 0.25 ,mg po qd prn    #Bowel regimen: start miralax and senna     #Code status: DNR/DNI  #Dispo: Plan for dc to [facility] tmrw pending EP tino

## 2025-04-30 NOTE — PROVIDER CONTACT NOTE (OTHER) - REASON
VTACH
abnormal vitals 
25 bets of vtach reported by tele tech
7 beats of multifocal vtach
PAF
hypoxia and SOB

## 2025-04-30 NOTE — CHART NOTE - NSCHARTNOTEFT_GEN_A_CORE
Called by RN pt w/ 7 beats of vtach. Pt is asymptomatic. Spoke w/ tele team pt w/ 7 beats vtach and had an episode earlier in the day w/ 25 beats of vtach. Pt w/ PPM. As per primary team note pt to get EP eval tomorrow AM.   -will get EKG   -mag and phos for AM   -Will continue to monitor   -RN to notify w/ any changes Called by RN pt w/ 7 beats of vtach. Pt is asymptomatic. Spoke w/ tele team pt w/ 7 beats vtach and had an episode earlier in the day w/ 25 beats of vtach. Pt w/ PPM. As per primary team note pt to get EP eval tomorrow AM.   -will get EKG - sinus rhythm w/ frequent PVC pattern of bigeminy HR 85 qtc 537    -mag and phos for AM   -avoid qtc prolonging medications   -Will continue to monitor   -RN to notify w/ any changes

## 2025-04-30 NOTE — PROGRESS NOTE ADULT - ASSESSMENT
Transaminitis  NSVT  A-fib RVR  CHF exacerbation  Pulmonary edema  PMHx of CHF, HLD, HTN, prostate ca (s/p radioactive seed implantation in 2015)   Hx T2DM  Hx PPM admitted for acute on chronic HFrEF    4/24 US abdomen RUQ: Distended gallbladder with wall thickening and edema without evidence of cholelithiasis.  4/24 HIDA negative    Plan:  - LFTs noted  - Trend LFT, trending down  - Suspect shock liver from CHF exacerbation/pulmonary edema  - US noted  - HIDA neg  - Follow surgery recs  - Avoid hepatotoxic medications  - D/c plan

## 2025-04-30 NOTE — PROVIDER CONTACT NOTE (OTHER) - ASSESSMENT
swatting vitals and blood sugar done see flow sheet  B/P 141/83  /mt
Pt appears restless in bed sating 89% on 3 L via nasal cannula, O2 increased to 5 L per MD order.
Pt resting in bed comfortably denies chest pain, heart palpitations, shortness of breath, and dizziness.
pt is alert, denies cp,  /65 HR 86 sat 93 RA

## 2025-04-30 NOTE — PROVIDER CONTACT NOTE (OTHER) - ACTION/TREATMENT ORDERED:
MD notified and aware. Will monitor
MD to bedside 40mg Lasix given IVP per MD order, Pt placed on bipap per MD order.
will come
Will monitor
spoke with @5986, no tx needed at this ok to proceed with D/C.
MD made aware, stat EKG ordered and acquired. Will continue to monitor this shift.

## 2025-04-30 NOTE — DISCHARGE NOTE NURSING/CASE MANAGEMENT/SOCIAL WORK - FLU SEASON?
Hospital Medicine Daily Progress Note    Date of Service  11/25/2024    Chief Complaint  Arnulfo Crocker is a 71 y.o. male admitted 11/18/2024 with right foot pain    Hospital Course  71-year-old male with past medical history of CVA, hypertension, and tobacco use who presents due to progressive wound of his right foot complicated by gangrene over the lateral aspect of the foot with necrosis of fourth and fifth digits. CT of the lower extremity with multiple areas of arterial stenosis. Vascular surgery was consulted. X-ray of the right foot demonstrating osteomyelitis.    Interval Problem Update  11/19 Patient noted discoloration of his right foot about 4 weeks ago. He has PAD and continues to smoke. CT reveals significant by bilateral PAD. Vascular surgery consulted.  I discussed the case with vascular surgery, plan for angiogram.  I consulted orthopedics who will evaluate the patient.  Continue IV abx for osteomyelitis.     11/20 Patient is planned for angiogram today for possible vascular intervention. Continue IV abx. Will consult ortho once vascular issues have been addressed.  K is low, added K replacement     11/21 Patient underwent Right common femoral and profunda femoral endarterectomy and  Right common femoral to below knee popliteal bypass using reversed right greater saphenous vein. Plan for transmetatarsal amputation with Ortho. Continue pain control with  oxycodone and IV dilaudid. Continue abx.     11/24 VSS. no acute events overnight.  His pain is well-controlled.  Heel weightbearing right lower extremity in shoe.  PT OT.  Patient will need SNF placement    11/25 patient doing well.  Pain is under control.  Vitals are stable.  Cleared for discharge from a medical and surgical standpoint.  Awaiting placement to SNF    I have discussed this patient's plan of care and discharge plan at IDT rounds today with Case Management, Nursing, Nursing leadership, and other members of the IDT  team.    Consultants/Specialty  orthopedics and vascular surgery    Code Status  DNAR/DNI    Disposition  medically cleared for SNF  I have placed the appropriate orders for post-discharge needs.    Review of Systems  Review of Systems   Constitutional:  Negative for chills, diaphoresis and fever.   HENT:  Negative for hearing loss and sore throat.    Eyes:  Negative for blurred vision.   Respiratory:  Negative for cough, sputum production, shortness of breath and wheezing.    Cardiovascular:  Negative for chest pain, palpitations and leg swelling.   Gastrointestinal:  Negative for abdominal pain, blood in stool, diarrhea, nausea and vomiting.   Genitourinary:  Negative for dysuria, flank pain and urgency.   Musculoskeletal:  Negative for back pain, joint pain, myalgias and neck pain.   Skin:  Negative for rash.   Neurological:  Negative for dizziness, focal weakness, seizures and headaches.   Endo/Heme/Allergies:  Does not bruise/bleed easily.   Psychiatric/Behavioral:  Negative for suicidal ideas.         Physical Exam  Temp:  [36.5 °C (97.7 °F)-36.8 °C (98.2 °F)] 36.5 °C (97.7 °F)  Pulse:  [] 73  Resp:  [16-17] 16  BP: ()/(57-73) 104/57  SpO2:  [91 %-96 %] 93 %    Physical Exam  Vitals and nursing note reviewed.   Constitutional:       General: He is not in acute distress.  HENT:      Head: Normocephalic.      Mouth/Throat:      Mouth: Mucous membranes are moist.   Eyes:      Pupils: Pupils are equal, round, and reactive to light.   Cardiovascular:      Rate and Rhythm: Normal rate and regular rhythm.      Pulses: Normal pulses.      Heart sounds: Normal heart sounds.   Pulmonary:      Effort: Pulmonary effort is normal.      Breath sounds: Normal breath sounds.   Abdominal:      Palpations: Abdomen is soft.      Tenderness: There is no abdominal tenderness.   Musculoskeletal:         General: No swelling.      Cervical back: Neck supple.      Comments: Right foot status post TMA with dressings in  place   Skin:     General: Skin is warm.      Coloration: Skin is not jaundiced.   Neurological:      General: No focal deficit present.      Mental Status: He is alert and oriented to person, place, and time.   Psychiatric:         Mood and Affect: Mood normal.         Behavior: Behavior normal.         Fluids    Intake/Output Summary (Last 24 hours) at 11/25/2024 1356  Last data filed at 11/25/2024 0511  Gross per 24 hour   Intake --   Output 200 ml   Net -200 ml        Laboratory  Recent Labs     11/25/24  0310   WBC 7.6   RBC 3.00*   HEMOGLOBIN 9.6*   HEMATOCRIT 27.7*   MCV 92.3   MCH 32.0   MCHC 34.7   RDW 51.0*   PLATELETCT 214   MPV 9.4     Recent Labs     11/25/24  0310   SODIUM 134*   POTASSIUM 3.2*   CHLORIDE 101   CO2 24   GLUCOSE 109*   BUN 11   CREATININE 0.59   CALCIUM 7.4*                   Imaging  DX-OPERATIVE ANGIOGRAM EACH PROJ   Final Result      Digitized intraoperative radiograph is submitted for review. This examination is not for diagnostic purpose but for guidance during a surgical procedure. Please see the patient's chart for full procedural details.         INTERPRETING LOCATION: [address] [facility], [address]      DX-PORTABLE FLUORO > 1 HOUR   Final Result      Portable fluoroscopy utilized for 48 seconds.      INTERPRETING LOCATION: [address] [facility], [facility], [address]      CT-CTA LOWER EXT WITH & W/O-POST PROCESS RIGHT   Final Result         1.  Extensive bilateral atherosclerotic plaque.   2.  Occlusion of the right superficial femoral artery which reconstitutes distally. Greater than 70% stenosis of the distal right superficial femoral artery.   3.  Greater than 70% stenosis of the distal right external iliac artery,   4.  50-70% stenosis of the right tibioperoneal trunk with three-vessel runoff at the right ankle.   5.  Greater than 70% stenosis of the left distal external iliac artery.   6.  50-70% stenosis of the distal left superficial femoral artery   7.  50-70% stenosis of the left  popliteal artery.   8.  Greater than 70% stenosis of the left tibioperoneal trunk with occlusion of the proximal posterior tibial artery. Small caliber left posterior tibial artery is seen distally otherwise there appears to be three-vessel runoff of the left ankle.   9.  Soft tissue wound of the right fourth and fifth toes with air in the fifth toe bony structures, compatible with osteomyelitis   10.  Soft tissue edema of the right leg below the knee.      These findings were discussed with the patient's clinician, Luiz Richardson, on 11/18/2024 10:07 PM.      DX-CHEST-PORTABLE (1 VIEW)   Final Result         1.  Right basilar atelectasis and/or subtle infiltrates.   2.  Trace right pleural effusion   3.  Right rib fractures are seen, appear likely subacute      IR-ABDOMINAL AORTA-WITH RUNOFF    (Results Pending)        Assessment/Plan  * Osteomyelitis of Right 4th and 5th toe(HCC)- (present on admission)  Assessment & Plan  At [facility], x-ray presented osteomyelitis as well as some soft tissue gas on the right foot.  Ultrasound presented significant peripheral artery disease.  Received 1 dose of ceftriaxone and vancomycin.  Transferred to [facility] for vascular consult.  At [facility], CTA lower extremity presented extensive bilateral arthrosclerotic plaque and occlusion of right superficial femoral artery, greater then 70% stenosis of the distal right external iliac artery and multiple stenosis.  ED physician contacted vascular surgeon who is planning to evaluate tomorrow 11/19/2024.  Received 1 dose of metronidazole  -Admitted Med/Surg floor  -Unasyn and linezolid  -Follow-up blood and wound culture collected on 11/19-20/2024   Vascular surgery consulted,   Patient underwent Right common femoral and profunda femoral endarterectomy and  Right common femoral to below knee popliteal bypass using reversed right greater saphenous vein.   Status post transmetatarsal amputation with Ortho.   PT  OT    Hyperlipidemia  Assessment & Plan  -Continue home atorvastatin    SIRS (systemic inflammatory response syndrome) (HCC)  Assessment & Plan  SIRS criteria identified on my evaluation include:  Tachycardia, with heart rate greater than 90 BPM and Leukocytosis, with WBC greater than 12,000  SIRS is non-infectious, the patient does not have sepsis  Elevated C-reactive, likely reactive leukocytosis secondary to osteomyelitis    Left-sided cerebrovascular accident (CVA) (HCC)- (present on admission)  Assessment & Plan  Left frontal lobe/insular infarct 7/2/2022  -Hold home aspirin due to possible amputation scheduled on 1119/24    Tobacco use disorder  Assessment & Plan  - Nicotine patch placed    Essential hypertension- (present on admission)  Assessment & Plan  -Continue home lisinopril and metoprolol tartrate         VTE prophylaxis: scd    I have performed a physical exam and reviewed and updated ROS and Plan today (11/25/2024). In review of yesterday's note (11/24/2024), there are no changes except as documented above.         Yes... No

## 2025-04-30 NOTE — PROVIDER CONTACT NOTE (OTHER) - DATE AND TIME:
21-Apr-2025 17:05
21-Apr-2025 18:02
27-Apr-2025 18:11
30-Apr-2025 13:46
30-Apr-2025 22:49
27-Apr-2025 22:00

## 2025-04-30 NOTE — PROGRESS NOTE ADULT - SUBJECTIVE AND OBJECTIVE BOX
NEPHROLOGY INTERVAL HPI/OVERNIGHT EVENTS:  HPI:  89 y/o male with PMHx of CHF, HLD, HTN, prostate ca (s/p radioactive seed implantation in 2015) T2DM, PPM presents to ER c/o SOB. Pt states that he has had intermittent SOB for the past few days, Pt notes he was visiting his wife today at rehab and felt SOB as he was leaving. He sat down to catch his breath, a staff member noticed he did not look well and they decided to call EMS. Pt notes he has had SOB both at rest and with exertion. Pt denies chest pain, fever, cough. Of note, pt was admitted 1 month ago for CHF exacerbation and also 1 week ago at [facility]. Pt adds a bunch of his medications were changed including his Entresto was dc'ed, Farxiga dosage was doubled and his Lasix was switched to Torsemide. Pt currently on NC and denies any SOB. Pt denies fever, chills, chest pain, palpitations, abdominal pain, nausea, vomiting, diarrhea.    No acute events noted        PAST MEDICAL & SURGICAL HISTORY:  NICM (nonischemic cardiomyopathy)      HTN (hypertension)      HLD (hyperlipidemia)      Prostate CA      T2DM (type 2 diabetes mellitus)      CHF (congestive heart failure)      H/O prostate biopsy      H/O cataract extraction      H/O inguinal hernia repair      AICD (automatic cardioverter/defibrillator) present          MEDICATIONS  (STANDING):  albumin human 25% IVPB 100 milliLiter(s) IV Intermittent every 6 hours  aspirin Suppository 300 milliGRAM(s) Rectal daily  chlorhexidine 2% Cloths 1 Application(s) Topical <User Schedule>  dexMEDEtomidine Infusion 0.2 MICROgram(s)/kG/Hr (4.04 mL/Hr) IV Continuous <Continuous>  dextrose 5%. 1000 milliLiter(s) (50 mL/Hr) IV Continuous <Continuous>  dextrose 5%. 1000 milliLiter(s) (100 mL/Hr) IV Continuous <Continuous>  dextrose 5%. 1000 milliLiter(s) (50 mL/Hr) IV Continuous <Continuous>  dextrose 50% Injectable 25 Gram(s) IV Push once  dextrose 50% Injectable 12.5 Gram(s) IV Push once  dextrose 50% Injectable 25 Gram(s) IV Push once  glucagon  Injectable 1 milliGRAM(s) IntraMuscular once  heparin  Infusion.  Unit(s)/Hr (15 mL/Hr) IV Continuous <Continuous>  insulin lispro (ADMELOG) corrective regimen sliding scale   SubCutaneous every 6 hours  mupirocin 2% Nasal 1 Application(s) Both Nostrils two times a day  pantoprazole  Injectable 40 milliGRAM(s) IV Push daily  piperacillin/tazobactam IVPB.. 3.375 Gram(s) IV Intermittent every 12 hours  tamsulosin 0.8 milliGRAM(s) Oral at bedtime    MEDICATIONS  (PRN):  dextrose Oral Gel 15 Gram(s) Oral once PRN Blood Glucose LESS THAN 70 milliGRAM(s)/deciliter  heparin   Injectable 6500 Unit(s) IV Push every 6 hours PRN For aPTT less than 40  heparin   Injectable 3000 Unit(s) IV Push every 6 hours PRN For aPTT between 40 - 57      Allergies    No Known Allergies    Intolerances      Vital Signs Last 24 Hrs  T(C): 36.3 (30 Apr 2025 11:26), Max: 36.4 (29 Apr 2025 12:22)  T(F): 97.4 (30 Apr 2025 11:26), Max: 97.6 (29 Apr 2025 12:22)  HR: 85 (30 Apr 2025 11:26) (61 - 85)  BP: 104/70 (30 Apr 2025 11:26) (97/63 - 110/76)  BP(mean): --  RR: 18 (30 Apr 2025 11:26) (18 - 19)  SpO2: 95% (30 Apr 2025 11:26) (91% - 96%)    Parameters below as of 30 Apr 2025 11:26  Patient On (Oxygen Delivery Method): room air          PHYSICAL EXAM:    GENERAL: Elderly male, in no acute distress, ill appearing  HEAD:  Atraumatic, Normocephalic  NERVOUS SYSTEM:  Awake  CHEST/LUNG: recuced BS bilaterally.  SKIN: No rashes or lesions    LABS:                                                         13.8   9.86  )-----------( 116      ( 30 Apr 2025 06:00 )             41.9     04-30    143  |  110[H]  |  60[H]  ----------------------------<  130[H]  4.1   |  24  |  1.90[H]    Ca    9.0      30 Apr 2025 06:00  Phos  3.4     04-29  Mg     2.9     04-29        Urinalysis Basic - ( 30 Apr 2025 06:00 )    Color: x / Appearance: x / SG: x / pH: x  Gluc: 130 mg/dL / Ketone: x  / Bili: x / Urobili: x   Blood: x / Protein: x / Nitrite: x   Leuk Esterase: x / RBC: x / WBC x   Sq Epi: x / Non Sq Epi: x / Bacteria: x

## 2025-04-30 NOTE — PROGRESS NOTE ADULT - PROBLEM SELECTOR PLAN 4
Pt with elevated troponin on admission to [facility]  -Per chart review pt had elevated troponin on last admission most likely 2/2 demand ischemia in setting of severe CHF exacerbation   - trend trops to trending down   -Currently no active CP, will monitor for anginal sx  -EKG with NSR with frequent PVC's, 90 BPM, QTc 518

## 2025-04-30 NOTE — CHART NOTE - NSCHARTNOTESELECT_GEN_ALL_CORE
Event Note
General Surgery/Off Service Note
POCUS/Event Note
Palliative Care/Event Note
Event Note
Nutrition Services
POCUS/Event Note
Rapid Response

## 2025-04-30 NOTE — PROGRESS NOTE ADULT - SUBJECTIVE AND OBJECTIVE BOX
Date/Time Patient Seen:  		  Referring MD:   Data Reviewed	       Patient is a 89y old  Male who presents with a chief complaint of SOB (29 Apr 2025 13:11)      Subjective/HPI     PAST MEDICAL & SURGICAL HISTORY:  NICM (nonischemic cardiomyopathy)    HTN (hypertension)    HLD (hyperlipidemia)    Prostate CA    T2DM (type 2 diabetes mellitus)    CHF (congestive heart failure)    H/O prostate biopsy    H/O cataract extraction    H/O inguinal hernia repair    AICD (automatic cardioverter/defibrillator) present          Medication list         MEDICATIONS  (STANDING):  apixaban 2.5 milliGRAM(s) Oral two times a day  aspirin  chewable 81 milliGRAM(s) Oral daily  busPIRone 5 milliGRAM(s) Oral two times a day  dextrose 5%. 1000 milliLiter(s) (50 mL/Hr) IV Continuous <Continuous>  dextrose 5%. 1000 milliLiter(s) (100 mL/Hr) IV Continuous <Continuous>  dextrose 5%. 1000 milliLiter(s) (50 mL/Hr) IV Continuous <Continuous>  dextrose 5%. 1000 milliLiter(s) (75 mL/Hr) IV Continuous <Continuous>  dextrose 50% Injectable 25 Gram(s) IV Push once  dextrose 50% Injectable 12.5 Gram(s) IV Push once  dextrose 50% Injectable 25 Gram(s) IV Push once  glucagon  Injectable 1 milliGRAM(s) IntraMuscular once  insulin lispro (ADMELOG) corrective regimen sliding scale   SubCutaneous three times a day before meals  insulin lispro (ADMELOG) corrective regimen sliding scale   SubCutaneous at bedtime  metoprolol tartrate 25 milliGRAM(s) Oral two times a day  pantoprazole   Suspension 40 milliGRAM(s) Oral daily  piperacillin/tazobactam IVPB.. 3.375 Gram(s) IV Intermittent every 12 hours  tamsulosin 0.8 milliGRAM(s) Oral at bedtime    MEDICATIONS  (PRN):  ALPRAZolam 0.25 milliGRAM(s) Oral daily PRN anxitey  dextrose Oral Gel 15 Gram(s) Oral once PRN Blood Glucose LESS THAN 70 milliGRAM(s)/deciliter         Vitals log        ICU Vital Signs Last 24 Hrs  T(C): 36.3 (30 Apr 2025 04:39), Max: 36.4 (29 Apr 2025 12:22)  T(F): 97.4 (30 Apr 2025 04:39), Max: 97.6 (29 Apr 2025 12:22)  HR: 61 (30 Apr 2025 04:39) (61 - 85)  BP: 102/62 (30 Apr 2025 04:39) (97/63 - 110/76)  BP(mean): --  ABP: --  ABP(mean): --  RR: 19 (30 Apr 2025 04:39) (18 - 19)  SpO2: 91% (30 Apr 2025 04:39) (91% - 97%)    O2 Parameters below as of 30 Apr 2025 04:39  Patient On (Oxygen Delivery Method): nasal cannula  O2 Flow (L/min): 4               Input and Output:  I&O's Detail    28 Apr 2025 07:01  -  29 Apr 2025 07:00  --------------------------------------------------------  IN:  Total IN: 0 mL    OUT:    Indwelling Catheter - Urethral (mL): 1000 mL  Total OUT: 1000 mL    Total NET: -1000 mL      29 Apr 2025 07:01  -  30 Apr 2025 05:43  --------------------------------------------------------  IN:  Total IN: 0 mL    OUT:    Indwelling Catheter - Urethral (mL): 300 mL    Voided (mL): 1300 mL  Total OUT: 1600 mL    Total NET: -1600 mL          Lab Data                        14.6   9.33  )-----------( 127      ( 29 Apr 2025 06:55 )             44.7     04-29    146[H]  |  116[H]  |  73[H]  ----------------------------<  161[H]  4.4   |  22  |  2.00[H]    Ca    9.1      29 Apr 2025 06:55  Phos  3.4     04-29  Mg     2.9     04-29    TPro  6.8  /  Alb  3.6  /  TBili  1.7[H]  /  DBili  x   /  AST  188[H]  /  ALT  854[H]  /  AlkPhos  97  04-28            Review of Systems	      Objective     Physical Examination    heart s1s2  lung dc bs  head nc  head at      Pertinent Lab findings & Imaging      Amberly:  NO   Adequate UO     I&O's Detail    28 Apr 2025 07:01  -  29 Apr 2025 07:00  --------------------------------------------------------  IN:  Total IN: 0 mL    OUT:    Indwelling Catheter - Urethral (mL): 1000 mL  Total OUT: 1000 mL    Total NET: -1000 mL      29 Apr 2025 07:01  -  30 Apr 2025 05:43  --------------------------------------------------------  IN:  Total IN: 0 mL    OUT:    Indwelling Catheter - Urethral (mL): 300 mL    Voided (mL): 1300 mL  Total OUT: 1600 mL    Total NET: -1600 mL               Discussed with:     Cultures:	        Radiology

## 2025-04-30 NOTE — PROGRESS NOTE ADULT - ATTENDING COMMENTS
FOR MYRA after PPM check for symptomatic bradycardia episode today.      Time-based billing (NON-critical care).     52 minutes spent on total encounter. The necessity of the time spent during the encounter on this date of service was due to:     Time spent includes direct patient care  (interview and examination of patient), discussion with other providers, support staff and/or patient's family members, review of medical records, ordering diagnostic tests and analyzing results, and documentation.

## 2025-05-01 VITALS
SYSTOLIC BLOOD PRESSURE: 112 MMHG | TEMPERATURE: 98 F | DIASTOLIC BLOOD PRESSURE: 71 MMHG | OXYGEN SATURATION: 93 % | RESPIRATION RATE: 18 BRPM | HEART RATE: 95 BPM

## 2025-05-01 LAB
ALBUMIN SERPL ELPH-MCNC: 3.4 G/DL — SIGNIFICANT CHANGE UP (ref 3.3–5)
ALP SERPL-CCNC: 68 U/L — SIGNIFICANT CHANGE UP (ref 40–120)
ALT FLD-CCNC: 395 U/L — HIGH (ref 12–78)
ANION GAP SERPL CALC-SCNC: 7 MMOL/L — SIGNIFICANT CHANGE UP (ref 5–17)
AST SERPL-CCNC: 85 U/L — HIGH (ref 15–37)
BASOPHILS # BLD AUTO: 0.02 K/UL — SIGNIFICANT CHANGE UP (ref 0–0.2)
BASOPHILS NFR BLD AUTO: 0.2 % — SIGNIFICANT CHANGE UP (ref 0–2)
BILIRUB SERPL-MCNC: 1.9 MG/DL — HIGH (ref 0.2–1.2)
BUN SERPL-MCNC: 56 MG/DL — HIGH (ref 7–23)
CALCIUM SERPL-MCNC: 9 MG/DL — SIGNIFICANT CHANGE UP (ref 8.5–10.1)
CHLORIDE SERPL-SCNC: 111 MMOL/L — HIGH (ref 96–108)
CO2 SERPL-SCNC: 26 MMOL/L — SIGNIFICANT CHANGE UP (ref 22–31)
CREAT SERPL-MCNC: 1.8 MG/DL — HIGH (ref 0.5–1.3)
EGFR: 36 ML/MIN/1.73M2 — LOW
EGFR: 36 ML/MIN/1.73M2 — LOW
EOSINOPHIL # BLD AUTO: 0.5 K/UL — SIGNIFICANT CHANGE UP (ref 0–0.5)
EOSINOPHIL NFR BLD AUTO: 5.5 % — SIGNIFICANT CHANGE UP (ref 0–6)
GLUCOSE SERPL-MCNC: 128 MG/DL — HIGH (ref 70–99)
HCT VFR BLD CALC: 41.2 % — SIGNIFICANT CHANGE UP (ref 39–50)
HGB BLD-MCNC: 13.5 G/DL — SIGNIFICANT CHANGE UP (ref 13–17)
IMM GRANULOCYTES NFR BLD AUTO: 1.1 % — HIGH (ref 0–0.9)
LYMPHOCYTES # BLD AUTO: 1.79 K/UL — SIGNIFICANT CHANGE UP (ref 1–3.3)
LYMPHOCYTES # BLD AUTO: 19.5 % — SIGNIFICANT CHANGE UP (ref 13–44)
MAGNESIUM SERPL-MCNC: 2.6 MG/DL — SIGNIFICANT CHANGE UP (ref 1.6–2.6)
MCHC RBC-ENTMCNC: 31.8 PG — SIGNIFICANT CHANGE UP (ref 27–34)
MCHC RBC-ENTMCNC: 32.8 G/DL — SIGNIFICANT CHANGE UP (ref 32–36)
MCV RBC AUTO: 96.9 FL — SIGNIFICANT CHANGE UP (ref 80–100)
MONOCYTES # BLD AUTO: 0.91 K/UL — HIGH (ref 0–0.9)
MONOCYTES NFR BLD AUTO: 9.9 % — SIGNIFICANT CHANGE UP (ref 2–14)
NEUTROPHILS # BLD AUTO: 5.84 K/UL — SIGNIFICANT CHANGE UP (ref 1.8–7.4)
NEUTROPHILS NFR BLD AUTO: 63.8 % — SIGNIFICANT CHANGE UP (ref 43–77)
NRBC BLD AUTO-RTO: 0 /100 WBCS — SIGNIFICANT CHANGE UP (ref 0–0)
PHOSPHATE SERPL-MCNC: 3.5 MG/DL — SIGNIFICANT CHANGE UP (ref 2.5–4.5)
PLATELET # BLD AUTO: 110 K/UL — LOW (ref 150–400)
POTASSIUM SERPL-MCNC: 3.6 MMOL/L — SIGNIFICANT CHANGE UP (ref 3.5–5.3)
POTASSIUM SERPL-SCNC: 3.6 MMOL/L — SIGNIFICANT CHANGE UP (ref 3.5–5.3)
PROT SERPL-MCNC: 5.9 G/DL — LOW (ref 6–8.3)
RBC # BLD: 4.25 M/UL — SIGNIFICANT CHANGE UP (ref 4.2–5.8)
RBC # FLD: 15.1 % — HIGH (ref 10.3–14.5)
SODIUM SERPL-SCNC: 144 MMOL/L — SIGNIFICANT CHANGE UP (ref 135–145)
WBC # BLD: 9.16 K/UL — SIGNIFICANT CHANGE UP (ref 3.8–10.5)
WBC # FLD AUTO: 9.16 K/UL — SIGNIFICANT CHANGE UP (ref 3.8–10.5)

## 2025-05-01 PROCEDURE — P9047: CPT

## 2025-05-01 PROCEDURE — 82570 ASSAY OF URINE CREATININE: CPT

## 2025-05-01 PROCEDURE — 80053 COMPREHEN METABOLIC PANEL: CPT

## 2025-05-01 PROCEDURE — 99285 EMERGENCY DEPT VISIT HI MDM: CPT | Mod: 25

## 2025-05-01 PROCEDURE — 83036 HEMOGLOBIN GLYCOSYLATED A1C: CPT

## 2025-05-01 PROCEDURE — 78226 HEPATOBILIARY SYSTEM IMAGING: CPT | Mod: MC

## 2025-05-01 PROCEDURE — 83880 ASSAY OF NATRIURETIC PEPTIDE: CPT

## 2025-05-01 PROCEDURE — 84300 ASSAY OF URINE SODIUM: CPT

## 2025-05-01 PROCEDURE — 83735 ASSAY OF MAGNESIUM: CPT

## 2025-05-01 PROCEDURE — 97535 SELF CARE MNGMENT TRAINING: CPT

## 2025-05-01 PROCEDURE — 83935 ASSAY OF URINE OSMOLALITY: CPT

## 2025-05-01 PROCEDURE — 97166 OT EVAL MOD COMPLEX 45 MIN: CPT

## 2025-05-01 PROCEDURE — 36415 COLL VENOUS BLD VENIPUNCTURE: CPT

## 2025-05-01 PROCEDURE — 84484 ASSAY OF TROPONIN QUANT: CPT

## 2025-05-01 PROCEDURE — 86803 HEPATITIS C AB TEST: CPT

## 2025-05-01 PROCEDURE — 80061 LIPID PANEL: CPT

## 2025-05-01 PROCEDURE — 97530 THERAPEUTIC ACTIVITIES: CPT

## 2025-05-01 PROCEDURE — 82550 ASSAY OF CK (CPK): CPT

## 2025-05-01 PROCEDURE — 94660 CPAP INITIATION&MGMT: CPT

## 2025-05-01 PROCEDURE — 85610 PROTHROMBIN TIME: CPT

## 2025-05-01 PROCEDURE — 87640 STAPH A DNA AMP PROBE: CPT

## 2025-05-01 PROCEDURE — 85025 COMPLETE CBC W/AUTO DIFF WBC: CPT

## 2025-05-01 PROCEDURE — 80076 HEPATIC FUNCTION PANEL: CPT

## 2025-05-01 PROCEDURE — 80074 ACUTE HEPATITIS PANEL: CPT

## 2025-05-01 PROCEDURE — 86704 HEP B CORE ANTIBODY TOTAL: CPT

## 2025-05-01 PROCEDURE — 93005 ELECTROCARDIOGRAM TRACING: CPT

## 2025-05-01 PROCEDURE — 99239 HOSP IP/OBS DSCHRG MGMT >30: CPT

## 2025-05-01 PROCEDURE — 71045 X-RAY EXAM CHEST 1 VIEW: CPT

## 2025-05-01 PROCEDURE — 84156 ASSAY OF PROTEIN URINE: CPT

## 2025-05-01 PROCEDURE — 84145 PROCALCITONIN (PCT): CPT

## 2025-05-01 PROCEDURE — 94760 N-INVAS EAR/PLS OXIMETRY 1: CPT

## 2025-05-01 PROCEDURE — 86706 HEP B SURFACE ANTIBODY: CPT

## 2025-05-01 PROCEDURE — 87340 HEPATITIS B SURFACE AG IA: CPT

## 2025-05-01 PROCEDURE — 85730 THROMBOPLASTIN TIME PARTIAL: CPT

## 2025-05-01 PROCEDURE — 82803 BLOOD GASES ANY COMBINATION: CPT

## 2025-05-01 PROCEDURE — 0225U NFCT DS DNA&RNA 21 SARSCOV2: CPT

## 2025-05-01 PROCEDURE — 87040 BLOOD CULTURE FOR BACTERIA: CPT

## 2025-05-01 PROCEDURE — 84133 ASSAY OF URINE POTASSIUM: CPT

## 2025-05-01 PROCEDURE — 82962 GLUCOSE BLOOD TEST: CPT

## 2025-05-01 PROCEDURE — 96372 THER/PROPH/DIAG INJ SC/IM: CPT

## 2025-05-01 PROCEDURE — 84540 ASSAY OF URINE/UREA-N: CPT

## 2025-05-01 PROCEDURE — A9537: CPT

## 2025-05-01 PROCEDURE — 85027 COMPLETE CBC AUTOMATED: CPT

## 2025-05-01 PROCEDURE — 87641 MR-STAPH DNA AMP PROBE: CPT

## 2025-05-01 PROCEDURE — 97162 PT EVAL MOD COMPLEX 30 MIN: CPT

## 2025-05-01 PROCEDURE — 36600 WITHDRAWAL OF ARTERIAL BLOOD: CPT

## 2025-05-01 PROCEDURE — 97116 GAIT TRAINING THERAPY: CPT

## 2025-05-01 PROCEDURE — 80048 BASIC METABOLIC PNL TOTAL CA: CPT

## 2025-05-01 PROCEDURE — 82553 CREATINE MB FRACTION: CPT

## 2025-05-01 PROCEDURE — 83605 ASSAY OF LACTIC ACID: CPT

## 2025-05-01 PROCEDURE — 76705 ECHO EXAM OF ABDOMEN: CPT

## 2025-05-01 PROCEDURE — 81001 URINALYSIS AUTO W/SCOPE: CPT

## 2025-05-01 PROCEDURE — 84100 ASSAY OF PHOSPHORUS: CPT

## 2025-05-01 RX ORDER — METOPROLOL SUCCINATE 50 MG/1
1 TABLET, EXTENDED RELEASE ORAL
Qty: 0 | Refills: 0 | DISCHARGE
Start: 2025-05-01

## 2025-05-01 RX ORDER — TORSEMIDE 10 MG
20 TABLET ORAL DAILY
Refills: 0 | Status: DISCONTINUED | OUTPATIENT
Start: 2025-05-01 | End: 2025-05-01

## 2025-05-01 RX ADMIN — BUSPIRONE HYDROCHLORIDE 5 MILLIGRAM(S): 15 TABLET ORAL at 04:51

## 2025-05-01 RX ADMIN — APIXABAN 2.5 MILLIGRAM(S): 2.5 TABLET, FILM COATED ORAL at 04:51

## 2025-05-01 RX ADMIN — INSULIN LISPRO 1: 100 INJECTION, SOLUTION INTRAVENOUS; SUBCUTANEOUS at 12:42

## 2025-05-01 RX ADMIN — Medication 40 MILLIEQUIVALENT(S): at 10:08

## 2025-05-01 RX ADMIN — Medication 40 MILLIGRAM(S): at 11:12

## 2025-05-01 RX ADMIN — Medication 81 MILLIGRAM(S): at 11:12

## 2025-05-01 NOTE — PROGRESS NOTE ADULT - REASON FOR ADMISSION
SOB

## 2025-05-01 NOTE — SOCIAL WORK PROGRESS NOTE - NSSWPROGRESSNOTE_GEN_ALL_CORE
MD has confirmed that pt is cleared for dc today. Pt to be transferred to [facility] today at 3pm via [facility] ambulance. Pt and pt's son Rich made aware and in agreement. SW will remain available as needed.

## 2025-05-01 NOTE — PROGRESS NOTE ADULT - PROVIDER SPECIALTY LIST ADULT
Cardiology
Critical Care
Electrophysiology
Hospitalist
Hospitalist
Nephrology
Pulmonology
Cardiology
Cardiology
Critical Care
Critical Care
Nephrology
Pulmonology
Pulmonology
Surgery
Cardiology
Electrophysiology
Electrophysiology
Gastroenterology
Gastroenterology
Hospitalist
Pulmonology
Pulmonology
Critical Care
Gastroenterology
Gastroenterology
Nephrology
Pulmonology
Gastroenterology
Gastroenterology
Hospitalist

## 2025-05-01 NOTE — PROGRESS NOTE ADULT - SUBJECTIVE AND OBJECTIVE BOX
Chief Complaint: Shortness of breath    Interval Events: No events overnight.    Review of Systems:  Unable to obtain.    Physical Exam:  Vital Signs Last 24 Hrs  T(C): 36.3 (01 May 2025 04:18), Max: 36.5 (30 Apr 2025 14:12)  T(F): 97.4 (01 May 2025 04:18), Max: 97.7 (30 Apr 2025 14:12)  HR: 85 (01 May 2025 04:18) (67 - 94)  BP: 94/50 (01 May 2025 04:20) (94/50 - 123/83)  BP(mean): --  RR: 19 (01 May 2025 04:18) (18 - 96)  SpO2: 96% (01 May 2025 04:18) (86% - 96%)  Parameters below as of 01 May 2025 04:18  Patient On (Oxygen Delivery Method): nasal cannula  O2 Flow (L/min): 4  General: NAD  HEENT: MMM  Neck: No JVD, no carotid bruit  Lungs: CTAB  CV: RRR, nl S1/S2, no M/R/G  Abdomen: S/NT/ND, +BS  Extremities: No LE edema, no cyanosis  Neuro: AAOx0  Skin: No rash    Labs:    05-01    144  |  111[H]  |  56[H]  ----------------------------<  128[H]  3.6   |  26  |  1.80[H]    Ca    9.0      01 May 2025 06:08  Phos  3.5     05-01  Mg     2.6     05-01    TPro  5.9[L]  /  Alb  3.4  /  TBili  1.9[H]  /  DBili  x   /  AST  85[H]  /  ALT  395[H]  /  AlkPhos  68  05-01                        13.5   9.16  )-----------( 110      ( 01 May 2025 06:08 )             41.2     ECG/Telemetry: Sinus rhythm, PVCs, NSVT

## 2025-05-01 NOTE — PROGRESS NOTE ADULT - ASSESSMENT
The patient is an 89 year old male with a history of HTN, HL, DM, CKD, chronic systolic heart failure s/p ICD prostate cancer who presents with shortness of breath in the setting of acute on chronic systolic heart failure.    Plan:  - ECG with sinus rhythm and LBBB  - Echo 3/20/25 with severely reduced LV systolic function (EF 20-25%), mod MR  - BNP 65822  - CXR with pulm congestion and small left pleural effusion  - Troponin elevated at 3785 in the setting of acute heart failure, HANNAH  - He had a cardiac catheterization in the past for work-up of his heart failure which showed mild non-obstructive CAD  - Continue metoprolol succinate 25 mg daily  - Continue aspirin 81 mg daily  - Continue atorvastatin 80 mg daily  - Ventricular tachycardia - EP follow-up. Tachytherapies have been deactivated on the ICD in light of the patient's goals of care.  - Off of amiodarone due to elevated LFTs  - Atrial fibrillation - transition from heparin to apixaban 2.5 mg bid  - Continue torsemide 20 mg PO daily  - Doubt the patient was bradycardic to 30s as his pacemaker is set to back-up pace at 40.   - Ok for discharge from a cardiac standpoint

## 2025-05-01 NOTE — PROGRESS NOTE ADULT - SUBJECTIVE AND OBJECTIVE BOX
[facility]  Uriel Onofre PA-C  [address] [place] [id number]  [phone]      INTERVAL HPI/OVERNIGHT EVENTS:  Pt s/e  LFTs trending down  Tolerating diet  No GI events    MEDICATIONS  (STANDING):  apixaban 2.5 milliGRAM(s) Oral two times a day  aspirin  chewable 81 milliGRAM(s) Oral daily  busPIRone 5 milliGRAM(s) Oral two times a day  dextrose 5%. 1000 milliLiter(s) (50 mL/Hr) IV Continuous <Continuous>  dextrose 5%. 1000 milliLiter(s) (100 mL/Hr) IV Continuous <Continuous>  dextrose 5%. 1000 milliLiter(s) (50 mL/Hr) IV Continuous <Continuous>  dextrose 5%. 1000 milliLiter(s) (75 mL/Hr) IV Continuous <Continuous>  dextrose 50% Injectable 25 Gram(s) IV Push once  dextrose 50% Injectable 12.5 Gram(s) IV Push once  dextrose 50% Injectable 25 Gram(s) IV Push once  glucagon  Injectable 1 milliGRAM(s) IntraMuscular once  insulin lispro (ADMELOG) corrective regimen sliding scale   SubCutaneous three times a day before meals  insulin lispro (ADMELOG) corrective regimen sliding scale   SubCutaneous at bedtime  metoprolol succinate ER 25 milliGRAM(s) Oral daily  pantoprazole   Suspension 40 milliGRAM(s) Oral daily  senna 2 Tablet(s) Oral at bedtime  tamsulosin 0.8 milliGRAM(s) Oral at bedtime    MEDICATIONS  (PRN):  ALPRAZolam 0.25 milliGRAM(s) Oral daily PRN anxitey  dextrose Oral Gel 15 Gram(s) Oral once PRN Blood Glucose LESS THAN 70 milliGRAM(s)/deciliter  polyethylene glycol 3350 17 Gram(s) Oral daily PRN Constipation      Allergies  No Known Allergies      PHYSICAL EXAM:   Vital Signs:  Vital Signs Last 24 Hrs  T(C): 36.3 (01 May 2025 04:18), Max: 36.5 (2025 14:12)  T(F): 97.4 (01 May 2025 04:18), Max: 97.7 (2025 14:12)  HR: 85 (01 May 2025 04:18) (67 - 94)  BP: 94/50 (01 May 2025 04:20) (94/50 - 123/83)  BP(mean): --  RR: 19 (01 May 2025 04:18) (18 - 96)  SpO2: 96% (01 May 2025 04:18) (86% - 96%)    Parameters below as of 01 May 2025 04:18  Patient On (Oxygen Delivery Method): nasal cannula  O2 Flow (L/min): 4    Daily     Daily Weight in k.4 (01 May 2025 04:18)    GENERAL:  Appears stated age  HEENT:  NC/AT  CHEST:  Full & symmetric excursion  HEART:  Regular rhythm  ABDOMEN:  Soft, non-tender, non-distended  EXTEREMITIES:  no cyanosis  SKIN:  No rash  NEURO:  Alert      LABS:                        13.5   9.16  )-----------( 110      ( 01 May 2025 06:08 )             41.2     05-01    144  |  111[H]  |  56[H]  ----------------------------<  128[H]  3.6   |  26  |  1.80[H]    Ca    9.0      01 May 2025 06:08  Phos  3.5     05-01  Mg     2.6     05-01    TPro  5.9[L]  /  Alb  3.4  /  TBili  1.9[H]  /  DBili  x   /  AST  85[H]  /  ALT  395[H]  /  AlkPhos  68  05-01      Urinalysis Basic - ( 01 May 2025 06:08 )    Color: x / Appearance: x / SG: x / pH: x  Gluc: 128 mg/dL / Ketone: x  / Bili: x / Urobili: x   Blood: x / Protein: x / Nitrite: x   Leuk Esterase: x / RBC: x / WBC x   Sq Epi: x / Non Sq Epi: x / Bacteria: x

## 2025-05-01 NOTE — PROGRESS NOTE ADULT - SUBJECTIVE AND OBJECTIVE BOX
EP Attending  HISTORY OF PRESENT ILLNESS: HPI:  88 y/o male with PMHx of CHF, HLD, HTN, prostate ca (s/p radioactive seed implantation in 2015) T2DM, PPM presents to ER c/o SOB. Pt states that he has had intermittent SOB for the past few days, Pt notes he was visiting his wife today at rehab and felt SOB as he was leaving. He sat down to catch his breath, a staff member noticed he did not look well and they decided to call EMS. Pt notes he has had SOB both at rest and with exertion. Pt denies chest pain, fever, cough. Of note, pt was admitted 1 month ago for CHF exacerbation and also 1 week ago at [facility]. Pt adds a bunch of his medications were changed including his Entresto was dc'ed, Farxiga dosage was doubled and his Lasix was switched to Torsemide. Pt currently on NC and denies any SOB. Pt denies fever, chills, chest pain, palpitations, abdominal pain, nausea, vomiting, diarrhea.    ED Course:   Vitals: BP: 135/73, HR: 95 , Temp: 98.2F , RR: 18 , SpO2: 94% on 6LNC  Labs: BUN/Cr 56/2.1, Glucose 125, eGFR 30, Troponin 3051, pro-BNP 96423  CXR: official read pending   EKG: NSR with frequent PVC's, 90 BPM, QTc 518  Received in the ED:  Aspirin 324mg PO x1, Lasix 40mg IVP x1 (21 Apr 2025 03:55)    While hospitalized re: acute on chronic systolic heart failure, he developed rapid VT, and has since been transferred to the ICU.  VT has quelled down with IV amiodarone, but he's been agitated in bed. Required precedex to sedate, then NorEpinephrine infusion to maintain blood pressure after sedation made him hypotensive.  Too sedated this morning to participate in any history/ROS, only opens his eyes for a brief moment.  He's reportedly been hospitalized at [place] and at [facility] in the last few weeks/months with CHF exacerbations.  He has a dual-coil, VVI (single chamber) Abbott ICD.  This will be interrogated by the EP lab.  He has history of a dilated nonischemic cardiomyopathy, and baseline LBBB ~145-150ms.  There is no known prior history of AFib.  4/23- remains somnolent, on CPAP mask during daylight hours.  no overnight issues. short bursts of NSVT on telemetry. wide QRS rhythm at baseline.  made DNR by family.  4/29- out of ICU, resting on tele floor.  no significant V-pacing on telemetry.  does not want any invasive procedures done / requests medical management only  Date of service 5/1- notified of pulse rate of 30s / diaphoretic yesterday.  may have been off of telemetry during this episode with bedside pulse check on vitals cart?  patient requests to be 'left alone', and wants no further procedures.      PAST MEDICAL & SURGICAL HISTORY:  NICM (nonischemic cardiomyopathy)  HTN (hypertension)  HLD (hyperlipidemia)  Prostate CA  T2DM (type 2 diabetes mellitus)  CHF (congestive heart failure)  H/O prostate biopsy  H/O cataract extraction  H/O inguinal hernia repair  AICD (automatic cardioverter/defibrillator) present    ALPRAZolam 0.25 milliGRAM(s) Oral daily PRN  apixaban 2.5 milliGRAM(s) Oral two times a day  aspirin  chewable 81 milliGRAM(s) Oral daily  busPIRone 5 milliGRAM(s) Oral two times a day  dextrose 5%. 1000 milliLiter(s) IV Continuous <Continuous>  dextrose 5%. 1000 milliLiter(s) IV Continuous <Continuous>  dextrose 5%. 1000 milliLiter(s) IV Continuous <Continuous>  dextrose 5%. 1000 milliLiter(s) IV Continuous <Continuous>  dextrose 50% Injectable 25 Gram(s) IV Push once  dextrose 50% Injectable 12.5 Gram(s) IV Push once  dextrose 50% Injectable 25 Gram(s) IV Push once  dextrose Oral Gel 15 Gram(s) Oral once PRN  glucagon  Injectable 1 milliGRAM(s) IntraMuscular once  insulin lispro (ADMELOG) corrective regimen sliding scale   SubCutaneous three times a day before meals  insulin lispro (ADMELOG) corrective regimen sliding scale   SubCutaneous at bedtime  metoprolol succinate ER 25 milliGRAM(s) Oral daily  pantoprazole   Suspension 40 milliGRAM(s) Oral daily  polyethylene glycol 3350 17 Gram(s) Oral daily PRN  potassium chloride    Tablet ER 40 milliEquivalent(s) Oral once  senna 2 Tablet(s) Oral at bedtime  tamsulosin 0.8 milliGRAM(s) Oral at bedtime  torsemide 20 milliGRAM(s) Oral daily                            13.5   9.16  )-----------( 110      ( 01 May 2025 06:08 )             41.2       05-01    144  |  111[H]  |  56[H]  ----------------------------<  128[H]  3.6   |  26  |  1.80[H]    Ca    9.0      01 May 2025 06:08  Phos  3.5     05-01  Mg     2.6     05-01    TPro  5.9[L]  /  Alb  3.4  /  TBili  1.9[H]  /  DBili  x   /  AST  85[H]  /  ALT  395[H]  /  AlkPhos  68  05-01            T(C): 36.3 (05-01-25 @ 04:18), Max: 36.5 (04-30-25 @ 14:12)  HR: 85 (05-01-25 @ 04:18) (67 - 94)  BP: 94/50 (05-01-25 @ 04:20) (94/50 - 123/83)  RR: 19 (05-01-25 @ 04:18) (18 - 96)  SpO2: 96% (05-01-25 @ 04:18) (86% - 96%)  Wt(kg): --    I&O's Summary    30 Apr 2025 07:01  -  01 May 2025 07:00  --------------------------------------------------------  IN: 360 mL / OUT: 1221 mL / NET: -861 mL          Appearance: elderly man, comfortable  HEENT:   Normal oral mucosa, PERRL, EOMI	  Lymphatic: No lymphadenopathy , no edema  Cardiovascular: Normal S1 S2,  frequent ectopy, No murmurs , Peripheral pulses palpable 2+ bilaterally, ICD left upper chest.  Respiratory: Lungs clear to auscultation, normal effort 	  Gastrointestinal:  Soft, Non-tender, + BS	  Skin: No rashes, No ecchymoses, No cyanosis, warm to touch  Musculoskeletal: Normal range of motion, normal strength  Psychiatry:  Mood & affect appropriate    TELEMETRY: NSR with APCs, LBBB native QRS, some ventricular premature beats and NSVT.  Telemetry strips of sustained ventricular tachycardia, two morphologies, RBBB in V1.	    ECG:  Sustained VT, 187bpm.	  device check per EP lab:  episodes of sustained VT, some in 'monitor zone', one in VF zone that slowed after anti-tachycardia pacing but did not terminate.  Echo:  < from: TTE W or WO Ultrasound Enhancing Agent (03.20.25 @ 19:47) >  CONCLUSIONS:      1. Left ventricular cavity is severely dilated. Left ventricular wall thickness is mildly increased. Left ventricular systolic function is severely decreased with an ejection fraction visually estimated at 20 to 25 %.   2. Left atrium is moderately dilated.   3. Moderate mitral regurgitation.   4. There is no evidence of a left ventricular thrombus.    < end of copied text >  ** on apical echo views, the RV lead appears reach at least the distal RV cavity)    CXR:  Left upper chest ICD generator, dual coil / single lead system terminating in inferior RV.  Difficult to gauge if it is reaching the apex due to dilated cardiac contours.      ASSESSMENT/PLAN: Mr Dumas is an	89y Male brought in with sudden onset weakness, fatigue and shortness of breath while visiting his spouse who is at rehab.  He's been demonstrating nonsustained VT on telemetry, until ultimately having a sustained VT episode which resulted in transfer to ICU and amiodarone infusion.  He has a chronic dilated nonischemic cardiomyopathy... hard to justify if he is really in exacerbation right now, as there is no edema or hepatomegaly. His limbs are relatively warm.  He is on vasopressor support to balance the vasodilatory effect of sedatives, given for sundowning/agitation last night.  Awaiting improvement in mental status to reattempt an HPI with him.    out of ICU.  DNR/DNI.  requests no further procedural interventions.  no telemetry evidence of sustained (or brief) HR of 30bpm.  no ventricular pacing noted either.  confirmed from device interrogation/reprogramming: tachy therapy is OFF, but pacing therapy remains programmed at VVI - 40bpm.  there is frequent ectopy on telemetry, which may not create a *pulse* that the vitals cart can detect, however there is no absence of electrical activity that indicate a need for RV pacing.  continue general medical management for chronic CHF per Rx list above.  no EP interventions planned.  followup with [facility]/EP.    Maico King M.D.  Cardiac Electrophysiology    office [phone]  pager [phone]

## 2025-05-01 NOTE — PROGRESS NOTE ADULT - ASSESSMENT
89 y/o male with PMHx of CHF, HLD, HTN, prostate ca (s/p radioactive seed implantation in 2015) T2DM, PPM presents to ER c/o SOB. Pt states that he has had intermittent SOB    systolic HF  OP  OA  Atelectasis  Pleural Eff  Dyspnea  eval ACS  HTN  HLD  hx of Prostate Ca  DM  PPM    dnr dni  diuresis as per cardio and renal  EP follow up reviewed - ICD function is off - DNR DNI  plan for MYRA - SW follow up reviewed    fio2 wean  I apple  goal sat > 88 pct  trend Trops  eval ACS  Cardio eval  tele monitoring  replete lytes  I and O  cvs rx regimen optimization  TTE reviewed from prior visits - EF 20 pct  pleural eff - atelectasis - I apple - no indication for pleurocentesis at present  DM care - serial FS  GOC discussion

## 2025-05-01 NOTE — PROGRESS NOTE ADULT - SUBJECTIVE AND OBJECTIVE BOX
NEPHROLOGY INTERVAL HPI/OVERNIGHT EVENTS:  HPI:  89 y/o male with PMHx of CHF, HLD, HTN, prostate ca (s/p radioactive seed implantation in 2015) T2DM, PPM presents to ER c/o SOB. Pt states that he has had intermittent SOB for the past few days, Pt notes he was visiting his wife today at rehab and felt SOB as he was leaving. He sat down to catch his breath, a staff member noticed he did not look well and they decided to call EMS. Pt notes he has had SOB both at rest and with exertion. Pt denies chest pain, fever, cough. Of note, pt was admitted 1 month ago for CHF exacerbation and also 1 week ago at [facility]. Pt adds a bunch of his medications were changed including his Entresto was dc'ed, Farxiga dosage was doubled and his Lasix was switched to Torsemide. Pt currently on NC and denies any SOB. Pt denies fever, chills, chest pain, palpitations, abdominal pain, nausea, vomiting, diarrhea.    No acute events noted but he is c/o constipation.         PAST MEDICAL & SURGICAL HISTORY:  NICM (nonischemic cardiomyopathy)      HTN (hypertension)      HLD (hyperlipidemia)      Prostate CA      T2DM (type 2 diabetes mellitus)      CHF (congestive heart failure)      H/O prostate biopsy      H/O cataract extraction      H/O inguinal hernia repair      AICD (automatic cardioverter/defibrillator) present          MEDICATIONS  (STANDING):  albumin human 25% IVPB 100 milliLiter(s) IV Intermittent every 6 hours  aspirin Suppository 300 milliGRAM(s) Rectal daily  chlorhexidine 2% Cloths 1 Application(s) Topical <User Schedule>  dexMEDEtomidine Infusion 0.2 MICROgram(s)/kG/Hr (4.04 mL/Hr) IV Continuous <Continuous>  dextrose 5%. 1000 milliLiter(s) (50 mL/Hr) IV Continuous <Continuous>  dextrose 5%. 1000 milliLiter(s) (100 mL/Hr) IV Continuous <Continuous>  dextrose 5%. 1000 milliLiter(s) (50 mL/Hr) IV Continuous <Continuous>  dextrose 50% Injectable 25 Gram(s) IV Push once  dextrose 50% Injectable 12.5 Gram(s) IV Push once  dextrose 50% Injectable 25 Gram(s) IV Push once  glucagon  Injectable 1 milliGRAM(s) IntraMuscular once  heparin  Infusion.  Unit(s)/Hr (15 mL/Hr) IV Continuous <Continuous>  insulin lispro (ADMELOG) corrective regimen sliding scale   SubCutaneous every 6 hours  mupirocin 2% Nasal 1 Application(s) Both Nostrils two times a day  pantoprazole  Injectable 40 milliGRAM(s) IV Push daily  piperacillin/tazobactam IVPB.. 3.375 Gram(s) IV Intermittent every 12 hours  tamsulosin 0.8 milliGRAM(s) Oral at bedtime    MEDICATIONS  (PRN):  dextrose Oral Gel 15 Gram(s) Oral once PRN Blood Glucose LESS THAN 70 milliGRAM(s)/deciliter  heparin   Injectable 6500 Unit(s) IV Push every 6 hours PRN For aPTT less than 40  heparin   Injectable 3000 Unit(s) IV Push every 6 hours PRN For aPTT between 40 - 57      Allergies    No Known Allergies    Intolerances      Vital Signs Last 24 Hrs  T(C): 36.5 (01 May 2025 11:42), Max: 36.5 (30 Apr 2025 14:12)  T(F): 97.7 (01 May 2025 11:42), Max: 97.7 (30 Apr 2025 14:12)  HR: 99 (01 May 2025 12:05) (67 - 99)  BP: 107/64 (01 May 2025 12:05) (94/50 - 123/83)  BP(mean): --  RR: 18 (01 May 2025 11:42) (18 - 96)  SpO2: 96% (01 May 2025 11:42) (86% - 96%)    Parameters below as of 01 May 2025 11:42  Patient On (Oxygen Delivery Method): room air        PHYSICAL EXAM:    GENERAL: Elderly male, in no acute distress, comfortable   HEAD:  Atraumatic, Normocephalic  NERVOUS SYSTEM:  Awake  CHEST/LUNG: reduced BS bilaterally  SKIN: No rashes or lesions        LABS:                          13.5   9.16  )-----------( 110      ( 01 May 2025 06:08 )             41.2     05-01    144  |  111[H]  |  56[H]  ----------------------------<  128[H]  3.6   |  26  |  1.80[H]    Ca    9.0      01 May 2025 06:08  Phos  3.5     05-01  Mg     2.6     05-01    TPro  5.9[L]  /  Alb  3.4  /  TBili  1.9[H]  /  DBili  x   /  AST  85[H]  /  ALT  395[H]  /  AlkPhos  68  05-01    LIVER FUNCTIONS - ( 01 May 2025 06:08 )  Alb: 3.4 g/dL / Pro: 5.9 g/dL / ALK PHOS: 68 U/L / ALT: 395 U/L / AST: 85 U/L / GGT: x             Urinalysis Basic - ( 01 May 2025 06:08 )    Color: x / Appearance: x / SG: x / pH: x  Gluc: 128 mg/dL / Ketone: x  / Bili: x / Urobili: x   Blood: x / Protein: x / Nitrite: x   Leuk Esterase: x / RBC: x / WBC x   Sq Epi: x / Non Sq Epi: x / Bacteria: x

## 2025-05-01 NOTE — PROGRESS NOTE ADULT - ASSESSMENT
HANNAH on CKD 3  CHF EF 20-25%  Dyspnea   HTN     -Baseline Creatinine 1.5  -HANNAH Likely 2/2 Decreased EABV, ATN  -Urine indices reviewed  -S/p Albumin 100mg q6hrs x2   -Renal function is improving well  -Hypernatremia is improved s/p D5W, Na is normal range now   -No indication for RRT. Volume and lytes acceptable and with UOP will hold off for now  -Can continue lasix as needed  -Is c/o constipation, consider more aggressive bowel regimen    Renally stable for DC planning; follow up outpatient

## 2025-05-01 NOTE — PROGRESS NOTE ADULT - ASSESSMENT
Transaminitis  NSVT  A-fib RVR  CHF exacerbation  Pulmonary edema  PMHx of CHF, HLD, HTN, prostate ca (s/p radioactive seed implantation in 2015)   Hx T2DM  Hx PPM admitted for acute on chronic HFrEF    4/24 US abdomen RUQ: Distended gallbladder with wall thickening and edema without evidence of cholelithiasis.  4/24 HIDA negative    Plan:  - LFTs noted  - Trend LFT, trending down  - Suspect shock liver from CHF exacerbation/pulmonary edema  - US noted  - HIDA neg  - Follow surgery recs  - Avoid hepatotoxic medications  - D/c plan once medically cleared

## 2025-05-01 NOTE — PROGRESS NOTE ADULT - SUBJECTIVE AND OBJECTIVE BOX
Date/Time Patient Seen:  		  Referring MD:   Data Reviewed	       Patient is a 89y old  Male who presents with a chief complaint of SOB (30 Apr 2025 17:03)      Subjective/HPI     PAST MEDICAL & SURGICAL HISTORY:  NICM (nonischemic cardiomyopathy)    HTN (hypertension)    HLD (hyperlipidemia)    Prostate CA    T2DM (type 2 diabetes mellitus)    CHF (congestive heart failure)    H/O prostate biopsy    H/O cataract extraction    H/O inguinal hernia repair    AICD (automatic cardioverter/defibrillator) present          Medication list         MEDICATIONS  (STANDING):  apixaban 2.5 milliGRAM(s) Oral two times a day  aspirin  chewable 81 milliGRAM(s) Oral daily  busPIRone 5 milliGRAM(s) Oral two times a day  dextrose 5%. 1000 milliLiter(s) (50 mL/Hr) IV Continuous <Continuous>  dextrose 5%. 1000 milliLiter(s) (100 mL/Hr) IV Continuous <Continuous>  dextrose 5%. 1000 milliLiter(s) (50 mL/Hr) IV Continuous <Continuous>  dextrose 5%. 1000 milliLiter(s) (75 mL/Hr) IV Continuous <Continuous>  dextrose 50% Injectable 25 Gram(s) IV Push once  dextrose 50% Injectable 12.5 Gram(s) IV Push once  dextrose 50% Injectable 25 Gram(s) IV Push once  glucagon  Injectable 1 milliGRAM(s) IntraMuscular once  insulin lispro (ADMELOG) corrective regimen sliding scale   SubCutaneous three times a day before meals  insulin lispro (ADMELOG) corrective regimen sliding scale   SubCutaneous at bedtime  metoprolol succinate ER 25 milliGRAM(s) Oral daily  pantoprazole   Suspension 40 milliGRAM(s) Oral daily  senna 2 Tablet(s) Oral at bedtime  tamsulosin 0.8 milliGRAM(s) Oral at bedtime  torsemide 20 milliGRAM(s) Oral daily    MEDICATIONS  (PRN):  ALPRAZolam 0.25 milliGRAM(s) Oral daily PRN anxitey  dextrose Oral Gel 15 Gram(s) Oral once PRN Blood Glucose LESS THAN 70 milliGRAM(s)/deciliter  polyethylene glycol 3350 17 Gram(s) Oral daily PRN Constipation         Vitals log        ICU Vital Signs Last 24 Hrs  T(C): 36.3 (01 May 2025 04:18), Max: 36.5 (30 Apr 2025 14:12)  T(F): 97.4 (01 May 2025 04:18), Max: 97.7 (30 Apr 2025 14:12)  HR: 85 (01 May 2025 04:18) (67 - 94)  BP: 94/50 (01 May 2025 04:20) (94/50 - 123/83)  BP(mean): --  ABP: --  ABP(mean): --  RR: 19 (01 May 2025 04:18) (18 - 96)  SpO2: 96% (01 May 2025 04:18) (86% - 96%)    O2 Parameters below as of 01 May 2025 04:18  Patient On (Oxygen Delivery Method): nasal cannula  O2 Flow (L/min): 4               Input and Output:  I&O's Detail    29 Apr 2025 07:01  -  30 Apr 2025 07:00  --------------------------------------------------------  IN:  Total IN: 0 mL    OUT:    Indwelling Catheter - Urethral (mL): 300 mL    Voided (mL): 1400 mL  Total OUT: 1700 mL    Total NET: -1700 mL      30 Apr 2025 07:01  -  01 May 2025 06:44  --------------------------------------------------------  IN:    Oral Fluid: 360 mL  Total IN: 360 mL    OUT:    Voided (mL): 1221 mL  Total OUT: 1221 mL    Total NET: -861 mL          Lab Data                        13.8   9.86  )-----------( 116      ( 30 Apr 2025 06:00 )             41.9     04-30    143  |  110[H]  |  60[H]  ----------------------------<  130[H]  4.1   |  24  |  1.90[H]    Ca    9.0      30 Apr 2025 06:00  Phos  3.4     04-29  Mg     2.9     04-29              Review of Systems	      Objective     Physical Examination    heart s1s2  lung dc bs  head nc  head at      Pertinent Lab findings & Imaging      Amberly:  NO   Adequate UO     I&O's Detail    29 Apr 2025 07:01  -  30 Apr 2025 07:00  --------------------------------------------------------  IN:  Total IN: 0 mL    OUT:    Indwelling Catheter - Urethral (mL): 300 mL    Voided (mL): 1400 mL  Total OUT: 1700 mL    Total NET: -1700 mL      30 Apr 2025 07:01  -  01 May 2025 06:44  --------------------------------------------------------  IN:    Oral Fluid: 360 mL  Total IN: 360 mL    OUT:    Voided (mL): 1221 mL  Total OUT: 1221 mL    Total NET: -861 mL               Discussed with:     Cultures:	        Radiology

## 2025-05-01 NOTE — PROGRESS NOTE ADULT - SUBJECTIVE AND OBJECTIVE BOX
Patient seen and examined at bedside. States he is feeling fine. States he feels better today compared ot yesterday. Denies any chest pain, SOB, abd pain at this time. States he would like to go to [facility] today so he can see his wife (wife currently at [facility]). Patient evaluated by EP- bradycardia episode yesterday likely not real, no objection to D/C. Patient remains high risk for readmission given multiple comorbidities. Discussed plan of care with daughter at bedside, agreeable with D/C to [facility] today.    Physical Exam:  GENERAL: NAD, sitting in chair comfortably on RA  HEENT: NCAT, conjunctiva and sclera clear, Moist mucous membranes  CHEST/LUNG: Clear to auscultation bilaterally; No rales, rhonchi, wheezing, or rubs. Unlabored respirations  HEART: Regular rate and rhythm; S1S2  ABDOMEN: Bowel sounds present; Soft, Nontender, Nondistended.  EXTREMITIES:  No leg edema b/l  NERVOUS SYSTEM:  awake and alert, speech clear, answers questions appropriately and follows commands  SKIN: Warm, dry    Please refer to updated D/C note for further details